# Patient Record
Sex: FEMALE | Race: WHITE | NOT HISPANIC OR LATINO | Employment: OTHER
[De-identification: names, ages, dates, MRNs, and addresses within clinical notes are randomized per-mention and may not be internally consistent; named-entity substitution may affect disease eponyms.]

---

## 2017-06-24 ENCOUNTER — HEALTH MAINTENANCE LETTER (OUTPATIENT)
Age: 42
End: 2017-06-24

## 2017-10-11 ENCOUNTER — COMMUNICATION - HEALTHEAST (OUTPATIENT)
Dept: FAMILY MEDICINE | Facility: CLINIC | Age: 42
End: 2017-10-11

## 2017-10-11 DIAGNOSIS — F33.9 MAJOR DEPRESSION, RECURRENT (H): ICD-10-CM

## 2017-11-22 ENCOUNTER — OFFICE VISIT - HEALTHEAST (OUTPATIENT)
Dept: FAMILY MEDICINE | Facility: CLINIC | Age: 42
End: 2017-11-22

## 2017-11-22 DIAGNOSIS — R11.10 VOMITING: ICD-10-CM

## 2017-11-22 DIAGNOSIS — F33.9 MAJOR DEPRESSION, RECURRENT (H): ICD-10-CM

## 2017-11-22 DIAGNOSIS — K92.1: ICD-10-CM

## 2017-11-23 ENCOUNTER — COMMUNICATION - HEALTHEAST (OUTPATIENT)
Dept: FAMILY MEDICINE | Facility: CLINIC | Age: 42
End: 2017-11-23

## 2017-11-24 LAB
GLIADIN IGA SER-ACNC: 0.2 U/ML
GLIADIN IGG SER-ACNC: <0.4 U/ML
IGA SERPL-MCNC: 71 MG/DL (ref 65–400)
TTG IGA SER-ACNC: 0.1 U/ML
TTG IGG SER-ACNC: <0.6 U/ML

## 2017-11-27 ENCOUNTER — AMBULATORY - HEALTHEAST (OUTPATIENT)
Dept: FAMILY MEDICINE | Facility: CLINIC | Age: 42
End: 2017-11-27

## 2017-11-27 ENCOUNTER — COMMUNICATION - HEALTHEAST (OUTPATIENT)
Dept: FAMILY MEDICINE | Facility: CLINIC | Age: 42
End: 2017-11-27

## 2017-11-27 DIAGNOSIS — K92.1 BLOOD IN STOOL: ICD-10-CM

## 2017-12-06 ENCOUNTER — OFFICE VISIT - HEALTHEAST (OUTPATIENT)
Dept: SURGERY | Facility: CLINIC | Age: 42
End: 2017-12-06

## 2017-12-06 DIAGNOSIS — R13.10 DYSPHAGIA, UNSPECIFIED TYPE: ICD-10-CM

## 2017-12-06 DIAGNOSIS — F17.200 SMOKING: ICD-10-CM

## 2017-12-06 DIAGNOSIS — R11.11 INTRACTABLE VOMITING WITHOUT NAUSEA, UNSPECIFIED VOMITING TYPE: ICD-10-CM

## 2017-12-06 ASSESSMENT — MIFFLIN-ST. JEOR: SCORE: 1651.05

## 2017-12-27 ASSESSMENT — MIFFLIN-ST. JEOR: SCORE: 1651.05

## 2017-12-28 ENCOUNTER — SURGERY - HEALTHEAST (OUTPATIENT)
Dept: SURGERY | Facility: AMBULATORY SURGERY CENTER | Age: 42
End: 2017-12-28

## 2017-12-28 ENCOUNTER — ANESTHESIA - HEALTHEAST (OUTPATIENT)
Dept: SURGERY | Facility: AMBULATORY SURGERY CENTER | Age: 42
End: 2017-12-28

## 2017-12-28 ENCOUNTER — TRANSFERRED RECORDS (OUTPATIENT)
Dept: HEALTH INFORMATION MANAGEMENT | Facility: CLINIC | Age: 42
End: 2017-12-28

## 2018-01-04 ENCOUNTER — COMMUNICATION - HEALTHEAST (OUTPATIENT)
Dept: FAMILY MEDICINE | Facility: CLINIC | Age: 43
End: 2018-01-04

## 2018-01-09 ENCOUNTER — COMMUNICATION - HEALTHEAST (OUTPATIENT)
Dept: SURGERY | Facility: CLINIC | Age: 43
End: 2018-01-09

## 2018-01-24 ENCOUNTER — COMMUNICATION - HEALTHEAST (OUTPATIENT)
Dept: FAMILY MEDICINE | Facility: CLINIC | Age: 43
End: 2018-01-24

## 2018-01-24 DIAGNOSIS — F33.9 MAJOR DEPRESSION, RECURRENT (H): ICD-10-CM

## 2018-01-31 ENCOUNTER — TRANSFERRED RECORDS (OUTPATIENT)
Dept: HEALTH INFORMATION MANAGEMENT | Facility: CLINIC | Age: 43
End: 2018-01-31

## 2018-01-31 ENCOUNTER — OFFICE VISIT - HEALTHEAST (OUTPATIENT)
Dept: FAMILY MEDICINE | Facility: CLINIC | Age: 43
End: 2018-01-31

## 2018-01-31 DIAGNOSIS — R10.11 RUQ PAIN: ICD-10-CM

## 2018-01-31 LAB
ALBUMIN SERPL-MCNC: 3.5 G/DL (ref 3.5–5)
ALP SERPL-CCNC: 84 U/L (ref 45–120)
ALT SERPL W P-5'-P-CCNC: 18 U/L (ref 0–45)
ANION GAP SERPL CALCULATED.3IONS-SCNC: 11 MMOL/L (ref 5–18)
AST SERPL W P-5'-P-CCNC: 21 U/L (ref 0–40)
BASOPHILS # BLD AUTO: 0 THOU/UL (ref 0–0.2)
BASOPHILS NFR BLD AUTO: 1 % (ref 0–2)
BILIRUB SERPL-MCNC: 0.7 MG/DL (ref 0–1)
BUN SERPL-MCNC: 8 MG/DL (ref 8–22)
CALCIUM SERPL-MCNC: 8.9 MG/DL (ref 8.5–10.5)
CHLORIDE BLD-SCNC: 104 MMOL/L (ref 98–107)
CO2 SERPL-SCNC: 24 MMOL/L (ref 22–31)
CREAT SERPL-MCNC: 0.74 MG/DL (ref 0.6–1.1)
EOSINOPHIL # BLD AUTO: 0.1 THOU/UL (ref 0–0.4)
EOSINOPHIL NFR BLD AUTO: 3 % (ref 0–6)
ERYTHROCYTE [DISTWIDTH] IN BLOOD BY AUTOMATED COUNT: 11 % (ref 11–14.5)
GFR SERPL CREATININE-BSD FRML MDRD: >60 ML/MIN/1.73M2
GLUCOSE BLD-MCNC: 92 MG/DL (ref 70–125)
HCT VFR BLD AUTO: 44.8 % (ref 35–47)
HGB BLD-MCNC: 15.4 G/DL (ref 12–16)
LYMPHOCYTES # BLD AUTO: 1.6 THOU/UL (ref 0.8–4.4)
LYMPHOCYTES NFR BLD AUTO: 30 % (ref 20–40)
MCH RBC QN AUTO: 32.2 PG (ref 27–34)
MCHC RBC AUTO-ENTMCNC: 34.4 G/DL (ref 32–36)
MCV RBC AUTO: 94 FL (ref 80–100)
MONOCYTES # BLD AUTO: 0.5 THOU/UL (ref 0–0.9)
MONOCYTES NFR BLD AUTO: 9 % (ref 2–10)
NEUTROPHILS # BLD AUTO: 3.2 THOU/UL (ref 2–7.7)
NEUTROPHILS NFR BLD AUTO: 58 % (ref 50–70)
PLATELET # BLD AUTO: 249 THOU/UL (ref 140–440)
PMV BLD AUTO: 7.8 FL (ref 7–10)
POTASSIUM BLD-SCNC: 3.9 MMOL/L (ref 3.5–5)
PROT SERPL-MCNC: 6.3 G/DL (ref 6–8)
RBC # BLD AUTO: 4.78 MILL/UL (ref 3.8–5.4)
SODIUM SERPL-SCNC: 139 MMOL/L (ref 136–145)
WBC: 5.5 THOU/UL (ref 4–11)

## 2018-01-31 ASSESSMENT — MIFFLIN-ST. JEOR: SCORE: 1655.59

## 2018-02-01 ENCOUNTER — AMBULATORY - HEALTHEAST (OUTPATIENT)
Dept: FAMILY MEDICINE | Facility: CLINIC | Age: 43
End: 2018-02-01

## 2018-02-01 ENCOUNTER — TRANSFERRED RECORDS (OUTPATIENT)
Dept: HEALTH INFORMATION MANAGEMENT | Facility: CLINIC | Age: 43
End: 2018-02-01

## 2018-02-01 DIAGNOSIS — R10.11 RUQ PAIN: ICD-10-CM

## 2018-02-02 ENCOUNTER — AMBULATORY - HEALTHEAST (OUTPATIENT)
Dept: FAMILY MEDICINE | Facility: CLINIC | Age: 43
End: 2018-02-02

## 2018-02-02 DIAGNOSIS — R10.11 RUQ PAIN: ICD-10-CM

## 2018-02-07 ENCOUNTER — TRANSFERRED RECORDS (OUTPATIENT)
Dept: HEALTH INFORMATION MANAGEMENT | Facility: CLINIC | Age: 43
End: 2018-02-07

## 2018-02-08 ENCOUNTER — COMMUNICATION - HEALTHEAST (OUTPATIENT)
Dept: FAMILY MEDICINE | Facility: CLINIC | Age: 43
End: 2018-02-08

## 2018-02-12 ENCOUNTER — MEDICAL CORRESPONDENCE (OUTPATIENT)
Dept: HEALTH INFORMATION MANAGEMENT | Facility: CLINIC | Age: 43
End: 2018-02-12

## 2018-02-21 ENCOUNTER — COMMUNICATION - HEALTHEAST (OUTPATIENT)
Dept: FAMILY MEDICINE | Facility: CLINIC | Age: 43
End: 2018-02-21

## 2018-02-28 ENCOUNTER — CARE COORDINATION (OUTPATIENT)
Dept: GASTROENTEROLOGY | Facility: CLINIC | Age: 43
End: 2018-02-28

## 2018-02-28 ENCOUNTER — COMMUNICATION - HEALTHEAST (OUTPATIENT)
Dept: FAMILY MEDICINE | Facility: CLINIC | Age: 43
End: 2018-02-28

## 2018-02-28 NOTE — PROGRESS NOTES
Received a  Transferred call from triage line from Dr. Hardy [facility]. Provider is requesting an urgent appt for pt to be seen due increased pain.  Pt scheduled to see Dr. Dubois. Pt has dilatated biliary duct. No records have been received.  Recommended that provider call the provider to provider to discuss pt.  Dr. Hardy talked to Dr. Piña and Dr. Piña has contacted his care coordinator.  Did call the clinic back to request records as have not received and also to have images pushed.  Arleen Piña's care coordinator notified.

## 2018-02-28 NOTE — PROGRESS NOTES
2/28/2018: New Referral from Dr. Hardy - [facility]    Per Dr. Piña- MD to MD communication:  Called by primary MD in [facility] (Dr Hardy) re RUQ pain, MRCP showing diffusely dilated CBD (11-14 mm). Normal LFTs.     Would suggest EUS here w , upload MRCP,  clinic w . 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 Seems unlikely.    2/28/2018: Per Dr. Song: Happy to see pt in clinic and do EUS in future    - Referring office called to get images of MRCP and records sent to us for review. Per Dr. Hardy the images and records will be sent. Film called to grab images.     Arleen CHACON RN Coordinator  Dr. Piña, Dr. Valencia & Dr. Song   [facility]  [phone]

## 2018-03-01 ENCOUNTER — COMMUNICATION - HEALTHEAST (OUTPATIENT)
Dept: FAMILY MEDICINE | Facility: CLINIC | Age: 43
End: 2018-03-01

## 2018-03-02 ENCOUNTER — CARE COORDINATION (OUTPATIENT)
Dept: GASTROENTEROLOGY | Facility: CLINIC | Age: 43
End: 2018-03-02

## 2018-03-02 NOTE — PROGRESS NOTES
MRI in PACS and Hard copy chart placed in clinic file.     Arleen CHACON RN Coordinator  Dr. Piña, Dr. Valencia & Dr. Song   [facility]  [phone]

## 2018-03-02 NOTE — PROGRESS NOTES
Message received from call center:     Pt stated her referring provider (Dr Hardy, [facility]) called and spoke to Dr Piña. Pt stated that Dr Piña recommended pt have an EUS prior to her GI appt here on Monday, 3/19/18 at 9:20am with Dr. Dubois. Pt was following up since she has not heard anything yet.     Pt can be reached at [phone]         Called back and explained she will need to see Dr. Song in clinic prior to EUS.   Scheduling will call her next week with date and time.   Verbalized understanding.     Arleen CHACON RN Coordinator  Dr. Piña, Dr. Valencia & Dr. Song   [facility]  [phone]

## 2018-03-06 ENCOUNTER — TELEPHONE (OUTPATIENT)
Dept: GASTROENTEROLOGY | Facility: CLINIC | Age: 43
End: 2018-03-06

## 2018-03-06 NOTE — TELEPHONE ENCOUNTER
Pt informed that she is scheduled on 04/05/2018 at 830 AM with Dr. Adhikari for a clinic consult.    Location of appt confirmed with pt.    SR 03/06/2018 @ 401

## 2018-03-12 ENCOUNTER — COMMUNICATION - HEALTHEAST (OUTPATIENT)
Dept: SCHEDULING | Facility: CLINIC | Age: 43
End: 2018-03-12

## 2018-03-12 ENCOUNTER — OFFICE VISIT - HEALTHEAST (OUTPATIENT)
Dept: FAMILY MEDICINE | Facility: CLINIC | Age: 43
End: 2018-03-12

## 2018-03-12 DIAGNOSIS — R60.0 MILD PERIPHERAL EDEMA: ICD-10-CM

## 2018-03-13 ENCOUNTER — OFFICE VISIT - HEALTHEAST (OUTPATIENT)
Dept: FAMILY MEDICINE | Facility: CLINIC | Age: 43
End: 2018-03-13

## 2018-03-13 DIAGNOSIS — M25.40 JOINT SWELLING: ICD-10-CM

## 2018-03-13 DIAGNOSIS — M13.0 POLYARTHRITIS: ICD-10-CM

## 2018-03-13 DIAGNOSIS — R53.83 FATIGUE, UNSPECIFIED TYPE: ICD-10-CM

## 2018-03-13 LAB
ALBUMIN SERPL-MCNC: 3.5 G/DL (ref 3.5–5)
ALP SERPL-CCNC: 92 U/L (ref 45–120)
ALT SERPL W P-5'-P-CCNC: 46 U/L (ref 0–45)
ANION GAP SERPL CALCULATED.3IONS-SCNC: 11 MMOL/L (ref 5–18)
AST SERPL W P-5'-P-CCNC: 45 U/L (ref 0–40)
BASOPHILS # BLD AUTO: 0.1 THOU/UL (ref 0–0.2)
BASOPHILS NFR BLD AUTO: 1 % (ref 0–2)
BILIRUB SERPL-MCNC: 0.8 MG/DL (ref 0–1)
BUN SERPL-MCNC: 9 MG/DL (ref 8–22)
C REACTIVE PROTEIN LHE: 0.7 MG/DL (ref 0–0.8)
CALCIUM SERPL-MCNC: 9 MG/DL (ref 8.5–10.5)
CCP AB SER IA-ACNC: 0.5 U/ML
CHLORIDE BLD-SCNC: 107 MMOL/L (ref 98–107)
CO2 SERPL-SCNC: 21 MMOL/L (ref 22–31)
CREAT SERPL-MCNC: 0.69 MG/DL (ref 0.6–1.1)
EOSINOPHIL COUNT (ABSOLUTE): 0.3 THOU/UL (ref 0–0.4)
EOSINOPHIL NFR BLD AUTO: 4 % (ref 0–6)
ERYTHROCYTE [DISTWIDTH] IN BLOOD BY AUTOMATED COUNT: 12.5 % (ref 11–14.5)
ERYTHROCYTE [SEDIMENTATION RATE] IN BLOOD BY WESTERGREN METHOD: 8 MM/HR (ref 0–20)
GFR SERPL CREATININE-BSD FRML MDRD: >60 ML/MIN/1.73M2
GLUCOSE BLD-MCNC: 94 MG/DL (ref 70–125)
HCT VFR BLD AUTO: 41.8 % (ref 35–47)
HGB BLD-MCNC: 14.2 G/DL (ref 12–16)
LYMPHOCYTES # BLD AUTO: 1.4 THOU/UL (ref 0.8–4.4)
LYMPHOCYTES NFR BLD AUTO: 19 % (ref 20–40)
MCH RBC QN AUTO: 31 PG (ref 27–34)
MCHC RBC AUTO-ENTMCNC: 34 G/DL (ref 32–36)
MCV RBC AUTO: 91 FL (ref 80–100)
MONOCYTES # BLD AUTO: 0.2 THOU/UL (ref 0–0.9)
MONOCYTES NFR BLD AUTO: 3 % (ref 2–10)
MYELOCYTES (ABSOLUTE): 0 THOU/UL
MYELOCYTES NFR BLD MANUAL: 1 %
OVALOCYTES: ABNORMAL
PLAT MORPH BLD: NORMAL
PLATELET # BLD AUTO: 335 THOU/UL (ref 140–440)
PMV BLD AUTO: 10.7 FL (ref 8.5–12.5)
POTASSIUM BLD-SCNC: 4.2 MMOL/L (ref 3.5–5)
PROT SERPL-MCNC: 6.1 G/DL (ref 6–8)
RBC # BLD AUTO: 4.58 MILL/UL (ref 3.8–5.4)
RHEUMATOID FACT SERPL-ACNC: <15 IU/ML (ref 0–30)
SODIUM SERPL-SCNC: 139 MMOL/L (ref 136–145)
TOTAL NEUTROPHILS-ABS(DIFF): 5.5 THOU/UL (ref 2–7.7)
TOTAL NEUTROPHILS-REL(DIFF): 73 % (ref 50–70)
TSH SERPL DL<=0.005 MIU/L-ACNC: 2.84 UIU/ML (ref 0.3–5)
URATE SERPL-MCNC: 3.8 MG/DL (ref 2–7.5)
WBC: 7.6 THOU/UL (ref 4–11)

## 2018-03-15 ENCOUNTER — COMMUNICATION - HEALTHEAST (OUTPATIENT)
Dept: FAMILY MEDICINE | Facility: CLINIC | Age: 43
End: 2018-03-15

## 2018-03-15 ENCOUNTER — TELEPHONE (OUTPATIENT)
Dept: SURGERY | Facility: CLINIC | Age: 43
End: 2018-03-15

## 2018-03-15 LAB — ANA SER QL: 0.2 U

## 2018-03-19 ENCOUNTER — RECORDS - HEALTHEAST (OUTPATIENT)
Dept: ADMINISTRATIVE | Facility: OTHER | Age: 43
End: 2018-03-19

## 2018-03-19 ENCOUNTER — APPOINTMENT (OUTPATIENT)
Dept: LAB | Facility: CLINIC | Age: 43
End: 2018-03-19
Payer: MEDICARE

## 2018-03-19 ENCOUNTER — OFFICE VISIT (OUTPATIENT)
Dept: GASTROENTEROLOGY | Facility: CLINIC | Age: 43
End: 2018-03-19
Payer: MEDICARE

## 2018-03-19 VITALS
TEMPERATURE: 97.8 F | HEART RATE: 78 BPM | SYSTOLIC BLOOD PRESSURE: 112 MMHG | HEIGHT: 68 IN | OXYGEN SATURATION: 95 % | WEIGHT: 220 LBS | BODY MASS INDEX: 33.34 KG/M2 | DIASTOLIC BLOOD PRESSURE: 72 MMHG

## 2018-03-19 DIAGNOSIS — K59.00 CONSTIPATION, UNSPECIFIED CONSTIPATION TYPE: ICD-10-CM

## 2018-03-19 DIAGNOSIS — G43.A0 CYCLICAL VOMITING WITH NAUSEA, INTRACTABILITY OF VOMITING NOT SPECIFIED: Primary | ICD-10-CM

## 2018-03-19 DIAGNOSIS — R10.11 ABDOMINAL PAIN, RIGHT UPPER QUADRANT: ICD-10-CM

## 2018-03-19 LAB
ALBUMIN SERPL-MCNC: 3.5 G/DL (ref 3.4–5)
ALP SERPL-CCNC: 89 U/L (ref 40–150)
ALT SERPL W P-5'-P-CCNC: 35 U/L (ref 0–50)
AST SERPL W P-5'-P-CCNC: 28 U/L (ref 0–45)
BILIRUB DIRECT SERPL-MCNC: 0.1 MG/DL (ref 0–0.2)
BILIRUB SERPL-MCNC: 0.5 MG/DL (ref 0.2–1.3)
IGA SERPL-MCNC: 88 MG/DL (ref 70–380)
PROT SERPL-MCNC: 6.6 G/DL (ref 6.8–8.8)

## 2018-03-19 PROCEDURE — 83516 IMMUNOASSAY NONANTIBODY: CPT | Performed by: INTERNAL MEDICINE

## 2018-03-19 PROCEDURE — 82784 ASSAY IGA/IGD/IGG/IGM EACH: CPT | Performed by: INTERNAL MEDICINE

## 2018-03-19 ASSESSMENT — ENCOUNTER SYMPTOMS
JOINT SWELLING: 1
NECK PAIN: 0
HALLUCINATIONS: 0
ALTERED TEMPERATURE REGULATION: 1
NECK MASS: 0
POLYDIPSIA: 0
EXERCISE INTOLERANCE: 1
SMELL DISTURBANCE: 0
HOARSE VOICE: 0
JAUNDICE: 0
RECTAL PAIN: 0
POSTURAL DYSPNEA: 0
SLEEP DISTURBANCES DUE TO BREATHING: 0
SHORTNESS OF BREATH: 0
HEARTBURN: 1
TASTE DISTURBANCE: 0
FATIGUE: 1
MUSCLE CRAMPS: 1
TROUBLE SWALLOWING: 1
CONSTIPATION: 0
FEVER: 1
ARTHRALGIAS: 1
CHILLS: 1
BACK PAIN: 1
SORE THROAT: 1
BLOOD IN STOOL: 1
ABDOMINAL PAIN: 1
LEG PAIN: 0
HYPERTENSION: 0
LIGHT-HEADEDNESS: 1
MYALGIAS: 1
WHEEZING: 0
PALPITATIONS: 0
COUGH: 1
HYPOTENSION: 0
MUSCLE WEAKNESS: 1
STIFFNESS: 1
INCREASED ENERGY: 0
HEMOPTYSIS: 0
BOWEL INCONTINENCE: 0
VOMITING: 1
POLYPHAGIA: 0
SNORES LOUDLY: 1
SINUS PAIN: 1
BLOATING: 1
SPUTUM PRODUCTION: 1
DYSPNEA ON EXERTION: 0
DECREASED APPETITE: 1
SWOLLEN GLANDS: 1
WEIGHT GAIN: 1
NIGHT SWEATS: 1
ORTHOPNEA: 0
COUGH DISTURBING SLEEP: 1
DIARRHEA: 0
NAUSEA: 1
BRUISES/BLEEDS EASILY: 0
SYNCOPE: 0
WEIGHT LOSS: 0
SINUS CONGESTION: 1

## 2018-03-19 ASSESSMENT — PAIN SCALES - GENERAL: PAINLEVEL: NO PAIN (0)

## 2018-03-19 NOTE — PROGRESS NOTES
Note dictated. Job code [id number].    Tam Dubois MD    [facility]  Division of Gastroenterology, Hepatology and Nutrition    Answers for HPI/ROS submitted by the patient on 3/19/2018   General Symptoms: Yes  Skin Symptoms: Yes  HENT Symptoms: Yes  EYE SYMPTOMS: No  HEART SYMPTOMS: Yes  LUNG SYMPTOMS: Yes  INTESTINAL SYMPTOMS: Yes  URINARY SYMPTOMS: No  GYNECOLOGIC SYMPTOMS: No  BREAST SYMPTOMS: No  SKELETAL SYMPTOMS: Yes  BLOOD SYMPTOMS: Yes  NERVOUS SYSTEM SYMPTOMS: No  MENTAL HEALTH SYMPTOMS: No  Fever: Yes  Loss of appetite: Yes  Weight loss: No  Weight gain: Yes  Fatigue: Yes  Night sweats: Yes  Chills: Yes  Increased stress: No  Excessive hunger: No  Excessive thirst: No  Feeling hot or cold when others believe the temperature is normal: Yes  Loss of height: No  Post-operative complications: No  Surgical site pain: No  Hallucinations: No  Change in or Loss of Energy: No  Hyperactivity: No  Confusion: No  Ear pain: No  Ear discharge: No  Tinnitus: No  Nosebleeds: No  Congestion: Yes  Sinus pain: Yes  Trouble swallowing: Yes   Voice hoarseness: No  Mouth sores: No  Sore throat: Yes  Tooth pain: No  Gum tenderness: No  Bleeding gums: No  Change in taste: No  Change in sense of smell: No  Dry mouth: No  Hearing aid used: No  Neck lump: No  Cough: Yes  Sputum or phlegm: Yes  Coughing up blood: No  Difficulty breating or shortness of breath: No  Snoring: Yes  Wheezing: No  Difficulty breathing on exertion: No  Nighttime Cough: Yes  Difficulty breathing when lying flat: No  Chest pain or pressure: No  Fast or irregular heartbeat: No  Pain in legs with walking: No  Trouble breathing while lying down: No  Fingers or toes appear blue: No  High blood pressure: No  Low blood pressure: No  Fainting: No  Murmurs: No  Pacemaker: No  Varicose veins: No  Edema or swelling: Yes  Wake up at night with shortness of breath: No  Light-headedness: Yes  Exercise intolerance: Yes  Heart burn or  indigestion: Yes  Nausea: Yes  Vomiting: Yes  Abdominal pain: Yes  Bloating: Yes  Constipation: No  Diarrhea: No  Blood in stool: Yes  Black stools: No  Rectal or Anal pain: No  Fecal incontinence: No  Yellowing of skin or eyes: No  Vomit with blood: Yes  Back pain: Yes  Muscle aches: Yes  Neck pain: No  Swollen joints: Yes  Joint pain: Yes  Bone pain: No  Muscle cramps: Yes  Muscle weakness: Yes  Joint stiffness: Yes  Bone fracture: No  Anemia: No  Swollen glands: Yes  Easy bleeding or bruising: No

## 2018-03-19 NOTE — NURSING NOTE
Printed  after visit summary given to pt along with verbal instructions on gerd precautions and other recommendations. Follow up appt and mre scheduled.

## 2018-03-19 NOTE — PATIENT INSTRUCTIONS
Nice to meet you today!    Check some liver tests today  Lab tests today at the 1st floor -- you will be notified of results by letter or my chart message in 7-10 days.  You will receive a phone call if more urgent follow up is needed.      Start miralax 1 capful per day. Increase or decrease this to have 1-2 soft BMs per day. You can take as much as 3 capfuls per day.    Schedule MR enterography to look at your small bowel  You are scheduled on March 20   Check in time 10   Nothing to eat or drink for 6 hours   [address]  Room [address]327       I will discuss if Dr. Adhikari still wants to see you in clinic first or if we can get the EUS (endoscopic ultrasound) done sooner    Follow anti-reflux precautions. But remember alcohol, smoking (tobacoc and marijuana), caffeine, soda pop can make this worse. Recommend avoiding all of these.    Follow up in 3-4 months or sooner if needed      Call with questions    For questions regarding your care Monday through Friday, contact the RN GI care coordinator,  Call   [phone] option 3 . Your call will be  returned same day, or if consultation is needed with the provider, it may be following business day - or you may send a My Chart message.    For medication refills (prescribed by the GI clinic), contact your pharmacy.    For appointment rescheduling/cancellation, contact [phone]     After hours, or if you have an immediate GI concern and cannot wait for a return call, contact the GI Fellow at [phone] and select option #4.     Thanks Carmen Jacobson RN Care Coordinator for Dr. Dubois   Phone   [phone]       GERD (Adult)    The esophagus is a tube that carries food from the mouth to the stomach. A valve at the lower end of the esophagus prevents stomach acid from flowing upward. When this valve doesn't work properly, stomach contents may repeatedly flow back up (reflux) into the esophagus. This is called gastroesophageal reflux disease  "(GERD). GERD can irritate the esophagus. It can cause problems with swallowing or breathing. In severe cases, GERD can cause recurrent pneumonia or other serious problems.  Symptoms of reflux include burning, pressure or sharp pain in the upper abdomen or mid to lower chest. The pain can spread to the neck, back, or shoulder. There may be belching, an acid taste in the back of the throat, chronic cough, or sore throat or hoarseness. GERD symptoms often occur during the day after a big meal. They can also occur at night when lying down.   Home care  Lifestyle changes can help reduce symptoms. If needed, medicines may be prescribed. Symptoms often improve with treatment, but if treatment is stopped, the symptoms often return after a few months. So most persons with GERD will need to continue treatment.  Lifestyle changes    Limit or avoid fatty, fried, and spicy foods, as well as coffee, chocolate, mint, and foods with high acid content such as tomatoes and citrus fruit and juices (orange, grapefruit, lemon).    Don t eat large meals, especially at night. Frequent, smaller meals are best. Do not lie down right after eating. And don t eat anything 3 hours before going to bed.    Avoid drinking alcohol and smoking. As much as possible, stay away from second hand smoke.    If you are overweight, losing weight will reduce symptoms.     Avoid wearing tight clothing around your stomach area.    If your symptoms occur during sleep, use a foam wedge to elevate your upper body (not just your head.) Or, place 4\" blocks under the head of your bed.  Medicines  If needed, medicines can help relieve the symptoms of GERD and prevent damage to the esophagus. Discuss a medicine plan with your healthcare provider. This may include one or more of the following medicines:    Antacids to help neutralize the normal acids in your stomach.    Acid blockers (H2 blockers) to decrease acid production.    Acid inhibitors (PPIs) to decrease acid " production in a different way than the blockers. They may work better, but can take a little longer to take effect.  Take an antacid 30-60 minutes after eating and at bedtime, but not at the same time as an acid blocker.  Try not to take medicines such as ibuprofen and aspirin. If you are taking aspirin for your heart or other medical reasons, talk to your healthcare provider about stopping it.  Follow-up care  Follow up with your healthcare provider or as advised by our staff.  When to seek medical advice  Call your healthcare provider if any of the following occur:    Stomach pain gets worse or moves to the lower right abdomen (appendix area)    Chest pain appears or gets worse, or spreads to the back, neck, shoulder, or arm    Frequent vomiting (can t keep down liquids)    Blood in the stool or vomit (red or black in color)    Feeling weak or dizzy    Fever of 100.4 F (38 C) or higher, or as directed by your healthcare provider  Date Last Reviewed: 6/23/2015 2000-2017 [employer]. [address] [place] [address], [place] [address]. All rights reserved. This information is not intended as a substitute for professional medical care. Always follow your healthcare professional's instructions.

## 2018-03-19 NOTE — PROGRESS NOTES
Service Date: 03/19/2018      REFERRING/PRIMARY CARE PROVIDER:   Veronica Hardy MD, from [facility].      REASON FOR REFERRAL:  Right upper quadrant abdominal discomfort and dilated common bile duct.      CHIEF COMPLAINT:  Right upper quadrant abdominal discomfort in the setting of multiple chronic GI symptoms.      HISTORY OF PRESENT ILLNESS:  Vianca is a very pleasant 42-year-old female with a many-year history of chronic GI symptoms who is here today to discuss a couple months of worsening right upper quadrant discomfort.      The patient notes that around [place] time she developed right upper quadrant discomfort.  She states the pain is right underneath her rib cage.  She feels that the area is swollen and there is a pressure discomfort.  Since Miquel this has become more frequent and more prominent.  She reports that her symptoms are almost daily now and the symptoms can last anywhere between 15-20 minutes to several hours.  She also notes that this pain can wake her up at night and it can be difficult to have discomfort.  She does not believe that this pain is worse with eating.  She also does not believe that it is related to her bowel habits in any way.  Her primary care provider ordered an abdominal ultrasound which showed no gallstones.  There was segmental thickening of the common bile duct measuring between 10 and 14 mm.  There was no intrahepatic biliary dilatation and no choledocholithiasis.  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  The patient did have liver tests done last week and her transaminases were very mildly elevated with an AST of 45, ALT of 46.  These have been otherwise normal previously.  Bilirubin and alkaline phosphatase were all normal as well as albumin.      The patient reports that she has had years of  other GI issues.  She has a long history of nausea and vomiting.  She has a long history of reflux.  She did have an EGD in December which showed some reflux esophagitis.  She also has a long history of constipation and on average only goes to the bathroom about 2 times per week.  She reports the Hay Stool score of 5.  She occasionally has small amounts of red blood in her stool.  She denies having any straining.  She has had multiple colonoscopies.  Most recent colonoscopy in December showed two polyps in your left colon, one was hyperplastic and one was a tubular adenoma.  She did have a sessile serrated adenoma polyp on a colonoscopy a couple years ago.  Her ileum has been normal on colonoscopies before.      The patient does drink a large like 20 ounce coffee a day with creamer.  She also drinks 1-2 diet sodas a day.  She also drinks 3-4 beers 2-3 times per week.  She occasionally will have hard alcohol on top of that.  She also smokes a half pack per day and smokes marijuana a couple of times per day.      The patient is taking omeprazole 40 mg a day for years.      The patient denies any rashes, any mouth sores or eye pain or eye redness.  She has had some joint pains in her wrists and in her hands as well as her knees and her legs.  She has had some swelling in her extremities but is not sure if it is related to her joints themselves.  She does have a mother who had a history of Crohn's disease with fistula disease.      REVIEW OF SYSTEMS:  A complete review of systems performed.  Pertinent positives and negatives are as stated above in the HPI.  The remainder of a complete review of systems is unremarkable.      PAST MEDICAL HISTORY:   1.  Tobacco abuse.   2.  Chronic cannabis abuse.   3.  History of depression.   4.  History of anxiety.   5.  History of headaches.   6.  History of insomnia.   7.  Chronic constipation.      MEDICATIONS:   1.  Magnesium oxide.   2.  Omeprazole 40 mg per day.   3.  Lexapro  20 mg per day.      ALLERGIES:  Avocado, Chantix.      FAMILY HISTORY:  Mother with history of Crohn's disease, multiple extended family members with coronary artery disease and heart attacks but no first-degree relatives.  Depression in her birth mother and high blood pressure in her birth mother.  No history of colon cancer or colon polyps in the family.  No history of liver or pancreas problems in the family.      PHYSICAL EXAMINATION:   VITAL SIGNS:  Weight is 220 pounds.  Height is 5 feet 8 inches, satting 95% on room air, pulse 78, temperature is 98.8, blood pressure 112/72.   GENERAL:  She is pleasant, in no acute distress.   HEENT:  Head is atraumatic, normocephalic.  Sclerae are anicteric without injection.  Oropharynx is clear with moist mucous membranes.   NECK:  Supple.  There is no lymphadenopathy, no thyromegaly.   HEART:  Normal rate.   RESPIRATIONS:  She is breathing comfortably.   ABDOMEN:  Soft, nondistended.  Minimal tenderness in the right upper quadrant.  Negative Mueller's.  She also does have a mildly positive worsening pain when she tightens her abdominal muscles.  No rebound or guarding.   EXTREMITIES:  No clubbing, cyanosis or edema.   SKIN:  No evidence of rash.   JOINTS:  No evidence of synovitis.   NEUROLOGIC:  Awake, alert and oriented x3 with no focal deficits.      LABORATORY DATA:  Reviewed in Care Everywhere, her AST and ALT are mildly elevated as stated above.  The rest of LFTs are normal.  Lipase is normal.  CBC is normal.        A colonoscopy and endoscopy are reviewed as above.        MRCP and ultrasound are reviewed as stated above.      ASSESSMENT AND PLAN:  Jasmine is a very pleasant 42-year-old female with a longstanding history of GI symptoms of constipation, nausea and vomiting who is seen today for a couple months of right upper quadrant discomfort associated with a dilated common bile duct.     1.  Dilated common bile duct.  I think it would be reasonable to pursue an  endoscopic ultrasound.  The patient is scheduled to see Dr. Guru Song next month to discuss this.  I will speak with him to see if we can move forward with the endoscopic ultrasound at a sooner date.  Her transaminases were mildly elevated last week and we will recheck these today.  She does report occasional pale stools and some itching. Further recommendations will be made after I speak with Dr. Song.  If nothing significant is found on endoscopic ultrasound, certainly her symptoms could be related to a functional GI disorder.     2.  Chronic constipation.  The patient has a long history of constipation.  She was seen 5 years ago in our GI Clinic and recommended MiraLax but she does not think she ever tried that.  I recommend she start MiraLax and take this 1-3 times a day to have 1-2 soft bowel movements per day.  If this does not work, we can consider something like Amitiza or Linzess as well as referral to [facility].     3.  Chronic nausea, vomiting and reflux.  I think that her symptoms could be improved greatly if she modified her lifestyle.  She should avoid tobacco.  She should avoid all alcohol.  She should avoid all caffeine and she should follow closely antireflux precautions.  Certainly weight loss can be helpful as well.  Keeping the head of her bed elevated and avoiding eating before going to bed would be helpful too.  Also, smoking cessation and marijuana cessation will be helpful.  If all of these things are done for a period of 2-3 months and there is no improvement in her symptoms, we can consider further evaluation with a gastric emptying study.      Thank you very much for the opportunity to take part in the care of this patient.  Please do not hesitate to call with questions.      cc:  Guru Nohemi MD.      MD Veronica Dubose MD, from [facility]         MIGEL MCBRIDE MD             D: 03/19/2018   T: 03/19/2018   MT: MICAELA       Name:     GERARDO BLANCO   MRN:      [id number]        Account:      [id number]   :      1975           Service Date: 2018      Document: [id number]

## 2018-03-19 NOTE — LETTER
3/19/2018       RE: Vianca Kumar  [address]  [facility] [address]     Dear Colleague,    Thank you for referring your patient, Vianca Kumar, to the [facility] GASTROENTEROLOGY AND IBD CLINIC at [facility]. Please see a copy of my visit note below.    Service Date: 03/19/2018      REFERRING/PRIMARY CARE PROVIDER:   Veronica Hardy MD, from [facility].      REASON FOR REFERRAL:  Right upper quadrant abdominal discomfort and dilated common bile duct.      CHIEF COMPLAINT:  Right upper quadrant abdominal discomfort in the setting of multiple chronic GI symptoms.      HISTORY OF PRESENT ILLNESS:  Vianca is a very pleasant 42-year-old female with a many-year history of chronic GI symptoms who is here today to discuss a couple months of worsening right upper quadrant discomfort.      The patient notes that around [place] time she developed right upper quadrant discomfort.  She states the pain is right underneath her rib cage.  She feels that the area is swollen and there is a pressure discomfort.  Since [place] this has become more frequent and more prominent.  She reports that her symptoms are almost daily now and the symptoms can last anywhere between 15-20 minutes to several hours.  She also notes that this pain can wake her up at night and it can be difficult to have discomfort.  She does not believe that this pain is worse with eating.  She also does not believe that it is related to her bowel habits in any way.  Her primary care provider ordered an abdominal ultrasound which showed no gallstones.  There was segmental thickening of the common bile duct measuring between 10 and 14 mm.  There was no intrahepatic biliary dilatation and no choledocholithiasis.  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  The patient did have liver tests done last week and her transaminases were very mildly elevated with an AST of 45, ALT of 46.  These have been otherwise normal previously.  Bilirubin and alkaline phosphatase were all normal as well as albumin.      The patient reports that she has had years of other GI issues.  She has a long history of nausea and vomiting.  She has a long history of reflux.  She did have an EGD in December which showed some reflux esophagitis.  She also has a long history of constipation and on average only goes to the bathroom about 2 times per week.  She reports the [place] Stool score of 5.  She occasionally has small amounts of red blood in her stool.  She denies having any straining.  She has had multiple colonoscopies.  Most recent colonoscopy in December showed two polyps in your left colon, one was hyperplastic and one was a tubular adenoma.  She did have a sessile serrated adenoma polyp on a colonoscopy a couple years ago.  Her ileum has been normal on colonoscopies before.      The patient does drink a large like 20 ounce coffee a day with creamer.  She also drinks 1-2 diet sodas a day.  She also drinks 3-4 beers 2-3 times per week.  She occasionally will have hard alcohol on top of that.  She also smokes a half pack per day and smokes marijuana a couple of times per day.      The patient is taking omeprazole 40 mg a day for years.      The patient denies any rashes, any mouth sores or eye pain or eye redness.  She has had some joint pains in her wrists and in her hands as well as her knees and her legs.  She has had some swelling in her extremities but is not sure if it is related to her joints themselves.  She does have a mother who had a history of Crohn's disease with fistula disease.      REVIEW OF SYSTEMS:  A complete review of systems performed.  Pertinent positives and negatives are as stated above in the HPI.  The remainder of a complete review of systems is  unremarkable.      PAST MEDICAL HISTORY:   1.  Tobacco abuse.   2.  Chronic cannabis abuse.   3.  History of depression.   4.  History of anxiety.   5.  History of headaches.   6.  History of insomnia.   7.  Chronic constipation.      MEDICATIONS:   1.  Magnesium oxide.   2.  Omeprazole 40 mg per day.   3.  Lexapro 20 mg per day.      ALLERGIES:  Avocado, Chantix.      FAMILY HISTORY:  Mother with history of Crohn's disease, multiple extended family members with coronary artery disease and heart attacks but no first-degree relatives.  Depression in her birth mother and high blood pressure in her birth mother.  No history of colon cancer or colon polyps in the family.  No history of liver or pancreas problems in the family.      PHYSICAL EXAMINATION:   VITAL SIGNS:  Weight is 220 pounds.  Height is 5 feet 8 inches, satting 95% on room air, pulse 78, temperature is 98.8, blood pressure 112/72.   GENERAL:  She is pleasant, in no acute distress.   HEENT:  Head is atraumatic, normocephalic.  Sclerae are anicteric without injection.  Oropharynx is clear with moist mucous membranes.   NECK:  Supple.  There is no lymphadenopathy, no thyromegaly.   HEART:  Normal rate.   RESPIRATIONS:  She is breathing comfortably.   ABDOMEN:  Soft, nondistended.  Minimal tenderness in the right upper quadrant.  Negative Mueller's.  She also does have a mildly positive worsening pain when she tightens her abdominal muscles.  No rebound or guarding.   EXTREMITIES:  No clubbing, cyanosis or edema.   SKIN:  No evidence of rash.   JOINTS:  No evidence of synovitis.   NEUROLOGIC:  Awake, alert and oriented x3 with no focal deficits.      LABORATORY DATA:  Reviewed in Care Everywhere, her AST and ALT are mildly elevated as stated above.  The rest of LFTs are normal.  Lipase is normal.  CBC is normal.        A colonoscopy and endoscopy are reviewed as above.        MRCP and ultrasound are reviewed as stated above.      ASSESSMENT AND PLAN:  Jasmine  is a very pleasant 42-year-old female with a longstanding history of GI symptoms of constipation, nausea and vomiting who is seen today for a couple months of right upper quadrant discomfort associated with a dilated common bile duct.     1.  Dilated common bile duct.  I think it would be reasonable to pursue an endoscopic ultrasound.  The patient is scheduled to see Dr. Guru Song next month to discuss this.  I will speak with him to see if we can move forward with the endoscopic ultrasound at a sooner date.  Her transaminases were mildly elevated last week and we will recheck these today.  She does report occasional pale stools and some itching. Further recommendations will be made after I speak with Dr. Song.  If nothing significant is found on endoscopic ultrasound, certainly her symptoms could be related to a functional GI disorder.     2.  Chronic constipation.  The patient has a long history of constipation.  She was seen 5 years ago in our GI Clinic and recommended MiraLax but she does not think she ever tried that.  I recommend she start MiraLax and take this 1-3 times a day to have 1-2 soft bowel movements per day.  If this does not work, we can consider something like Amitiza or Linzess as well as referral to [facility].     3.  Chronic nausea, vomiting and reflux.  I think that her symptoms could be improved greatly if she modified her lifestyle.  She should avoid tobacco.  She should avoid all alcohol.  She should avoid all caffeine and she should follow closely antireflux precautions.  Certainly weight loss can be helpful as well.  Keeping the head of her bed elevated and avoiding eating before going to bed would be helpful too.  Also, smoking cessation and marijuana cessation will be helpful.  If all of these things are done for a period of 2-3 months and there is no improvement in her symptoms, we can consider further evaluation with a gastric emptying study.      Thank you  very much for the opportunity to take part in the care of this patient.  Please do not hesitate to call with questions.      cc:  Guru Nohemi MD.      MD Veronica Dubose MD, from [facility]     D: 2018   T: 2018   MT: MICAELA      Name:     GERARDO BLANCO   MRN:      [id number]        Account:      [id number]   :      1975           Service Date: 2018      Document: [id number]        Again, thank you for allowing me to participate in the care of your patient.      Sincerely,    Tam Dubois MD

## 2018-03-19 NOTE — MR AVS SNAPSHOT
After Visit Summary   3/19/2018    Vianca Kumar    MRN: [id number]           Patient Information     Date Of Birth          1975        Visit Information        Provider Department      3/19/2018 9:20 AM Tam Dubois MD [facility] Gastroenterology [facility] IBD [facility]        Today's Diagnoses     Cyclical vomiting with nausea, intractability of vomiting not specified    -  1    Abdominal pain, right upper quadrant        Constipation, unspecified constipation type          Care Instructions    Nice to meet you today!    Check some liver tests today  Lab tests today at the 1st floor -- you will be notified of results by letter or my chart message in 7-10 days.  You will receive a phone call if more urgent follow up is needed.      Start miralax 1 capful per day. Increase or decrease this to have 1-2 soft BMs per day. You can take as much as 3 capfuls per day.    Schedule MR enterography to look at your small bowel  You are scheduled on March 20   Check in time 10   Nothing to eat or drink for 6 hours   [address]  [address]       I will discuss if Dr. Adhikari still wants to see you in clinic first or if we can get the EUS (endoscopic ultrasound) done sooner    Follow anti-reflux precautions. But remember alcohol, smoking (tobacoc and marijuana), caffeine, soda pop can make this worse. Recommend avoiding all of these.    Follow up in 3-4 months or sooner if needed      Call with questions    For questions regarding your care Monday through Friday, contact the RN GI care coordinator,  Call   [phone] option 3 . Your call will be  returned same day, or if consultation is needed with the provider, it may be following business day - or you may send a My Chart message.    For medication refills (prescribed by the GI clinic), contact your pharmacy.    For appointment rescheduling/cancellation, contact [phone]     After hours, or if you have an immediate GI  concern and cannot wait for a return call, contact the GI Fellow at [phone] and select option #4.     Thanks Carmen Jacobson RN Care Coordinator for Dr. Dubois   Phone   [phone]       GERD (Adult)    The esophagus is a tube that carries food from the mouth to the stomach. A valve at the lower end of the esophagus prevents stomach acid from flowing upward. When this valve doesn't work properly, stomach contents may repeatedly flow back up (reflux) into the esophagus. This is called gastroesophageal reflux disease (GERD). GERD can irritate the esophagus. It can cause problems with swallowing or breathing. In severe cases, GERD can cause recurrent pneumonia or other serious problems.  Symptoms of reflux include burning, pressure or sharp pain in the upper abdomen or mid to lower chest. The pain can spread to the neck, back, or shoulder. There may be belching, an acid taste in the back of the throat, chronic cough, or sore throat or hoarseness. GERD symptoms often occur during the day after a big meal. They can also occur at night when lying down.   Home care  Lifestyle changes can help reduce symptoms. If needed, medicines may be prescribed. Symptoms often improve with treatment, but if treatment is stopped, the symptoms often return after a few months. So most persons with GERD will need to continue treatment.  Lifestyle changes    Limit or avoid fatty, fried, and spicy foods, as well as coffee, chocolate, mint, and foods with high acid content such as tomatoes and citrus fruit and juices (orange, grapefruit, lemon).    Don t eat large meals, especially at night. Frequent, smaller meals are best. Do not lie down right after eating. And don t eat anything 3 hours before going to bed.    Avoid drinking alcohol and smoking. As much as possible, stay away from second hand smoke.    If you are overweight, losing weight will reduce symptoms.     Avoid wearing tight clothing around your stomach area.    If your  "symptoms occur during sleep, use a foam wedge to elevate your upper body (not just your head.) Or, place 4\" blocks under the head of your bed.  Medicines  If needed, medicines can help relieve the symptoms of GERD and prevent damage to the esophagus. Discuss a medicine plan with your healthcare provider. This may include one or more of the following medicines:    Antacids to help neutralize the normal acids in your stomach.    Acid blockers (H2 blockers) to decrease acid production.    Acid inhibitors (PPIs) to decrease acid production in a different way than the blockers. They may work better, but can take a little longer to take effect.  Take an antacid 30-60 minutes after eating and at bedtime, but not at the same time as an acid blocker.  Try not to take medicines such as ibuprofen and aspirin. If you are taking aspirin for your heart or other medical reasons, talk to your healthcare provider about stopping it.  Follow-up care  Follow up with your healthcare provider or as advised by our staff.  When to seek medical advice  Call your healthcare provider if any of the following occur:    Stomach pain gets worse or moves to the lower right abdomen (appendix area)    Chest pain appears or gets worse, or spreads to the back, neck, shoulder, or arm    Frequent vomiting (can t keep down liquids)    Blood in the stool or vomit (red or black in color)    Feeling weak or dizzy    Fever of 100.4 F (38 C) or higher, or as directed by your healthcare provider  Date Last Reviewed: 6/23/2015 2000-2017 [employer]. [address] [place] [address]. All rights reserved. This information is not intended as a substitute for professional medical care. Always follow your healthcare professional's instructions.                Follow-ups after your visit        Your next 10 appointments already scheduled     Mar 20, 2018 11:00 AM CDT   (Arrive by 10:45 AM)   MR ENTEROGRAPHY with UUMR1   [facility] " ([facility] [place])    [address] [id number] [phone]           Take your medicines as usual, unless your doctor tells you not to. Bring a list of your current medicines to your exam (including vitamins, minerals and over-the-counter drugs).  You may or may not receive intravenous (IV) contrast for this exam pending the discretion of the Radiologist.  You will be asked to drink oral contrast for this exam. You will be given the oral contrast in MRI prior to your exam. Please arrive 60-90 minutes before your exam time to drink the oral contrast. To prepare:   Do not eat or drink for 6 hours before the exam. If you need to take medicine, you may take it with small sips of water. (We may ask you to take liquid medicine as well.)  The MRI machine uses a strong magnet. Please wear clothes without metal (snaps, zippers). A sweatsuit works well, or we may give you a hospital gown.  Please remove any body piercings and hair extensions before you arrive. You will also remove watches, jewelry, hairpins, wallets, dentures, partial dental plates and hearing aids. You may wear contact lenses, and you may be able to wear your rings. We have a safe place to keep your personal items, but it is safer to leave them at home.  If you have any questions, please contact your Imaging Department exam site.            Apr 05, 2018  8:30 AM CDT   (Arrive by 8:15 AM)   New Patient Visit with Guru Hallie Song MD   [facility] ([facility])    [address] [place] [id number] [phone]            Jun 18, 2018 11:40 AM CDT   (Arrive by 11:25 AM)   Return Visit with Tee Rodgers PA-C   [facility] IBD [facility] ([facility])    [address] [place] [id number] [phone]              Future tests that were  "ordered for you today     Open Future Orders        Priority Expected Expires Ordered    MR Enterography Routine  3/19/2019 3/19/2018            Who to contact     Please call your clinic at [phone] to:    Ask questions about your health    Make or cancel appointments    Discuss your medicines    Learn about your test results    Speak to your doctor            Additional Information About Your Visit        [employer] Information     [employer] gives you secure access to your electronic health record. If you see a primary care provider, you can also send messages to your care team and make appointments. If you have questions, please call your primary care clinic.  If you do not have a primary care provider, please call [phone] and they will assist you.      [employer] is an electronic gateway that provides easy, online access to your medical records. With [employer], you can request a clinic appointment, read your test results, renew a prescription or communicate with your care team.     To access your existing account, please contact your [facility] or call [phone] for assistance.        Care EveryWhere ID     This is your Care EveryWhere ID. This could be used by other organizations to access your [place] medical records  [id number]        Your Vitals Were     Pulse Temperature Height Pulse Oximetry BMI (Body Mass Index)       78 97.8  F (36.6  C) 1.727 m (5' 8\") 95% 33.45 kg/m2        Blood Pressure from Last 3 Encounters:   03/19/18 112/72   06/02/14 114/71   03/01/13 104/71    Weight from Last 3 Encounters:   03/19/18 99.8 kg (220 lb)   06/02/14 86.2 kg (190 lb)   03/01/13 84.4 kg (186 lb)              We Performed the Following     Hepatic panel     IgA     Tissue transglutaminase xavier IgA and IgG          Today's Medication Changes          These changes are accurate as of 3/19/18  9:59 AM.  If you have any questions, ask your nurse or doctor.               Stop taking these " medicines if you haven't already. Please contact your care team if you have questions.     atovaquone 750 MG/5ML suspension   Commonly known as:  MEPRON   Stopped by:  Tam Dubois MD           B-12 1000 MCG Caps   Stopped by:  Tam Dubois MD           CEFUROXIME AXETIL PO   Stopped by:  Tam Dubois MD           clonazePAM 1 MG tablet   Commonly known as:  klonoPIN   Stopped by:  Tam Dubois MD           DOXYCYCLINE HYCLATE PO   Stopped by:  Tam Dubois MD           FISH OIL   Stopped by:  Tam Dubois MD           LECITHIN PO   Stopped by:  Tam Dubois MD           LEVOFLOXACIN PO   Stopped by:  Tam Dubois MD           LUNESTA 3 MG tablet   Generic drug:  eszopiclone   Stopped by:  Tam Dubois MD           MICROGESTIN 1-20 MG-MCG per tablet   Generic drug:  norethindrone-ethinyl estradiol   Stopped by:  Tam Dubois MD           polyethylene glycol Packet   Commonly known as:  MIRALAX/GLYCOLAX   Stopped by:  Tam Dubois MD           SULFAMETHOXAZOLE-TRIMETHOPRIM PO   Stopped by:  Tam Dubois MD           vitamin D 62888 UNIT capsule   Commonly known as:  ERGOCALCIFEROL   Stopped by:  Tam Dubois MD                    Primary Care Provider Office Phone # Fax #    Veronica ARROYO MD Hayley [phone]       [place] [address]        Equal Access to Services     [facility] AH: Hadii aad ku hadasho Soomaali, waaxda luqadaha, qaybta kaalmada adeegyada, waxay idiin hayaan adeeg kharash la'aan . So [facility] [phone].    ATENCIÓN: Si rigola suzie, tiene a ballard disposición servicios gratuitos de asistencia lingüística. Llame al [phone].    We comply with applicable federal civil rights laws and [place] laws. We do not discriminate on the basis of race, color, national origin, age,  disability, sex, sexual orientation, or gender identity.            Thank you!     Thank you for choosing [facility] GASTROENTER[facility]INIC  for your care. Our goal is always to provide you with excellent care. Hearing back from our patients is one way we can continue to improve our services. Please take a few minutes to complete the written survey that you may receive in the mail after your visit with us. Thank you!             Your Updated Medication List - Protect others around you: Learn how to safely use, store and throw away your medicines at www.disposemymeds.org.          This list is accurate as of 3/19/18  9:59 AM.  Always use your most recent med list.                   Brand Name Dispense Instructions for use Diagnosis    escitalopram 20 MG tablet    LEXAPRO     Take 20 mg by mouth daily.        MAGNESIUM OXIDE PO           omeprazole 40 MG capsule    priLOSEC     Take  by mouth daily.

## 2018-03-20 ENCOUNTER — HOSPITAL ENCOUNTER (OUTPATIENT)
Dept: MRI IMAGING | Facility: CLINIC | Age: 43
Discharge: HOME | End: 2018-03-20
Attending: INTERNAL MEDICINE | Admitting: INTERNAL MEDICINE
Payer: MEDICARE

## 2018-03-20 DIAGNOSIS — K59.00 CONSTIPATION, UNSPECIFIED CONSTIPATION TYPE: ICD-10-CM

## 2018-03-20 DIAGNOSIS — G43.A0 CYCLICAL VOMITING WITH NAUSEA, INTRACTABILITY OF VOMITING NOT SPECIFIED: ICD-10-CM

## 2018-03-20 DIAGNOSIS — R10.11 ABDOMINAL PAIN, RIGHT UPPER QUADRANT: ICD-10-CM

## 2018-03-20 LAB
TTG IGA SER-ACNC: <1 U/ML
TTG IGG SER-ACNC: 1 U/ML

## 2018-03-20 PROCEDURE — A9585 GADOBUTROL INJECTION: HCPCS | Performed by: INTERNAL MEDICINE

## 2018-03-20 PROCEDURE — 25000128 H RX IP 250 OP 636: Performed by: INTERNAL MEDICINE

## 2018-03-20 PROCEDURE — 72197 MRI PELVIS W/O & W/DYE: CPT

## 2018-03-20 RX ORDER — GADOBUTROL 604.72 MG/ML
10 INJECTION INTRAVENOUS ONCE
Status: COMPLETED | OUTPATIENT
Start: 2018-03-20 | End: 2018-03-20

## 2018-03-20 RX ADMIN — GADOBUTROL 10 ML: 604.72 INJECTION INTRAVENOUS at 11:44

## 2018-03-20 RX ADMIN — GLUCAGON HYDROCHLORIDE 0.5 MG: 1 INJECTION, POWDER, FOR SOLUTION INTRAMUSCULAR; INTRAVENOUS; SUBCUTANEOUS at 11:32

## 2018-03-23 ENCOUNTER — CARE COORDINATION (OUTPATIENT)
Dept: GASTROENTEROLOGY | Facility: CLINIC | Age: 43
End: 2018-03-23

## 2018-03-23 DIAGNOSIS — K63.9 THICKENED SMALL BOWEL: Primary | ICD-10-CM

## 2018-03-26 ENCOUNTER — COMMUNICATION - HEALTHEAST (OUTPATIENT)
Dept: FAMILY MEDICINE | Facility: CLINIC | Age: 43
End: 2018-03-26

## 2018-03-26 ENCOUNTER — CARE COORDINATION (OUTPATIENT)
Dept: GASTROENTEROLOGY | Facility: CLINIC | Age: 43
End: 2018-03-26

## 2018-03-26 ENCOUNTER — TELEPHONE (OUTPATIENT)
Dept: GASTROENTEROLOGY | Facility: CLINIC | Age: 43
End: 2018-03-26

## 2018-03-26 DIAGNOSIS — R93.5 ABNORMAL MRI OF ABDOMEN: Primary | ICD-10-CM

## 2018-03-26 NOTE — PROGRESS NOTES
Vianca called back, she is aawre of plan for EUS and will get pre-op physical done through her primary.   She is not on any blood thinners, no ASA and is not diabetic.     She will expect a call with date and time for procedure with Dr. Song.     Arleen CHACON RN Coordinator  Dr. Piña, Dr. Valencia & Dr. Song   Advanced Endoscopy  [phone]

## 2018-03-26 NOTE — PROGRESS NOTES
Message received from Dr. Sterling:   Tam   I can do EUS on 4/3 in [address] under [facility]. I am forwarding this to our coordinator.    Order placed for EUS and sent to endo scheduling.   -Message left for her to call back to review plan.       Arleen CHACON RN Coordinator  Dr. Piña, Dr. Valencia & Dr. Song   Advanced Endoscopy  [phone]

## 2018-03-27 ENCOUNTER — TELEPHONE (OUTPATIENT)
Dept: GASTROENTEROLOGY | Facility: CLINIC | Age: 43
End: 2018-03-27

## 2018-03-29 ENCOUNTER — OFFICE VISIT - HEALTHEAST (OUTPATIENT)
Dept: FAMILY MEDICINE | Facility: CLINIC | Age: 43
End: 2018-03-29

## 2018-03-29 ENCOUNTER — TELEPHONE (OUTPATIENT)
Dept: GASTROENTEROLOGY | Facility: CLINIC | Age: 43
End: 2018-03-29

## 2018-03-29 DIAGNOSIS — Z01.818 ENCOUNTER FOR PREOPERATIVE EXAMINATION FOR GENERAL SURGICAL PROCEDURE: ICD-10-CM

## 2018-03-29 DIAGNOSIS — K21.00 REFLUX ESOPHAGITIS: ICD-10-CM

## 2018-03-29 LAB
ALBUMIN UR-MCNC: NEGATIVE MG/DL
APPEARANCE UR: CLEAR
BILIRUB UR QL STRIP: NEGATIVE
COLOR UR AUTO: YELLOW
GLUCOSE UR STRIP-MCNC: NEGATIVE MG/DL
HCG UR QL: NEGATIVE
HGB UR QL STRIP: NEGATIVE
KETONES UR STRIP-MCNC: NEGATIVE MG/DL
LEUKOCYTE ESTERASE UR QL STRIP: NEGATIVE
NITRATE UR QL: NEGATIVE
PH UR STRIP: 5.5 [PH] (ref 5–8)
SP GR UR STRIP: 1.01 (ref 1–1.03)
SP GR UR STRIP: 1.01 (ref 1–1.03)
UROBILINOGEN UR STRIP-ACNC: NORMAL

## 2018-03-29 NOTE — TELEPHONE ENCOUNTER
Patient scheduled for EUS and video capsule     Indication for procedure. Thickened small bowel     Referring Provider. Tam Dubois MD    ? No     Arrival time verified? Patient instructed to arrive at 1000.     Facility location verified? [address]     Instructions given regarding prep and procedure. Patient denied prep review. Transportation policy reviewed; patient is working on a ride home.     Prep Type Miralax prep for video capsule.     Are you taking any anticoagulants or blood thinners? Denies     Instructions given? Yes.     Electronic implanted devices? Denies     Pre procedure teaching completed? Yes    Transportation from procedure? Working on a ride.     H&P / Pre op physical completed? Scheduled for today.     Haseeb Posada RN

## 2018-04-03 ENCOUNTER — HOSPITAL ENCOUNTER (OUTPATIENT)
Facility: CLINIC | Age: 43
Discharge: HOME | End: 2018-04-03
Attending: INTERNAL MEDICINE | Admitting: INTERNAL MEDICINE
Payer: MEDICARE

## 2018-04-03 ENCOUNTER — ANESTHESIA EVENT (OUTPATIENT)
Dept: GASTROENTEROLOGY | Facility: CLINIC | Age: 43
End: 2018-04-03
Payer: MEDICARE

## 2018-04-03 ENCOUNTER — CARE COORDINATION (OUTPATIENT)
Dept: SURGERY | Facility: CLINIC | Age: 43
End: 2018-04-03

## 2018-04-03 ENCOUNTER — ANESTHESIA (OUTPATIENT)
Dept: GASTROENTEROLOGY | Facility: CLINIC | Age: 43
End: 2018-04-03
Payer: MEDICARE

## 2018-04-03 VITALS
TEMPERATURE: 97.5 F | WEIGHT: 220 LBS | SYSTOLIC BLOOD PRESSURE: 100 MMHG | BODY MASS INDEX: 33.34 KG/M2 | HEIGHT: 68 IN | RESPIRATION RATE: 9 BRPM | HEART RATE: 96 BPM | DIASTOLIC BLOOD PRESSURE: 82 MMHG | OXYGEN SATURATION: 97 %

## 2018-04-03 PROCEDURE — 43237 ENDOSCOPIC US EXAM ESOPH: CPT | Performed by: INTERNAL MEDICINE

## 2018-04-03 PROCEDURE — 43259 EGD US EXAM DUODENUM/JEJUNUM: CPT | Performed by: INTERNAL MEDICINE

## 2018-04-03 PROCEDURE — 25000128 H RX IP 250 OP 636: Performed by: NURSE ANESTHETIST, CERTIFIED REGISTERED

## 2018-04-03 PROCEDURE — 25000125 ZZHC RX 250: Performed by: NURSE ANESTHETIST, CERTIFIED REGISTERED

## 2018-04-03 PROCEDURE — 91110 GI TRC IMG INTRAL ESOPH-ILE: CPT | Performed by: INTERNAL MEDICINE

## 2018-04-03 PROCEDURE — 37000008 ZZH ANESTHESIA TECHNICAL FEE, 1ST 30 MIN: Performed by: INTERNAL MEDICINE

## 2018-04-03 PROCEDURE — 37000009 ZZH ANESTHESIA TECHNICAL FEE, EACH ADDTL 15 MIN: Performed by: INTERNAL MEDICINE

## 2018-04-03 PROCEDURE — 25000132 ZZH RX MED GY IP 250 OP 250 PS 637: Mod: GY | Performed by: INTERNAL MEDICINE

## 2018-04-03 RX ORDER — PROPOFOL 10 MG/ML
INJECTION, EMULSION INTRAVENOUS PRN
Status: DISCONTINUED | OUTPATIENT
Start: 2018-04-03 | End: 2018-04-03

## 2018-04-03 RX ORDER — FENTANYL CITRATE 50 UG/ML
INJECTION, SOLUTION INTRAMUSCULAR; INTRAVENOUS PRN
Status: DISCONTINUED | OUTPATIENT
Start: 2018-04-03 | End: 2018-04-03

## 2018-04-03 RX ORDER — SIMETHICONE
LIQUID (ML) MISCELLANEOUS PRN
Status: DISCONTINUED | OUTPATIENT
Start: 2018-04-03 | End: 2018-04-03 | Stop reason: HOSPADM

## 2018-04-03 RX ORDER — MEPERIDINE HYDROCHLORIDE 25 MG/ML
12.5 INJECTION INTRAMUSCULAR; INTRAVENOUS; SUBCUTANEOUS
Status: CANCELLED | OUTPATIENT
Start: 2018-04-03

## 2018-04-03 RX ORDER — ONDANSETRON 2 MG/ML
4 INJECTION INTRAMUSCULAR; INTRAVENOUS EVERY 30 MIN PRN
Status: CANCELLED | OUTPATIENT
Start: 2018-04-03

## 2018-04-03 RX ORDER — SODIUM CHLORIDE, SODIUM LACTATE, POTASSIUM CHLORIDE, CALCIUM CHLORIDE 600; 310; 30; 20 MG/100ML; MG/100ML; MG/100ML; MG/100ML
INJECTION, SOLUTION INTRAVENOUS CONTINUOUS PRN
Status: DISCONTINUED | OUTPATIENT
Start: 2018-04-03 | End: 2018-04-03

## 2018-04-03 RX ORDER — LIDOCAINE HYDROCHLORIDE 20 MG/ML
INJECTION, SOLUTION INFILTRATION; PERINEURAL PRN
Status: DISCONTINUED | OUTPATIENT
Start: 2018-04-03 | End: 2018-04-03

## 2018-04-03 RX ORDER — FENTANYL CITRATE 50 UG/ML
25-50 INJECTION, SOLUTION INTRAMUSCULAR; INTRAVENOUS
Status: CANCELLED | OUTPATIENT
Start: 2018-04-03

## 2018-04-03 RX ORDER — PROPOFOL 10 MG/ML
INJECTION, EMULSION INTRAVENOUS CONTINUOUS PRN
Status: DISCONTINUED | OUTPATIENT
Start: 2018-04-03 | End: 2018-04-03

## 2018-04-03 RX ORDER — ONDANSETRON 4 MG/1
4 TABLET, ORALLY DISINTEGRATING ORAL EVERY 30 MIN PRN
Status: CANCELLED | OUTPATIENT
Start: 2018-04-03

## 2018-04-03 RX ORDER — NALOXONE HYDROCHLORIDE 0.4 MG/ML
.1-.4 INJECTION, SOLUTION INTRAMUSCULAR; INTRAVENOUS; SUBCUTANEOUS
Status: CANCELLED | OUTPATIENT
Start: 2018-04-03 | End: 2018-04-04

## 2018-04-03 RX ORDER — GLYCOPYRROLATE 0.2 MG/ML
INJECTION, SOLUTION INTRAMUSCULAR; INTRAVENOUS PRN
Status: DISCONTINUED | OUTPATIENT
Start: 2018-04-03 | End: 2018-04-03

## 2018-04-03 RX ORDER — SODIUM CHLORIDE, SODIUM LACTATE, POTASSIUM CHLORIDE, CALCIUM CHLORIDE 600; 310; 30; 20 MG/100ML; MG/100ML; MG/100ML; MG/100ML
INJECTION, SOLUTION INTRAVENOUS CONTINUOUS
Status: CANCELLED | OUTPATIENT
Start: 2018-04-03

## 2018-04-03 RX ADMIN — GLYCOPYRROLATE 0.1 MG: 0.2 INJECTION, SOLUTION INTRAMUSCULAR; INTRAVENOUS at 10:39

## 2018-04-03 RX ADMIN — PROPOFOL 100 MCG/KG/MIN: 10 INJECTION, EMULSION INTRAVENOUS at 10:45

## 2018-04-03 RX ADMIN — FENTANYL CITRATE 50 MCG: 50 INJECTION, SOLUTION INTRAMUSCULAR; INTRAVENOUS at 10:44

## 2018-04-03 RX ADMIN — BENZOCAINE 1 EACH: 220 SPRAY, METERED PERIODONTAL at 10:44

## 2018-04-03 RX ADMIN — MIDAZOLAM 2 MG: 1 INJECTION INTRAMUSCULAR; INTRAVENOUS at 10:39

## 2018-04-03 RX ADMIN — PROPOFOL 20 MG: 10 INJECTION, EMULSION INTRAVENOUS at 10:54

## 2018-04-03 RX ADMIN — LIDOCAINE HYDROCHLORIDE 40 MG: 20 INJECTION, SOLUTION INFILTRATION; PERINEURAL at 10:44

## 2018-04-03 RX ADMIN — SODIUM CHLORIDE, POTASSIUM CHLORIDE, SODIUM LACTATE AND CALCIUM CHLORIDE: 600; 310; 30; 20 INJECTION, SOLUTION INTRAVENOUS at 09:40

## 2018-04-03 RX ADMIN — PROPOFOL 20 MG: 10 INJECTION, EMULSION INTRAVENOUS at 10:51

## 2018-04-03 RX ADMIN — BENZOCAINE 1 EACH: 220 SPRAY, METERED PERIODONTAL at 10:39

## 2018-04-03 RX ADMIN — PROPOFOL 20 MG: 10 INJECTION, EMULSION INTRAVENOUS at 11:03

## 2018-04-03 NOTE — PROGRESS NOTES
Care Coordination New Patient Referral  Surgical Oncology and GI    NP referral date: 04/03/18  Referred to MD:  JESUS Noble    Referring MD: LISA Song:    This is a 42 year old female who saw Dr Dubois for constipation, nausea and right upper quadrant pain. She underwent a MRE which was concerning for a mild jejunal thickening suggestive of Crohn's disease. 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 No mass or wall thickening in the bile duct.I will definitely recommend a cholecystectomy. I will leave it to your expertise as to what needs to be done for her choledochocele     04/03/18 Referral will be reviewed by Dr. Noble and we will arrange for clinic consult.    04/04/18 Referral has been reviewed and will ask scheduling to arrange for appointment with Dr. Aide Fung  BSN, HNBC  RN Care Coordinator  Surgical Oncology  Ph: [phone]  Email: [email or web address]

## 2018-04-03 NOTE — IP AVS SNAPSHOT
[facility], [place], Endoscopy    [address], [place] [address]    Phone:  [phone]                                       After Visit Summary   4/3/2018    Vianca Kumar    MRN: [id number]           After Visit Summary Signature Page     I have received my discharge instructions, and my questions have been answered. I have discussed any challenges I see with this plan with the nurse or doctor.    ..........................................................................................................................................  Patient/Patient Representative Signature      ..........................................................................................................................................  Patient Representative Print Name and Relationship to Patient    ..................................................               ................................................  Date                                            Time    ..........................................................................................................................................  Reviewed by Signature/Title    ...................................................              ..............................................  Date                                                            Time

## 2018-04-03 NOTE — OR NURSING
EGD done w/ placement of pillcam.  EUS done w/o interventions.  Both done under MAC, VS and meds per CRNA

## 2018-04-03 NOTE — ANESTHESIA POSTPROCEDURE EVALUATION
Patient: Vianca Kumar    Procedure(s):  EUS/Capsule  - Wound Class: II-Clean Contaminated      Diagnosis:video capsule, needs golytley prep  abdominal pain, nausea  Diagnosis Additional Information: No value filed.    Anesthesia Type:  MAC    Note:  Anesthesia Post Evaluation    Patient location during evaluation: Phase 2  Patient participation: Able to fully participate in evaluation  Level of consciousness: awake and alert  Pain management: adequate  Airway patency: patent  Cardiovascular status: acceptable  Respiratory status: acceptable  Hydration status: acceptable  PONV: none     Anesthetic complications: None          Last vitals:  Vitals:    04/03/18 1002 04/03/18 1142   BP: 112/76 108/87   Pulse:  96   Resp: 18 26   Temp:  36.7  C (98  F)   SpO2: 93% 98%         Electronically Signed By: Emely Harrison MD  April 3, 2018  12:08 PM

## 2018-04-03 NOTE — DISCHARGE INSTRUCTIONS
[facility] Upper Endoscopy (EGD) with Monitored Anesthesia Care  For 24 hours after your procedure  Sedation:  1. Get plenty of rest. A responsible adult must stay with you for at least 24 hours after you leave the hospital.   2. Do not drive or use heavy equipment. If you have weakness or tingling, don't drive or use heavy equipment until this feeling goes away.  3. Do not drink alcohol.  4. Avoid strenuous or risky activities. Ask for help when climbing stairs.   5. You may feel lightheaded. IF so, sit for a few minutes before standing. Have someone help you get up.   6. If you have nausea (feel sick to your stomach): Drink only clear liquids such as apple juice, ginger ale, broth or 7-Up. Rest may also help. Be sure to drink enough fluids. Move to a regular diet as you feel able.  7. You may have a slight fever. Call the doctor if your fever is over 100 F (37.7 C) (taken under the tongue) or lasts longer than 24 hours.  8. You may have a dry mouth, a sore throat, muscle aches or trouble sleeping. These should go away after 24 hours.  9. Do not make important or legal decisions.   Procedural:  1. Wait one hour before eating or drinking. Start with sips of water. When your gag reflex has returned, you may return to your normal diet, medicines, and light exercise.  2. Some bloating is normal. You may have large burps or pass air.  3. You may have a sore throat for 2 to 3 days. If so, it may help to:    Avoid hot liquids for 24 hours.    Use sore throat lozenges.    Gargle as need with salt water up to 4 times a day. Mix 1 cup of warm water with 1 teaspoon of salt. Do not swallow.  4. You may take Tylenol (acetaminophen) for pain unless your doctor has told you not to.   Do not take aspirin or ibuprofen (Advil, Motrin, or other anti-inflammatory  drugs) for __3___ days.  Call right away if you have:  1. Unusual pain in belly or chest pain not relieved with passing air.  2. Severe throat pain or trouble  swallowing.  3. Black stools (tar-like looking bowel movement).  4. It has been over 8 to 10 hours since surgery and you are still not able to urinate (pass water).  5. Headache for over 24 hours.  6.   Follow-up:  ____.  __x__If you have severe pain, bleeding, vomit blood, or shortness of breath, go to an emergency room.  If you have questions, call:  Endoscopy: Monday to Friday, 7 a.m. to 4:30 p.m. [phone] (We may have to call you back)  After hours: Hospital  [phone] (Ask for the GI fellow on call)    [facility] Endoscopic Ultrasound with Monitored Anesthesia Care  For 24 hours after your procedure  Sedation:  10. Get plenty of rest. A responsible adult must stay with you for at least 24 hours after you leave the hospital.   11. Do not drive or use heavy equipment. If you have weakness or tingling, don't drive or use heavy equipment until this feeling goes away.  12. Do not drink alcohol.  13. Avoid strenuous or risky activities. Ask for help when climbing stairs.   14. You may feel lightheaded. IF so, sit for a few minutes before standing. Have someone help you get up.   15. If you have nausea (feel sick to your stomach): Drink only clear liquids such as apple juice, ginger ale, broth or 7-Up. Rest may also help. Be sure to drink enough fluids. Move to a regular diet as you feel able.  16. You may have a slight fever. Call the doctor if your fever is over 100 F (37.7 C) (taken under the tongue) or lasts longer than 24 hours.  17. You may have a dry mouth, a sore throat, muscle aches or trouble sleeping. These should go away after 24 hours.  18. Do not make important or legal decisions.   Procedural:  5. Wait one hour before eating or drinking. Start with sips of water. When your gag reflex has returned, you may return to your normal diet, medicines, and light exercise.  6. Some bloating is normal. You may have large burps or pass air.  7. You may have a sore throat for 2 to 3 days. If so, it may help  to:    Avoid hot liquids for 24 hours.    Use sore throat lozenges.    Gargle as need with salt water up to 4 times a day. Mix 1 cup of warm water with 1 teaspoon of salt. Do not swallow.  8. You may take Tylenol (acetaminophen) for pain unless your doctor has told you not to.   Do not take aspirin or ibuprofen (Advil, Motrin, or other anti-inflammatory  drugs) for __3___ days.  Call right away if you have:  7. Unusual pain in belly or chest pain not relieved with passing air.  8. Severe throat pain or trouble swallowing.  9. Black stools (tar-like looking bowel movement).  10. It has been over 8 to 10 hours since surgery and you are still not able to urinate (pass water).  11. Headache for over 24 hours.  19.   Follow-up:  ___If you have severe paina), bleeding, vomit blood, or shortness of breath, go to an emergency room.  If you have questions, call:  Endoscopy: Monday to Friday, 7 a.m. to 4:30 p.m. [phone] (We may have to call you back)  After hours: Hospital  [phone] (Ask for the GI fellow on call)

## 2018-04-03 NOTE — ANESTHESIA PREPROCEDURE EVALUATION
Anesthesia Evaluation     . Pt has had prior anesthetic. Type: General and MAC           ROS/MED HX    ENT/Pulmonary:  - neg pulmonary ROS     Neurologic:     (+)other neuro   Ataxia, PTSD    Cardiovascular:         METS/Exercise Tolerance:     Hematologic:         Musculoskeletal:   (+) , , other musculoskeletal- TOTAL KNEE ARTHROPLASTY      GI/Hepatic:     (+) GERD       Renal/Genitourinary:  - ROS Renal section negative       Endo:  - neg endo ROS       Psychiatric:     (+) psychiatric history anxiety and depression      Infectious Disease:         Malignancy:         Other:    (+) No chance of pregnancy C-spine cleared: N/A, no H/O Chronic Pain,no other significant disability                    Physical Exam  Normal systems: cardiovascular, pulmonary and dental    Airway   Mallampati: II  TM distance: >3 FB  Neck ROM: full    Dental     Cardiovascular   Rhythm and rate: regular and normal      Pulmonary    breath sounds clear to auscultation                    Anesthesia Plan      History & Physical Review  History and physical reviewed and following examination; no interval change.    ASA Status:  2 .    NPO Status:  > 8 hours    Plan for MAC with Intravenous induction. Maintenance will be TIVA.  Reason for MAC:  Deep or markedly invasive procedure (G8)  PONV prophylaxis:  Ondansetron (or other 5HT-3)       Postoperative Care  Postoperative pain management:  IV analgesics.      Consents  Anesthetic plan, risks, benefits and alternatives discussed with:  Patient.  Use of blood products discussed: No .   .

## 2018-04-03 NOTE — ANESTHESIA CARE TRANSFER NOTE
Patient: Vianca Kumar    Procedure(s):  EUS/Capsule  - Wound Class: II-Clean Contaminated      Diagnosis: video capsule, needs golytley prep  abdominal pain, nausea  Diagnosis Additional Information: No value filed.    Anesthesia Type:   MAC     Note:  Airway :Nasal Cannula  Patient transferred to:Phase II  Comments: Patient transferred to PACU spontaneously breathing.  VSS.  Report to RN. Handoff Report: Identifed the Patient, Identified the Reponsible Provider, Reviewed the pertinent medical history, Discussed the surgical course, Reviewed Intra-OP anesthesia mangement and issues during anesthesia, Set expectations for post-procedure period and Allowed opportunity for questions and acknowledgement of understanding      Vitals: (Last set prior to Anesthesia Care Transfer)    CRNA VITALS  4/3/2018 1105 - 4/3/2018 1138      4/3/2018             Pulse: 99    Ht Rate: 99    SpO2: 93 %                Electronically Signed By: SALLY Carranza CRNA  April 3, 2018  11:38 AM

## 2018-04-03 NOTE — IP AVS SNAPSHOT
MRN:[id number]                      After Visit Summary   4/3/2018    Vianca Kumar    MRN: [id number]           Thank you!     Thank you for choosing [facility] for your care. Our goal is always to provide you with excellent care. Hearing back from our patients is one way we can continue to improve our services. Please take a few minutes to complete the written survey that you may receive in the mail after you visit with us. Thank you!        Patient Information     Date Of Birth          1975        About your hospital stay     You were admitted on:  April 3, 2018 You last received care in the:  [facility], Endoscopy    You were discharged on:  April 3, 2018       Who to Call     For medical emergencies, please call 911.  For non-urgent questions about your medical care, please call your primary care provider or clinic, [phone]  For questions related to your surgery, please call your surgery clinic        Attending Provider     Provider Guru Hallie Jaeger MD Gastroenterology       Primary Care Provider Office Phone # Fax #    Veronica Hardy [phone]      Your next 10 appointments already scheduled     Jun 18, 2018 11:40 AM CDT   (Arrive by 11:25 AM)   Return Visit with Tee Rodgers PA-C   [facility]enter[facility] ([facility])    [address] [place] [id number] [phone]              Further instructions from your care team       [facility] Upper Endoscopy (EGD) with Monitored Anesthesia Care  For 24 hours after your procedure  Sedation:  1. Get plenty of rest. A responsible adult must stay with you for at least 24 hours after you leave the hospital.   2. Do not drive or use heavy equipment. If you have weakness or tingling, don't drive or use heavy equipment until this feeling goes away.  3. Do not drink alcohol.  4. Avoid strenuous or risky  activities. Ask for help when climbing stairs.   5. You may feel lightheaded. IF so, sit for a few minutes before standing. Have someone help you get up.   6. If you have nausea (feel sick to your stomach): Drink only clear liquids such as apple juice, ginger ale, broth or 7-Up. Rest may also help. Be sure to drink enough fluids. Move to a regular diet as you feel able.  7. You may have a slight fever. Call the doctor if your fever is over 100 F (37.7 C) (taken under the tongue) or lasts longer than 24 hours.  8. You may have a dry mouth, a sore throat, muscle aches or trouble sleeping. These should go away after 24 hours.  9. Do not make important or legal decisions.   Procedural:  1. Wait one hour before eating or drinking. Start with sips of water. When your gag reflex has returned, you may return to your normal diet, medicines, and light exercise.  2. Some bloating is normal. You may have large burps or pass air.  3. You may have a sore throat for 2 to 3 days. If so, it may help to:    Avoid hot liquids for 24 hours.    Use sore throat lozenges.    Gargle as need with salt water up to 4 times a day. Mix 1 cup of warm water with 1 teaspoon of salt. Do not swallow.  4. You may take Tylenol (acetaminophen) for pain unless your doctor has told you not to.   Do not take aspirin or ibuprofen (Advil, Motrin, or other anti-inflammatory  drugs) for __3___ days.  Call right away if you have:  1. Unusual pain in belly or chest pain not relieved with passing air.  2. Severe throat pain or trouble swallowing.  3. Black stools (tar-like looking bowel movement).  4. It has been over 8 to 10 hours since surgery and you are still not able to urinate (pass water).  5. Headache for over 24 hours.  6.   Follow-up:  ____.  __x__If you have severe pain, bleeding, vomit blood, or shortness of breath, go to an emergency room.  If you have questions, call:  Endoscopy: Monday to Friday, 7 a.m. to 4:30 p.m. [phone] (We may have to  call you back)  After hours: Hospital  [phone] (Ask for the GI fellow on call)    [facility] Endoscopic Ultrasound with Monitored Anesthesia Care  For 24 hours after your procedure  Sedation:  10. Get plenty of rest. A responsible adult must stay with you for at least 24 hours after you leave the hospital.   11. Do not drive or use heavy equipment. If you have weakness or tingling, don't drive or use heavy equipment until this feeling goes away.  12. Do not drink alcohol.  13. Avoid strenuous or risky activities. Ask for help when climbing stairs.   14. You may feel lightheaded. IF so, sit for a few minutes before standing. Have someone help you get up.   15. If you have nausea (feel sick to your stomach): Drink only clear liquids such as apple juice, ginger ale, broth or 7-Up. Rest may also help. Be sure to drink enough fluids. Move to a regular diet as you feel able.  16. You may have a slight fever. Call the doctor if your fever is over 100 F (37.7 C) (taken under the tongue) or lasts longer than 24 hours.  17. You may have a dry mouth, a sore throat, muscle aches or trouble sleeping. These should go away after 24 hours.  18. Do not make important or legal decisions.   Procedural:  5. Wait one hour before eating or drinking. Start with sips of water. When your gag reflex has returned, you may return to your normal diet, medicines, and light exercise.  6. Some bloating is normal. You may have large burps or pass air.  7. You may have a sore throat for 2 to 3 days. If so, it may help to:    Avoid hot liquids for 24 hours.    Use sore throat lozenges.    Gargle as need with salt water up to 4 times a day. Mix 1 cup of warm water with 1 teaspoon of salt. Do not swallow.  8. You may take Tylenol (acetaminophen) for pain unless your doctor has told you not to.   Do not take aspirin or ibuprofen (Advil, Motrin, or other anti-inflammatory  drugs) for __3___ days.  Call right away if you have:  7. Unusual pain in  "belly or chest pain not relieved with passing air.  8. Severe throat pain or trouble swallowing.  9. Black stools (tar-like looking bowel movement).  10. It has been over 8 to 10 hours since surgery and you are still not able to urinate (pass water).  11. Headache for over 24 hours.  19.   Follow-up:  ___If you have severe paina), bleeding, vomit blood, or shortness of breath, go to an emergency room.  If you have questions, call:  Endoscopy: Monday to Friday, 7 a.m. to 4:30 p.m. [phone] (We may have to call you back)  After hours: Hospital  [phone] (Ask for the GI fellow on call)                Pending Results     No orders found from 4/1/2018 to 4/4/2018.            Admission Information     Date & Time Provider Department Dept. Phone    4/3/2018 Guru Hallie Song MD [facility], [place], Endoscopy [phone]      Your Vitals Were     Blood Pressure Pulse Temperature Respirations Height Weight    108/87 96 98  F (36.7  C) (Oral) 26 1.727 m (5' 8\") 99.8 kg (220 lb)    Pulse Oximetry BMI (Body Mass Index)                98% 33.45 kg/m2          MyChart Information     [facility] gives you secure access to your electronic health record. If you see a primary care provider, you can also send messages to your care team and make appointments. If you have questions, please call your primary care clinic.  If you do not have a primary care provider, please call [phone] and they will assist you.        Care EveryWhere ID     This is your Care EveryWhere ID. This could be used by other organizations to access your [place] medical records  [id number]        Equal Access to Services     JOVANI HO AH: Carine Bearden, mariama mccall, emma chatman [facility] [phone].    ATENCIÓN: Si habla español, tiene a ballard disposición servicios gratuitos de asistencia lingüística. Llame al [phone].    We comply with applicable " federal civil rights laws and [place] laws. We do not discriminate on the basis of race, color, national origin, age, disability, sex, sexual orientation, or gender identity.               Review of your medicines      UNREVIEWED medicines. Ask your doctor about these medicines        Dose / Directions    escitalopram 20 MG tablet   Commonly known as:  LEXAPRO        Dose:  20 mg   Take 20 mg by mouth daily.   Refills:  0       MAGNESIUM OXIDE PO        Refills:  0       omeprazole 40 MG capsule   Commonly known as:  priLOSEC        Take  by mouth daily.   Refills:  0                Protect others around you: Learn how to safely use, store and throw away your medicines at www.disposemymeds.org.             Medication List: This is a list of all your medications and when to take them. Check marks below indicate your daily home schedule. Keep this list as a reference.      Medications           Morning Afternoon Evening Bedtime As Needed    escitalopram 20 MG tablet   Commonly known as:  LEXAPRO   Take 20 mg by mouth daily.                                MAGNESIUM OXIDE PO                                omeprazole 40 MG capsule   Commonly known as:  priLOSEC   Take  by mouth daily.

## 2018-04-06 ENCOUNTER — CARE COORDINATION (OUTPATIENT)
Dept: GASTROENTEROLOGY | Facility: CLINIC | Age: 43
End: 2018-04-06

## 2018-04-06 NOTE — PROGRESS NOTES
Post EUS (4/3/2018) with Dr. Song: Follow-up    Post procedure recommendations: Referral to Dr. Noble for cholecystectomy     Patient states: Voicemail was left for patient to contact us with any questions or concerns.    Orders placed: None- referral already placed and patient already scheduled.      July MENDEZ RN Coordinator  Dr. Song  Advanced Endoscopy  [phone]

## 2018-04-09 LAB
UPPER EUS: NORMAL
VIDEO CAPSULE ENDOSCOPY: NORMAL

## 2018-04-16 ENCOUNTER — OFFICE VISIT (OUTPATIENT)
Dept: SURGERY | Facility: CLINIC | Age: 43
End: 2018-04-16
Attending: SURGERY
Payer: MEDICARE

## 2018-04-16 ENCOUNTER — RECORDS - HEALTHEAST (OUTPATIENT)
Dept: ADMINISTRATIVE | Facility: OTHER | Age: 43
End: 2018-04-16

## 2018-04-16 VITALS
TEMPERATURE: 96.8 F | HEIGHT: 68 IN | SYSTOLIC BLOOD PRESSURE: 108 MMHG | WEIGHT: 217 LBS | HEART RATE: 72 BPM | RESPIRATION RATE: 16 BRPM | DIASTOLIC BLOOD PRESSURE: 72 MMHG | BODY MASS INDEX: 32.89 KG/M2 | OXYGEN SATURATION: 98 %

## 2018-04-16 DIAGNOSIS — K80.50 BILIARY COLIC: Primary | ICD-10-CM

## 2018-04-16 PROCEDURE — G0463 HOSPITAL OUTPT CLINIC VISIT: HCPCS | Mod: ZF

## 2018-04-16 PROCEDURE — 99205 OFFICE O/P NEW HI 60 MIN: CPT | Mod: ZP | Performed by: SURGERY

## 2018-04-16 ASSESSMENT — ENCOUNTER SYMPTOMS
SPEECH CHANGE: 0
WEIGHT GAIN: 1
HALLUCINATIONS: 0
DIZZINESS: 0
DECREASED APPETITE: 0
LIGHT-HEADEDNESS: 1
WHEEZING: 0
MUSCLE CRAMPS: 1
HYPERTENSION: 0
SEIZURES: 0
ORTHOPNEA: 0
MYALGIAS: 1
MEMORY LOSS: 0
NAIL CHANGES: 0
HEMOPTYSIS: 0
SLEEP DISTURBANCES DUE TO BREATHING: 0
POLYPHAGIA: 0
ARTHRALGIAS: 1
COUGH: 1
CONSTIPATION: 0
HYPOTENSION: 0
WEIGHT LOSS: 0
WEAKNESS: 1
FEVER: 0
FATIGUE: 1
BLOOD IN STOOL: 1
POOR WOUND HEALING: 0
EXERCISE INTOLERANCE: 0
PARALYSIS: 0
DISTURBANCES IN COORDINATION: 0
BACK PAIN: 0
DYSPNEA ON EXERTION: 0
SNORES LOUDLY: 0
HEADACHES: 1
JOINT SWELLING: 1
ALTERED TEMPERATURE REGULATION: 1
SKIN CHANGES: 0
NUMBNESS: 1
COUGH DISTURBING SLEEP: 1
NAUSEA: 1
INCREASED ENERGY: 0
DIARRHEA: 0
PALPITATIONS: 0
STIFFNESS: 1
SHORTNESS OF BREATH: 0
NIGHT SWEATS: 1
ABDOMINAL PAIN: 1
POLYDIPSIA: 0
TINGLING: 1
BOWEL INCONTINENCE: 0
TREMORS: 0
BLOATING: 1
MUSCLE WEAKNESS: 1
LOSS OF CONSCIOUSNESS: 0
CHILLS: 0
LEG PAIN: 0
POSTURAL DYSPNEA: 0
RECTAL PAIN: 0
SPUTUM PRODUCTION: 1
VOMITING: 1
SYNCOPE: 0
HEARTBURN: 1
NECK PAIN: 0

## 2018-04-16 ASSESSMENT — PAIN SCALES - GENERAL: PAINLEVEL: MILD PAIN (2)

## 2018-04-16 NOTE — MR AVS SNAPSHOT
After Visit Summary   4/16/2018    Vianca Kumar    MRN: [id number]           Patient Information     Date Of Birth          1975        Visit Information        Provider Department      4/16/2018 1:00 PM Alberto Noble MD [facility]        Today's Diagnoses     Biliary colic    -  1      Care Instructions    Care Coordination Summary    This is a brief summary of your discussion and care plan from today's visit below.  Please review this information with your primary care provider.  ______________________________________________________________________    As discussed today by Dr. Noble, we will plan the following:     We will plan for surgery and our  will contact you with the information to confirm date and time.     You will need a preoperative history and physical done prior to the procedure within 30 days of procedure.      _______________________________________________________________________    It was a pleasure seeing you in clinic today - please be in touch if there are any further questions about upcoming appointments that arise following today's visit.  During business hours, you may reach the Clinic Coordinator at [phone].  For urgent/emergent questions after business hours, you may reach the on-call Surgical oncology fellow on call by contacting the [facility]  at [phone].    Any benign/non-urgent test results are usually communicated via letter or 23andMehart message within 1-2 weeks after completion.  Urgent results (those that require a change in the previously-discussed care plan) are usually communicated via a phone call once available from our clinic staff to discuss the results and the next steps in your evaluation.    I recommend signing up for Easy [employer] access if you have not already done so and are comfortable with using a computer.  This allows for online access to your lab results and also helps you communicate  efficiently with the clinic should any questions arise in your care.    My role as your RN care coordinator is to assist you with any additional questions or concerns that you may have regarding your diagnosis and plan of care.  Feel free to contact me should you need any further assistance.  I am happy to be part of your care team here at the [facility].      Sincerely,      Kristen Fung  BSN, HNBC, STAR-T  RN Care Coordinator  Ph: [phone]  FAX: [phone]    SURGERY INFORMATION            Your surgery is scheduled:    Date: to be determined  ________________________________    Time: pending  ________________________________    Please arrive at:  Two hours prior to procedure  ________________________________    Surgeon's Name: Dr. Alberto Noble  ________________________________    Care Coordinator: Kristen Fung RN      Pre-Op Physical Fax Numbers:             [facility] Pre-Admissions      [address]      Fax: [phone]      Phone: [phone]        Your surgery is located at:      [address] [address]      [phone]      [email or web address]       Before Your Surgery  For Patients and Visitors at [place]    Welcome  As you get ready for surgery, you may have questions.  This information will help you know what to expect before and after surgery.  You and your family are the most important members of your health care team.  You will need to take an active role in your care.    Be sure to ask questions and learn all that you can about your surgery.  If you have any safety concerns, we urge you to tell a nurse as soon as possible.   This is for information only.  It does not replace the advice of your doctor.  Always follow your doctor's advice.    Please let us know if you need a .You have been scheduled for surgery and we would like to give you some  information that will assist in helping get the best possible outcome.      We will also discuss going home plans when we see you in the clinic to plan surgery.  Please let your care team know who will be providing care for you and if you think you may not be able to go home after surgery so we can begin discussion about where you can go for care.      If for any reason you decide not to have the surgery, please contact our office at [phone].  We can easily cancel or reschedule the procedure. After hours: [facility] [phone], [place] [phone]).      Any pain related to the surgery that occurs before the surgery needs to be reported and managed by your primary care or referring doctor.      GETTING READY FOR SURGERY  Always follow your surgeon's instructions.  If you don't, your surgery could be canceled.  Please use the following checklist.    Within 30 Days of Surgery:    Have a pre-surgery physical exam with your family doctor or partner or our Preoperative Assessement Center (PAC). This assessment provides valuable information for anesthesia and your surgeon about your readiness for surgery.    If you use a [facility], your information will be accessable in the Epic computer system.    Ask the doctor to send all of your results to the hospital before the surgery.  The doctor also may ask you to bring the results with you on the day of surgery or can be faxed to [phone].   Tell the doctor if:    You are allergic to latex or rubber  (Latex and rubber gloves are often used in medical care).    You are taking any medicines (including aspirin), vitamins (Vitamin E, Fish Oil, Omegas) or herbal products.  You will need to stop taking some medicines before surgery.  Also, if you are taking blood thinners, such as aspirin, Coumadin or Plavix, these will need to be stopped prior to surgery and you will need to check with your doctor about the timing of this.    You have any medical problems  (allergies, diabetes or heart disease, for example).    You have a pacemaker or an AICD (automatic implanted cardiac defibrillator).  If you do, please bring the ID card with you on the day of surgery.    You are a smoker.  People who smoke have a higher risk of infection after surgery.  Ask your doctor how you can quit smoking.  Within 7 days of Surgery:    Pre-register with the hospital.  Please use the hospital's phone number listed.  Or, to register online, go to [email or web address].      Prior to your surgical procedure, a nurse will be contacting you to obtain a health history ([phone]).  Additionally, someone from the Admissions Department will also contact you for preregistration ([phone]).      Call your insurance company.  Ask if you need pre-approval for your surgery.  If you do not have insurance, please let us know.      Arrange for someone to drive you home after surgery.  If you will have same-day surgery, you may not drive or take public transportation home by yourself.    Arrange for someone to stay with you for 24 hours after you go home.  This person must be a responsible adult (ie- Family member or friend).  The Day Before Surgery:     Call your surgeon if there are any changes in your health.  This includes signs of a cold or flu (such as a sore throat, runny nose, cough, rash or fever).    Do not smoke, drink alcohol or take over-the-counter medicine (unless your surgeon tells you to) for 24 hours before and after surgery.    If you take prescribed drugs:  You may need to stop them until after the surgery.  Follow your doctor's orders.  You may resume Aspirin and/or blood thinners after your surgery as directed by your physician/surgeon.    NO FOOD OR DRINK AFTER MIDNIGHT.  Follow your surgeon's orders for eating and drinking.  You need to have an empty stomach before surgery.  If you do eat or drink your surgery will be canceled.    Take your first Pre surgery shower.  See the  directions below.  A nurse will call you within a few days of surgery to go over these and other instructions.  You may contact them at [phone] if you miss that call.   The Day of Surgery:    Take a shower the morning of surgery. *SEE DIRECTIONS FOR SHOWER BELOW.  Use antiseptic soap or the soap your surgeon gave you.  Gently clean the skin.  Do not shave or scrub your surgery site. You may wash your hair and face with your regular soap.    Please remove deodorant, cologne, scented lotion, makeup, nail polish and jewelry (including rings and body piercings).  If you wear artificial nails, please remove at least one nail before coming to the hospital.    Wear clean, loose clothing to the hospital.    Bring these items to the hospital:  1. Your insurance card.  2. Money for parking and co-pays (for medicines or the surgery), if needed.   3. A list of all the medicines you take.  Include vitamins, minerals, herbs and over-the-counter drugs.  Note any drug allergies.  4. A copy of your advance health care directive, if you have one.  This tells us what treatment you would want -- and who would make health care decisions -- if you could no longer speak for yourself.  You may request this form in advance or download it from [email or web address].  5. A case for any glasses, contact lenses, hearing aids or dentures.  6. Your inhaler or CPAP machine, if you use these at home.  Leave extra cash, jewelry and other valuables at home.    When You Arrive:  When you get to the hospital, you will:    Check in.  If you are under age 18, you must be with a parent or legal guardian.    Sign consent forms, if you haven't already.  These forms state that you know the risks and benefits of surgery.  When you sign the forms, you give us permission to do the surgery.  Do not sign them unless you understand what will happen during and after your surgery.  If you have any questions about your surgery, ask to speak with your doctor  before you sign the forms.  If you don't understand the answers, ask again.    Receive a copy of the Patients Bill of Rights.  If you do not receive a copy, please ask for one.    Change into hospital clothes.  Your belongings will be placed in a bag.  We will return them to you after surgery.    Meet with the anesthesia provider.  He or she will tell you what kind of anesthesia (medicine) will be used to keep you comfortable during surgery.  Remember: It's okay to remind doctors and nurses to wash their hands before touching you.   In most cases, your surgeon will use a marker to write his or her initials on the surgery site.  This ensures that the exact site is operated on.  For safety reasons, we will ask you the same questions many times.  For example, we may ask your name and birth date over and over again.  Friends and family can stay with you until time for surgery.  While you're in surgery, they will be in the waiting area.  Please note that cell phones are not allowed in some patient care areas.  If you have questions about what will happen in the operating room, talk to your care team.  After Surgery:    We will move you to a recovery room where we will watch you closely.  If you have any pain or discomfort, tell your nurse. .      If you are staying overnight we will move you to your hospital room after you are awake.  Your family or friends can meet you there.    You will meet your care team when you move to your hospital room.  You may be in a room with another patient.  If you have questions or concerns, please inform your team.  That team includes nurses, health aides, residents, medical students, your physician and others.    If you are going home we will move you to another room.  Friends and family may be able to join you.  The length of time you spend in recovery depends on the type of medicine you received, your medical condition, and the type of surgery you had.  Dealing with pain:  A nurse will  "check your comfort level often during your stay.    Remember:    All pain is real.  There are many ways to control pain.  We will work with you decide what works best for you.  Supporting your incision when you cough and deep breathe will decrease pain.  We may order an abdominal binder to provide extra support for your abdominal wall.  Ice packs can also be used.    Ask for pain medicine when you need it.  Don't try to \"tough it out\" -- this can make you feel worse.  Always take your medicine as ordered.  If you notice any side effects that do not feel right, please inform your care team.    Medicine doesn't work the same for everyone.  If your medicine isn't working tell your nurse.  There may be other medicines or treatments that can be tried.  Also feel free to discuss   Going Home:  We will let you know when you're ready to leave the hospital.  Before you leave, we will discuss ways to care for yourself.  Feel free to ask questions for clarification and further discussion.   A detailed copy of discharge instructions will be provided including post operative appointments and contact numbers should you have further questions or concerns after going home.  You must have an adult with you for the first 24 hours after you leave the hospital. You will need  time to recover -- you may be more tired than you realize at first.    Exercise is important.  Walk short distances 4-6 times per day, resting when tired.    Do not submerge the wound in water.  You may not use a bathtub or hot tub until the wound is closed.  The wait time frame is generally 2-3 weeks but any open area can be a source of incoming bacteria, so it is better to be on the safe side and avoid the tub until your wound is fully healed.      You may take a shower 24 hours after surgery.  Check with your surgeon to confirmed that it is ok for water to run over a wound. You may gently wash the wound using the antiseptic soap provided for your pre-surgery " showering (do not use a washcloth).  Any mild soap will work as well.      Many surgical wounds will have small white strips of tape on them called Steri Strips.  Do not remove these.  The edges will curl and fall off within 7-10 days with normal showering.      If you are going home with sutures (stitches) or staples, you must return to the clinic to have them taken out, usually within 1-2 weeks.      Also, remember to eat a healthy nutritious diet.  This includes fruits, vegetables and good sources of proteins.  Protein provides the healing blocks for tissues, so include that in each meal, even in small amounts.  Stay hydrated by drinking water and liquids such as Gatoraide or Poweraide, tea, juices.        When to call the doctor:  1. Fever, temperature over 101.5 ' F  2. Redness at the incision  3. Swelling at the incision  4. Increasing pain  5. Drainage from your wound.  Green or yellow drainage which may or may not have a foul odor.                        Pre-Operative Surgery Showers    Purpose:   The skin harbors a large variety of bacteria, both infectious and noninfectious.  Showering with an antiseptic soap prior to an invasive procedure will decrease the number of transient and resident (good and bad) bacteria that is normally found on the skin.    Procedure:      Shower or bathe with 1/2 of the bottle of antiseptic soap (enclosed) the evening before and 1/2 of the bottle the morning of surgery (bathing the day of the procedure is most important). If you do not receive your soap at the clinic you may purchase Hibiclens at your local drug store.      Apply the soap at full strength (use the entire bottle).  Gently clean the skin, rinse, and dry with a clean towel that is freshly laundered (out of the dryer) and then put on clean clothing that is freshly laundered.        We have given you information regarding pre-op showering.  We recommend that patients wash with an antiseptic soap prior to surgery.   This cleanser will be given to you at the clinic.  It is advised that you liberally wash the specific area surgery area the night before, and again in the morning before the surgery.  Do not apply lotion afterward.  We would like to keep the skin as clean as possible.      If you are deaf or hard of hearing, please let us know.  We provide many free services including sign language interpreters, oval interpreters, TTYs, telephone amplifiers, note takers, and written materials.                  Follow-ups after your visit        Your next 10 appointments already scheduled     Jun 18, 2018 11:40 AM CDT   (Arrive by 11:25 AM)   Return Visit with Tee Rodgers PA-C   [facility] ([facility])    [address]  [address] [facility] [address] [phone]              Who to contact     If you have questions or need follow up information about today's clinic visit or your schedule please contact [facility] directly at [phone].  Normal or non-critical lab and imaging results will be communicated to you by c8appshart, letter or phone within 4 business days after the clinic has received the results. If you do not hear from us within 7 days, please contact the clinic through c8appshart or phone. If you have a critical or abnormal lab result, we will notify you by phone as soon as possible.  Submit refill requests through [employer] or call your pharmacy and they will forward the refill request to us. Please allow 3 business days for your refill to be completed.          Additional Information About Your Visit        [employer] Information     [employer] gives you secure access to your electronic health record. If you see a primary care provider, you can also send messages to your care team and make appointments. If you have questions, please call your primary care clinic.  If you do not have a primary care provider, please call [phone] and  "they will assist you.        Care EveryWhere ID     This is your Care EveryWhere ID. This could be used by other organizations to access your [facility] medical records  [id number]        Your Vitals Were     Pulse Temperature Respirations Height Last Period Pulse Oximetry    72 96.8  F (36  C) (Oral) 16 1.727 m (5' 8\") 04/06/2018 (Exact Date) 98%    Breastfeeding? BMI (Body Mass Index)                No 32.99 kg/m2           Blood Pressure from Last 3 Encounters:   04/16/18 108/72   04/03/18 100/82   03/19/18 112/72    Weight from Last 3 Encounters:   04/16/18 98.4 kg (217 lb)   04/03/18 99.8 kg (220 lb)   03/19/18 99.8 kg (220 lb)              We Performed the Following     [facility]-Operative Worksheet        Primary Care Provider Office Phone # Fax #    Veroniac Hardy [phone]       [place] [address]        Equal Access to Services     : Hadii eduardo ku hadasho Soomaali, waaxda luqadaha, qaybta kaalmada adeegyachayo, emma monzon . So [facility] [phone].    ATENCIÓN: Si habla español, tiene a ballard disposición servicios gratuitos de asistencia lingüística. [facility] [phone].    We comply with applicable federal civil rights laws and [place] laws. We do not discriminate on the basis of race, color, national origin, age, disability, sex, sexual orientation, or gender identity.            Thank you!     Thank you for choosing [facility]  for your care. Our goal is always to provide you with excellent care. Hearing back from our patients is one way we can continue to improve our services. Please take a few minutes to complete the written survey that you may receive in the mail after your visit with us. Thank you!             Your Updated Medication List - Protect others around you: Learn how to safely use, store and throw away your medicines at www.disposemymeds.org.          This list is accurate " as of 4/16/18  1:46 PM.  Always use your most recent med list.                   Brand Name Dispense Instructions for use Diagnosis    escitalopram 20 MG tablet    LEXAPRO     Take 20 mg by mouth daily.        MAGNESIUM OXIDE PO           omeprazole 40 MG capsule    priLOSEC     Take  by mouth daily.

## 2018-04-16 NOTE — NURSING NOTE
"Oncology Rooming Note    April 16, 2018 12:58 PM   Vianca Kumar is a 42 year old female who presents for:    Chief Complaint   Patient presents with     Oncology Clinic Visit     New Pt. Cholelithiasis     Initial Vitals: /72  Pulse 72  Temp 96.8  F (36  C) (Oral)  Resp 16  Ht 1.727 m (5' 8\")  Wt 98.4 kg (217 lb)  LMP 04/06/2018 (Exact Date)  SpO2 98%  Breastfeeding? No  BMI 32.99 kg/m2 Estimated body mass index is 32.99 kg/(m^2) as calculated from the following:    Height as of this encounter: 1.727 m (5' 8\").    Weight as of this encounter: 98.4 kg (217 lb). Body surface area is 2.17 meters squared.  Mild Pain (2) Comment: Data Unavailable   Patient's last menstrual period was 04/06/2018 (exact date).  Allergies reviewed: Yes  Medications reviewed: Yes    Medications: Medication refills not needed today.  Pharmacy name entered into EPIC: Data Unavailable    Clinical concerns: new patient today Dr. Noble was NOT notified.    10 minutes for nursing intake (face to face time)     Israel Taylor CMA              "

## 2018-04-16 NOTE — PATIENT INSTRUCTIONS
Care Coordination Summary    This is a brief summary of your discussion and care plan from today's visit below.  Please review this information with your primary care provider.  ______________________________________________________________________    As discussed today by Dr. Noble, we will plan the following:     We will plan for surgery and our  will contact you with the information to confirm date and time.     You will need a preoperative history and physical done prior to the procedure within 30 days of procedure.      _______________________________________________________________________    It was a pleasure seeing you in clinic today - please be in touch if there are any further questions about upcoming appointments that arise following today's visit.  During business hours, you may reach the Clinic Coordinator at [phone].  For urgent/emergent questions after business hours, you may reach the on-call Surgical oncology fellow on call by contacting the [facility]  at [phone].    Any benign/non-urgent test results are usually communicated via letter or [employer] message within 1-2 weeks after completion.  Urgent results (those that require a change in the previously-discussed care plan) are usually communicated via a phone call once available from our clinic staff to discuss the results and the next steps in your evaluation.    I recommend signing up for [employer] access if you have not already done so and are comfortable with using a computer.  This allows for online access to your lab results and also helps you communicate efficiently with the clinic should any questions arise in your care.    My role as your RN care coordinator is to assist you with any additional questions or concerns that you may have regarding your diagnosis and plan of care.  Feel free to contact me should you need any further assistance.  I am happy to be part of your care team here at the [facility].      Sincerely,      Kristen Fung  BSN, HNBC, STAR-T  RN Care Coordinator  Ph: [phone]  FAX: [phone]    SURGERY INFORMATION            Your surgery is scheduled:    Date: to be determined  ________________________________    Time: pending  ________________________________    Please arrive at:  Two hours prior to procedure  ________________________________    Surgeon's Name: Dr. Alberto Noble  ________________________________    Care Coordinator: Kristen Fung RN      Pre-Op Physical Fax Numbers:             [facility]-Ad[facility]      [address]      Fax: [phone]      Phone: [phone]        Your surgery is located at:      [address] [address]      [phone]      [email or web address]       Before Your Surgery  For Patients and Visitors at [place]    Welcome  As you get ready for surgery, you may have questions.  This information will help you know what to expect before and after surgery.  You and your family are the most important members of your health care team.  You will need to take an active role in your care.    Be sure to ask questions and learn all that you can about your surgery.  If you have any safety concerns, we urge you to tell a nurse as soon as possible.   This is for information only.  It does not replace the advice of your doctor.  Always follow your doctor's advice.    Please let us know if you need a .You have been scheduled for surgery and we would like to give you some information that will assist in helping get the best possible outcome.      We will also discuss going home plans when we see you in the clinic to plan surgery.  Please let your care team know who will be providing care for you and if you think you may not be able to go home after surgery so we can begin discussion about where you can go for care.      If for any  reason you decide not to have the surgery, please contact our office at [phone].  We can easily cancel or reschedule the procedure. After hours: [facility] [phone], [place] [phone]).      Any pain related to the surgery that occurs before the surgery needs to be reported and managed by your primary care or referring doctor.      GETTING READY FOR SURGERY  Always follow your surgeon's instructions.  If you don't, your surgery could be canceled.  Please use the following checklist.    Within 30 Days of Surgery:    Have a pre-surgery physical exam with your family doctor or partner or our Preoperative Ass[facility]ment Center (PAC). This assessment provides valuable information for anesthesia and your surgeon about your readiness for surgery.    If you use a [facility], your information will be accessable in the Epic computer system.    Ask the doctor to send all of your results to the hospital before the surgery.  The doctor also may ask you to bring the results with you on the day of surgery or can be faxed to [phone].   Tell the doctor if:    You are allergic to latex or rubber  (Latex and rubber gloves are often used in medical care).    You are taking any medicines (including aspirin), vitamins (Vitamin E, Fish Oil, Omegas) or herbal products.  You will need to stop taking some medicines before surgery.  Also, if you are taking blood thinners, such as aspirin, Coumadin or Plavix, these will need to be stopped prior to surgery and you will need to check with your doctor about the timing of this.    You have any medical problems (allergies, diabetes or heart disease, for example).    You have a pacemaker or an AICD (automatic implanted cardiac defibrillator).  If you do, please bring the ID card with you on the day of surgery.    You are a smoker.  People who smoke have a higher risk of infection after surgery.  Ask your doctor how you can quit smoking.  Within 7 days of  Surgery:    Pre-register with the hospital.  Please use the hospital's phone number listed.  Or, to register online, go to [email or web address].      Prior to your surgical procedure, a nurse will be contacting you to obtain a health history ([phone]).  Additionally, someone from the Admissions Department will also contact you for preregistration ([phone]).      Call your insurance company.  Ask if you need pre-approval for your surgery.  If you do not have insurance, please let us know.      Arrange for someone to drive you home after surgery.  If you will have same-day surgery, you may not drive or take public transportation home by yourself.    Arrange for someone to stay with you for 24 hours after you go home.  This person must be a responsible adult (ie- Family member or friend).  The Day Before Surgery:     Call your surgeon if there are any changes in your health.  This includes signs of a cold or flu (such as a sore throat, runny nose, cough, rash or fever).    Do not smoke, drink alcohol or take over-the-counter medicine (unless your surgeon tells you to) for 24 hours before and after surgery.    If you take prescribed drugs:  You may need to stop them until after the surgery.  Follow your doctor's orders.  You may resume Aspirin and/or blood thinners after your surgery as directed by your physician/surgeon.    NO FOOD OR DRINK AFTER MIDNIGHT.  Follow your surgeon's orders for eating and drinking.  You need to have an empty stomach before surgery.  If you do eat or drink your surgery will be canceled.    Take your first Pre surgery shower.  See the directions below.  A nurse will call you within a few days of surgery to go over these and other instructions.  You may contact them at [phone] if you miss that call.   The Day of Surgery:    Take a shower the morning of surgery. *SEE DIRECTIONS FOR SHOWER BELOW.  Use antiseptic soap or the soap your surgeon gave you.  Gently clean the skin.  Do  not shave or scrub your surgery site. You may wash your hair and face with your regular soap.    Please remove deodorant, cologne, scented lotion, makeup, nail polish and jewelry (including rings and body piercings).  If you wear artificial nails, please remove at least one nail before coming to the hospital.    Wear clean, loose clothing to the hospital.    Bring these items to the hospital:  1. Your insurance card.  2. Money for parking and co-pays (for medicines or the surgery), if needed.   3. A list of all the medicines you take.  Include vitamins, minerals, herbs and over-the-counter drugs.  Note any drug allergies.  4. A copy of your advance health care directive, if you have one.  This tells us what treatment you would want -- and who would make health care decisions -- if you could no longer speak for yourself.  You may request this form in advance or download it from [email or web address].  5. A case for any glasses, contact lenses, hearing aids or dentures.  6. Your inhaler or CPAP machine, if you use these at home.  Leave extra cash, jewelry and other valuables at home.    When You Arrive:  When you get to the hospital, you will:    Check in.  If you are under age 18, you must be with a parent or legal guardian.    Sign consent forms, if you haven't already.  These forms state that you know the risks and benefits of surgery.  When you sign the forms, you give us permission to do the surgery.  Do not sign them unless you understand what will happen during and after your surgery.  If you have any questions about your surgery, ask to speak with your doctor before you sign the forms.  If you don't understand the answers, ask again.    Receive a copy of the Patients Bill of Rights.  If you do not receive a copy, please ask for one.    Change into hospital clothes.  Your belongings will be placed in a bag.  We will return them to you after surgery.    Meet with the anesthesia provider.  He or she will tell  you what kind of anesthesia (medicine) will be used to keep you comfortable during surgery.  Remember: It's okay to remind doctors and nurses to wash their hands before touching you.   In most cases, your surgeon will use a marker to write his or her initials on the surgery site.  This ensures that the exact site is operated on.  For safety reasons, we will ask you the same questions many times.  For example, we may ask your name and birth date over and over again.  Friends and family can stay with you until time for surgery.  While you're in surgery, they will be in the waiting area.  Please note that cell phones are not allowed in some patient care areas.  If you have questions about what will happen in the operating room, talk to your care team.  After Surgery:    We will move you to a recovery room where we will watch you closely.  If you have any pain or discomfort, tell your nurse. .      If you are staying overnight we will move you to your hospital room after you are awake.  Your family or friends can meet you there.    You will meet your care team when you move to your hospital room.  You may be in a room with another patient.  If you have questions or concerns, please inform your team.  That team includes nurses, health aides, residents, medical students, your physician and others.    If you are going home we will move you to another room.  Friends and family may be able to join you.  The length of time you spend in recovery depends on the type of medicine you received, your medical condition, and the type of surgery you had.  Dealing with pain:  A nurse will check your comfort level often during your stay.    Remember:    All pain is real.  There are many ways to control pain.  We will work with you decide what works best for you.  Supporting your incision when you cough and deep breathe will decrease pain.  We may order an abdominal binder to provide extra support for your abdominal wall.  Ice packs can  "also be used.    Ask for pain medicine when you need it.  Don't try to \"tough it out\" -- this can make you feel worse.  Always take your medicine as ordered.  If you notice any side effects that do not feel right, please inform your care team.    Medicine doesn't work the same for everyone.  If your medicine isn't working tell your nurse.  There may be other medicines or treatments that can be tried.  Also feel free to discuss   Going Home:  We will let you know when you're ready to leave the hospital.  Before you leave, we will discuss ways to care for yourself.  Feel free to ask questions for clarification and further discussion.   A detailed copy of discharge instructions will be provided including post operative appointments and contact numbers should you have further questions or concerns after going home.  You must have an adult with you for the first 24 hours after you leave the hospital. You will need  time to recover -- you may be more tired than you realize at first.    Exercise is important.  Walk short distances 4-6 times per day, resting when tired.    Do not submerge the wound in water.  You may not use a bathtub or hot tub until the wound is closed.  The wait time frame is generally 2-3 weeks but any open area can be a source of incoming bacteria, so it is better to be on the safe side and avoid the tub until your wound is fully healed.      You may take a shower 24 hours after surgery.  Check with your surgeon to confirmed that it is ok for water to run over a wound. You may gently wash the wound using the antiseptic soap provided for your pre-surgery showering (do not use a washcloth).  Any mild soap will work as well.      Many surgical wounds will have small white strips of tape on them called Steri Strips.  Do not remove these.  The edges will curl and fall off within 7-10 days with normal showering.      If you are going home with sutures (stitches) or staples, you must return to the clinic to " have them taken out, usually within 1-2 weeks.      Also, remember to eat a healthy nutritious diet.  This includes fruits, vegetables and good sources of proteins.  Protein provides the healing blocks for tissues, so include that in each meal, even in small amounts.  Stay hydrated by drinking water and liquids such as Gatoraide or Poweraide, tea, juices.        When to call the doctor:  1. Fever, temperature over 101.5 ' F  2. Redness at the incision  3. Swelling at the incision  4. Increasing pain  5. Drainage from your wound.  Green or yellow drainage which may or may not have a foul odor.                        Pre-Operative Surgery Showers    Purpose:   The skin harbors a large variety of bacteria, both infectious and noninfectious.  Showering with an antiseptic soap prior to an invasive procedure will decrease the number of transient and resident (good and bad) bacteria that is normally found on the skin.    Procedure:      Shower or bathe with 1/2 of the bottle of antiseptic soap (enclosed) the evening before and 1/2 of the bottle the morning of surgery (bathing the day of the procedure is most important). If you do not receive your soap at the clinic you may purchase Hibiclens at your local drug store.      Apply the soap at full strength (use the entire bottle).  Gently clean the skin, rinse, and dry with a clean towel that is freshly laundered (out of the dryer) and then put on clean clothing that is freshly laundered.        We have given you information regarding pre-op showering.  We recommend that patients wash with an antiseptic soap prior to surgery.  This cleanser will be given to you at the clinic.  It is advised that you liberally wash the specific area surgery area the night before, and again in the morning before the surgery.  Do not apply lotion afterward.  We would like to keep the skin as clean as possible.      If you are deaf or hard of hearing, please let us know.  We provide many free  services including sign language interpreters, oval interpreters, TTYs, telephone amplifiers, note takers, and written materials.
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
## 2018-04-24 ENCOUNTER — ANESTHESIA EVENT (OUTPATIENT)
Dept: SURGERY | Facility: CLINIC | Age: 43
End: 2018-04-24
Payer: MEDICARE

## 2018-04-25 ENCOUNTER — HOSPITAL ENCOUNTER (OUTPATIENT)
Facility: CLINIC | Age: 43
Discharge: HOME | End: 2018-04-25
Attending: SURGERY | Admitting: SURGERY
Payer: MEDICARE

## 2018-04-25 ENCOUNTER — SURGERY (OUTPATIENT)
Age: 43
End: 2018-04-25
Payer: MEDICARE

## 2018-04-25 ENCOUNTER — ANESTHESIA (OUTPATIENT)
Dept: SURGERY | Facility: CLINIC | Age: 43
End: 2018-04-25
Payer: MEDICARE

## 2018-04-25 VITALS
WEIGHT: 215.39 LBS | HEIGHT: 68 IN | BODY MASS INDEX: 32.64 KG/M2 | DIASTOLIC BLOOD PRESSURE: 79 MMHG | TEMPERATURE: 98.2 F | OXYGEN SATURATION: 96 % | RESPIRATION RATE: 18 BRPM | SYSTOLIC BLOOD PRESSURE: 117 MMHG

## 2018-04-25 DIAGNOSIS — Z90.49 S/P LAPAROSCOPIC CHOLECYSTECTOMY: Primary | ICD-10-CM

## 2018-04-25 LAB
ABO + RH BLD: NORMAL
ABO + RH BLD: NORMAL
BLD GP AB SCN SERPL QL: NORMAL
BLOOD BANK CMNT PATIENT-IMP: NORMAL
GLUCOSE BLDC GLUCOMTR-MCNC: 103 MG/DL (ref 70–99)
HCG SERPL QL: NEGATIVE
SPECIMEN EXP DATE BLD: NORMAL

## 2018-04-25 PROCEDURE — 36000057 ZZH SURGERY LEVEL 3 1ST 30 MIN - UMMC: Performed by: SURGERY

## 2018-04-25 PROCEDURE — 27210794 ZZH OR GENERAL SUPPLY STERILE: Performed by: SURGERY

## 2018-04-25 PROCEDURE — 71000027 ZZH RECOVERY PHASE 2 EACH 15 MINS: Performed by: SURGERY

## 2018-04-25 PROCEDURE — 88304 TISSUE EXAM BY PATHOLOGIST: CPT | Performed by: SURGERY

## 2018-04-25 PROCEDURE — 86850 RBC ANTIBODY SCREEN: CPT | Performed by: STUDENT IN AN ORGANIZED HEALTH CARE EDUCATION/TRAINING PROGRAM

## 2018-04-25 PROCEDURE — 25000128 H RX IP 250 OP 636: Performed by: STUDENT IN AN ORGANIZED HEALTH CARE EDUCATION/TRAINING PROGRAM

## 2018-04-25 PROCEDURE — 47562 LAPAROSCOPIC CHOLECYSTECTOMY: CPT | Mod: GC | Performed by: SURGERY

## 2018-04-25 PROCEDURE — 25000128 H RX IP 250 OP 636: Performed by: RESIDENTIAL TREATMENT FACILITY, PHYSICAL DISABILITIES

## 2018-04-25 PROCEDURE — 25000128 H RX IP 250 OP 636: Performed by: NURSE ANESTHETIST, CERTIFIED REGISTERED

## 2018-04-25 PROCEDURE — 37000009 ZZH ANESTHESIA TECHNICAL FEE, EACH ADDTL 15 MIN: Performed by: SURGERY

## 2018-04-25 PROCEDURE — 86900 BLOOD TYPING SEROLOGIC ABO: CPT | Performed by: STUDENT IN AN ORGANIZED HEALTH CARE EDUCATION/TRAINING PROGRAM

## 2018-04-25 PROCEDURE — 37000008 ZZH ANESTHESIA TECHNICAL FEE, 1ST 30 MIN: Performed by: SURGERY

## 2018-04-25 PROCEDURE — 82962 GLUCOSE BLOOD TEST: CPT

## 2018-04-25 PROCEDURE — C9290 INJ, BUPIVACAINE LIPOSOME: HCPCS | Performed by: ANESTHESIOLOGY

## 2018-04-25 PROCEDURE — 25000565 ZZH ISOFLURANE, EA 15 MIN: Performed by: SURGERY

## 2018-04-25 PROCEDURE — 25000132 ZZH RX MED GY IP 250 OP 250 PS 637: Mod: GY | Performed by: RESIDENTIAL TREATMENT FACILITY, PHYSICAL DISABILITIES

## 2018-04-25 PROCEDURE — 40000171 ZZH STATISTIC PRE-PROCEDURE ASSESSMENT III: Performed by: SURGERY

## 2018-04-25 PROCEDURE — 84703 CHORIONIC GONADOTROPIN ASSAY: CPT | Performed by: STUDENT IN AN ORGANIZED HEALTH CARE EDUCATION/TRAINING PROGRAM

## 2018-04-25 PROCEDURE — A9270 NON-COVERED ITEM OR SERVICE: HCPCS | Mod: GY | Performed by: RESIDENTIAL TREATMENT FACILITY, PHYSICAL DISABILITIES

## 2018-04-25 PROCEDURE — 36000059 ZZH SURGERY LEVEL 3 EA 15 ADDTL MIN UMMC: Performed by: SURGERY

## 2018-04-25 PROCEDURE — 25000125 ZZHC RX 250: Performed by: NURSE ANESTHETIST, CERTIFIED REGISTERED

## 2018-04-25 PROCEDURE — 25000128 H RX IP 250 OP 636: Performed by: ANESTHESIOLOGY

## 2018-04-25 PROCEDURE — 25000566 ZZH SEVOFLURANE, EA 15 MIN: Performed by: SURGERY

## 2018-04-25 PROCEDURE — 86901 BLOOD TYPING SEROLOGIC RH(D): CPT | Performed by: STUDENT IN AN ORGANIZED HEALTH CARE EDUCATION/TRAINING PROGRAM

## 2018-04-25 PROCEDURE — 25000125 ZZHC RX 250: Performed by: ANESTHESIOLOGY

## 2018-04-25 PROCEDURE — 71000014 ZZH RECOVERY PHASE 1 LEVEL 2 FIRST HR: Performed by: SURGERY

## 2018-04-25 RX ORDER — CEFAZOLIN SODIUM 2 G/100ML
2 INJECTION, SOLUTION INTRAVENOUS
Status: COMPLETED | OUTPATIENT
Start: 2018-04-25 | End: 2018-04-25

## 2018-04-25 RX ORDER — SODIUM CHLORIDE, SODIUM LACTATE, POTASSIUM CHLORIDE, CALCIUM CHLORIDE 600; 310; 30; 20 MG/100ML; MG/100ML; MG/100ML; MG/100ML
INJECTION, SOLUTION INTRAVENOUS CONTINUOUS
Status: DISCONTINUED | OUTPATIENT
Start: 2018-04-25 | End: 2018-04-25 | Stop reason: HOSPADM

## 2018-04-25 RX ORDER — ACETAMINOPHEN 500 MG
1000 TABLET ORAL EVERY 8 HOURS PRN
Qty: 30 TABLET | Refills: 0 | Status: SHIPPED | OUTPATIENT
Start: 2018-04-25 | End: 2018-06-12

## 2018-04-25 RX ORDER — FENTANYL CITRATE 50 UG/ML
25-50 INJECTION, SOLUTION INTRAMUSCULAR; INTRAVENOUS
Status: DISCONTINUED | OUTPATIENT
Start: 2018-04-25 | End: 2018-04-25 | Stop reason: HOSPADM

## 2018-04-25 RX ORDER — DIMENHYDRINATE 50 MG/ML
INJECTION, SOLUTION INTRAMUSCULAR; INTRAVENOUS PRN
Status: DISCONTINUED | OUTPATIENT
Start: 2018-04-25 | End: 2018-04-25

## 2018-04-25 RX ORDER — NALOXONE HYDROCHLORIDE 0.4 MG/ML
.1-.4 INJECTION, SOLUTION INTRAMUSCULAR; INTRAVENOUS; SUBCUTANEOUS
Status: DISCONTINUED | OUTPATIENT
Start: 2018-04-25 | End: 2018-04-25 | Stop reason: HOSPADM

## 2018-04-25 RX ORDER — HYDROMORPHONE HYDROCHLORIDE 1 MG/ML
.3-.5 INJECTION, SOLUTION INTRAMUSCULAR; INTRAVENOUS; SUBCUTANEOUS EVERY 5 MIN PRN
Status: DISCONTINUED | OUTPATIENT
Start: 2018-04-25 | End: 2018-04-25 | Stop reason: HOSPADM

## 2018-04-25 RX ORDER — DEXAMETHASONE SODIUM PHOSPHATE 4 MG/ML
INJECTION, SOLUTION INTRA-ARTICULAR; INTRALESIONAL; INTRAMUSCULAR; INTRAVENOUS; SOFT TISSUE PRN
Status: DISCONTINUED | OUTPATIENT
Start: 2018-04-25 | End: 2018-04-25

## 2018-04-25 RX ORDER — CEFAZOLIN SODIUM 1 G/3ML
1 INJECTION, POWDER, FOR SOLUTION INTRAMUSCULAR; INTRAVENOUS SEE ADMIN INSTRUCTIONS
Status: DISCONTINUED | OUTPATIENT
Start: 2018-04-25 | End: 2018-04-25 | Stop reason: HOSPADM

## 2018-04-25 RX ORDER — ONDANSETRON 4 MG/1
4 TABLET, ORALLY DISINTEGRATING ORAL EVERY 30 MIN PRN
Status: DISCONTINUED | OUTPATIENT
Start: 2018-04-25 | End: 2018-04-25 | Stop reason: HOSPADM

## 2018-04-25 RX ORDER — BUPIVACAINE HYDROCHLORIDE AND EPINEPHRINE 2.5; 5 MG/ML; UG/ML
INJECTION, SOLUTION INFILTRATION; PERINEURAL PRN
Status: DISCONTINUED | OUTPATIENT
Start: 2018-04-25 | End: 2018-04-25

## 2018-04-25 RX ORDER — MEPERIDINE HYDROCHLORIDE 25 MG/ML
12.5 INJECTION INTRAMUSCULAR; INTRAVENOUS; SUBCUTANEOUS
Status: DISCONTINUED | OUTPATIENT
Start: 2018-04-25 | End: 2018-04-25 | Stop reason: HOSPADM

## 2018-04-25 RX ORDER — ONDANSETRON 2 MG/ML
INJECTION INTRAMUSCULAR; INTRAVENOUS PRN
Status: DISCONTINUED | OUTPATIENT
Start: 2018-04-25 | End: 2018-04-25

## 2018-04-25 RX ORDER — LIDOCAINE HYDROCHLORIDE 20 MG/ML
INJECTION, SOLUTION INFILTRATION; PERINEURAL PRN
Status: DISCONTINUED | OUTPATIENT
Start: 2018-04-25 | End: 2018-04-25

## 2018-04-25 RX ORDER — SODIUM CHLORIDE, SODIUM LACTATE, POTASSIUM CHLORIDE, CALCIUM CHLORIDE 600; 310; 30; 20 MG/100ML; MG/100ML; MG/100ML; MG/100ML
INJECTION, SOLUTION INTRAVENOUS CONTINUOUS PRN
Status: DISCONTINUED | OUTPATIENT
Start: 2018-04-25 | End: 2018-04-25

## 2018-04-25 RX ORDER — FLUMAZENIL 0.1 MG/ML
0.2 INJECTION, SOLUTION INTRAVENOUS
Status: DISCONTINUED | OUTPATIENT
Start: 2018-04-25 | End: 2018-04-25 | Stop reason: HOSPADM

## 2018-04-25 RX ORDER — POLYETHYLENE GLYCOL 3350 17 G/17G
1 POWDER, FOR SOLUTION ORAL DAILY
Qty: 510 G | Refills: 1 | Status: SHIPPED | OUTPATIENT
Start: 2018-04-25 | End: 2018-06-12

## 2018-04-25 RX ORDER — PROPOFOL 10 MG/ML
INJECTION, EMULSION INTRAVENOUS PRN
Status: DISCONTINUED | OUTPATIENT
Start: 2018-04-25 | End: 2018-04-25

## 2018-04-25 RX ORDER — OXYCODONE HYDROCHLORIDE 5 MG/1
5 TABLET ORAL EVERY 6 HOURS PRN
Qty: 15 TABLET | Refills: 0 | Status: SHIPPED | OUTPATIENT
Start: 2018-04-25 | End: 2018-06-12

## 2018-04-25 RX ORDER — OXYCODONE HYDROCHLORIDE 5 MG/1
5 TABLET ORAL EVERY 6 HOURS PRN
Qty: 12 TABLET | Refills: 0 | Status: SHIPPED | OUTPATIENT
Start: 2018-04-25 | End: 2018-04-25

## 2018-04-25 RX ORDER — ONDANSETRON 2 MG/ML
4 INJECTION INTRAMUSCULAR; INTRAVENOUS EVERY 30 MIN PRN
Status: DISCONTINUED | OUTPATIENT
Start: 2018-04-25 | End: 2018-04-25 | Stop reason: HOSPADM

## 2018-04-25 RX ORDER — OXYCODONE HYDROCHLORIDE 5 MG/1
5 TABLET ORAL ONCE
Status: COMPLETED | OUTPATIENT
Start: 2018-04-25 | End: 2018-04-25

## 2018-04-25 RX ADMIN — FENTANYL CITRATE 50 MCG: 50 INJECTION INTRAMUSCULAR; INTRAVENOUS at 13:15

## 2018-04-25 RX ADMIN — OXYCODONE HYDROCHLORIDE 5 MG: 5 TABLET ORAL at 14:50

## 2018-04-25 RX ADMIN — MIDAZOLAM 2 MG: 1 INJECTION INTRAMUSCULAR; INTRAVENOUS at 11:32

## 2018-04-25 RX ADMIN — FENTANYL CITRATE 50 MCG: 50 INJECTION INTRAMUSCULAR; INTRAVENOUS at 10:53

## 2018-04-25 RX ADMIN — ONDANSETRON 4 MG: 2 INJECTION INTRAMUSCULAR; INTRAVENOUS at 12:49

## 2018-04-25 RX ADMIN — ROCURONIUM BROMIDE 10 MG: 10 INJECTION INTRAVENOUS at 12:01

## 2018-04-25 RX ADMIN — FENTANYL CITRATE 100 MCG: 50 INJECTION INTRAMUSCULAR; INTRAVENOUS at 12:35

## 2018-04-25 RX ADMIN — FENTANYL CITRATE 100 MCG: 50 INJECTION INTRAMUSCULAR; INTRAVENOUS at 11:42

## 2018-04-25 RX ADMIN — ROCURONIUM BROMIDE 40 MG: 10 INJECTION INTRAVENOUS at 11:45

## 2018-04-25 RX ADMIN — BUPIVACAINE HYDROCHLORIDE AND EPINEPHRINE BITARTRATE 20 ML: 2.5; .005 INJECTION, SOLUTION INFILTRATION; PERINEURAL at 11:05

## 2018-04-25 RX ADMIN — BUPIVACAINE 20 ML: 13.3 INJECTION, SUSPENSION, LIPOSOMAL INFILTRATION at 11:05

## 2018-04-25 RX ADMIN — CEFAZOLIN SODIUM 2 G: 2 INJECTION, SOLUTION INTRAVENOUS at 11:51

## 2018-04-25 RX ADMIN — PROPOFOL 30 MG: 10 INJECTION, EMULSION INTRAVENOUS at 12:35

## 2018-04-25 RX ADMIN — DIMENHYDRINATE 25 MG: 50 INJECTION, SOLUTION INTRAMUSCULAR; INTRAVENOUS at 11:51

## 2018-04-25 RX ADMIN — LIDOCAINE HYDROCHLORIDE 80 MG: 20 INJECTION, SOLUTION INFILTRATION; PERINEURAL at 11:43

## 2018-04-25 RX ADMIN — MIDAZOLAM 1 MG: 1 INJECTION INTRAMUSCULAR; INTRAVENOUS at 10:52

## 2018-04-25 RX ADMIN — SODIUM CHLORIDE, POTASSIUM CHLORIDE, SODIUM LACTATE AND CALCIUM CHLORIDE: 600; 310; 30; 20 INJECTION, SOLUTION INTRAVENOUS at 11:24

## 2018-04-25 RX ADMIN — HYDROMORPHONE HYDROCHLORIDE 0.3 MG: 1 INJECTION, SOLUTION INTRAMUSCULAR; INTRAVENOUS; SUBCUTANEOUS at 13:38

## 2018-04-25 RX ADMIN — DEXAMETHASONE SODIUM PHOSPHATE 6 MG: 4 INJECTION, SOLUTION INTRA-ARTICULAR; INTRALESIONAL; INTRAMUSCULAR; INTRAVENOUS; SOFT TISSUE at 11:45

## 2018-04-25 RX ADMIN — PROPOFOL 170 MG: 10 INJECTION, EMULSION INTRAVENOUS at 11:43

## 2018-04-25 ASSESSMENT — LIFESTYLE VARIABLES: TOBACCO_USE: 1

## 2018-04-25 NOTE — DISCHARGE INSTRUCTIONS
[facility]  Same-Day Surgery   Adult Discharge Orders & Instructions     For 24 hours after surgery    1. Get plenty of rest.  A responsible adult must stay with you for at least 24 hours after you leave the hospital.   2. Do not drive or use heavy equipment.  If you have weakness or tingling, don't drive or use heavy equipment until this feeling goes away.  3. Do not drink alcohol.  4. Avoid strenuous or risky activities.  Ask for help when climbing stairs.   5. You may feel lightheaded.  IF so, sit for a few minutes before standing.  Have someone help you get up.   6. If you have nausea (feel sick to your stomach): Drink only clear liquids such as apple juice, ginger ale, broth or 7-Up.  Rest may also help.  Be sure to drink enough fluids.  Move to a regular diet as you feel able.  7. You may have a slight fever. Call the doctor if your fever is over 100 F (37.7 C) (taken under the tongue) or lasts longer than 24 hours.  8. You may have a dry mouth, a sore throat, muscle aches or trouble sleeping.  These should go away after 24 hours.  9. Do not make important or legal decisions.   Call your doctor for any of the followin.  Signs of infection (fever, growing tenderness at the surgery site, a large amount of drainage or bleeding, severe pain, foul-smelling drainage, redness, swelling).    2. It has been over 8 to 10 hours since surgery and you are still not able to urinate (pass water).    3.  Headache for over 24 hours.    4.  Numbness, tingling or weakness the day after surgery (if you had spinal anesthesia).  To contact a doctor, call Dr. Alberto Noble @ [phone] (clinic)  or:        [phone] and ask for the resident on call for Surgery/Oncology (answered 24 hours a day)      Emergency Department:    [facility]: [phone]       (TTY for hearing impaired: [phone])    [facility]: [phone]       (TTY for hearing impaired:  [phone])

## 2018-04-25 NOTE — IP AVS SNAPSHOT
MRN:[id number]                      After Visit Summary   4/25/2018    Vianca Kumar    MRN: [id number]           Thank you!     Thank you for choosing [facility] for your care. Our goal is always to provide you with excellent care. Hearing back from our patients is one way we can continue to improve our services. Please take a few minutes to complete the written survey that you may receive in the mail after you visit with us. Thank you!        Patient Information     Date Of Birth          1975        About your hospital stay     You were admitted on:  April 25, 2018 You last received care in the:  Post Anesthesia Care Unit [facility]    You were discharged on:  April 25, 2018        Reason for your hospital stay       No diagnosis found.                  Who to Call     For medical emergencies, please call 911.  For non-urgent questions about your medical care, please call your primary care provider or clinic, [phone]  For questions related to your surgery, please call your surgery clinic        Attending Provider     Provider Specialty    Alberto Noble MD Surgery Surgical Oncology       Primary Care Provider Office Phone # Fax #    Veronica Hardy [phone]      After Care Instructions     Activity       Your activity upon discharge: activity as tolerated.   If you've recently had laparoscopic surgery, do not lift anything > 10 lbs for 4 weeks.            Diet       Regular            Discharge Instructions       If your travel plans upon discharge include prolonged periods of sitting (a lengthy car or plane ride), it is highly beneficial to get up and walk at least once per hour to help prevent swelling and blood clots.     You may shower after operation, let warm soapy water run over incision and pat dry. Do not submerge, soak, or scrub incision.    Take incentive spirometer home for continued frequent use    You will be discharged with narcotic  "pain medications. Please take them only as needed and do not operate a car or heavy machinery for 24 hours after taking them.  Narcotic pain medications like oxycodone are constipating. Please ensure that you are drinking adequate amounts of fluids and taking stool softeners while you are taking narcotics. Take Miralax as needed for constipation.     Do not drive until you can with stand pressing the brake pedal quickly and fully without pain and you are not distracted by pain.     Call for fever greater than 101.5, chills, red skin around incision, drainage from incision, increased swelling from the incision, bleeding from the incision that is not controlled with light pressure, inability to tolerate diet,new nausea/vomiting or other new/worsening symptoms.   You may also call the surgical oncology nurse care coordinator from 8:00am-4:30pm [employer] at [phone]. For after hours questions or concerns you can contact the on-call surgical oncology resident (nights and weekends [phone] and ask \"I would like to page the Surgical Oncology Resident on call.\"). In emergencies, call [phone]    Diet:  -Advance diet as tolerated to regular diet    Follow Up:  Follow up in clinic with Dr. Noble in 2 week. You should be called to make an appointment within 3 business days. If you are not contacted, call [phone] to make an appointment.                  Follow-up Appointments     Adult [facility] Follow-up and recommended labs and tests       Follow up with Dr. Noble , at [facility] Surgery clinic in 2 weeks for post op check    Appointments on [place] and/or [facility] (with [facility] or [facility] provider or service). Call [phone] if you haven't heard regarding these appointments within 7 days of discharge.                  Your next 10 appointments already scheduled     Jun 18, 2018 11:40 AM CDT   (Arrive by 11:25 AM)   Return Visit with Tee Rodgers PA-C   [facility] ([facility] " [facility])    [address]  [address] [facility] [address] [phone]              Further instructions from your care team       [facility] [place]  Same-Day Surgery   Adult Discharge Orders & Instructions     For 24 hours after surgery    1. Get plenty of rest.  A responsible adult must stay with you for at least 24 hours after you leave the hospital.   2. Do not drive or use heavy equipment.  If you have weakness or tingling, don't drive or use heavy equipment until this feeling goes away.  3. Do not drink alcohol.  4. Avoid strenuous or risky activities.  Ask for help when climbing stairs.   5. You may feel lightheaded.  IF so, sit for a few minutes before standing.  Have someone help you get up.   6. If you have nausea (feel sick to your stomach): Drink only clear liquids such as apple juice, ginger ale, broth or 7-Up.  Rest may also help.  Be sure to drink enough fluids.  Move to a regular diet as you feel able.  7. You may have a slight fever. Call the doctor if your fever is over 100 F (37.7 C) (taken under the tongue) or lasts longer than 24 hours.  8. You may have a dry mouth, a sore throat, muscle aches or trouble sleeping.  These should go away after 24 hours.  9. Do not make important or legal decisions.   Call your doctor for any of the followin.  Signs of infection (fever, growing tenderness at the surgery site, a large amount of drainage or bleeding, severe pain, foul-smelling drainage, redness, swelling).    2. It has been over 8 to 10 hours since surgery and you are still not able to urinate (pass water).    3.  Headache for over 24 hours.    4.  Numbness, tingling or weakness the day after surgery (if you had spinal anesthesia).  To contact a doctor, call Dr. Alberto Noble @ [phone] (clinic)  or:        [phone] and ask for the resident on call for Surgery/Oncology (answered 24 hours a day)      Emergency  "Department:    [facility]: [phone]       (TTY for hearing impaired: [phone])    [facility] [place]: [phone]       (TTY for hearing impaired: [phone])        Pending Results     Date and Time Order Name Status Description    4/25/2018 1236 Surgical pathology exam In process             Admission Information     Date & Time Provider Department Dept. Phone    4/25/2018 Alberto Noble MD Post Anesthesia Care Unit [facility] [phone]      Your Vitals Were     Blood Pressure Temperature Respirations Height Weight Last Period    113/67 98.2  F (36.8  C) (Oral) 11 1.727 m (5' 8\") 97.7 kg (215 lb 6.2 oz) 04/06/2018 (Exact Date)    Pulse Oximetry BMI (Body Mass Index)                94% 32.75 kg/m2          MyChart Information     [employer] gives you secure access to your electronic health record. If you see a primary care provider, you can also send messages to your care team and make appointments. If you have questions, please call your primary care clinic.  If you do not have a primary care provider, please call [phone] and they will assist you.        Care EveryWhere ID     This is your Care EveryWhere ID. This could be used by other organizations to access your [place] medical records  [id number]        Equal Access to Services     JOVANI HO AH: Carine apple Sobarbara, waaxda luqadaha, qaybta kaalmada adeegvíctor, emma bullard. So [facility] [phone].    ATENCIÓN: Si habla español, tiene a ballard disposición servicios gratuitos de asistencia lingüística. Llame al [phone].    We comply with applicable federal civil rights laws and [place] laws. We do not discriminate on the basis of race, color, national origin, age, disability, sex, sexual orientation, or gender identity.               Review of your medicines      START taking        Dose / Directions    acetaminophen 500 MG tablet   Commonly known as:  TYLENOL   Used for:  S/P laparoscopic " "cholecystectomy        Dose:  1000 mg   Take 2 tablets (1,000 mg) by mouth every 8 hours as needed for mild pain   Quantity:  30 tablet   Refills:  0       oxyCODONE IR 5 MG tablet   Commonly known as:  ROXICODONE   Used for:  S/P laparoscopic cholecystectomy        Dose:  5 mg   Take 1 tablet (5 mg) by mouth every 6 hours as needed for severe pain maximum 6 tablet(s) per day   Quantity:  15 tablet   Refills:  0       polyethylene glycol powder   Commonly known as:  MIRALAX   Used for:  S/P laparoscopic cholecystectomy        Dose:  1 capful   Take 17 g (1 capful) by mouth daily   Quantity:  510 g   Refills:  1         CONTINUE these medicines which have NOT CHANGED        Dose / Directions    escitalopram 20 MG tablet   Commonly known as:  LEXAPRO        Dose:  20 mg   Take 20 mg by mouth daily.   Refills:  0       MAGNESIUM OXIDE PO        Take by mouth as needed   Refills:  0       omeprazole 40 MG capsule   Commonly known as:  priLOSEC        Take  by mouth daily.   Refills:  0            Where to get your medicines      Some of these will need a paper prescription and others can be bought over the counter. Ask your nurse if you have questions.     Bring a paper prescription for each of these medications     acetaminophen 500 MG tablet    oxyCODONE IR 5 MG tablet    polyethylene glycol powder                Protect others around you: Learn how to safely use, store and throw away your medicines at www.disposemymeds.org.        Information about your nerve block     Today you received a block to numb the nerves near your surgery site.    This is a block using local anesthetic or \"numbing\" medication injected around the nerves to anesthetize or \"numb\" the area supplied by those nerves. This block is injected into the muscle layer near your surgical site. The type of anesthesia (Exparel) your anesthesia team used to numb your abdomen may give you relief for up to 72 hours.     Diet: There are no diet restrictions, " but you should drink plenty of fluids, unless you are on a fluid-restricted diet.     Activity: If your surgical site is an arm or leg you should be careful with your affected limb, since it is possible to injure your limb without being aware of it due to the numbing. Until full feeling returns, you should guard against bumping or hitting your limb, and avoid extreme hot or cold temperatures on the skin.    Pain Medication: As the block wears off, the feeling will return as a tingling or prickly sensation near your surgical site. You will experience more discomfort from your incisions as the feeling returns. You may want to take a pain pill (a narcotic or Tylenol if this was prescribed by your surgeon) when you start to experience mild pain, before the pain becomes more severe. If your pain medications do not control your pain, you should notify your surgeon. If you are taking narcotics for pain management, do not drink alcohol, drive a car, or perform hazardous activities.  If you have questions or concerns you may call your surgeon at the number provided with your discharge instructions.     Call your surgeon if you experience blurry vision, ringing in the ears or metallic taste in your mouth.         Information about OPIOIDS     PRESCRIPTION OPIOIDS: WHAT YOU NEED TO KNOW   You have a prescription for an opioid (narcotic) pain medicine. Opioids can cause addiction. If you have a history of chemical dependency of any type, you are at a higher risk of becoming addicted to opioids. Only take this medicine after all other options have been tried. Take it for as short a time and as few doses as possible.     Do not:    Drive. If you drive while taking these medicines, you could be arrested for driving under the influence (DUI).    Operate heavy machinery    Do any other dangerous activities while taking these medicines.     Drink any alcohol while taking these medicines.      Take with any other medicines that  contain acetaminophen. Read all labels carefully. Look for the word  acetaminophen  or  Tylenol.  Ask your pharmacist if you have questions or are unsure.    Store your pills in a secure place, locked if possible. We will not replace any lost or stolen medicine. If you don t finish your medicine, please throw away (dispose) as directed by your pharmacist. The [place] Pollution Control Agency has more information about safe disposal: https://www.pca.[facility].[place].us/living-green/managing-unwanted-medications    All opioids tend to cause constipation. Drink plenty of water and eat foods that have a lot of fiber, such as fruits, vegetables, prune juice, apple juice and high-fiber cereal. Take a laxative (Miralax, milk of magnesia, Colace, Senna) if you don t move your bowels at least every other day.              Medication List: This is a list of all your medications and when to take them. Check marks below indicate your daily home schedule. Keep this list as a reference.      Medications           Morning Afternoon Evening Bedtime As Needed    acetaminophen 500 MG tablet   Commonly known as:  TYLENOL   Take 2 tablets (1,000 mg) by mouth every 8 hours as needed for mild pain                                escitalopram 20 MG tablet   Commonly known as:  LEXAPRO   Take 20 mg by mouth daily.                                MAGNESIUM OXIDE PO   Take by mouth as needed                                omeprazole 40 MG capsule   Commonly known as:  priLOSEC   Take  by mouth daily.                                oxyCODONE IR 5 MG tablet   Commonly known as:  ROXICODONE   Take 1 tablet (5 mg) by mouth every 6 hours as needed for severe pain maximum 6 tablet(s) per day                                polyethylene glycol powder   Commonly known as:  MIRALAX   Take 17 g (1 capful) by mouth daily

## 2018-04-25 NOTE — BRIEF OP NOTE
[facility], [place]    Brief Operative Note    Pre-operative diagnosis: Biliary Colic   Post-operative diagnosis Chronic cholecystitis   Procedure: Procedure(s):  Laparoscopic Cholecystectomy  - Wound Class: II-Clean Contaminated  Surgeon: Surgeon(s) and Role:     * Alberto Noble MD - Primary     * Danial Martínez MD - Resident - Assisting  Anesthesia: Combined General with Block   Estimated blood loss: Less than 10 ml  Drains: None  Specimens:   ID Type Source Tests Collected by Time Destination   A : GALLBLADDER Tissue Gallbladder and Contents SURGICAL PATHOLOGY EXAM Alberto Noble MD 4/25/2018 12:35 PM      Findings:   Critical view of safety established prior to ligation of cystic duct and artery.  Complications: None.  Implants: None.
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
## 2018-04-25 NOTE — OR NURSING
Dr Noble to room.  OK to give oral pain medication prior to discharge.  Patient able to eat and drink.  Pain is minimal but would like one for plans of actiivity with trip home.

## 2018-04-25 NOTE — ANESTHESIA POSTPROCEDURE EVALUATION
Patient: Vianca Kumar    Procedure(s):  Laparoscopic Cholecystectomy  - Wound Class: II-Clean Contaminated    Diagnosis:Biliary Colic   Diagnosis Additional Information: No value filed.    Anesthesia Type:  General    Note:  Anesthesia Post Evaluation    Patient location during evaluation: PACU  Patient participation: Able to fully participate in evaluation  Level of consciousness: awake and alert  Pain management: adequate  Airway patency: patent  Cardiovascular status: hemodynamically stable  Respiratory status: acceptable  Hydration status: acceptable  PONV: none             Last vitals:  Vitals:    04/25/18 1345 04/25/18 1400 04/25/18 1410   BP: 112/76 113/67 113/74   Resp: 10 11 16   Temp: 36.3  C (97.4  F) 36.8  C (98.2  F) 37.1  C (98.7  F)   SpO2: 95% 94% 93%         Electronically Signed By: Corazon Hernandez MD  April 25, 2018  2:11 PM

## 2018-04-25 NOTE — OP NOTE
DATE OF SURGERY: April 25, 2018     SURGEON: Alberto Noble MD   ASSISTANT: Danial Martínez MD    PREOPERATIVE DIAGNOSIS: Biliary Colic  POSTOPERATIVE DIAGNOSIS: Biliary Colic   general  PROCEDURE: Laparoscopic cholecystectomy    ANESTHESIA: General    INDICATIONS:  Ms. Kumar is a 42-year-old lady who over the last 4-5 months is began experiencing right upper quadrant abdominal pain.  She has undergone extensive workup including CT scan, MR enterography, and an EUS.  She was referred for the EUS procedure because she had some mild dilation of her common bile duct and concern over possible choledochal cyst.  On the EUS she was also noted to have gallbladder sludge in the common bile duct was dilated to about 13 mm.  We had an extensive discussion about how her clinical picture was not the prototypical presentation and appearance of the choledochal cyst.  We also discussed how her symptoms which may be consistent with biliary colic could be explained by her gallbladder still sludge and some ongoing cholecystitis.  I offered her a laparoscopic cholecystectomy attempt to improve her symptoms.  After discussing the risks and benefits of the procedure, she agreed to proceed with surgery.    FINDINGS:  There appearance of the gallbladder was consistent with some chronic inflammation.  There do not appear to be any acute inflammation.  We did not visualize anything other pathology within the upper abdomen that might explain her abdominal pain.    PROCEDURE IN DETAIL:  She was taken to the operating room placed on the table in supine position and general anesthesia was administered.  She received preoperative antibiotics.  She had SCDs in place for VTE prophylaxis.  She was prepped and draped in sterile fashion timeout was performed.    We began the procedure by making a 10 mm millimeter supraumbilical incision.  We entered the peritoneal cavity with Aleman technique and placed a 10 mm port.  Pneumoperitoneum was achieved.   We inspected the peritoneal cavity and then placed 3 additional 5 mm ports within the right upper quadrant in the subcostal location.  Placed the patient in reverse Trendelenburg and left side down.  We grasped the dome and infundibulum of the gallbladder used a combination of blunt and electrocautery dissection to identify the cystic artery coursing onto the gallbladder as well as the cystic duct.  We achieved the critical view as were the only 2 structures going into the gallbladder and the liver was visualized posteriorly.  The cystic artery and cystic duct were divided between clips.  Next, we freed the gallbladder from the gallbladder fossa and left the cystic plate on the liver.  There was no significant bleeding during the procedure and there was complete hemostasis at the completion.    We closed the 10 mm supraumbilical fascial defect with 0 Vicryl in figure-of-eight fashion ×2.  The skin was reapproximated at all incision sites with 4-0 Monocryl in subcuticular fashion and Dermabond was applied to the skin.     The patient tolerated the procedure well.  I was present and scrubbed for the entire procedure.    EBL: 5 mL    SPECIMEN(S):  1.  Gallbladder    COUNTS:  Recorded as correct x 2.    POSTOPERATIVE PLANS:  The patient will be transferred to the PACU with plans to be discharged home.    Alberto Noble MD    Division of Surgical Oncology  [facility]

## 2018-04-25 NOTE — OR NURSING
Page sent to Dr. Martínez, mother of pt has not received update on how surgery went, asked him to stop by marvelunganthony or call [phone] to speak with her

## 2018-04-25 NOTE — ANESTHESIA CARE TRANSFER NOTE
Patient: Vianca Kumar    Procedure(s):  Laparoscopic Cholecystectomy  - Wound Class: II-Clean Contaminated    Diagnosis: Biliary Colic   Diagnosis Additional Information: No value filed.    Anesthesia Type:   General     Note:  Airway :Face Mask  Patient transferred to:PACU  Comments: VSS, patent airway, report to RN.Handoff Report: Identifed the Patient, Identified the Reponsible Provider, Reviewed the pertinent medical history, Discussed the surgical course, Reviewed Intra-OP anesthesia mangement and issues during anesthesia, Set expectations for post-procedure period and Allowed opportunity for questions and acknowledgement of understanding      Vitals: (Last set prior to Anesthesia Care Transfer)    CRNA VITALS  4/25/2018 1239 - 4/25/2018 1315      4/25/2018             Resp Rate (observed): (!)  1                Electronically Signed By: SALLY Byrd CRNA  April 25, 2018  1:15 PM

## 2018-04-25 NOTE — IP AVS SNAPSHOT
Post Anesthesia Care Unit [address] [address]    Phone:  [phone]                                       After Visit Summary   4/25/2018    Vianca Kumar    MRN: [id number]           After Visit Summary Signature Page     I have received my discharge instructions, and my questions have been answered. I have discussed any challenges I see with this plan with the nurse or doctor.    ..........................................................................................................................................  Patient/Patient Representative Signature      ..........................................................................................................................................  Patient Representative Print Name and Relationship to Patient    ..................................................               ................................................  Date                                            Time    ..........................................................................................................................................  Reviewed by Signature/Title    ...................................................              ..............................................  Date                                                            Time

## 2018-04-25 NOTE — ANESTHESIA PROCEDURE NOTES
Peripheral Nerve Block Procedure Note    Staff:     Anesthesiologist:  EWA MITCHELL    Resident/CRNA:  PENNIE EVANS    Block performed by resident/CRNA in the presence of a teaching physician    Location: Pre-op  Procedure Start/Stop TImes:      4/25/2018 10:55 AM     4/25/2018 11:05 AM    patient identified, IV checked, site marked, risks and benefits discussed, informed consent, monitors and equipment checked, pre-op evaluation, at physician/surgeon's request and post-op pain management      Correct Patient: Yes      Correct Position: Yes      Correct Site: Yes      Correct Procedure: Yes      Correct Laterality:  Yes    Site Marked:  Yes  Procedure details:     Procedure:  TAP    Laterality:  Bilateral    Position:  Supine    Sterile Prep: chloraprep, mask and sterile gloves      Local skin infiltration:  None    Needle:  Short bevel and insulated    Needle gauge:  21    Needle length (mm):  110    Ultrasound: Yes      Ultrasound used to identify targeted nerve, plexus, or vascular structure and placed a needle adjacent to it      Permanent Image entered into patiient's record      Abnormal pain on injection: No      Blood Aspirated: No      Paresthesias:  No    Bleeding at site: No      Bolus via:  Needle    Infusion Method:  Single Shot    Complications:  None  Assessment/Narrative:     Injection made incrementally with aspirations every (mL):  5

## 2018-04-26 ENCOUNTER — CARE COORDINATION (OUTPATIENT)
Dept: SURGERY | Facility: CLINIC | Age: 43
End: 2018-04-26

## 2018-04-26 NOTE — PROGRESS NOTES
"RN Care Coordination Post Operative Note  Surgical Oncology    Called patient in f/u to recent laparoscopic cholecystectomy done by Dr. Noble    Patient returned my call and left the following information    Health Status:    Fevers or Chills:  no    Nausea no  Vomiting no  Diet: eating \"some regular food\"    Pain: Related good incisional pain control    Bowels: no information    Activity/Restrictions:    Post op visit has been scheduled and patient is aware.  Patient has our contact information and I have asked that they call with any further questions or concerns.    Plan: patient related that no need for me to call back as doing well.    Kristen FRANCISCON, HNBC, STAR-T  RN Care Coordinator  Surgical Oncology  Ph: [phone]  FAX: [phone]          "

## 2018-04-27 LAB — COPATH REPORT: NORMAL

## 2018-05-06 ENCOUNTER — COMMUNICATION - HEALTHEAST (OUTPATIENT)
Dept: FAMILY MEDICINE | Facility: CLINIC | Age: 43
End: 2018-05-06

## 2018-05-06 DIAGNOSIS — F33.9 MAJOR DEPRESSION, RECURRENT (H): ICD-10-CM

## 2018-05-14 ENCOUNTER — RECORDS - HEALTHEAST (OUTPATIENT)
Dept: ADMINISTRATIVE | Facility: OTHER | Age: 43
End: 2018-05-14

## 2018-05-14 ENCOUNTER — OFFICE VISIT (OUTPATIENT)
Dept: SURGERY | Facility: CLINIC | Age: 43
End: 2018-05-14
Attending: SURGERY
Payer: MEDICARE

## 2018-05-14 VITALS — TEMPERATURE: 98.4 F | OXYGEN SATURATION: 98 % | HEART RATE: 97 BPM | WEIGHT: 215.4 LBS | BODY MASS INDEX: 32.75 KG/M2

## 2018-05-14 DIAGNOSIS — K80.50 BILIARY COLIC: Primary | ICD-10-CM

## 2018-05-14 PROCEDURE — G0463 HOSPITAL OUTPT CLINIC VISIT: HCPCS

## 2018-05-14 PROCEDURE — 99024 POSTOP FOLLOW-UP VISIT: CPT | Mod: ZP | Performed by: SURGERY

## 2018-05-14 ASSESSMENT — PAIN SCALES - GENERAL: PAINLEVEL: NO PAIN (0)

## 2018-05-14 NOTE — NURSING NOTE
"Oncology Rooming Note    May 14, 2018 3:07 PM   Vianca Kumar is a 42 year old female who presents for:    Chief Complaint   Patient presents with     Oncology Clinic Visit     Pt is here for a Post OP rtn      Initial Vitals: Pulse 97  Temp 98.4  F (36.9  C) (Oral)  Wt 97.7 kg (215 lb 6.4 oz)  SpO2 98%  BMI 32.75 kg/m2 Estimated body mass index is 32.75 kg/(m^2) as calculated from the following:    Height as of 4/25/18: 1.727 m (5' 8\").    Weight as of this encounter: 97.7 kg (215 lb 6.4 oz). Body surface area is 2.17 meters squared.  No Pain (0) Comment: Data Unavailable   No LMP recorded.  Allergies reviewed: Yes  Medications reviewed: Yes    Medications: Medication refills not needed today.  Pharmacy name entered into EPIC: Data Unavailable    Clinical concerns: none     6 minutes for nursing intake (face to face time)     Ene Jefferson MA              "

## 2018-05-14 NOTE — MR AVS SNAPSHOT
After Visit Summary   5/14/2018    Vianca Kumar    MRN: [id number]           Patient Information     Date Of Birth          1975        Visit Information        Provider Department      5/14/2018 3:00 PM Alberto Noble MD [facility]        Today's Diagnoses     Biliary colic    -  1       Follow-ups after your visit        Your next 10 appointments already scheduled     Jun 18, 2018 11:40 AM CDT   (Arrive by 11:25 AM)   Return Visit with Tee Rodgers PA-C   [facility] ([facility])    [address] [place] [address] [phone]              Who to contact     If you have questions or need follow up information about today's clinic visit or your schedule please contact [facility] directly at [phone].  Normal or non-critical lab and imaging results will be communicated to you by MyChart, letter or phone within 4 business days after the clinic has received the results. If you do not hear from us within 7 days, please contact the clinic through Veristorm[employer] or phone. If you have a critical or abnormal lab result, we will notify you by phone as soon as possible.  Submit refill requests through Fan[employer] or call your pharmacy and they will forward the refill request to us. Please allow 3 business days for your refill to be completed.          Additional Information About Your Visit        MyChart Information     Fan[employer] gives you secure access to your electronic health record. If you see a primary care provider, you can also send messages to your care team and make appointments. If you have questions, please call your primary care clinic.  If you do not have a primary care provider, please call [phone] and they will assist you.        Care EveryWhere ID     This is your Care EveryWhere ID. This could be used by other organizations to access your [facility]  records  [id number]        Your Vitals Were     Pulse Temperature Pulse Oximetry BMI (Body Mass Index)          97 98.4  F (36.9  C) (Oral) 98% 32.75 kg/m2         Blood Pressure from Last 3 Encounters:   04/25/18 117/79   04/16/18 108/72   04/03/18 100/82    Weight from Last 3 Encounters:   05/14/18 97.7 kg (215 lb 6.4 oz)   04/25/18 97.7 kg (215 lb 6.2 oz)   04/16/18 98.4 kg (217 lb)              Today, you had the following     No orders found for display       Primary Care Provider Office Phone # Fax #    Veronica Hardy [phone]       [address] [address]        Equal Access to Services     JOVANI HO : Carine Bearden, waaxchayo luqadaha, qaybta kaalmachayo segovia, emma monozn . So [facility] [phone].    ATENCIÓN: Si habla español, tiene a ballard disposición servicios gratuitos de asistencia lingüística. Angelina al [phone].    We comply with applicable federal civil rights laws and [place] laws. We do not discriminate on the basis of race, color, national origin, age, disability, sex, sexual orientation, or gender identity.            Thank you!     Thank you for choosing [facility]  for your care. Our goal is always to provide you with excellent care. Hearing back from our patients is one way we can continue to improve our services. Please take a few minutes to complete the written survey that you may receive in the mail after your visit with us. Thank you!             Your Updated Medication List - Protect others around you: Learn how to safely use, store and throw away your medicines at www.disposemymeds.org.          This list is accurate as of 5/14/18  3:55 PM.  Always use your most recent med list.                   Brand Name Dispense Instructions for use Diagnosis    acetaminophen 500 MG tablet    TYLENOL    30 tablet    Take 2 tablets (1,000 mg) by mouth every 8 hours as  needed for mild pain    S/P laparoscopic cholecystectomy       escitalopram 20 MG tablet    LEXAPRO     Take 20 mg by mouth daily.        MAGNESIUM OXIDE PO      Take by mouth as needed        omeprazole 40 MG capsule    priLOSEC     Take  by mouth daily.        oxyCODONE IR 5 MG tablet    ROXICODONE    15 tablet    Take 1 tablet (5 mg) by mouth every 6 hours as needed for severe pain maximum 6 tablet(s) per day    S/P laparoscopic cholecystectomy       polyethylene glycol powder    MIRALAX    510 g    Take 17 g (1 capful) by mouth daily    S/P laparoscopic cholecystectomy

## 2018-05-14 NOTE — LETTER
5/14/2018       RE: Vianca Kumar  [address]  [facility] [address]     Dear Colleague,    Thank you for referring your patient, Vianca Kumar, to the [facility]. Please see a copy of my visit note below.    FOLLOW-UP  May 14, 2018    Vianca Kumar is a 42 year old female who returns for her first post-operative follow-up visit.    HPI:  She underwent a diagnostic laparoscopy and laparoscopic cholecystectomy on April 25, 2018.  She is currently 2.5 week(s) post-op.  Final surgical pathology showed evidence of chronic cholecystitis.    Since the procedure, she reports that she has not had any more right upper quadrant abdominal pain. She reports that she has nausea and vomiting which she has had for many years, but reports that it may be slightly worse since the procedure. She did have some incisional pain shortly thereafter operation but that is improved. She is having normal bowel movements.    Pulse 97  Temp 98.4  F (36.9  C) (Oral)  Wt 97.7 kg (215 lb 6.4 oz)  SpO2 98%  BMI 32.75 kg/m2   Physical Exam  General: No acute distress.  Head and Neck: Anicteric sclera.  Chest: Bilateral chest rise, regular rate.  Abdomen: Incisions are healing well, soft, nontender, nondistended.  Extremities: No lower extremity edema.    INVESTIGATIONS:  Labs: none  Surgical Pathology (April 25, 2018): see above    ASSESSMENT/PLAN:  Vianca Kumar is a 42 year old female with symptoms of biliary colic/right upper quadrant pain that seemed to have resolved after laparoscopic cholecystectomy. She also did have evidence of focal dilation of her common bile duct.    I am pleased that her right upper quadrant abdominal pain seems to have resolved. She still has multiple long-standing gastrointestinal issues. She also will need continued surveillance for this focal dilation of the common bile duct with concerns over possible choledochocyst. I will discuss this patient with Dr. Adhikari  Carolyn, as he is a GI provider who will continue to follow her going forward. She does not need a follow-up appointment with me, but I am happy to see her at any time.    All of the above was discussed with the patient and all questions were answered.     Alberto Noble MD    Division of Surgical Oncology  [facility]

## 2018-05-14 NOTE — PROGRESS NOTES
FOLLOW-UP  May 14, 2018    Vianca Kumar is a 42 year old female who returns for her first post-operative follow-up visit.    HPI:  She underwent a diagnostic laparoscopy and laparoscopic cholecystectomy on April 25, 2018.  She is currently 2.5 week(s) post-op.  Final surgical pathology showed evidence of chronic cholecystitis.    Since the procedure, she reports that she has not had any more right upper quadrant abdominal pain. She reports that she has nausea and vomiting which she has had for many years, but reports that it may be slightly worse since the procedure. She did have some incisional pain shortly thereafter operation but that is improved. She is having normal bowel movements.    Pulse 97  Temp 98.4  F (36.9  C) (Oral)  Wt 97.7 kg (215 lb 6.4 oz)  SpO2 98%  BMI 32.75 kg/m2   Physical Exam  General: No acute distress.  Head and Neck: Anicteric sclera.  Chest: Bilateral chest rise, regular rate.  Abdomen: Incisions are healing well, soft, nontender, nondistended.  Extremities: No lower extremity edema.    INVESTIGATIONS:  Labs: none  Surgical Pathology (April 25, 2018): see above    ASSESSMENT/PLAN:  Vianca Kumar is a 42 year old female with symptoms of biliary colic/right upper quadrant pain that seemed to have resolved after laparoscopic cholecystectomy. She also did have evidence of focal dilation of her common bile duct.    I am pleased that her right upper quadrant abdominal pain seems to have resolved. She still has multiple long-standing gastrointestinal issues. She also will need continued surveillance for this focal dilation of the common bile duct with concerns over possible choledochocyst. I will discuss this patient with Dr. Guru Leon, as he is a GI provider who will continue to follow her going forward. She does not need a follow-up appointment with me, but I am happy to see her at any time.    All of the above was discussed with the patient and all questions were answered.      Alberto Noble MD    Division of Surgical Oncology  [facility]

## 2018-06-12 ENCOUNTER — OFFICE VISIT (OUTPATIENT)
Dept: URGENT CARE | Facility: URGENT CARE | Age: 43
End: 2018-06-12
Payer: MEDICARE

## 2018-06-12 VITALS
TEMPERATURE: 97.8 F | BODY MASS INDEX: 31.85 KG/M2 | OXYGEN SATURATION: 93 % | RESPIRATION RATE: 18 BRPM | WEIGHT: 209.5 LBS | DIASTOLIC BLOOD PRESSURE: 72 MMHG | SYSTOLIC BLOOD PRESSURE: 105 MMHG | HEART RATE: 77 BPM

## 2018-06-12 DIAGNOSIS — H66.013 ACUTE SUPPURATIVE OTITIS MEDIA OF BOTH EARS WITH SPONTANEOUS RUPTURE OF TYMPANIC MEMBRANES, RECURRENCE NOT SPECIFIED: ICD-10-CM

## 2018-06-12 DIAGNOSIS — R51.9 NONINTRACTABLE HEADACHE, UNSPECIFIED CHRONICITY PATTERN, UNSPECIFIED HEADACHE TYPE: ICD-10-CM

## 2018-06-12 DIAGNOSIS — H60.393 INFECTIVE OTITIS EXTERNA, BILATERAL: Primary | ICD-10-CM

## 2018-06-12 PROCEDURE — 99214 OFFICE O/P EST MOD 30 MIN: CPT | Performed by: PHYSICIAN ASSISTANT

## 2018-06-12 RX ORDER — HYDROCODONE BITARTRATE AND ACETAMINOPHEN 5; 325 MG/1; MG/1
1-2 TABLET ORAL EVERY 6 HOURS PRN
Qty: 6 TABLET | Refills: 0 | Status: SHIPPED | OUTPATIENT
Start: 2018-06-12 | End: 2018-06-18

## 2018-06-12 RX ORDER — OFLOXACIN 3 MG/ML
10 SOLUTION AURICULAR (OTIC) 2 TIMES DAILY
Qty: 10 ML | Refills: 0 | Status: SHIPPED | OUTPATIENT
Start: 2018-06-12 | End: 2018-06-19

## 2018-06-12 NOTE — MR AVS SNAPSHOT
After Visit Summary   6/12/2018    Vianca Kumar    MRN: [id number]           Patient Information     Date Of Birth          1975        Visit Information        Provider Department      6/12/2018 9:45 AM Alberto Thomas PA-C [facility]        Today's Diagnoses     Infective otitis externa, bilateral    -  1    Acute suppurative otitis media of both ears with spontaneous rupture of tympanic membranes, recurrence not specified        Nonintractable headache, unspecified chronicity pattern, unspecified headache type           Follow-ups after your visit        Your next 10 appointments already scheduled     Jun 18, 2018 11:40 AM CDT   (Arrive by 11:25 AM)   Return Visit with Tee Rodgers PA-C   [facility] ([facility])    [address] [place] [id number] [phone]              Who to contact     If you have questions or need follow up information about today's clinic visit or your schedule please contact [facility] directly at [phone].  Normal or non-critical lab and imaging results will be communicated to you by [employer], letter or phone within 4 business days after the clinic has received the results. If you do not hear from us within 7 days, please contact the clinic through [employer] or phone. If you have a critical or abnormal lab result, we will notify you by phone as soon as possible.  Submit refill requests through [employer] or call your pharmacy and they will forward the refill request to us. Please allow 3 business days for your refill to be completed.          Additional Information About Your Visit        MyChart Information     [employer] gives you secure access to your electronic health record. If you see a primary care provider, you can also send messages to your care team and make appointments. If you have questions, please call your  primary care clinic.  If you do not have a primary care provider, please call [phone] and they will assist you.        Care EveryWhere ID     This is your Care EveryWhere ID. This could be used by other organizations to access your [facility] medical records  [id number]        Your Vitals Were     Pulse Temperature Respirations Pulse Oximetry BMI (Body Mass Index)       77 97.8  F (36.6  C) (Oral) 18 93% 31.85 kg/m2        Blood Pressure from Last 3 Encounters:   06/12/18 105/72   04/25/18 117/79   04/16/18 108/72    Weight from Last 3 Encounters:   06/12/18 209 lb 8 oz (95 kg)   05/14/18 215 lb 6.4 oz (97.7 kg)   04/25/18 215 lb 6.2 oz (97.7 kg)              Today, you had the following     No orders found for display         Today's Medication Changes          These changes are accurate as of 6/12/18 10:13 AM.  If you have any questions, ask your nurse or doctor.               Start taking these medicines.        Dose/Directions    amoxicillin-clavulanate 875-125 MG per tablet   Commonly known as:  AUGMENTIN   Used for:  Acute suppurative otitis media of both ears with spontaneous rupture of tympanic membranes, recurrence not specified        Dose:  1 tablet   Take 1 tablet by mouth 2 times daily   Quantity:  20 tablet   Refills:  0       HYDROcodone-acetaminophen 5-325 MG per tablet   Commonly known as:  NORCO   Used for:  Nonintractable headache, unspecified chronicity pattern, unspecified headache type        Dose:  1-2 tablet   Take 1-2 tablets by mouth every 6 hours as needed for pain   Quantity:  6 tablet   Refills:  0       ofloxacin 0.3 % otic solution   Commonly known as:  FLOXIN   Used for:  Infective otitis externa, bilateral        Dose:  10 drop   Place 10 drops into both ears 2 times daily for 7 days   Quantity:  10 mL   Refills:  0         Stop taking these medicines if you haven't already. Please contact your care team if you have questions.     acetaminophen 500 MG tablet   Commonly known as:   TYLENOL           MAGNESIUM OXIDE PO           oxyCODONE IR 5 MG tablet   Commonly known as:  ROXICODONE           polyethylene glycol powder   Commonly known as:  MIRALAX                Where to get your medicines      Some of these will need a paper prescription and others can be bought over the counter.  Ask your nurse if you have questions.     Bring a paper prescription for each of these medications     amoxicillin-clavulanate 875-125 MG per tablet    HYDROcodone-acetaminophen 5-325 MG per tablet    ofloxacin 0.3 % otic solution               Information about OPIOIDS     PRESCRIPTION OPIOIDS: WHAT YOU NEED TO KNOW   You have a prescription for an opioid (narcotic) pain medicine. Opioids can cause addiction. If you have a history of chemical dependency of any type, you are at a higher risk of becoming addicted to opioids. Only take this medicine after all other options have been tried. Take it for as short a time and as few doses as possible.     Do not:    Drive. If you drive while taking these medicines, you could be arrested for driving under the influence (DUI).    Operate heavy machinery    Do any other dangerous activities while taking these medicines.     Drink any alcohol while taking these medicines.      Take with any other medicines that contain acetaminophen. Read all labels carefully. Look for the word  acetaminophen  or  Tylenol.  Ask your pharmacist if you have questions or are unsure.    Store your pills in a secure place, locked if possible. We will not replace any lost or stolen medicine. If you don t finish your medicine, please throw away (dispose) as directed by your pharmacist. The [place] Pollution Control Agency has more information about safe disposal: https://www.pca.[facility].[place].us/living-green/managing-unwanted-medications    All opioids tend to cause constipation. Drink plenty of water and eat foods that have a lot of fiber, such as fruits, vegetables, prune juice, apple juice and  high-fiber cereal. Take a laxative (Miralax, milk of magnesia, Colace, Senna) if you don t move your bowels at least every other day.          Primary Care Provider Office Phone # Fax #    Veronica Hardy [phone]       [place] [address]        Equal Access to Services     [facility] Hospital[facility]OR[facility] : Hadii aad ku hadasho Soomaali, waaxda luqadaha, qaybta kaalmada adeegyada, waxay idiin hayaan adeeg kharash la'aan ah. So [facility] [phone].    ATENCIÓN: Si habla español, tiene a ballard disposición servicios gratuitos de asistencia lingüística. Samuelame al [phone].    We comply with applicable federal civil rights laws and [place] laws. We do not discriminate on the basis of race, color, national origin, age, disability, sex, sexual orientation, or gender identity.            Thank you!     Thank you for choosing [facility]  for your care. Our goal is always to provide you with excellent care. Hearing back from our patients is one way we can continue to improve our services. Please take a few minutes to complete the written survey that you may receive in the mail after your visit with us. Thank you!             Your Updated Medication List - Protect others around you: Learn how to safely use, store and throw away your medicines at www.disposemymeds.org.          This list is accurate as of 6/12/18 10:13 AM.  Always use your most recent med list.                   Brand Name Dispense Instructions for use Diagnosis    ADVIL PO      Take 600 mg by mouth every 6 hours as needed for moderate pain        amoxicillin-clavulanate 875-125 MG per tablet    AUGMENTIN    20 tablet    Take 1 tablet by mouth 2 times daily    Acute suppurative otitis media of both ears with spontaneous rupture of tympanic membranes, recurrence not specified       escitalopram 20 MG tablet    LEXAPRO     Take 20 mg by mouth daily.        HYDROcodone-acetaminophen 5-325 MG  per tablet    NORCO    6 tablet    Take 1-2 tablets by mouth every 6 hours as needed for pain    Nonintractable headache, unspecified chronicity pattern, unspecified headache type       ofloxacin 0.3 % otic solution    FLOXIN    10 mL    Place 10 drops into both ears 2 times daily for 7 days    Infective otitis externa, bilateral       omeprazole 40 MG capsule    priLOSEC     Take  by mouth daily.        SUDAFED PO      Take by mouth as needed for congestion
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
## 2018-06-18 ENCOUNTER — RECORDS - HEALTHEAST (OUTPATIENT)
Dept: ADMINISTRATIVE | Facility: OTHER | Age: 43
End: 2018-06-18

## 2018-06-18 ENCOUNTER — OFFICE VISIT (OUTPATIENT)
Dept: GASTROENTEROLOGY | Facility: CLINIC | Age: 43
End: 2018-06-18
Payer: MEDICARE

## 2018-06-18 VITALS
HEIGHT: 68 IN | TEMPERATURE: 98.6 F | BODY MASS INDEX: 31.54 KG/M2 | HEART RATE: 85 BPM | RESPIRATION RATE: 18 BRPM | DIASTOLIC BLOOD PRESSURE: 74 MMHG | WEIGHT: 208.1 LBS | OXYGEN SATURATION: 98 % | SYSTOLIC BLOOD PRESSURE: 103 MMHG

## 2018-06-18 DIAGNOSIS — K29.60 GASTRITIS, BILE ACID REFLUX: Primary | ICD-10-CM

## 2018-06-18 RX ORDER — SUCRALFATE ORAL 1 G/10ML
1 SUSPENSION ORAL 4 TIMES DAILY
Qty: 420 ML | Refills: 1 | Status: SHIPPED | OUTPATIENT
Start: 2018-06-18 | End: 2021-05-05

## 2018-06-18 ASSESSMENT — ENCOUNTER SYMPTOMS
DIARRHEA: 1
CONSTIPATION: 0
BRUISES/BLEEDS EASILY: 0
SKIN CHANGES: 0
BLOATING: 1
NAUSEA: 1
SINUS PAIN: 1
JAUNDICE: 0
SORE THROAT: 1
SINUS CONGESTION: 1
HOARSE VOICE: 1
SMELL DISTURBANCE: 1
BLOOD IN STOOL: 1
DYSPNEA ON EXERTION: 0
TROUBLE SWALLOWING: 1
COUGH DISTURBING SLEEP: 1
COUGH: 1
SNORES LOUDLY: 1
NAIL CHANGES: 0
ABDOMINAL PAIN: 1
WHEEZING: 1
SPUTUM PRODUCTION: 1
HEARTBURN: 1
TASTE DISTURBANCE: 1
HEMOPTYSIS: 1
SHORTNESS OF BREATH: 0
POSTURAL DYSPNEA: 1
POOR WOUND HEALING: 0
VOMITING: 1
SWOLLEN GLANDS: 1
BOWEL INCONTINENCE: 0
RECTAL PAIN: 0
NECK MASS: 0

## 2018-06-18 ASSESSMENT — PAIN SCALES - GENERAL: PAINLEVEL: NO PAIN (0)

## 2018-06-18 NOTE — NURSING NOTE
"Chief Complaint   Patient presents with     Surgical Followup     Pt is here for a 3 month follow up       Vitals:    06/18/18 1131   BP: 103/74   BP Location: Left arm   Patient Position: Sitting   Cuff Size: Adult Large   Pulse: 85   Resp: 18   Temp: 98.6  F (37  C)   TempSrc: Oral   SpO2: 98%   Weight: 208 lb 1.6 oz   Height: 5' 8\"       Body mass index is 31.64 kg/(m^2).      MERCEDES Cardona, EMT                    "
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
## 2018-06-18 NOTE — PATIENT INSTRUCTIONS
It was a pleasure taking care of you today.  I've included a brief summary of our discussion and care plan from today's visit below.  Please review this information with your primary care provider.  ______________________________________________________________________    My recommendations are summarized as follows:    -- do a trial without marajuana to see if this helps with nausea   -- may use carafe as prescribed to help with bile acid vomiting  -- continue life-style modifications for GERD symptoms   1) Prop actual bed up, not just with pillows.  Propping up with just pillows can actually make things worse if it is causing you to bend at the waist while sleeping  2) Avoid alcohol, as this causes relaxation of the sphincter and makes it easier for food to travel up esophagus.  If you do drink alcohol, do not drink before bed or before meals.  3)  Eat small meals  4) Avoid triggers such as fatty foods, processed foods, and high fructose corn syrup (or food that contain these)  -- continue taking omeprazole 40 mg daily on an empty stomach   -- try taking smaller for frequent meals     Return to GI Clinic in 3-6 months to review your progress.    ______________________________________________________________________    Who do I call with any questions after my visit?  Please be in touch if there are any further questions that arise following today's visit.  There are multiple ways to contact your gastroenterology care team.        During business hours, you may reach a Gastroenterology nurse at [phone], option 3.       To schedule or reschedule an appointment, please call [phone].       You can always send a secure message through [employer].  [employer] messages are answered by your nurse or doctor typically within 24 hours.  Please allow extra time on weekends and holidays.        For urgent/emergent questions after business hours, you may reach the on-call GI Fellow by contacting the [facility]   at [phone].     How will I get the results of any tests ordered?    You will receive all of your results.  If you have signed up for [employer], any tests ordered at your visit will be available to you after your physician reviews them.  Typically this takes 1-2 weeks.  If there are urgent results that require a change in your care plan, your physician or nurse will call you to discuss the next steps.      What is [employer]?  [employer] is a secure way for you to access all of your healthcare records from the [facility].  It is a web based computer program, so you can sign on to it from any location.  It also allows you to send secure messages to your care team.  I recommend signing up for [employer] access if you have not already done so and are comfortable with using a computer.      How to I schedule a follow-up visit?  If you did not schedule a follow-up visit today, please call [phone] to schedule a follow-up office visit.        Sincerely,    Tee Rodgers PA-C  [facility]  Division of Gastroenterology

## 2018-06-18 NOTE — MR AVS SNAPSHOT
After Visit Summary   6/18/2018    Vianca Kumar    MRN: [id number]           Patient Information     Date Of Birth          1975        Visit Information        Provider Department      6/18/2018 11:40 AM Tee Rodgers PA-C [facility]        Today's Diagnoses     Gastritis, bile acid reflux    -  1      Care Instructions    It was a pleasure taking care of you today.  I've included a brief summary of our discussion and care plan from today's visit below.  Please review this information with your primary care provider.  ______________________________________________________________________    My recommendations are summarized as follows:    -- do a trial without marajuana to see if this helps with nausea   -- may use carafe as prescribed to help with bile acid vomiting  -- continue life-style modifications for GERD symptoms   1) Prop actual bed up, not just with pillows.  Propping up with just pillows can actually make things worse if it is causing you to bend at the waist while sleeping  2) Avoid alcohol, as this causes relaxation of the sphincter and makes it easier for food to travel up esophagus.  If you do drink alcohol, do not drink before bed or before meals.  3)  Eat small meals  4) Avoid triggers such as fatty foods, processed foods, and high fructose corn syrup (or food that contain these)  -- continue taking omeprazole 40 mg daily on an empty stomach   -- try taking smaller for frequent meals     Return to GI Clinic in 3-6 months to review your progress.    ______________________________________________________________________    Who do I call with any questions after my visit?  Please be in touch if there are any further questions that arise following today's visit.  There are multiple ways to contact your gastroenterology care team.        During business hours, you may reach a Gastroenterology nurse at [phone], option 3.       To schedule or  reschedule an appointment, please call [phone].       You can always send a secure message through [employer].  [employer] messages are answered by your nurse or doctor typically within 24 hours.  Please allow extra time on weekends and holidays.        For urgent/emergent questions after business hours, you may reach the on-call GI Fellow by contacting the [facility]  at [phone].     How will I get the results of any tests ordered?    You will receive all of your results.  If you have signed up for [employer], any tests ordered at your visit will be available to you after your physician reviews them.  Typically this takes 1-2 weeks.  If there are urgent results that require a change in your care plan, your physician or nurse will call you to discuss the next steps.      What is [employer]?  [employer] is a secure way for you to access all of your healthcare records from the [facility].  It is a web based computer program, so you can sign on to it from any location.  It also allows you to send secure messages to your care team.  I recommend signing up for [employer] access if you have not already done so and are comfortable with using a computer.      How to I schedule a follow-up visit?  If you did not schedule a follow-up visit today, please call [phone] to schedule a follow-up office visit.        Sincerely,    Tee Rodgers PA-C  [facility]  Division of Gastroenterology                Follow-ups after your visit        Who to contact     Please call your clinic at [phone] to:    Ask questions about your health    Make or cancel appointments    Discuss your medicines    Learn about your test results    Speak to your doctor            Additional Information About Your Visit        MyChart Information     [employer] gives you secure access to your electronic health record. If you see a primary care provider, you can also send messages to your care team and make  "appointments. If you have questions, please call your primary care clinic.  If you do not have a primary care provider, please call [phone] and they will assist you.      [employer] is an electronic gateway that provides easy, online access to your medical records. With [employer], you can request a clinic appointment, read your test results, renew a prescription or communicate with your care team.     To access your existing account, please contact your [facility] or call [phone] for assistance.        Care EveryWhere ID     This is your Care EveryWhere ID. This could be used by other organizations to access your [place] medical records  [id number]        Your Vitals Were     Pulse Temperature Respirations Height Pulse Oximetry BMI (Body Mass Index)    85 98.6  F (37  C) (Oral) 18 1.727 m (5' 8\") 98% 31.64 kg/m2       Blood Pressure from Last 3 Encounters:   06/18/18 103/74   06/12/18 105/72   04/25/18 117/79    Weight from Last 3 Encounters:   06/18/18 94.4 kg (208 lb 1.6 oz)   06/12/18 95 kg (209 lb 8 oz)   05/14/18 97.7 kg (215 lb 6.4 oz)              Today, you had the following     No orders found for display         Today's Medication Changes          These changes are accurate as of 6/18/18  1:05 PM.  If you have any questions, ask your nurse or doctor.               Start taking these medicines.        Dose/Directions    sucralfate 1 GM/10ML suspension   Commonly known as:  CARAFATE   Used for:  Gastritis, bile acid reflux   Started by:  Tee Rodgers PA-C        Dose:  1 g   Take 10 mLs (1 g) by mouth 4 times daily   Quantity:  420 mL   Refills:  1            Where to get your medicines      Some of these will need a paper prescription and others can be bought over the counter.  Ask your nurse if you have questions.     Bring a paper prescription for each of these medications     sucralfate 1 GM/10ML suspension                Primary Care Provider Office Phone # " Fax #    Veronica DINA Hardy [phone]       [place] [address]        Equal Access to Services     JOVANI GEORGIA : Carine eduardo cordova zechariah Bearden, waaxda luqadaha, qaybta kaalmada luca, emma valdez laadeolaallen aleah. So [facility] [phone].    ATENCIÓN: Si habla español, tiene a ballard disposición servicios gratuitos de asistencia lingüística. Llame al [phone].    We comply with applicable federal civil rights laws and [place] laws. We do not discriminate on the basis of race, color, national origin, age, disability, sex, sexual orientation, or gender identity.            Thank you!     Thank you for choosing [facility]  for your care. Our goal is always to provide you with excellent care. Hearing back from our patients is one way we can continue to improve our services. Please take a few minutes to complete the written survey that you may receive in the mail after your visit with us. Thank you!             Your Updated Medication List - Protect others around you: Learn how to safely use, store and throw away your medicines at www.disposemymeds.org.          This list is accurate as of 6/18/18  1:05 PM.  Always use your most recent med list.                   Brand Name Dispense Instructions for use Diagnosis    amoxicillin-clavulanate 875-125 MG per tablet    AUGMENTIN    20 tablet    Take 1 tablet by mouth 2 times daily    Acute suppurative otitis media of both ears with spontaneous rupture of tympanic membranes, recurrence not specified       escitalopram 20 MG tablet    LEXAPRO     Take 20 mg by mouth daily.        ofloxacin 0.3 % otic solution    FLOXIN    10 mL    Place 10 drops into both ears 2 times daily for 7 days    Infective otitis externa, bilateral       omeprazole 40 MG capsule    priLOSEC     Take  by mouth daily.        sucralfate 1 GM/10ML suspension    CARAFATE    420 mL    Take 10 mLs (1 g)  by mouth 4 times daily    Gastritis, bile acid reflux
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
## 2019-05-06 ENCOUNTER — COMMUNICATION - HEALTHEAST (OUTPATIENT)
Dept: FAMILY MEDICINE | Facility: CLINIC | Age: 44
End: 2019-05-06

## 2019-05-06 DIAGNOSIS — F33.9 MAJOR DEPRESSION, RECURRENT (H): ICD-10-CM

## 2019-05-20 ENCOUNTER — OFFICE VISIT (OUTPATIENT)
Dept: URGENT CARE | Facility: URGENT CARE | Age: 44
End: 2019-05-20
Payer: MEDICARE

## 2019-05-20 ENCOUNTER — RECORDS - HEALTHEAST (OUTPATIENT)
Dept: ADMINISTRATIVE | Facility: OTHER | Age: 44
End: 2019-05-20

## 2019-05-20 VITALS
SYSTOLIC BLOOD PRESSURE: 120 MMHG | WEIGHT: 206.2 LBS | BODY MASS INDEX: 31.25 KG/M2 | HEART RATE: 78 BPM | OXYGEN SATURATION: 96 % | TEMPERATURE: 97.7 F | HEIGHT: 68 IN | DIASTOLIC BLOOD PRESSURE: 83 MMHG

## 2019-05-20 DIAGNOSIS — Z72.0 TOBACCO ABUSE: ICD-10-CM

## 2019-05-20 DIAGNOSIS — J01.90 ACUTE SINUSITIS WITH SYMPTOMS > 10 DAYS: Primary | ICD-10-CM

## 2019-05-20 DIAGNOSIS — H00.011 HORDEOLUM EXTERNUM OF RIGHT UPPER EYELID: ICD-10-CM

## 2019-05-20 DIAGNOSIS — J20.9 ACUTE BRONCHITIS, UNSPECIFIED ORGANISM: ICD-10-CM

## 2019-05-20 PROCEDURE — 99214 OFFICE O/P EST MOD 30 MIN: CPT | Performed by: FAMILY MEDICINE

## 2019-05-20 RX ORDER — TOBRAMYCIN 3 MG/ML
2 SOLUTION/ DROPS OPHTHALMIC 4 TIMES DAILY
Qty: 3 ML | Refills: 0 | Status: SHIPPED | OUTPATIENT
Start: 2019-05-20 | End: 2019-05-27

## 2019-05-20 RX ORDER — DOXYCYCLINE 100 MG/1
100 TABLET ORAL 2 TIMES DAILY
Qty: 20 TABLET | Refills: 0 | Status: SHIPPED | OUTPATIENT
Start: 2019-05-20 | End: 2019-05-30

## 2019-05-20 ASSESSMENT — MIFFLIN-ST. JEOR: SCORE: 1643.82
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
## 2019-06-10 ENCOUNTER — OFFICE VISIT - HEALTHEAST (OUTPATIENT)
Dept: FAMILY MEDICINE | Facility: CLINIC | Age: 44
End: 2019-06-10

## 2019-06-10 DIAGNOSIS — L50.9 HIVES: ICD-10-CM

## 2019-06-10 DIAGNOSIS — F33.9 MAJOR DEPRESSION, RECURRENT (H): ICD-10-CM

## 2019-06-10 DIAGNOSIS — M62.838 SPASM OF MUSCLE: ICD-10-CM

## 2019-06-10 DIAGNOSIS — R11.2 INTRACTABLE VOMITING WITH NAUSEA, UNSPECIFIED VOMITING TYPE: ICD-10-CM

## 2019-06-10 ASSESSMENT — MIFFLIN-ST. JEOR: SCORE: 1637.45

## 2019-07-29 ENCOUNTER — COMMUNICATION - HEALTHEAST (OUTPATIENT)
Dept: FAMILY MEDICINE | Facility: CLINIC | Age: 44
End: 2019-07-29

## 2019-10-02 ENCOUNTER — HEALTH MAINTENANCE LETTER (OUTPATIENT)
Age: 44
End: 2019-10-02

## 2019-10-11 ENCOUNTER — COMMUNICATION - HEALTHEAST (OUTPATIENT)
Dept: FAMILY MEDICINE | Facility: CLINIC | Age: 44
End: 2019-10-11

## 2019-11-25 ENCOUNTER — OFFICE VISIT (OUTPATIENT)
Dept: URGENT CARE | Facility: URGENT CARE | Age: 44
End: 2019-11-25
Payer: MEDICARE

## 2019-11-25 VITALS
TEMPERATURE: 97.1 F | BODY MASS INDEX: 31.78 KG/M2 | DIASTOLIC BLOOD PRESSURE: 78 MMHG | OXYGEN SATURATION: 98 % | RESPIRATION RATE: 18 BRPM | HEART RATE: 68 BPM | SYSTOLIC BLOOD PRESSURE: 108 MMHG | WEIGHT: 209 LBS

## 2019-11-25 DIAGNOSIS — J01.90 ACUTE SINUSITIS WITH SYMPTOMS > 10 DAYS: Primary | ICD-10-CM

## 2019-11-25 DIAGNOSIS — T78.40XA ALLERGIC REACTION, INITIAL ENCOUNTER: ICD-10-CM

## 2019-11-25 DIAGNOSIS — R68.89 FLU-LIKE SYMPTOMS: ICD-10-CM

## 2019-11-25 LAB
FLUAV+FLUBV AG SPEC QL: NEGATIVE
FLUAV+FLUBV AG SPEC QL: NEGATIVE
SPECIMEN SOURCE: NORMAL

## 2019-11-25 PROCEDURE — 99214 OFFICE O/P EST MOD 30 MIN: CPT | Performed by: FAMILY MEDICINE

## 2019-11-25 PROCEDURE — 87804 INFLUENZA ASSAY W/OPTIC: CPT | Performed by: PHYSICIAN ASSISTANT

## 2019-11-25 RX ORDER — METHYLPREDNISOLONE 4 MG
TABLET, DOSE PACK ORAL
Qty: 21 TABLET | Refills: 0 | Status: SHIPPED | OUTPATIENT
Start: 2019-11-25 | End: 2019-12-01

## 2019-11-25 RX ORDER — DOXYCYCLINE 100 MG/1
100 TABLET ORAL 2 TIMES DAILY
Qty: 20 TABLET | Refills: 0 | Status: SHIPPED | OUTPATIENT
Start: 2019-11-25 | End: 2019-12-05
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
## 2019-12-09 ENCOUNTER — OFFICE VISIT - HEALTHEAST (OUTPATIENT)
Dept: FAMILY MEDICINE | Facility: CLINIC | Age: 44
End: 2019-12-09

## 2019-12-09 DIAGNOSIS — H66.001 ACUTE SUPPURATIVE OTITIS MEDIA OF RIGHT EAR WITHOUT SPONTANEOUS RUPTURE OF TYMPANIC MEMBRANE, RECURRENCE NOT SPECIFIED: ICD-10-CM

## 2019-12-09 DIAGNOSIS — J06.9 UPPER RESPIRATORY TRACT INFECTION, UNSPECIFIED TYPE: ICD-10-CM

## 2019-12-09 ASSESSMENT — PATIENT HEALTH QUESTIONNAIRE - PHQ9: SUM OF ALL RESPONSES TO PHQ QUESTIONS 1-9: 13

## 2019-12-09 ASSESSMENT — ANXIETY QUESTIONNAIRES
1. FEELING NERVOUS, ANXIOUS, OR ON EDGE: SEVERAL DAYS
5. BEING SO RESTLESS THAT IT IS HARD TO SIT STILL: NOT AT ALL
6. BECOMING EASILY ANNOYED OR IRRITABLE: NEARLY EVERY DAY
3. WORRYING TOO MUCH ABOUT DIFFERENT THINGS: SEVERAL DAYS
4. TROUBLE RELAXING: SEVERAL DAYS
IF YOU CHECKED OFF ANY PROBLEMS ON THIS QUESTIONNAIRE, HOW DIFFICULT HAVE THESE PROBLEMS MADE IT FOR YOU TO DO YOUR WORK, TAKE CARE OF THINGS AT HOME, OR GET ALONG WITH OTHER PEOPLE: SOMEWHAT DIFFICULT
2. NOT BEING ABLE TO STOP OR CONTROL WORRYING: SEVERAL DAYS
GAD7 TOTAL SCORE: 7
7. FEELING AFRAID AS IF SOMETHING AWFUL MIGHT HAPPEN: NOT AT ALL

## 2019-12-16 ENCOUNTER — HEALTH MAINTENANCE LETTER (OUTPATIENT)
Age: 44
End: 2019-12-16

## 2020-01-18 ENCOUNTER — OFFICE VISIT (OUTPATIENT)
Dept: URGENT CARE | Facility: URGENT CARE | Age: 45
End: 2020-01-18
Payer: MEDICARE

## 2020-01-18 ENCOUNTER — ANCILLARY PROCEDURE (OUTPATIENT)
Dept: GENERAL RADIOLOGY | Facility: CLINIC | Age: 45
End: 2020-01-18
Attending: PHYSICIAN ASSISTANT
Payer: MEDICARE

## 2020-01-18 ENCOUNTER — NURSE TRIAGE (OUTPATIENT)
Dept: NURSING | Facility: CLINIC | Age: 45
End: 2020-01-18

## 2020-01-18 VITALS
HEIGHT: 68 IN | DIASTOLIC BLOOD PRESSURE: 84 MMHG | SYSTOLIC BLOOD PRESSURE: 120 MMHG | WEIGHT: 206 LBS | TEMPERATURE: 99.5 F | RESPIRATION RATE: 20 BRPM | BODY MASS INDEX: 31.22 KG/M2 | HEART RATE: 98 BPM | OXYGEN SATURATION: 97 %

## 2020-01-18 DIAGNOSIS — R11.2 NAUSEA AND VOMITING, INTRACTABILITY OF VOMITING NOT SPECIFIED, UNSPECIFIED VOMITING TYPE: ICD-10-CM

## 2020-01-18 DIAGNOSIS — R07.89 CHEST TIGHTNESS: ICD-10-CM

## 2020-01-18 DIAGNOSIS — R06.02 SOB (SHORTNESS OF BREATH): ICD-10-CM

## 2020-01-18 DIAGNOSIS — R19.7 DIARRHEA, UNSPECIFIED TYPE: ICD-10-CM

## 2020-01-18 DIAGNOSIS — H60.391 INFECTIVE OTITIS EXTERNA, RIGHT: ICD-10-CM

## 2020-01-18 DIAGNOSIS — R07.89 CHEST TIGHTNESS: Primary | ICD-10-CM

## 2020-01-18 DIAGNOSIS — R09.89 CHEST CONGESTION: ICD-10-CM

## 2020-01-18 DIAGNOSIS — R07.89 ATYPICAL CHEST PAIN: ICD-10-CM

## 2020-01-18 LAB
ALBUMIN SERPL-MCNC: 3.5 G/DL (ref 3.4–5)
ALP SERPL-CCNC: 104 U/L (ref 40–150)
ALT SERPL W P-5'-P-CCNC: 19 U/L (ref 0–50)
ANION GAP SERPL CALCULATED.3IONS-SCNC: 3 MMOL/L (ref 3–14)
AST SERPL W P-5'-P-CCNC: 12 U/L (ref 0–45)
BASOPHILS # BLD AUTO: 0 10E9/L (ref 0–0.2)
BASOPHILS NFR BLD AUTO: 0.2 %
BILIRUB SERPL-MCNC: 1.1 MG/DL (ref 0.2–1.3)
BUN SERPL-MCNC: 7 MG/DL (ref 7–30)
C DIFF TOX B STL QL: NEGATIVE
CALCIUM SERPL-MCNC: 8.6 MG/DL (ref 8.5–10.1)
CHLORIDE SERPL-SCNC: 105 MMOL/L (ref 94–109)
CO2 SERPL-SCNC: 28 MMOL/L (ref 20–32)
CREAT SERPL-MCNC: 0.71 MG/DL (ref 0.52–1.04)
D DIMER PPP FEU-MCNC: 0.4 UG/ML FEU (ref 0–0.5)
DIFFERENTIAL METHOD BLD: ABNORMAL
EOSINOPHIL # BLD AUTO: 0.1 10E9/L (ref 0–0.7)
EOSINOPHIL NFR BLD AUTO: 0.5 %
ERYTHROCYTE [DISTWIDTH] IN BLOOD BY AUTOMATED COUNT: 12.1 % (ref 10–15)
GFR SERPL CREATININE-BSD FRML MDRD: >90 ML/MIN/{1.73_M2}
GLUCOSE SERPL-MCNC: 132 MG/DL (ref 70–99)
HCT VFR BLD AUTO: 43.1 % (ref 35–47)
HGB BLD-MCNC: 14.8 G/DL (ref 11.7–15.7)
LYMPHOCYTES # BLD AUTO: 1.5 10E9/L (ref 0.8–5.3)
LYMPHOCYTES NFR BLD AUTO: 8.6 %
MCH RBC QN AUTO: 31.9 PG (ref 26.5–33)
MCHC RBC AUTO-ENTMCNC: 34.3 G/DL (ref 31.5–36.5)
MCV RBC AUTO: 93 FL (ref 78–100)
MONOCYTES # BLD AUTO: 0.6 10E9/L (ref 0–1.3)
MONOCYTES NFR BLD AUTO: 3.4 %
NEUTROPHILS # BLD AUTO: 15.5 10E9/L (ref 1.6–8.3)
NEUTROPHILS NFR BLD AUTO: 87.3 %
PLATELET # BLD AUTO: 324 10E9/L (ref 150–450)
POTASSIUM SERPL-SCNC: 3.6 MMOL/L (ref 3.4–5.3)
PROT SERPL-MCNC: 6.9 G/DL (ref 6.8–8.8)
RBC # BLD AUTO: 4.64 10E12/L (ref 3.8–5.2)
SODIUM SERPL-SCNC: 136 MMOL/L (ref 133–144)
SPECIMEN SOURCE: NORMAL
TROPONIN I SERPL-MCNC: <0.015 UG/L (ref 0–0.04)
WBC # BLD AUTO: 17.7 10E9/L (ref 4–11)

## 2020-01-18 PROCEDURE — 80053 COMPREHEN METABOLIC PANEL: CPT | Performed by: PHYSICIAN ASSISTANT

## 2020-01-18 PROCEDURE — 71046 X-RAY EXAM CHEST 2 VIEWS: CPT

## 2020-01-18 PROCEDURE — 99215 OFFICE O/P EST HI 40 MIN: CPT | Mod: 25 | Performed by: PHYSICIAN ASSISTANT

## 2020-01-18 PROCEDURE — 84484 ASSAY OF TROPONIN QUANT: CPT | Performed by: PHYSICIAN ASSISTANT

## 2020-01-18 PROCEDURE — 87177 OVA AND PARASITES SMEARS: CPT | Performed by: PHYSICIAN ASSISTANT

## 2020-01-18 PROCEDURE — 87209 SMEAR COMPLEX STAIN: CPT | Performed by: PHYSICIAN ASSISTANT

## 2020-01-18 PROCEDURE — 94640 AIRWAY INHALATION TREATMENT: CPT | Performed by: PHYSICIAN ASSISTANT

## 2020-01-18 PROCEDURE — 87506 IADNA-DNA/RNA PROBE TQ 6-11: CPT | Performed by: PHYSICIAN ASSISTANT

## 2020-01-18 PROCEDURE — 36415 COLL VENOUS BLD VENIPUNCTURE: CPT | Performed by: PHYSICIAN ASSISTANT

## 2020-01-18 PROCEDURE — 87493 C DIFF AMPLIFIED PROBE: CPT | Mod: XU | Performed by: PHYSICIAN ASSISTANT

## 2020-01-18 PROCEDURE — 85025 COMPLETE CBC W/AUTO DIFF WBC: CPT | Performed by: PHYSICIAN ASSISTANT

## 2020-01-18 PROCEDURE — 85379 FIBRIN DEGRADATION QUANT: CPT | Performed by: PHYSICIAN ASSISTANT

## 2020-01-18 RX ORDER — ALBUTEROL SULFATE 90 UG/1
2 AEROSOL, METERED RESPIRATORY (INHALATION) EVERY 6 HOURS
Qty: 1 INHALER | Refills: 0 | Status: SHIPPED | OUTPATIENT
Start: 2020-01-18 | End: 2021-05-05

## 2020-01-18 RX ORDER — CIPROFLOXACIN AND DEXAMETHASONE 3; 1 MG/ML; MG/ML
4 SUSPENSION/ DROPS AURICULAR (OTIC) 2 TIMES DAILY
Qty: 2.8 ML | Refills: 0 | Status: SHIPPED | OUTPATIENT
Start: 2020-01-18 | End: 2020-01-25

## 2020-01-18 RX ORDER — AZITHROMYCIN 250 MG/1
TABLET, FILM COATED ORAL
Qty: 6 TABLET | Refills: 0 | Status: SHIPPED | OUTPATIENT
Start: 2020-01-18 | End: 2021-05-05

## 2020-01-18 RX ORDER — LEVALBUTEROL INHALATION SOLUTION 1.25 MG/3ML
1.25 SOLUTION RESPIRATORY (INHALATION) ONCE
Status: COMPLETED | OUTPATIENT
Start: 2020-01-18 | End: 2020-01-18

## 2020-01-18 RX ADMIN — LEVALBUTEROL INHALATION SOLUTION 1.25 MG: 1.25 SOLUTION RESPIRATORY (INHALATION) at 09:50

## 2020-01-18 ASSESSMENT — MIFFLIN-ST. JEOR: SCORE: 1637.91
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
## 2020-01-18 NOTE — TELEPHONE ENCOUNTER
Pharmacist requesting RX change   Call transferred to [facility]   Nohemi Vasquez RN  FNA

## 2020-01-20 LAB
O+P STL MICRO: NORMAL
O+P STL MICRO: NORMAL
SPECIMEN SOURCE: NORMAL

## 2020-01-24 ENCOUNTER — COMMUNICATION - HEALTHEAST (OUTPATIENT)
Dept: FAMILY MEDICINE | Facility: CLINIC | Age: 45
End: 2020-01-24

## 2020-01-24 ENCOUNTER — RECORDS - HEALTHEAST (OUTPATIENT)
Dept: FAMILY MEDICINE | Facility: CLINIC | Age: 45
End: 2020-01-24

## 2020-01-24 ENCOUNTER — HOSPITAL ENCOUNTER (OUTPATIENT)
Dept: CT IMAGING | Facility: CLINIC | Age: 45
Discharge: HOME | End: 2020-01-24
Attending: FAMILY MEDICINE

## 2020-01-24 ENCOUNTER — COMMUNICATION - HEALTHEAST (OUTPATIENT)
Dept: SCHEDULING | Facility: CLINIC | Age: 45
End: 2020-01-24

## 2020-01-24 ENCOUNTER — OFFICE VISIT - HEALTHEAST (OUTPATIENT)
Dept: FAMILY MEDICINE | Facility: CLINIC | Age: 45
End: 2020-01-24

## 2020-01-24 DIAGNOSIS — R51.9 ACUTE INTRACTABLE HEADACHE, UNSPECIFIED HEADACHE TYPE: ICD-10-CM

## 2020-01-24 DIAGNOSIS — D72.825 BANDEMIA: ICD-10-CM

## 2020-01-24 DIAGNOSIS — H70.91 MASTOIDITIS OF RIGHT SIDE: ICD-10-CM

## 2020-01-24 DIAGNOSIS — M54.2 NECK PAIN: ICD-10-CM

## 2020-01-24 DIAGNOSIS — R50.9 FEVER, UNSPECIFIED FEVER CAUSE: ICD-10-CM

## 2020-01-24 LAB
BASOPHILS # BLD AUTO: 0.1 THOU/UL (ref 0–0.2)
BASOPHILS NFR BLD AUTO: 1 % (ref 0–2)
C REACTIVE PROTEIN LHE: 1.3 MG/DL (ref 0–0.8)
EOSINOPHIL # BLD AUTO: 0.3 THOU/UL (ref 0–0.4)
EOSINOPHIL NFR BLD AUTO: 3 % (ref 0–6)
ERYTHROCYTE [DISTWIDTH] IN BLOOD BY AUTOMATED COUNT: 11.1 % (ref 11–14.5)
HCT VFR BLD AUTO: 43.7 % (ref 35–47)
HGB BLD-MCNC: 14.8 G/DL (ref 12–16)
LYMPHOCYTES # BLD AUTO: 2.7 THOU/UL (ref 0.8–4.4)
LYMPHOCYTES NFR BLD AUTO: 26 % (ref 20–40)
MCH RBC QN AUTO: 32.3 PG (ref 27–34)
MCHC RBC AUTO-ENTMCNC: 33.9 G/DL (ref 32–36)
MCV RBC AUTO: 95 FL (ref 80–100)
MONOCYTES # BLD AUTO: 0.7 THOU/UL (ref 0–0.9)
MONOCYTES NFR BLD AUTO: 7 % (ref 2–10)
NEUTROPHILS # BLD AUTO: 6.7 THOU/UL (ref 2–7.7)
NEUTROPHILS NFR BLD AUTO: 64 % (ref 50–70)
PLATELET # BLD AUTO: 418 THOU/UL (ref 140–440)
PMV BLD AUTO: 8.1 FL (ref 7–10)
RBC # BLD AUTO: 4.59 MILL/UL (ref 3.8–5.4)
WBC: 10.6 THOU/UL (ref 4–11)

## 2020-01-24 ASSESSMENT — ANXIETY QUESTIONNAIRES
6. BECOMING EASILY ANNOYED OR IRRITABLE: SEVERAL DAYS
IF YOU CHECKED OFF ANY PROBLEMS ON THIS QUESTIONNAIRE, HOW DIFFICULT HAVE THESE PROBLEMS MADE IT FOR YOU TO DO YOUR WORK, TAKE CARE OF THINGS AT HOME, OR GET ALONG WITH OTHER PEOPLE: SOMEWHAT DIFFICULT
1. FEELING NERVOUS, ANXIOUS, OR ON EDGE: MORE THAN HALF THE DAYS
3. WORRYING TOO MUCH ABOUT DIFFERENT THINGS: MORE THAN HALF THE DAYS
5. BEING SO RESTLESS THAT IT IS HARD TO SIT STILL: SEVERAL DAYS
7. FEELING AFRAID AS IF SOMETHING AWFUL MIGHT HAPPEN: MORE THAN HALF THE DAYS
GAD7 TOTAL SCORE: 13
2. NOT BEING ABLE TO STOP OR CONTROL WORRYING: NEARLY EVERY DAY
4. TROUBLE RELAXING: MORE THAN HALF THE DAYS

## 2020-01-31 ENCOUNTER — RECORDS - HEALTHEAST (OUTPATIENT)
Dept: ADMINISTRATIVE | Facility: OTHER | Age: 45
End: 2020-01-31

## 2020-02-11 ENCOUNTER — AMBULATORY - HEALTHEAST (OUTPATIENT)
Dept: FAMILY MEDICINE | Facility: CLINIC | Age: 45
End: 2020-02-11

## 2020-02-25 ENCOUNTER — OFFICE VISIT - HEALTHEAST (OUTPATIENT)
Dept: FAMILY MEDICINE | Facility: CLINIC | Age: 45
End: 2020-02-25

## 2020-02-25 DIAGNOSIS — Z11.4 SCREENING FOR HIV (HUMAN IMMUNODEFICIENCY VIRUS): ICD-10-CM

## 2020-02-25 DIAGNOSIS — R53.83 FATIGUE, UNSPECIFIED TYPE: ICD-10-CM

## 2020-02-25 DIAGNOSIS — H70.91 MASTOIDITIS, RIGHT: ICD-10-CM

## 2020-02-25 DIAGNOSIS — R11.2 NON-INTRACTABLE VOMITING WITH NAUSEA, UNSPECIFIED VOMITING TYPE: ICD-10-CM

## 2020-02-25 LAB
ALBUMIN SERPL-MCNC: 3.8 G/DL (ref 3.5–5)
ALP SERPL-CCNC: 100 U/L (ref 45–120)
ALT SERPL W P-5'-P-CCNC: 21 U/L (ref 0–45)
ANION GAP SERPL CALCULATED.3IONS-SCNC: 10 MMOL/L (ref 5–18)
AST SERPL W P-5'-P-CCNC: 20 U/L (ref 0–40)
BASOPHILS # BLD AUTO: 0.1 THOU/UL (ref 0–0.2)
BASOPHILS NFR BLD AUTO: 1 % (ref 0–2)
BILIRUB SERPL-MCNC: 1 MG/DL (ref 0–1)
BUN SERPL-MCNC: 6 MG/DL (ref 8–22)
C REACTIVE PROTEIN LHE: 0.4 MG/DL (ref 0–0.8)
CALCIUM SERPL-MCNC: 9.3 MG/DL (ref 8.5–10.5)
CHLORIDE BLD-SCNC: 105 MMOL/L (ref 98–107)
CO2 SERPL-SCNC: 25 MMOL/L (ref 22–31)
CREAT SERPL-MCNC: 0.74 MG/DL (ref 0.6–1.1)
EOSINOPHIL # BLD AUTO: 0.3 THOU/UL (ref 0–0.4)
EOSINOPHIL NFR BLD AUTO: 2 % (ref 0–6)
ERYTHROCYTE [DISTWIDTH] IN BLOOD BY AUTOMATED COUNT: 11.5 % (ref 11–14.5)
GFR SERPL CREATININE-BSD FRML MDRD: >60 ML/MIN/1.73M2
GLUCOSE BLD-MCNC: 86 MG/DL (ref 70–125)
HCT VFR BLD AUTO: 45.6 % (ref 35–47)
HGB BLD-MCNC: 15.2 G/DL (ref 12–16)
HIV 1+2 AB+HIV1 P24 AG SERPL QL IA: NEGATIVE
LYMPHOCYTES # BLD AUTO: 3.4 THOU/UL (ref 0.8–4.4)
LYMPHOCYTES NFR BLD AUTO: 26 % (ref 20–40)
MCH RBC QN AUTO: 32.3 PG (ref 27–34)
MCHC RBC AUTO-ENTMCNC: 33.4 G/DL (ref 32–36)
MCV RBC AUTO: 97 FL (ref 80–100)
MONOCYTES # BLD AUTO: 0.6 THOU/UL (ref 0–0.9)
MONOCYTES NFR BLD AUTO: 5 % (ref 2–10)
NEUTROPHILS # BLD AUTO: 8.8 THOU/UL (ref 2–7.7)
NEUTROPHILS NFR BLD AUTO: 67 % (ref 50–70)
PLATELET # BLD AUTO: 336 THOU/UL (ref 140–440)
PMV BLD AUTO: 7.9 FL (ref 7–10)
POTASSIUM BLD-SCNC: 4 MMOL/L (ref 3.5–5)
PROT SERPL-MCNC: 6.4 G/DL (ref 6–8)
RBC # BLD AUTO: 4.72 MILL/UL (ref 3.8–5.4)
SODIUM SERPL-SCNC: 140 MMOL/L (ref 136–145)
WBC: 13.2 THOU/UL (ref 4–11)

## 2020-03-02 ENCOUNTER — RECORDS - HEALTHEAST (OUTPATIENT)
Dept: ADMINISTRATIVE | Facility: OTHER | Age: 45
End: 2020-03-02

## 2020-06-24 ENCOUNTER — COMMUNICATION - HEALTHEAST (OUTPATIENT)
Dept: FAMILY MEDICINE | Facility: CLINIC | Age: 45
End: 2020-06-24

## 2020-06-24 DIAGNOSIS — F33.9 MAJOR DEPRESSION, RECURRENT (H): ICD-10-CM

## 2021-01-15 ENCOUNTER — HEALTH MAINTENANCE LETTER (OUTPATIENT)
Age: 46
End: 2021-01-15

## 2021-03-12 ENCOUNTER — RECORDS - HEALTHEAST (OUTPATIENT)
Dept: ADMINISTRATIVE | Facility: OTHER | Age: 46
End: 2021-03-12

## 2021-04-09 ENCOUNTER — COMMUNICATION - HEALTHEAST (OUTPATIENT)
Dept: FAMILY MEDICINE | Facility: CLINIC | Age: 46
End: 2021-04-09

## 2021-04-09 DIAGNOSIS — F33.9 MAJOR DEPRESSION, RECURRENT (H): ICD-10-CM

## 2021-05-05 SDOH — HEALTH STABILITY: MENTAL HEALTH: HOW OFTEN DO YOU HAVE 6 OR MORE DRINKS ON ONE OCCASION?: NOT ASKED

## 2021-05-05 SDOH — HEALTH STABILITY: MENTAL HEALTH: HOW OFTEN DO YOU HAVE A DRINK CONTAINING ALCOHOL?: NOT ASKED

## 2021-05-05 SDOH — HEALTH STABILITY: MENTAL HEALTH: HOW MANY STANDARD DRINKS CONTAINING ALCOHOL DO YOU HAVE ON A TYPICAL DAY?: NOT ASKED

## 2021-05-06 ENCOUNTER — OFFICE VISIT (OUTPATIENT)
Dept: INTERNAL MEDICINE | Facility: CLINIC | Age: 46
End: 2021-05-06
Payer: MEDICARE

## 2021-05-06 VITALS
TEMPERATURE: 97.7 F | HEIGHT: 68 IN | RESPIRATION RATE: 18 BRPM | WEIGHT: 203 LBS | SYSTOLIC BLOOD PRESSURE: 112 MMHG | HEART RATE: 94 BPM | BODY MASS INDEX: 30.77 KG/M2 | OXYGEN SATURATION: 98 % | DIASTOLIC BLOOD PRESSURE: 74 MMHG

## 2021-05-06 DIAGNOSIS — Z13.1 SCREENING FOR DIABETES MELLITUS: ICD-10-CM

## 2021-05-06 DIAGNOSIS — Z00.00 MEDICARE ANNUAL WELLNESS VISIT, SUBSEQUENT: Primary | ICD-10-CM

## 2021-05-06 DIAGNOSIS — E66.9 OBESITY (BMI 30-39.9): ICD-10-CM

## 2021-05-06 DIAGNOSIS — Z12.4 SCREENING FOR CERVICAL CANCER: ICD-10-CM

## 2021-05-06 DIAGNOSIS — Z13.220 SCREENING FOR CHOLESTEROL LEVEL: ICD-10-CM

## 2021-05-06 DIAGNOSIS — Z11.59 ENCOUNTER FOR HEPATITIS C SCREENING TEST FOR LOW RISK PATIENT: ICD-10-CM

## 2021-05-06 DIAGNOSIS — Z12.31 ENCOUNTER FOR SCREENING MAMMOGRAM FOR BREAST CANCER: ICD-10-CM

## 2021-05-06 DIAGNOSIS — F33.42 RECURRENT MAJOR DEPRESSIVE DISORDER, IN FULL REMISSION (H): ICD-10-CM

## 2021-05-06 PROCEDURE — G0438 PPPS, INITIAL VISIT: HCPCS | Performed by: INTERNAL MEDICINE

## 2021-05-06 RX ORDER — ESCITALOPRAM OXALATE 20 MG/1
20 TABLET ORAL DAILY
Qty: 90 TABLET | Refills: 3 | Status: SHIPPED | OUTPATIENT
Start: 2021-05-06 | End: 2022-06-09

## 2021-05-06 SDOH — HEALTH STABILITY: MENTAL HEALTH: HOW OFTEN DO YOU HAVE A DRINK CONTAINING ALCOHOL?: 4 OR MORE TIMES A WEEK

## 2021-05-06 SDOH — HEALTH STABILITY: MENTAL HEALTH: HOW MANY STANDARD DRINKS CONTAINING ALCOHOL DO YOU HAVE ON A TYPICAL DAY?: 3 OR 4

## 2021-05-06 ASSESSMENT — PATIENT HEALTH QUESTIONNAIRE - PHQ9: SUM OF ALL RESPONSES TO PHQ QUESTIONS 1-9: 10

## 2021-05-06 ASSESSMENT — MIFFLIN-ST. JEOR: SCORE: 1619.3

## 2021-05-06 NOTE — PROGRESS NOTES
ASSESSMENT/PLAN                                                       (Z00.00) Medicare annual wellness visit, subsequent  (primary encounter diagnosis)  Comment: PMH, PSH, FH, SH, medications, allergies, immunizations, and preventative health measures reviewed and updated as appropriate.  Plan: see below for plans.      (Z12.31) Encounter for screening mammogram for breast cancer  Plan: screening mammogram ordered - patient to schedule.     (Z13.220) Screening for cholesterol level  (Z13.1) Screening for diabetes mellitus  Plan: fasting labs ordered - patient to schedule.     (F33.42) Recurrent major depressive disorder, in full remission (H)  Comment: well-controlled on current regimen.    Plan: continue present management; refills provided.     Whitney Perez MD   [address] [address]  T: [phone], F: [phone]    ZULEMA Kumar is a very pleasant 45 year old female who presents for her AWV:    Current patient providers (other than myself): n/a     PMH, PSH, FH, SH, medications, allergies, immunizations, preventative health, and health risk assessment reviewed.     Past Medical History:   Diagnosis Date     MDD (major depressive disorder)      Obesity (BMI 30-39.9)      Past Surgical History:   Procedure Laterality Date     CAPSULE/PILL CAM ENDOSCOPY N/A 04/03/2018    Procedure: CAPSULE/PILL CAM ENDOSCOPY;;  Surgeon: Guru Hallie Song MD;  [facility]     ENDOSCOPIC ULTRASOUND UPPER GASTROINTESTINAL TRACT (GI) N/A 04/03/2018    Procedure: ENDOSCOPIC ULTRASOUND, ESOPHAGOSCOPY / UPPER GASTROINTESTINAL TRACT (GI);  EUS/Capsule ;  Surgeon: Guru Hallie Song MD;  Location: [facility]     KNEE SURGERY Left     osteotomy     LAPAROSCOPIC CHOLECYSTECTOMY N/A 04/25/2018    Procedure: LAPAROSCOPIC CHOLECYSTECTOMY;  Laparoscopic Cholecystectomy ;   Surgeon: Alberto Noble MD;  Location:  OR     Family History   Problem Relation Age of Onset     Hypertension Mother      Hyperlipidemia Mother      Hypertension Brother      Hyperlipidemia Brother      Myocardial Infarction Maternal Grandfather 47     Myocardial Infarction Maternal Uncle         multiple later in life     Diabetes No family hx of      Breast Cancer No family hx of      Ovarian Cancer No family hx of      Colon Cancer No family hx of      Cerebrovascular Disease No family hx of      Social History     Tobacco Use     Smoking status: Current Every Day Smoker     Packs/day: 1.00     Years: 29.00     Pack years: 29.00     Types: Cigarettes     Smokeless tobacco: Former User     Tobacco comment: smoking since age 16   Substance and Sexual Activity     Alcohol use: Yes     Frequency: 4 or more times a week     Drinks per session: 3 or 4     Drug use: Yes     Types: Marijuana     Comment: 0.5gm/day     Sexual activity: Not Currently   Social History Narrative    Single.    No kids.    Walks 3-4x/week; takes care of niece and nephew regularly.     Allergies   Allergen Reactions     Avocado Hives     Chantix [Varenicline Tartrate] Other (See Comments)     depression     Current Outpatient Medications   Medication Sig     escitalopram (LEXAPRO) 20 MG tablet Take 1 tablet (20 mg) by mouth daily     Immunization History   Administered Date(s) Administered     Flu, Unspecified 01/14/2011, 10/17/2011     Influenza (IIV3) PF 01/14/2011, 10/17/2011     Influenza Vaccine, 6+MO IM (QUADRIVALENT W/PRESERVATIVES) 09/30/2015     TDAP Vaccine (Adacel) 01/14/2011, 01/19/2012     Td (Adult), Adsorbed 12/31/2001     Tdap (Adult) Unspecified Formulation 12/31/2001     PREVENTATIVE HEALTH                                                      BMI: obese  Blood pressure: within normal limits   Breast CA screening: DUE  Colon CA screening: not medically indicated at this time   Lung CA screening: DUE - patient  "declines  Dexa: not medically indicated at this time   Screening HCV: n/a   Screening HIV: completed  Screening cholesterol: DUE  Screening diabetes: DUE  Alcohol misuse screening: alcohol use reviewed - no intervention indicated at this time  Immunizations: reviewed; COVID-19 vaccine DUE - patient declines    HEALTH RISK ASSESSMENT                                                      In general, how would you rate your overall physical health? fair  Outside of work, how many days during the week do you exercise? 4-5 days/week  Outside of work, approximately how many minutes a day do you exercise? 15-30 minutes    If you drink alcohol do you typically have >3 drinks per day or >7 drinks per week? Yes  Do you usually eat at least 4 servings of fruit and vegetables a day, include whole grains & fiber and avoid regularly eating high fat or \"junk\" foods? Yes     Do you have any problems taking medications regularly? No  Do you have any side effects from medications? No    Assistance with daily activities: No    Safety concerns: No    Hearing concerns: No    In the past 6 months, have you been bothered by leaking of urine: No    In general, how would you rate your overall mental or emotional health: fair    Additional concerns today: No    OBJECTIVE                                                      /74 (BP Location: Left arm, Patient Position: Chair, Cuff Size: Adult Regular)   Pulse 94   Temp 97.7  F (36.5  C) (Temporal)   Resp 18   Ht 1.727 m (5' 8\")   Wt 92.1 kg (203 lb)   SpO2 98%   BMI 30.87 kg/m    Constitutional: well-appearing  Head, Ears, and Eyes: normocephalic; normal external auditory canal and pinna; tympanic membranes visualized and normal; normal lids and conjunctivae  Neck: supple, symmetric, no thyromegaly or lymphadenopathy  Respiratory: normal respiratory effort; clear to auscultation bilaterally  Cardiovascular: regular rate and rhythm; no edema  Gastrointestinal: soft, non-tender, " non-distended; no organomegaly or masses   Musculoskeletal: normal gait and station  Psych: normal mood and affect    Cognitive impairment noted: No  ---  (Note was completed, in part, with [employer] voice-recognition software. Documentation was reviewed, but some grammatical, spelling, and word errors may remain.)

## 2021-05-19 ENCOUNTER — ANCILLARY PROCEDURE (OUTPATIENT)
Dept: MAMMOGRAPHY | Facility: CLINIC | Age: 46
End: 2021-05-19
Attending: INTERNAL MEDICINE
Payer: MEDICARE

## 2021-05-19 DIAGNOSIS — Z13.1 SCREENING FOR DIABETES MELLITUS: ICD-10-CM

## 2021-05-19 DIAGNOSIS — Z12.31 VISIT FOR SCREENING MAMMOGRAM: ICD-10-CM

## 2021-05-19 DIAGNOSIS — Z12.31 ENCOUNTER FOR SCREENING MAMMOGRAM FOR BREAST CANCER: ICD-10-CM

## 2021-05-19 DIAGNOSIS — Z13.220 SCREENING FOR CHOLESTEROL LEVEL: ICD-10-CM

## 2021-05-19 LAB
ALBUMIN SERPL-MCNC: 3.9 G/DL (ref 3.4–5)
ALP SERPL-CCNC: 92 U/L (ref 40–150)
ALT SERPL W P-5'-P-CCNC: 27 U/L (ref 0–50)
ANION GAP SERPL CALCULATED.3IONS-SCNC: 7 MMOL/L (ref 3–14)
AST SERPL W P-5'-P-CCNC: 19 U/L (ref 0–45)
BILIRUB SERPL-MCNC: 0.6 MG/DL (ref 0.2–1.3)
BUN SERPL-MCNC: 9 MG/DL (ref 7–30)
CALCIUM SERPL-MCNC: 9 MG/DL (ref 8.5–10.1)
CHLORIDE SERPL-SCNC: 107 MMOL/L (ref 94–109)
CHOLEST SERPL-MCNC: 196 MG/DL
CO2 SERPL-SCNC: 27 MMOL/L (ref 20–32)
CREAT SERPL-MCNC: 0.92 MG/DL (ref 0.52–1.04)
GFR SERPL CREATININE-BSD FRML MDRD: 75 ML/MIN/{1.73_M2}
GLUCOSE SERPL-MCNC: 93 MG/DL (ref 70–99)
HDLC SERPL-MCNC: 65 MG/DL
LDLC SERPL CALC-MCNC: 109 MG/DL
NONHDLC SERPL-MCNC: 131 MG/DL
POTASSIUM SERPL-SCNC: 4.7 MMOL/L (ref 3.4–5.3)
PROT SERPL-MCNC: 6.8 G/DL (ref 6.8–8.8)
SODIUM SERPL-SCNC: 141 MMOL/L (ref 133–144)
TRIGL SERPL-MCNC: 111 MG/DL

## 2021-05-19 PROCEDURE — 77067 SCR MAMMO BI INCL CAD: CPT | Mod: TC | Performed by: RADIOLOGY

## 2021-05-19 PROCEDURE — 36415 COLL VENOUS BLD VENIPUNCTURE: CPT | Performed by: INTERNAL MEDICINE

## 2021-05-19 PROCEDURE — 77063 BREAST TOMOSYNTHESIS BI: CPT | Mod: TC | Performed by: RADIOLOGY

## 2021-05-19 PROCEDURE — 80053 COMPREHEN METABOLIC PANEL: CPT | Performed by: INTERNAL MEDICINE

## 2021-05-19 PROCEDURE — 80061 LIPID PANEL: CPT | Performed by: INTERNAL MEDICINE

## 2021-05-24 ENCOUNTER — HOSPITAL ENCOUNTER (OUTPATIENT)
Dept: MAMMOGRAPHY | Facility: CLINIC | Age: 46
Discharge: HOME | End: 2021-05-24
Attending: INTERNAL MEDICINE | Admitting: INTERNAL MEDICINE
Payer: MEDICARE

## 2021-05-24 DIAGNOSIS — R92.8 ABNORMAL MAMMOGRAM: ICD-10-CM

## 2021-05-24 PROCEDURE — 76642 ULTRASOUND BREAST LIMITED: CPT | Mod: RT

## 2021-05-26 ASSESSMENT — PATIENT HEALTH QUESTIONNAIRE - PHQ9: SUM OF ALL RESPONSES TO PHQ QUESTIONS 1-9: 13

## 2021-05-28 ASSESSMENT — ANXIETY QUESTIONNAIRES
GAD7 TOTAL SCORE: 13
GAD7 TOTAL SCORE: 7

## 2021-05-28 NOTE — TELEPHONE ENCOUNTER
Left message to call back for: regarding refill request   Information to relay to patient:  Please relay message below per PCP and assist pt in scheduling appt. Thank you.

## 2021-05-28 NOTE — TELEPHONE ENCOUNTER
RN cannot approve Refill Request    RN can NOT refill this medication overdue for office visits and/or labs.    Rob Zendejas, [facility] Triage/Med Refill 5/6/2019    Requested Prescriptions   Pending Prescriptions Disp Refills     escitalopram oxalate (LEXAPRO) 20 MG tablet [Pharmacy Med Name: Escitalopram Oxalate Oral Tablet 20 MG] 90 tablet 2     Sig: TAKE ONE TABLET BY MOUTH ONE TIME DAILY       [facility] Refill Protocol  Failed - 5/6/2019  2:48 PM        Failed - PCP or prescribing provider visit in last year     Last office visit with prescriber/PCP: 1/31/2018 Veronica Hardy MD OR same dept: Visit date not found OR same specialty: 3/13/2018 Ayesha Delgadillo CNP  Last physical: 12/28/2016 Last MTM visit: Visit date not found   Next visit within 3 mo: Visit date not found  Next physical within 3 mo: Visit date not found  Prescriber OR PCP: Veronica Hardy MD  Last diagnosis associated with med order: 1. Major depression, recurrent (H)  - escitalopram oxalate (LEXAPRO) 20 MG tablet [Pharmacy Med Name: Escitalopram Oxalate Oral Tablet 20 MG]; TAKE ONE TABLET BY MOUTH ONE TIME DAILY   Dispense: 90 tablet; Refill: 2    If protocol passes may refill for 12 months if within 3 months of last provider visit (or a total of 15 months).

## 2021-05-29 NOTE — PROGRESS NOTES
"SUBJECTIVE: Vianca Kumar is a 43 y.o. female with:  Chief Complaint   Patient presents with     Medication check     Emesis     Rash     x few months.     1) Depression - She has been taking Lexapro 20 mg daily.  Mood doing ok.  No side effects.  Would like to continue on medicine.  Not working at this time.  Has not pursued any further treatment for chronic Lyme's due to finances.  Her stuttering has completely resolved.    2) Vomiting - Has had extensive work-up with GI for this.  Abdominal pain better after choly last year.  She had upper scope exam in 2017 that showed esophagitis.  Also had capsule study that showed normal small bowel.  Did have colonoscopy with adenomatous and hyperplastic polyps in 2017. She saw GI midlevel at [facility] last year but has not been back.   She tried stopping THC for 6 months but no improvement in vomiting.  When she rolls over at night, it triggers nausea and she will go vomit.  Other times can vomit due to a food.  Usually vomits at least once a day.  Has trouble with first meal of the day.  Swallowing food is tough in am.  No nausea in between the vomiting spells.  Bowel movements more frequent since choly - 3x a week.  States her diet is average.    3) Rash - Having a rash that comes up on different parts of body on daily basis. Very itchy.  Rash will last 10-15 minutes.  Not on hand/ face/ feet / chest.  Will get small bumps and very itchy.  Started 1 year ago.  She has tried Benadryl / topical treatments.      4) Muscle spasms - These come and go.  They can be severe and wake her up from sleep.  Was on muscle relaxants and magnesium in past but at present only smoking marijuana to control the spasms.    SH: Single.  Lives with parents.  Watches her nice/nephews a couple times a week.  Not working.  No current relationship.    OBJECTIVE: /74 (Patient Site: Right Arm, Patient Position: Sitting, Cuff Size: Adult Regular)   Pulse 86   Ht 5' 8\" (1.727 m)   Wt 207 lb " (93.9 kg)   LMP 04/29/2019   BMI 31.47 kg/m     No acute distress.  HEENT: Head is atraumatic and/normocephalic.  PERRL.  Conjunctiva clear.  Tympanic membranes grey with normal landmarks and normal light reflexes.  No nasal discharge.  Oropharynx is pink and moist.  Sinuses nontender.  Neck: Supple.  No lymphadenopathy or thyromegaly.  Lungs: Clear to auscultation.  No wheezing, retractions, or tachypnea.  Heart: RRR. S1 and S2 normal.  No murmurs, rubs, or gallops.  Abdomen: Soft. Bowel sounds normal.  Non tender.  No masses.  No hepatosplenomegaly or masses.  Neuro: Awake, alert, oriented x 3.  Normal strength and tone.  Normal gait.    Skin: No rash.  Psych Exam:  Mood: depressed  Affect: flat   Behavior: appropriate  ThoughtContent: logical  Judgement: appropriate  Insight: normal    PHQ9: 12    She shows me photos of what appears to be hive like lesions clustered together.    Vianca was seen today for medication check, emesis and rash.    Diagnoses and all orders for this visit:    Major depression, recurrent (H)- I think PHQ elevated due to other factors.  Continue current dose Lexapro for now.  -     escitalopram oxalate (LEXAPRO) 20 MG tablet; Take 1 tablet (20 mg total) by mouth daily.    Intractable vomiting with nausea, unspecified vomiting type  -     sucralfate (CARAFATE) 1 gram tablet; Take 1 tablet (1 g total) by mouth 4 (four) times a day.  Trial of yvonne.    Muscle Spasm - Will register for medical cannabis.    Hives- Take anti-histamine daily.  If not doing better, will increase dose, add H2 blocker as well.       Patient Instructions   For vomiting:    Consider trying yvonne - 1 capsule of powdered yvonne daily  Carafate 1 tab up to 4x daily to help protect stomach    For muscle cramps / spasms:    Medical Cannabis program - I will register you and you will get e-mail from [facility]    For rash:    Start on a daily anti-histamine like loratadine 10 mg daily    F/U for a physical / pap in 1  elyssa.     Veronica Hardy

## 2021-05-29 NOTE — PATIENT INSTRUCTIONS - HE
For vomiting:    Consider trying yvonne - 1 capsule of powdered yvonne daily  Carafate 1 tab up to 4x daily to help protect stomach    For muscle cramps / spasms:    Medical Cannabis program - I will register you and you will get e-mail from [facility]    For rash:    Start on a daily anti-histamine like loratadine 10 mg daily    F/U for a physical / pap in 1 month.

## 2021-05-31 VITALS — WEIGHT: 209 LBS | BODY MASS INDEX: 31.78 KG/M2

## 2021-05-31 VITALS — BODY MASS INDEX: 31.83 KG/M2 | HEIGHT: 68 IN | WEIGHT: 210 LBS

## 2021-05-31 VITALS — HEIGHT: 68 IN | BODY MASS INDEX: 31.83 KG/M2 | WEIGHT: 210 LBS

## 2021-06-01 VITALS — BODY MASS INDEX: 32.99 KG/M2 | WEIGHT: 217 LBS

## 2021-06-01 VITALS — WEIGHT: 215 LBS | BODY MASS INDEX: 32.69 KG/M2

## 2021-06-01 VITALS — WEIGHT: 211 LBS | HEIGHT: 68 IN | BODY MASS INDEX: 31.98 KG/M2

## 2021-06-01 VITALS — WEIGHT: 221.25 LBS | BODY MASS INDEX: 33.64 KG/M2

## 2021-06-03 VITALS — WEIGHT: 207 LBS | HEIGHT: 68 IN | BODY MASS INDEX: 31.37 KG/M2

## 2021-06-04 VITALS
DIASTOLIC BLOOD PRESSURE: 60 MMHG | TEMPERATURE: 97.7 F | HEART RATE: 71 BPM | OXYGEN SATURATION: 95 % | BODY MASS INDEX: 31.21 KG/M2 | SYSTOLIC BLOOD PRESSURE: 92 MMHG | WEIGHT: 205.25 LBS

## 2021-06-04 VITALS
HEART RATE: 88 BPM | DIASTOLIC BLOOD PRESSURE: 78 MMHG | SYSTOLIC BLOOD PRESSURE: 90 MMHG | TEMPERATURE: 98.5 F | WEIGHT: 207.25 LBS | BODY MASS INDEX: 31.51 KG/M2 | OXYGEN SATURATION: 96 %

## 2021-06-04 VITALS
DIASTOLIC BLOOD PRESSURE: 66 MMHG | SYSTOLIC BLOOD PRESSURE: 92 MMHG | BODY MASS INDEX: 31.78 KG/M2 | WEIGHT: 209 LBS | HEART RATE: 60 BPM

## 2021-06-04 NOTE — PROGRESS NOTES
OFFICE VISIT - FAMILY MEDICINE     ASSESSMENT AND PLAN     1. Acute suppurative otitis media of right ear without spontaneous rupture of tympanic membrane, recurrence not specified  amoxicillin (AMOXIL) 875 MG tablet   2. Upper respiratory tract infection, unspecified type     Continue with good hydration, extra vitamin C, cough drops as needed, take the prescribed antibiotic as directed, possible side effect discussed.  Return if not improving in 1-2 weeks    CHIEF COMPLAINT   Follow-up (URI, went to  11/25/19 did 10 days of antibiotics and prednisone. After finishing, still not any better, congestion, ear aching, chest pain-struggling to breath.)    [facility]   Vianca Kumar is a 44 y.o. female.  No Patient Care Coordination Note on file.    She never fully improved from her URI last November, she was treated with prednisone and doxycycline, symptoms did minimally improve, but for the past 7 to 10 days, she is noticing right more than left ear pain, she is coughing with mild amount of yellowish mucus production, feels feverish, did not check her temperature.  She also feels short of breath with chest discomfort associated with cough.  Mild headache .  Has been pushing fluids, taking Tylenol with no significant improvement of her symptoms.  She does have a history of seizure disorder about a year  Neurologist, no medication was prescribed, no new episode.  She has mild depression with anxiety controlled with Lexapro 20 mg daily.        Review of Systems As per [facility], otherwise negative.    OBJECTIVE   BP 92/60 (Patient Site: Right Arm, Patient Position: Sitting, Cuff Size: Adult Regular)   Pulse 71   Temp 97.7  F (36.5  C) (Oral)   Wt 205 lb 4 oz (93.1 kg)   LMP 11/27/2019   SpO2 95%   BMI 31.21 kg/m    Physical Exam   Constitutional: She is oriented to person, place, and time. She appears well-developed and well-nourished.   HENT:   Head: Normocephalic and atraumatic.   Right Ear: There is tenderness. Tympanic  membrane is injected and erythematous.   Left Ear: There is tenderness.   Neck: Normal range of motion. Neck supple. No JVD present. No tracheal deviation present. No thyromegaly present.   Cardiovascular: Normal rate, regular rhythm, normal heart sounds and intact distal pulses. Exam reveals no gallop and no friction rub.   No murmur heard.  Pulmonary/Chest: Effort normal and breath sounds normal. No respiratory distress. She has no wheezes. She has no rales.   Musculoskeletal:         General: No tenderness or edema.   Lymphadenopathy:     She has no cervical adenopathy.   Neurological: She is alert and oriented to person, place, and time. Coordination normal.   Psychiatric: She has a normal mood and affect. Judgment and thought content normal.       PFSH   No family history on file.  Social History     Socioeconomic History     Marital status:      Spouse name: Not on file     Number of children: Not on file     Years of education: Not on file     Highest education level: Not on file   Occupational History     Not on file   Social Needs     Financial resource strain: Not on file     Food insecurity:     Worry: Not on file     Inability: Not on file     Transportation needs:     Medical: Not on file     Non-medical: Not on file   Tobacco Use     Smoking status: Current Every Day Smoker     Packs/day: 0.50     Years: 48.00     Pack years: 24.00     Types: Cigarettes     Last attempt to quit: 7/22/2016     Years since quitting: 3.3     Smokeless tobacco: Never Used     Tobacco comment: off and on trying to quit, 3packs a week   Substance and Sexual Activity     Alcohol use: Yes     Alcohol/week: 2.0 - 3.0 standard drinks     Types: 2 - 3 Cans of beer per week     Comment: 4 to 5 time       Drug use: Yes     Types: Marijuana     Sexual activity: Yes     Partners: Male     Birth control/protection: None   Lifestyle     Physical activity:     Days per week: Not on file     Minutes per session: Not on file      Stress: Not on file   Relationships     Social connections:     Talks on phone: Not on file     Gets together: Not on file     Attends Religion service: Not on file     Active member of club or organization: Not on file     Attends meetings of clubs or organizations: Not on file     Relationship status: Not on file     Intimate partner violence:     Fear of current or ex partner: Not on file     Emotionally abused: Not on file     Physically abused: Not on file     Forced sexual activity: Not on file   Other Topics Concern     Not on file   Social History Narrative     Not on file     Relevant history was reviewed with the patient today, unless noted in HPI, nothing is pertinent for this visit.  [facility]     Patient Active Problem List    Diagnosis Date Noted     Mild mitral regurgitation 07/22/2016     Seizure-like activity (H) 07/21/2016     Hiatal hernia 09/30/2015     Reflux esophagitis 09/30/2015     Adenomatous polyp of colon 09/21/2015     Overview Note:     Seen on colonoscopy 9/15.  Due for repeat exam 2018.       Muscle Spasm      Overview Note:     Created by Conversion         Insomnia      Overview Note:     Created by Conversion         Chronic Lyme Disease      Overview Note:     Created by Conversion         Allergic Rhinitis      Overview Note:     Created by Conversion    Replacement Utility updated for latest IMO load       Major Depression, Recurrent      Overview Note:     Created by Conversion    Replacement Utility updated for latest IMO load       Past Surgical History:   Procedure Laterality Date     COLONOSCOPY       COLONOSCOPY N/A 12/28/2017    Procedure: COLONOSCOPY;  Surgeon: Yosi Beck MD;  Location: [facility];  Service:      ESOPHAGOGASTRODUODENOSCOPY N/A 12/28/2017    Procedure: ESOPHAGOGASTRODUODENOSCOPY (EGD);  Surgeon: Yosi Beck MD;  Location: [facility];  Service:      IA OSTEOTOMY FEMUR SHAFT/SUPRACONDY      Description: Osteotomy Of The Femoral Shaft;   Recorded: 05/27/2013;     UPPER GASTROINTESTINAL ENDOSCOPY         RESULTS/CONSULTS (Lab/Rad)   No results found for this or any previous visit (from the past 168 hour(s)).  No results found.  MEDICATIONS     Current Outpatient Medications on File Prior to Visit   Medication Sig Dispense Refill     escitalopram oxalate (LEXAPRO) 20 MG tablet Take 1 tablet (20 mg total) by mouth daily. 90 tablet 3     [DISCONTINUED] sucralfate (CARAFATE) 1 gram tablet Take 1 tablet (1 g total) by mouth 4 (four) times a day. 120 tablet 11     No current facility-administered medications on file prior to visit.        HEALTH MAINTENANCE / SCREENING   PHQ-2 Total Score: 4 (12/9/2019 11:00 AM)  , PHQ-9 Total Score: 13 (12/9/2019 11:00 AM)  ,RICCARDO 7 Total Score: 7 (12/9/2019 11:00 AM)    Immunization History   Administered Date(s) Administered     Influenza, inj, historic,unspecified 01/14/2011, 10/17/2011     Influenza,seasonal,quad inj =/> 6months 09/30/2015     Td,adult,historic,unspecified 12/31/2001     Tdap 01/14/2011, 01/19/2012     Health Maintenance   Topic     HIV SCREENING      MEDICARE ANNUAL WELLNESS VISIT      PNEUMOCOCCAL IMMUNIZATION 19-64 MEDIUM RISK (1 of 1 - PPSV23)     PAP SMEAR      HPV TEST      INFLUENZA VACCINE RULE BASED (1)     DEPRESSION FOLLOW UP      ADVANCE CARE PLANNING      TD 18+ HE      TDAP ADULT ONE TIME DOSE        Farshad Beauchamp MD  Family Medicine, [facility]     This note was dictated using a voice recognition software.  Any grammatical or context distortion are unintentional and inherent to the software.

## 2021-06-05 NOTE — PROGRESS NOTES
Assessment and Plan  Uncomfortable - symptoms not improving with recent abx though white count improved.  Discussed with ID on Call [facility] - much appreciated! Who agrees with need to rule out mastoiditis - CT later DID show mastitis and Augmentin and doxycycline prescribed per recommendation of ID and referral to ENT placed    1. Neck pain/2. Fever, unspecified fever cause/3. Bandemia/4. Acute intractable headache, unspecified headache type  - HM1(CBC and Differential)  - HM1 (CBC with Diff)  - JIC RED  - C-Reactive Protein  - CT Head Without Contrast; Future    5. Mastoiditis of right side  - Ambulatory referral to ENT  Following RXs originally sent to the wrong pharmacy, then called in to the correct one    amoxicillin-clavulanate (AUGMENTIN) 875-125 mg per tablet 20 tablet 0 1/24/2020 2/3/2020 No   Sig - Route: Take 1 tablet by mouth 2 (two) times a day for 10 days. - Oral   Sent to pharmacy as: amoxicillin 875 mg-potassium clavulanate 125 mg tablet (Augmentin)   E-Prescribing Status: Receipt confirmed by pharmacy (1/24/2020  6:56 PM CST)      Disp Refills Start End SOLA   doxycycline (MONODOX) 100 MG capsule 20 capsule 0 1/24/2020 2/3/2020 No   Sig - Route: Take 1 capsule (100 mg total) by mouth 2 (two) times a day for 10 days. - Oral   Sent to pharmacy as: doxycycline monohydrate 100 mg capsule (MONODOX)   E-Prescribing Status: Receipt confirmed by pharmacy (1/24/2020  6:56 PM CST)     Return in about 1 week (around 1/31/2020).    Breana Gatica MD     -------------------------------------------    Chief Complaint   Patient presents with     Follow-up     Ear infection Right side lots pain behind ear and down neck, Left ear plugged.     Follow-up     Respitory infection     Vianca notes she has been fighting various stuff since November - ear, sinus and upper respiratory infections. She had recently finished a course of Amoxicillin prescribed by Dr. Beauchamp on 12/9/19 for right otitis media.  She was seen in [place]  [facility] for ear pain, chest tightness, diarrhea, and dyspnea and had a very thorough work up including CXR - negative, d-dimer - normal, CBC - which showed WBC count of 17; and stool tests for infective bacteria, ova and parasites, and toxoplasma - all negative.  Right TM was inflamed and she was treated with Ciprodex ear drops and azythromycin. Her right ear pain was so bad the night before she went in that she was up most of the night crying     With most recent round of treatment - level of pain has gotten a little better , but now things feel like they are going downhill again. She has pain at the base of skull and along hairline.  She has been have low grade fevers and continues to have some nausea and diarrhea, though not consistently        Current Outpatient Medications on File Prior to Visit   Medication Sig Dispense Refill     escitalopram oxalate (LEXAPRO) 20 MG tablet Take 1 tablet (20 mg total) by mouth daily. 90 tablet 3     No current facility-administered medications on file prior to visit.          Health Maintenance Due   Topic Date Due     ASTHMA ACTION PLAN  1976     ASTHMA CONTROL TEST  1976     DEPRESSION ACTION PLAN  1976     HIV SCREENING  10/09/1991     MEDICARE ANNUAL WELLNESS VISIT  10/09/1994     PNEUMOCOCCAL IMMUNIZATION 19-64 MEDIUM RISK (1 of 1 - PPSV23) 10/09/1995     PAP SMEAR  08/05/2018     HPV TEST  08/05/2018     INFLUENZA VACCINE RULE BASED (1) 08/01/2019     Health Maintenance reviewed - .    The history section was last reviewed by Brandy Nina CMA on Jan 24, 2020.    Social History     Tobacco Use   Smoking Status Current Every Day Smoker     Packs/day: 0.50     Years: 48.00     Pack years: 24.00     Types: Cigarettes     Last attempt to quit: 7/22/2016     Years since quitting: 3.5   Smokeless Tobacco Never Used   Tobacco Comment    off and on trying to quit, 3packs a week       Social History     Substance and Sexual Activity   Alcohol Use Yes      Alcohol/week: 2.0 - 3.0 standard drinks     Types: 2 - 3 Cans of beer per week    Comment: 4 to 5 time           Vitals:    01/24/20 0905   BP: 92/66   Pulse: 60     Body mass index is 31.78 kg/m .     EXAM:    General appearance - tired, very uncomfortabel appearing with pain  Eyes - conjucntiva clear  Ears - right ear normal, left TM erythema upper half, clear lower half; pain to palpation  over right mastoic  Mouth - mucous membranes moist, pharynx normal without lesions  Neck - supple, no significant adenopathy, pain to palpation right neck strap muscles and along skull-salpline  Chest - clear to auscultation, no wheezes, rales or rhonchi, symmetric air entry  Heart - normal rate, regular rhythm, normal S1, S2, no murmurs, rubs, clicks or gallops

## 2021-06-05 NOTE — TELEPHONE ENCOUNTER
Question following Office Visit    When did you see your provider: 01/24/2020    What is your question:     Needs scripts sent locally: [facility] [place], [place]  Sent to: [facility] - [place], [place] - [address] in error    Okay to leave a detailed message: Yes    Please resend to [facility].

## 2021-06-05 NOTE — TELEPHONE ENCOUNTER
1-28-20  Hello just connecting back, notes,labs,imaging and order were all faxed to [facility] @ [phone] for pt's appt 1-31-20 with Dr Flores.   Thanks  Karoline

## 2021-06-05 NOTE — TELEPHONE ENCOUNTER
Pt was seen today in clinic, had CT and per note on CT pt to start two antibiotics.   Antibiotics are not at pharmacy, were sent to a pharmacy in [place].   One antibiotic is listed as discontinued.   RN advised on-call provider would be paged to verify prescriptions.     Paged on-call provider today at 6:44pm.     Per Dr. Nixon salgado to send in doxycycline 100mg 1 capsule by mouth 2 times daily for 10 days as previously prescribed and Augmentin 875-125 1 tablet by mouth 2 times daily for 10 days. Both with 20 tablets no refills.    Rn read back and confirmed rx's.     Both prescriptions sent to patient's preferred pharmacy. Pt notified and stated understanding.  Zenobia Ahuja, RN   [facility] RN Triage      Reason for Disposition    [1] Prescription not at pharmacy AND [2] was prescribed today by PCP    Protocols used: MEDICATION QUESTION CALL-A-

## 2021-06-06 NOTE — PROGRESS NOTES
"Assessment and Plan  Vianca has had two round of (first time Augmentin plus doxycyline, second time Augmentin plus prednisone)  antibiotics to address otitis media and mastoiditis. She feels better with antibiotics and worse off of them.  She is feeling poorly in general - though she has had a tendency to nausea and vomiting, this is worse though might be I partly explained bu antibiotics and prednisone.  She had been careful to eat yogurt and has not had diarrhea.  She continues to have pain over the are of the right mastoid    1. Fatigue, unspecified type/2. Mastoiditis, right  - C-Reactive Protein  - HM1(CBC and Differential)  - Comprehensive Metabolic Panel  - HIV Antigen/Antibody Screening [place]  - HM1 (CBC with Diff)    Advised trial of cool pacs rather than warm pacs to right mastoid  I would consider prescription for Tramadol or T3 if pain does not improve.  No history of addiction    3. Non-intractable vomiting with nausea, unspecified vomiting type  She says ondansetron (\"zofran\") doesn't work = trial of  - prochlorperazine (COMPAZINE) 25 MG suppository; Insert 1 suppository (25 mg total) into the rectum every 12 (twelve) hours as needed for nausea.  Dispense: 10 suppository; Refill: 1    5. Screening for HIV (human immunodeficiency virus)  - HIV Antigen/Antibody Screening [place]    She is following up with ENT   Salomon Flores MD in 2 days      Breana Gatica MD     -------------------------------------------    Chief Complaint   Patient presents with     Follow-up     upper respitory concerns.Behind right ear pain and pressure, upper abdonmin feel hot and infected, and it hurts around heart. Has been vomiting 2 to 4 times a day for the last x 4 wks.       I saw Vianca for severe ear pain on 1/24/20 with concern for mastoiditis and ordered at CT scan which showed    IMPRESSION:   1.  Partial fluid opacification of the right mastoid air cells and middle ear cavity which could relate to otitis " "media and mastoiditis given the clinical history. There is no osseous erosive change to suggest coalescent mastoiditis.     2.  Nodular mucosal thickening of the inferior maxillary sinuses and sphenoid sinuses which could relate to chronic sinusitis with a superimposed acute component possible.    After consulting with infectious disease, I treated her with Augmentin and doxycycline    She [facility] MD [facility] on 1/31/20 .  WE do note yet have notes, but Vianca says she started her again on  Augmentin, and added  prednisone, steroid rinse for nose, and two allergy medications. She says that she finished oral prednisone early last week and Augmentin Friday of last week  She feels better on antibiotics - improves - goes off antibiotics  - gets worse. She does not take antibiotics at the same time as probiotics /yogurt and she is also using turmeric. She will be seeing him again in two days. He did do a nasal culture (this is viewable in [facility] ) that was positive for H. Flu.     Vianca says \"I feel I have infection all over the place   - horrible fatigue   - temperature is fluctuating   - vomiting 3x/day . Upper abdomen \"is inflamed and painful.\"  - has trouble with GI tract for years - vomiting is not outside the normal   - sinus pressure comes and goes  - \"back here (she points to right mastoid) still feels like a massive infection\"   - she is sleeping horribly - sleep because of pressure in ear, head -ibuprofen does not help  - chest feels \"hot and firey    Last HIV test 2016    I notice that she seems to be stuttering more. She stuttered for 4 years when she got really sick - stuttering went away after being treated for Lyme - now reappeared th last few weeks.  She says she has not been confused        Current Outpatient Medications on File Prior to Visit   Medication Sig Dispense Refill     escitalopram oxalate (LEXAPRO) 20 MG tablet Take 1 tablet (20 mg total) by mouth " daily. 90 tablet 3     amoxicillin-clavulanate (AUGMENTIN) 875-125 mg per tablet TAKE 1 TABLET BY MOUTH EVERY 12 HOURS FOR 10 DAYS       No current facility-administered medications on file prior to visit.            The history section was last reviewed by Brandy Nina CMA on Jan 24, 2020.    Social History     Tobacco Use   Smoking Status Current Every Day Smoker     Packs/day: 0.50     Years: 48.00     Pack years: 24.00     Types: Cigarettes     Last attempt to quit: 7/22/2016     Years since quitting: 3.6   Smokeless Tobacco Never Used   Tobacco Comment    off and on trying to quit, 3packs a week       Social History     Substance and Sexual Activity   Alcohol Use Yes     Alcohol/week: 2.0 - 3.0 standard drinks     Types: 2 - 3 Cans of beer per week    Comment: 4 to 5 time           Vitals:    02/25/20 1504   BP: 90/78   Pulse: 88   Temp: 98.5  F (36.9  C)   SpO2: 96%     Body mass index is 31.51 kg/m .     EXAM:    General appearance - tired appearing, also appears uncomfortable  Mental status - worried, frustrated; also she has halting and stuttering speech  Eyes - conjunctiva clear  Ears - right TM pale and puffy and dull, but not red  Mouth - mucous membranes moist, pharynx normal without lesions  Neck - supple, no significant adenopathy  Chest - clear to auscultation, no wheezes, rales or rhonchi, symmetric air entry  Heart - S1 and S2 normal, tachycardia, normal rhythm  Abdomen - epigastric pain to palpation, otherwise non-tender and without organomegaly

## 2021-06-09 NOTE — TELEPHONE ENCOUNTER
Refill Approved    Rx renewed per Medication Renewal Policy. Medication was last renewed on 6/10/19.    Addis Marrufo, [facility] Connection Triage/Med Refill 6/25/2020     Requested Prescriptions   Pending Prescriptions Disp Refills     escitalopram oxalate (LEXAPRO) 20 MG tablet [Pharmacy Med Name: Escitalopram Oxalate Oral Tablet 20 MG] 90 tablet 0     Sig: TAKE ONE TABLET BY MOUTH ONE TIME DAILY       SSRI Refill Protocol  Passed - 6/24/2020  4:50 PM        Passed - PCP or prescribing provider visit in last year     Last office visit with prescriber/PCP: 6/10/2019 Veronica Hardy MD OR same dept: 2/25/2020 Breana Gatica MD OR same specialty: 2/25/2020 Breana Gatica MD  Last physical: 12/28/2016 Last MTM visit: Visit date not found   Next visit within 3 mo: Visit date not found  Next physical within 3 mo: Visit date not found  Prescriber OR PCP: Veronica Hardy MD  Last diagnosis associated with med order: 1. Major depression, recurrent (H)  - escitalopram oxalate (LEXAPRO) 20 MG tablet [Pharmacy Med Name: Escitalopram Oxalate Oral Tablet 20 MG]; TAKE ONE TABLET BY MOUTH ONE TIME DAILY   Dispense: 90 tablet; Refill: 0    If protocol passes may refill for 12 months if within 3 months of last provider visit (or a total of 15 months).

## 2021-06-14 NOTE — PROGRESS NOTES
GENERAL SURGICAL CONSULTATION    I was requested by Veronica Hardy MD to consult on this pt to evaluate them for Dysphagia, GERD and Blood in stool.    HPI:  This is a 42 y.o. female here today with frequent GERD symptoms and daily vomiting.  Often the emesis starts at night time.  This issues with frequent emesis has been going on for several years.   She is experiencing dysphagia over the last few months.  Things like bread are giving her the feeling that they are getting stuck.    She has seen blood in her stool about once a week.  She sees a bright red coating over her stool about once a week.  She denies any hemorrhoid issues.      Allergies:Review of patient's allergies indicates no known allergies.    Past Medical History:   Diagnosis Date     Depression      Lyme disease      Muscle spasm        Past Surgical History:   Procedure Laterality Date     COLONOSCOPY       IA OSTEOTOMY FEMUR SHAFT/SUPRACONDY      Description: Osteotomy Of The Femoral Shaft;  Recorded: 05/27/2013;     UPPER GASTROINTESTINAL ENDOSCOPY         CURRENT MEDS:  Current Outpatient Prescriptions   Medication Sig Dispense Refill     escitalopram oxalate (LEXAPRO) 20 MG tablet TAKE 1 TABLET(20 MG) BY MOUTH DAILY 90 tablet 0     magnesium oxide (MAG-OX) 400 mg tablet Take 400 mg by mouth daily.       No current facility-administered medications for this visit.        History reviewed. No pertinent family history.  Family history is not pertinent to this patients Chief Complaint.     reports that she has been smoking Cigarettes.  She has a 24.00 pack-year smoking history. She has never used smokeless tobacco. She reports that she drinks about 1.2 - 1.8 oz of alcohol per week  She reports that she uses illicit drugs, including Marijuana.    Review of Systems -   10 point Review of systems is negative except for; as mentioned above in HPI and PMHx    /78 (Patient Site: Right Arm, Patient Position: Sitting, Cuff Size: Adult  "Large)  Pulse 78  Ht 5' 8\" (1.727 m)  Wt 210 lb (95.3 kg)  LMP 11/08/2017  SpO2 95%  BMI 31.93 kg/m2  Body mass index is 31.93 kg/(m^2).    EXAM:  GENERAL: Well developed female  HEENT: EOMI, Anicteric Sclera, Moist Mucous Membranes,  In Mouth the pt does not have redness or bleeding gums  CARDIOVASCULAR: RRR w/out murmur   CHEST/LUNG: Clear to Auscultation  ABDOMEN:  Non tender to palpation, +BS  MUSCULOSKELETAL:  No deformities with good range of motion in all extremities  NEURO: She is ambulatory with good strength in both legs.  HEME/LYMPH: No Cervical Adenopathy or tenderness.     IMAGES:  I reviewed the prior Colonoscopy and Upper Endoscopy Reports.    Assessment/Plan:  Dysphagia with emesis and Blood in stool.  I believe she would benefit from an EGD and Colonoscopy as an innitial evaluation.  The colonoscopy needs to be done to follow up on a Sessile Polyp noted from her colonoscopy in 2015.  That in combination with the blood in her stool is an appropriate indication for repeat colonoscopy.  The upper endo would be good to evaluate for the GERD and emesis issue.  The hyperemesis with likely require further work-up.       We also discussed how smoking is not helping her health and is potentially complicating issues with gastroesophageal reflux disease and hyperemesis.    EGD and Colonoscopy      Yosi Beck MD  [facility]  [phone]  "

## 2021-06-14 NOTE — PROGRESS NOTES
SUBJECTIVE: Vianca Kumar is a 42 y.o. female with:  Chief Complaint   Patient presents with     Medication Refill     Lexapro- WANTS TO KNOW IF THERE IS A GENERIC BRAND SHE CAN GET     Medication Management     Rectal Bleeding     blood in the stool      Emesis     ongoing issues, at night triggers the vomiting, having pain when swallowing food     1) Depression / anxiety - She tried counseling last year but did not pursue it more than a couple sessions.  She feels her depression is stable but she continues to have medical problems and is not able to work.  She has been paying out of pocket for escitalopram 20 mg daily and it is very expensive.  She does feel the medicine helps her mood.    2) She has been having more vomiting.  She has had many years of reflux symptoms.  She will vomit on a daily basis.  She notices that when she swallows food it gets uncomfortable in her throat.  Her last upper scope was  which showed hiatal hernia.  She will have occasional blood in the vomit- it is mostly what she has eaten.  She will get pressure under right side of rib cage.  No abdominal pain.  If she lies down that will trigger the vomiting.  She is not taking any medicines.  In the past she has tried ranitidine/ pantoprazole / TUMs.  She has not lost weight.    3) Blood in stools -  She will get blood in stools 1x a week.  She has a bowel movement 2-3x a week- stool is in ribbons.  The blood is when she wipes and also coating the stool.  No external hemorrhoids.  No rectal pain.  No abdominal pain.    SH: Living with parents.  On disability.  Diet: bland. Avoids spicy food.  Carb heavy.  Caffeine: 1 cup coffee 5 days a week.  Diet soda 1 / day.  Smokin/2 PPD.  Alcohol: 2-4 drinks 3x a week.  THC - helps with nausea / muscle pain.    Patient Active Problem List   Diagnosis     Muscle Spasm     Insomnia     Chronic Lyme Disease     Allergic Rhinitis     Major Depression, Recurrent     Adenomatous polyp of colon      Hiatal hernia     Reflux esophagitis     Seizure-like activity     Mild mitral regurgitation      Vianca was seen today for medication refill, medication management, rectal bleeding and emesis.    Diagnoses and all orders for this visit:    Vomiting - Will r/o esophageal stricture/ eosinophilic esophagitis/ etc with scope exam. Trial of Iberogast herbal supplement.  -     Comprehensive Metabolic Panel  -     Lipase  -     Celiac(Gluten)Antibody Panel ($$$)  -     Ambulatory Referral for Upper GI Endoscopy    Major depression, recurrent - I gave her resources to purchase escitalopram in the future.      Blood in stool, valencia - Suspect internal hemorrhoids but will r/o other causes.  -     Ambulatory referral for Colonoscopy  -     HM2(CBC w/o Differential)    Follow-up after scope exams.     Veronica Hardy

## 2021-06-15 NOTE — ANESTHESIA PREPROCEDURE EVALUATION
Anesthesia Evaluation      Patient summary reviewed     Airway   Mallampati: II  Neck ROM: full   Pulmonary - negative ROS and normal exam                          Cardiovascular - normal exam  (+) valvular problems/murmurs (mild MR) MR, ,     Rhythm: regular  Rate: normal,      ROS comment: Echo 2016   Normal left ventricular size and wall thickness.   Left ventricular ejection fraction is visually estimated to be 65%.   No regional wall motion abnormalities.   Mild mitral regurgitation.     Neuro/Psych    (+) depression, anxiety/panic attacks,     Endo/Other - negative ROS      GI/Hepatic/Renal - negative ROS      Other findings: Pin in lip.      Dental - normal exam                        Anesthesia Plan  Planned anesthetic: MAC  Versed/ketamine/propofol  ASA 2     Anesthetic plan and risks discussed with: patient    Post-op plan: routine recovery

## 2021-06-15 NOTE — PROGRESS NOTES
"SUBJECTIVE: Vianca Kumar is a 42 y.o. female with:   Chief Complaint   Patient presents with     Follow-up     F/u after endoscopy and colonoscopy procedure     GI Problem     Rt side of stomach underneath ribs cage pain on and off for several weeks, Diarrhea and vomiting after eating for a few days    She is still having a lot of reflux.  She went back on omeprazole and is not doing any better.  She is getting pain under the right side if her rib cage.  Pain comes and goes but it is more frequent.  In the last 3 days she has not held down any food.  She has had vomiting/ diarrhea in the last few days.  She cannot get comfortable with the pain.  Her abdomen feels more distended in that area.  In the last 3 days she has had lower than normal temp and then went higher than normal but not above 100.  Stool is watery for last 3 days.  No new foods.  Her niece and nephew have had respiratory illnesses.  No recent travel or antibiotics.  She has not tried any OTC products for diarrhea.  She has tried zofran in the past and it has not worked for her.    She had recent EGD/ colonoscopy with pathology showing gastritis/ reflux esophagitis but no H pylori.  She had adenomatous polyp of colon.  Celiac / lipase and LFTs have been normal in the last few months.    She has history of chronic lyme disease.  In past she was treated with long term antibiotics but currently she is not getting any active treatment.  She has had unexplained neurologic symptoms including stuttering and muscle spasms.  The stuttering is much better.    OBJECTIVE: /68 (Patient Site: Left Arm, Patient Position: Sitting, Cuff Size: Adult Large)  Pulse 72  Ht 5' 8\" (1.727 m)  Wt 211 lb (95.7 kg)  BMI 32.08 kg/m2 no distress  Lungs: Clear to auscultation.  No retractions or tachypnea.  CV: RRR. S1 and S2 normal.  No murmurs, rubs or gallops.  Abdomen: Soft. Mild RUQ tenderness with no rebound or guarding. ND. No HSM or masses.    Vianca was " seen today for follow-up and gi problem.    Diagnoses and all orders for this visit:    RUQ pain  -     HM1(CBC and Differential)  -     Comprehensive Metabolic Panel  -     US Abdomen Limited; Future  -     HM1 (CBC with Diff)     At this point her symptoms seem more acute and will r/o gallbladder disease.  With the diarrhea, she could have acute gastroenteritis also.  I advised her to push fluids/ try yvonne for nausea/ sip on soup broth to stay hydrated.  Call if symptoms worsen.    Veronica Hardy

## 2021-06-15 NOTE — ANESTHESIA CARE TRANSFER NOTE
Last vitals:   Vitals:    12/28/17 1520   BP: 104/70   Pulse: 82   Resp: 16   Temp: 37.2 C   SpO2: 97%     Patient's level of consciousness is drowsy  Spontaneous respirations: yes  Maintains airway independently: yes  Dentition unchanged: yes  Oropharynx: oropharynx clear of all foreign objects    QCDR Measures:  ASA# 20 - Surgical Safety Checklist: WHO surgical safety checklist completed prior to induction  PQRS# 430 - Adult PONV Prevention: 4558F - Pt received => 2 anti-emetic agents (different classes) preop & intraop  ASA# 8 - Peds PONV Prevention: NA - Not pediatric patient, not GA or 2 or more risk factors NOT present  PQRS# 424 - Gladis-op Temp Management: 4559F - At least one body temp DOCUMENTED => 35.5C or 95.9F within required timeframe  PQRS# 426 - PACU Transfer Protocol: - Transfer of care checklist used  ASA# 14 - Acute Post-op Pain: ASA14B - Patient did NOT experience pain >= 7 out of 10

## 2021-06-15 NOTE — ANESTHESIA POSTPROCEDURE EVALUATION
Patient: Vianca Kumar  ESOPHAGOGASTRODUODENOSCOPY (EGD), COLONOSCOPY  Anesthesia type: MAC    Patient location: Phase II Recovery  Last vitals:   Vitals:    12/28/17 1523   BP: 104/70   Pulse: 82   Resp: 16   Temp: 37.2  C (99  F)   SpO2: 97%     Post vital signs: stable  Level of consciousness: awake and responds to simple questions  Post-anesthesia pain: pain controlled  Post-anesthesia nausea and vomiting: no  Pulmonary: unassisted, return to baseline  Cardiovascular: stable and blood pressure at baseline  Hydration: adequate  Anesthetic events: no    QCDR Measures:  ASA# 11 - Gladis-op Cardiac Arrest: ASA11B - Patient did NOT experience unanticipated cardiac arrest  ASA# 12 - Gladis-op Mortality Rate: ASA12B - Patient did NOT die  ASA# 13 - PACU Re-Intubation Rate: NA - No ETT / LMA used for case  ASA# 10 - Composite Anes Safety: ASA10A - No serious adverse event    Additional Notes:

## 2021-06-16 ENCOUNTER — OFFICE VISIT (OUTPATIENT)
Dept: URGENT CARE | Facility: URGENT CARE | Age: 46
End: 2021-06-16
Payer: MEDICARE

## 2021-06-16 VITALS
DIASTOLIC BLOOD PRESSURE: 80 MMHG | RESPIRATION RATE: 20 BRPM | BODY MASS INDEX: 30.41 KG/M2 | TEMPERATURE: 97.9 F | HEART RATE: 60 BPM | SYSTOLIC BLOOD PRESSURE: 130 MMHG | OXYGEN SATURATION: 98 % | WEIGHT: 200 LBS

## 2021-06-16 DIAGNOSIS — L73.9 FOLLICULITIS: Primary | ICD-10-CM

## 2021-06-16 PROCEDURE — 99213 OFFICE O/P EST LOW 20 MIN: CPT | Performed by: PHYSICIAN ASSISTANT

## 2021-06-16 RX ORDER — SULFAMETHOXAZOLE/TRIMETHOPRIM 800-160 MG
1 TABLET ORAL 2 TIMES DAILY
Qty: 20 TABLET | Refills: 0 | Status: SHIPPED | OUTPATIENT
Start: 2021-06-16 | End: 2021-06-26

## 2021-06-16 NOTE — PROGRESS NOTES
Subjective:   Vianca Kumar is a 42 y.o. female  Roomed by: Hope    Refills needed? No    Do you have any forms that need to be filled out? No N     Chief Complaint   Patient presents with     Edema     HANDS AND FEET SWELLING, STARTED X 2 DAYS, PT STATES ITCHING ALL OVER BODY SINCE NOVEMBER 2017   Says her noticed pain and swelling in both of her wrists and ankles over the last 2-3 days. Called Care Line was was advised by Triage Nurse to come to [facility] for evaluation. Denies having problems with swelling in hands and feet before. Denies any fevers, chills, CP, palpitations, or SOB. Says it's painful to walk with the swelling. Last saw primary in November. Is seeing GI on Monday. Patient is up 4 pounds since January. Denies eating salty foods. For her total body itching. Denies any rashes. Has not history of CHF or HTN and is not on any diuretics.   Called the Triage line and was able to speak with the triage nurse that sent patient to walk-in.  The triage nurse said that she wondered if patient needed to increase diuretics for the edema and if patient had a cellulitis in her feet.  Review of Systems  Cor - Skin - see HPI  Allergies   Allergen Reactions     Avocado Hives       Current Outpatient Prescriptions:      escitalopram oxalate (LEXAPRO) 20 MG tablet, Take 1 tablet (20 mg total) by mouth daily., Disp: 90 tablet, Rfl: 0     magnesium oxide (MAG-OX) 400 mg tablet, Take 400 mg by mouth daily., Disp: , Rfl:      omeprazole (PRILOSEC) 40 MG capsule, Take 1 capsule (40 mg total) by mouth 2 (two) times a day before meals., Disp: 60 capsule, Rfl: 3  Patient Active Problem List   Diagnosis     Muscle Spasm     Insomnia     Chronic Lyme Disease     Allergic Rhinitis     Major Depression, Recurrent     Adenomatous polyp of colon     Hiatal hernia     Reflux esophagitis     Seizure-like activity     Mild mitral regurgitation     Medical History Reviewed  Objective:     Vitals:    03/12/18 2013   BP: 110/62    Patient Site: Right Arm   Patient Position: Sitting   Cuff Size: Adult Regular   Pulse: 72   Resp: 18   Temp: 98.1  F (36.7  C)   TempSrc: Oral   SpO2: 98%   Weight: 215 lb (97.5 kg)   Gen - Pt in NAD  Skin -on both patient's hands and feet there are no redness, and induration or lesions.  Extremities -both patient's hands and feet, ankles are mildly swollen. - trace   Musculoskeletal-patient is able to make 100% fist  Patient did have some did look like she was in some pain when she transferred when she ambulated from the chair to the exam table    Assessment - Plan   Medical Decision Making -42-year-old woman presents with swelling in both her hands and her feet in the last 2-3 days.  She denies any increase in her salt intake.  Patient has 4 pounds up from January.  She had some diffuse swelling of both of her hands and feet but without any erythema or induration.  Was able to get patient into her primary clinic at 8:00 tomorrow.  Will check her comprehensive metabolic panel a sed rate and was CRP.    1. Mild peripheral edema  - Comprehensive Metabolic Panel  - Sedimentation Rate  - C-Reactive Protein(CRP)    At the conclusion of the encounter, assessment and plan were discussed.   All questions were answered.   The patient or guardian acknowledged understanding and was involved in the decision making regarding the overall care plan.    Patient Instructions   1. Follow up with primary care tomorrow at 8 am at [facility]  2.  May take Tylenol every 6 hours as needed for pain  3.  If symptoms are getting worse over time or you have any questions, call the clinic number - the number is answered 24/7

## 2021-06-16 NOTE — PATIENT INSTRUCTIONS
Patient Education     Folliculitis  Folliculitis is an infection of a hair follicle. A hair follicle is the little pocket where a hair grows out of the skin. Bacteria normally live on the skin. But sometimes bacteria can get trapped in a follicle and cause infection. This causes a bumpy rash. The area over the follicles is red and raised. It may itch or be painful. The bumps may have fluid (pus) inside. The pus may leak and then form crusts. Sores can spread to other areas of the body. Once it goes away, folliculitis can come back at any time. Severe cases may cause lasting (permanent) hair loss and scarring.   Folliculitis can happen anywhere on the body where hair grows. It can be caused by rubbing from tight clothing. Ingrown hairs can cause it. Soaking in a hot tub or swimming pool that has bacteria in the water can cause it. It may also occur if a hair follicle is blocked by a bandage.   Sores often go away in a few days with no treatment. In some cases, medicine may be given. A small piece of skin or pus may be taken to find the type of bacteria causing the infection.   Home care  The healthcare provider may prescribe an antibiotic cream or ointment. Antibiotics taken by mouth (oral) may also be prescribed. Or you may be told to use an over-the-counter antibiotic cream. Follow all instructions when using any of these medicines.   General care    Apply warm, moist compresses to the sores for 20 minutes up to 3 times a day. You can make a compress by soaking a cloth in warm water. Squeeze out excess water.    Don t cut, poke, or squeeze the sores. This can be painful and spread infection.    Don t scratch the affected area. Scratching can delay healing.    Don t shave the areas affected by folliculitis.    If the sores leak fluid, cover the area with a nonstick gauze bandage. Use as little tape as possible. Carefully get rid of all soiled bandages.    Dress in loose cotton clothing.    Change sheets and  blankets if they are soiled by pus. Wash all clothes, towels, sheets, and cloth diapers in soap and hot water. Don't share clothes, towels, or sheets with other family members.    Don't soak the sores in bath water. This can spread infection. Instead keep the area clean by gently washing sores with soap and warm water.    Wash your hands or use antibacterial gels often to prevent spreading the bacteria.    Follow-up care  Follow up with your healthcare provider, or as advised.  When to get medical advice  Call your healthcare provider right away if any of these occur:    Fever of 100.4 F (38 C) or higher, or as directed by your provider    Rash spreads    Rash does not get better with treatment    Redness or swelling that gets worse    Rash becomes more painful    Foul-smelling fluid leaking from the skin    Rash improves, but then comes back   Sabina last reviewed this educational content on 8/1/2019 2000-2021 [employer]. All rights reserved. This information is not intended as a substitute for professional medical care. Always follow your healthcare professional's instructions.

## 2021-06-16 NOTE — TELEPHONE ENCOUNTER
Call pt - I have not seen her for almost 2 years.  Please let her know of changes and see what her plans are for primary care.  She needs a visit before any further refills.    The [facility] is permanently closed.  I will be practicing at the [facility].  I am currently doing both face to face and virtual visits ( phone/ video).  If you feel that it is too far to travel there for care and/ or prefer a provider in the [facility], I highly recommend establishing care with one of the following two new providers:  Dr. Kenya Jaffe at [facility] or Dr. Temi Siegel at [facility].

## 2021-06-16 NOTE — PROGRESS NOTES
Order faxed and notes faxed to the [facility] office  PH: [phone] Fax: [phone]  Patient scheduled 03/19/18 at 9:20 AM with Dr. Tam Dubois.

## 2021-06-16 NOTE — PROGRESS NOTES
"Patient presents with:  Derm Problem: Boils or Cysts in Left groin area for 4-5 days, very painful    (L73.9) Folliculitis  (primary encounter diagnosis)  Comment:   Plan: sulfamethoxazole-trimethoprim (BACTRIM DS)         800-160 MG tablet        Warm compress to area.     Avoid shaving in area.      F/U with PCP should symptoms persist or worsen.          SUBJECTIVE:   Vianca Kumar is a 45 year old female who presents today with painful lumps in her groin for the past few days.  Denies any new topical products.  Has had \"boils\" in the past.      Denies any fevers.        Past Medical History:   Diagnosis Date     MDD (major depressive disorder)      Obesity (BMI 30-39.9)          Current Outpatient Medications   Medication Sig Dispense Refill     Multiple Vitamins-Iron (DAILY-ALIS/IRON/BETA-CAROTENE) TABS TAKE 1 TABLET BY MOUTH DAILY. (Patient not taking: Reported on 10/19/2020) 30 tablet 7     Social History     Tobacco Use     Smoking status: Never Smoker     Smokeless tobacco: Never Used   Substance Use Topics     Alcohol use: Not on file     Family History   Problem Relation Age of Onset     Diabetes Mother      Diabetes Father          ROS:    10 point ROS of systems including Constitutional, Eyes, Respiratory, Cardiovascular, Gastroenterology, Genitourinary, Integumentary, Muscularskeletal, Psychiatric ,neurological were all negative except for pertinent positives noted in my HPI       OBJECTIVE:  /80   Pulse 60   Temp 97.9  F (36.6  C)   Resp 20   Wt 90.7 kg (200 lb)   LMP  (LMP Unknown)   SpO2 98%   BMI 30.41 kg/m    Physical Exam:  GENERAL APPEARANCE: healthy, alert and no distress  SKIN: erythematous pustules at hair follicles in pubic area.  Hair has been shaved.        "

## 2021-06-16 NOTE — TELEPHONE ENCOUNTER
RN cannot approve Refill Request    RN can NOT refill this medication PCP messaged that patient is overdue for Office Visit. Last office visit: 6/10/2019 Veronica Hardy MD Last Physical: Visit date not found Last MTM visit: Visit date not found Last visit same specialty: 2/25/2020 Breana Gatica MD.  Next visit within 3 mo: Visit date not found  Next physical within 3 mo: Visit date not found      Apoorva BELIA Harry, [facility] Triage/Med Refill 4/10/2021    Requested Prescriptions   Pending Prescriptions Disp Refills     escitalopram oxalate (LEXAPRO) 20 MG tablet [Pharmacy Med Name: Escitalopram Oxalate Oral Tablet 20 MG] 90 tablet 0     Sig: TAKE ONE TABLET BY MOUTH ONE TIME DAILY       [facility] Refill Protocol  Failed - 4/9/2021 11:22 AM        Failed - PCP or prescribing provider visit in last year     Last office visit with prescriber/PCP: 6/10/2019 Veronica Hardy MD OR same dept: Visit date not found OR same specialty: 2/25/2020 Breana Gatica MD  Last physical: Visit date not found Last MTM visit: Visit date not found   Next visit within 3 mo: Visit date not found  Next physical within 3 mo: Visit date not found  Prescriber OR PCP: Veronica Hardy MD  Last diagnosis associated with med order: 1. Major depression, recurrent (H)  - escitalopram oxalate (LEXAPRO) 20 MG tablet [Pharmacy Med Name: Escitalopram Oxalate Oral Tablet 20 MG]; TAKE ONE TABLET BY MOUTH ONE TIME DAILY   Dispense: 90 tablet; Refill: 0    If protocol passes may refill for 12 months if within 3 months of last provider visit (or a total of 15 months).

## 2021-06-16 NOTE — PROGRESS NOTES
[facility]  OFFICE VISIT - FAMILY MEDICINE     ASSESSMENT AND PLAN     1. Polyarthritis  Duration x 4 days, improving somewhat. Unclear cause. Additional labs today and will refer to rheumatology for further evaluation. Trial NSAIDs, ice, elevation to help with swelling. If persists, may need steroid. No signs/symptoms of infection.    2. Joint swelling        3. Fatigue, unspecified type  Ambulatory referral to Rheumatology        HM1(CBC and Differential)    Thyroid Stimulating Hormone (TSH)    Antinuclear Antibody (SÁNCHEZ) Cascade    Uric Acid    Rheumatoid Factor Quant    CCP Antibodies    HM1 (CBC with Diff)     Thyroid Stimulating Hormone (TSH)       CHIEF COMPLAINT     Edema (swelling feet and hands ) and Pain (joint pains in hands, really difficult to use hands )    HPI     Vianca Kumar is a 42 y.o. female past with medical history as below who presents today for evaluation of joint pain and swelling to hands and feet. Symptoms started over the weekend, started with wrist pain. Repots pain in hand joints, ankle joints, and knees. No back or neck pain. No toe joint pain. Reports stiffness to joints. Not taking any meds for pain and has not tried anything for swelling. Was seen in [facility] yesterday, CMP, Sed rate, CRP ordered, results pending. Feeling well otherwise. No CP or SOB. No fevers, chills, nausea, vomiting or diarrhea. Some fatigue, but this has been chronic and is unchanged. Has not noticed any redness or warmth to her joins. Has seen rheum in the past, but not for many years.       Hx lyme disease.   Undergoing GI workup with Dr. Hardy - seeing GI next week for further evaluation of bile duct.       Review of Systems  12-point review of systems completed and as per HPI, otherwise negative.     PMSH     Past Medical History:   Diagnosis Date     Anxiety      Depression      Lyme disease      Muscle spasm      Seizures      Past Surgical History:   Procedure Laterality Date      COLONOSCOPY       COLONOSCOPY N/A 12/28/2017    Procedure: COLONOSCOPY;  Surgeon: Yosi Beck MD;  Location: [facility];  Service:      ESOPHAGOGASTRODUODENOSCOPY N/A 12/28/2017    Procedure: ESOPHAGOGASTRODUODENOSCOPY (EGD);  Surgeon: Yosi Beck MD;  Location: [facility];  Service:      [place] OSTEOTOMY FEMUR SHAFT/SUPRACONDY      Description: Osteotomy Of The Femoral Shaft;  Recorded: 05/27/2013;     UPPER GASTROINTESTINAL ENDOSCOPY         PFS   No family history on file.  Social History     Social History     Marital status:      Spouse name: N/A     Number of children: N/A     Years of education: N/A     Occupational History     Not on file.     Social History Main Topics     Smoking status: Current Every Day Smoker     Packs/day: 0.50     Years: 48.00     Types: Cigarettes     Last attempt to quit: 7/22/2016     Smokeless tobacco: Never Used      Comment: off and on trying to quit, 3packs a week     Alcohol use 1.2 - 1.8 oz/week     2 - 3 Cans of beer per week      Comment: 4 to 5 time       Drug use: Yes     Special: Marijuana     Sexual activity: Yes     Partners: Male     Birth control/ protection: None     Other Topics Concern     Not on file     Social History Narrative     Relevant history was reviewed with the patient today, unless noted in HPI, is not pertinent for this visit.    MEDICATIONS     Current Outpatient Prescriptions on File Prior to Visit   Medication Sig Dispense Refill     escitalopram oxalate (LEXAPRO) 20 MG tablet Take 1 tablet (20 mg total) by mouth daily. 90 tablet 0     magnesium oxide (MAG-OX) 400 mg tablet Take 400 mg by mouth daily.       omeprazole (PRILOSEC) 40 MG capsule Take 1 capsule (40 mg total) by mouth 2 (two) times a day before meals. 60 capsule 3     No current facility-administered medications on file prior to visit.        OBJECTIVE   /74 (Patient Site: Left Arm, Patient Position: Sitting, Cuff Size: Adult Large)  Pulse 77  Resp  16  Wt 217 lb (98.4 kg)  SpO2 98%  Breastfeeding? No  BMI 32.99 kg/m2    Physical Exam  Physical Examination: General appearance - alert, well appearing, and in no distress  Mental status - alert, oriented to person, place, and time  Eyes - pupils equal and reactive, extraocular eye movements intact  Ears - bilateral TM's and external ear canals normal  Nose - normal and patent, no erythema, discharge or polyps  Mouth - mucous membranes moist, pharynx normal without lesions  Neck - supple, no significant adenopathy  Lymphatics - no palpable lymphadenopathy, no hepatosplenomegaly  Chest - clear to auscultation, no wheezes, rales or rhonchi, symmetric air entry  Heart - normal rate, regular rhythm, normal S1, S2, no murmurs, rubs, clicks or gallops  Neurological - alert, oriented, normal speech, no focal findings or movement disorder noted  Musculoskeletal - no joint tenderness, deformity or swelling  Extremities - peripheral pulses normal, no clubbing or cyanosis, mild symmetric edema to bilateral hands and feet. No redness or warmth. Tenderness on palpation over wrist, MCPs, PIPs bilaterally. No tenderness to DIPs. No tenderness to toe joints. BUE and BLE circulation, sensation, strength, ROM intact. Bilateral knee exam normal; no redness, warmth or swelling.  Skin - normal coloration and turgor, no rashes, no suspicious skin lesions noted      RESULTS/CONSULTS (Lab/Radiology)   No results found for this or any previous visit (from the past 168 hour(s)).    HEALTH MAINTENANCE / SCREENING     PHQ-2 Total Score: 4 (11/22/2017  2:58 PM)  Depression Follow-up Plan: patient follow-up to return when and if necessary (11/22/2017  2:58 PM), PHQ-9 Total Score: 17 (11/22/2017  2:58 PM),RICCARDO-7 Total: 10 (11/22/2017  2:00 PM)    Health Maintenance   Topic Date Due     DEPRESSION FOLLOW UP  1975     INFLUENZA VACCINE RULE BASED (1) 08/01/2017     PAP SMEAR  08/05/2018     ADVANCE DIRECTIVES DISCUSSED WITH PATIENT   08/05/2020     TD 18+ HE  01/19/2022     TDAP ADULT ONE TIME DOSE  Completed     Total time was 25 minutes, greater than 50% counseling and coordinating care regarding the above issues.      Ayesha Delgadillo CNP  Family Medicine, [facility]

## 2021-06-17 NOTE — PROGRESS NOTES
Preoperative Exam    Scheduled Procedure: endoscopy   Surgery Date:  4/3/18  Surgery Location: [facility]     Surgeon:  Dr. Garcia     Assessment/Plan:     1. Encounter for preoperative examination for general surgical procedure  Well 41 y/o female. Low risk patient scheduled to undergo low risk endoscopic procedure. See impression below. No further testing or known intervention would further lower patient's risk prior to planned procedure. Patient advised to hold NSAIDs and supplements 7 days prior to the procedure.   - Pregnancy (Beta-hCG, Qual), Urine  - Urinalysis-UC if Indicated    2. Reflux esophagitis  Ongoing workup with GI.     Surgical Procedure Risk: Low (reported cardiac risk generally < 1%)  Have you had prior anesthesia?: yes   Have you or any family members had a previous anesthesia reaction:  No  Do you or any family members have a history of a clotting or bleeding disorder?: No  Cardiac Risk Assessment: no increased risk for major cardiac complications    Patient approved for surgery with general or local anesthesia.    Functional Status: Independent  Patient plans to recover at home with family.     Impression:  Vianca Kumar is a low risk patient scheduled to undergo a moderate risk surgical procedure. Patient denies any recent CP/SOB. she is able to complete the equivalent of 4 mets activity without CP or SOB. she does not have a known history of ischemic heart disease, CHF, stroke, diabetes requiring insulin, or renal insufficiency. she has undergone surgery in the past with general anesthesia without any complication. Vianca Kumar understands that there are risks associated with any surgical procedure. she reports that all of her questions related to this procedure have been answered to her satisfaction by her surgeon.     Revised Cardiac Risk Index:   High-risk type of surgery (intraperitoneal, intrathoracic, or suprainguinal vascular surgery): No   History of ischemic  heart disease (history of MI or a positive exercise test, current complaint of chest pain considered to be secondary to myocardial ischemia, use of nitrate therapy, or ECG with pathological Q waves; do not count prior coronary revascularization procedure unless one of the other criteria for ischemic heart disease is present): No   History of CHF: No   History of cerebrovascular disease (TIA, stroke): No   Diabetes mellitus requiring treatment with insulin: No   Preoperative serum creatinine >2.0 mg/dL: No   Rate of cardiac death, nonfatal myocardial infarction, and nonfatal cardiac arrest according to the number of predictors:   No risk factors -- 0.4 percent   Functional status: > 4 mets       Subjective:      Vianca Kumar is a 42 y.o. female who presents for a preoperative consultation. She is scheduled to undergo upper endoscopy and endoscopic ultrasound on 4/3/18. She is feeling well today overall. No CP or SOB. No fevers, chills, nausea, vomiting or diarrhea. No recent illness. Walks on treadmill 3-5 days per week for exercise. No dizziness, syncope, palpitations, claudication, BARON, or PND. No personal or family history of clotting disorders or complications from anesthesia.  Can walk up >2 flights of stairs and several blocks without chest pain or SOB.     All other systems reviewed and are negative, other than those listed in the HPI.    Pertinent History  Do you have difficulty breathing or chest pain after walking up a flight of stairs: No  History of obstructive sleep apnea: No  Steroid use in the last 6 months: No  Frequent Aspirin/NSAID use: Yes: Naproxen on and off for inflammation   Prior Blood Transfusion: No  Prior Blood Transfusion Reaction: No  If for some reason prior to, during or after the procedure, if it is medically indicated, would you be willing to have a blood transfusion?:  There is no transfusion refusal.    Current Outpatient Prescriptions   Medication Sig Dispense Refill      escitalopram oxalate (LEXAPRO) 20 MG tablet Take 1 tablet (20 mg total) by mouth daily. 90 tablet 0     magnesium oxide (MAG-OX) 400 mg tablet Take 400 mg by mouth daily.       naproxen (NAPROSYN) 500 MG tablet Take 500 mg by mouth 2 (two) times a day with meals.       omeprazole (PRILOSEC) 40 MG capsule Take 1 capsule (40 mg total) by mouth 2 (two) times a day before meals. 60 capsule 3     No current facility-administered medications for this visit.         Allergies   Allergen Reactions     Avocado Hives       Patient Active Problem List   Diagnosis     Muscle Spasm     Insomnia     Chronic Lyme Disease     Allergic Rhinitis     Major Depression, Recurrent     Adenomatous polyp of colon     Hiatal hernia     Reflux esophagitis     Seizure-like activity     Mild mitral regurgitation       Past Medical History:   Diagnosis Date     Anxiety      Depression      Lyme disease      Muscle spasm      Seizures        Past Surgical History:   Procedure Laterality Date     COLONOSCOPY       COLONOSCOPY N/A 12/28/2017    Procedure: COLONOSCOPY;  Surgeon: Yosi Beck MD;  Location: [facility];  Service:      ESOPHAGOGASTRODUODENOSCOPY N/A 12/28/2017    Procedure: ESOPHAGOGASTRODUODENOSCOPY (EGD);  Surgeon: Yosi Beck MD;  Location: [facility];  Service:      [place] OSTEOTOMY FEMUR SHAFT/SUPRACONDY      Description: Osteotomy Of The Femoral Shaft;  Recorded: 05/27/2013;     UPPER GASTROINTESTINAL ENDOSCOPY         Social History     Social History     Marital status:      Spouse name: N/A     Number of children: N/A     Years of education: N/A     Occupational History     Not on file.     Social History Main Topics     Smoking status: Current Every Day Smoker     Packs/day: 0.50     Years: 48.00     Types: Cigarettes     Last attempt to quit: 7/22/2016     Smokeless tobacco: Never Used      Comment: off and on trying to quit, 3packs a week     Alcohol use 1.2 - 1.8 oz/week     2 - 3 Cans of beer  per week      Comment: 4 to 5 time       Drug use: Yes     Special: Marijuana     Sexual activity: Yes     Partners: Male     Birth control/ protection: None     Other Topics Concern     Not on file     Social History Narrative       Patient Care Team:  Veronica Hardy MD as PCP - General      Objective:     Vitals:    03/29/18 1409   BP: 94/68   Pulse: 73   Resp: 16   SpO2: 96%   Weight: 221 lb 4 oz (100.4 kg)   PainSc:   3       Physical Exam:  Physical Examination: General appearance - alert, well appearing, and in no distress  Mental status - alert, oriented to person, place, and time  Eyes - pupils equal and reactive, extraocular eye movements intact  Ears - bilateral TM's and external ear canals normal  Nose - normal and patent, no erythema, discharge or polyps  Mouth - mucous membranes moist, pharynx normal without lesions  Neck - supple, no significant adenopathy  Lymphatics - no palpable lymphadenopathy, no hepatosplenomegaly  Chest - clear to auscultation, no wheezes, rales or rhonchi, symmetric air entry  Heart - normal rate, regular rhythm, normal S1, S2, no murmurs, rubs, clicks or gallops  Abdomen - soft, nontender, nondistended, no masses or organomegaly  Neurological - alert, oriented, normal speech, no focal findings or movement disorder noted  Musculoskeletal - no joint tenderness, deformity or swelling  Extremities - peripheral pulses normal, no pedal edema, no clubbing or cyanosis  Skin - normal coloration and turgor, no rashes, no suspicious skin lesions noted      Patient Instructions     Hold all supplements, aspirin and NSAIDs for 7 days prior to surgery.  Follow your surgeon's direction on when to stop eating and drinking prior to surgery.  Your surgeon will be managing your pain after your surgery.      Labs:  Recent Results (from the past 24 hour(s))   Pregnancy (Beta-hCG, Qual), Urine    Collection Time: 03/29/18  2:46 PM   Result Value Ref Range    Pregnancy Test, Urine  Negative Negative    Specific Gravity, UA 1.015 1.001 - 1.030   Urinalysis-UC if Indicated    Collection Time: 03/29/18  2:46 PM   Result Value Ref Range    Color, UA Yellow Colorless, Yellow, Straw, Light Yellow    Clarity, UA Clear Clear    Glucose, UA Negative Negative    Bilirubin, UA Negative Negative    Ketones, UA Negative Negative    Specific Gravity, UA 1.015 1.005 - 1.030    Blood, UA Negative Negative    pH, UA 5.5 5.0 - 8.0    Protein, UA Negative Negative mg/dL    Urobilinogen, UA 0.2 E.U./dL 0.2 E.U./dL, 1.0 E.U./dL    Nitrite, UA Negative Negative    Leukocytes, UA Negative Negative       Immunization History   Administered Date(s) Administered     Influenza, inj, historic,unspecified 01/14/2011, 10/17/2011     Influenza, seasonal,quad inj 36+ mos 09/30/2015     Td,adult,historic,unspecified 12/31/2001     Tdap 01/14/2011, 01/19/2012     Total time was 25 minutes, greater than 50% counseling and coordinating care regarding the above issues.    Electronically signed by Ayesha Delgadillo CNP 03/29/18 2:10 PM

## 2021-06-20 NOTE — LETTER
Letter by Breana Gatica MD at      Author: Breana Gatica MD Service: -- Author Type: --    Filed:  Encounter Date: 1/24/2020 Status: (Other)                    My Depression Action Plan  Name: Vianca Kumar   Date of Birth 1975  Date: 1/24/2020    My Doctor: Veronica Hardy MD   My Clinic: [facility]ILY [facility]/OB  [address] [address]  [phone]          GREEN    ZONE   Good Control    What it looks like:     Things are going generally well. You have normal ups and downs. You may even feel depressed from time to time, but bad moods usually last less than a day.   What you need to do:  1. Continue to care for yourself (see self care plan)  2. Check your depression survival kit and update it as needed  3. Follow your physicians recommendations including any medication.  4. Do not stop taking medication unless you consult with your physician first.           YELLOW         ZONE Getting Worse    What it looks like:     Depression is starting to interfere with your life.     It may be hard to get out of bed; you may be starting to isolate yourself from others.    Symptoms of depression are starting to last most all day and this has happened for several days.     You may have suicidal thoughts but they are not constant.   What you need to do:     1. Call your care team. Your response to treatment will improve if you keep your care team informed of your progress. Yellow periods are signs an adjustment may need to be made.     2. Continue your self-care.  Just get dressed and ready for the day.  Don't give yourself time to talk yourself out of it.    3. Talk to someone in your support network.    4. Open up your depression Depression Self-Care Plan / Wellness kit.           RED    ZONE Medical Alert - Get Help    What it looks like:     Depression is seriously interfering with your life.     You may experience these or other symptoms: You cant get out of bed most days,  cant work or engage in other necessary activities, you have trouble taking care of basic hygiene, or basic responsibilities, thoughts of suicide or death that will not go away, self-injurious behavior.     What you need to do:  1. Call your care team and request a same-day appointment. If they are not available (weekends or after hours) call your local crisis line, emergency room or 911.            Self-Care Plan / Wellness Kit    Self-Care for Depression  Heres the deal. Your body and mind are really not as separate as most people think.  What you do and think affects how you feel and how you feel influences what you do and think. This means if you do things that people who feel good do, it will help you feel better.  Sometimes this is all it takes.  There is also a place for medication and therapy depending on how severe your depression is, so be sure to consult with your medical provider and/ or Behavioral Health Consultant if your symptoms are worsening or not improving.     In order to better manage my stress, I will:    Exercise  Get some form of exercise, every day. This will help reduce pain and release endorphins, the feel good chemicals in your brain. This is almost as good as taking antidepressants!  This is not the same as joining a gym and then never going! (they count on that by the way?) It can be as simple as just going for a walk or doing some gardening, anything that will get you moving.      Hygiene   Maintain good hygiene (get out of bed in the morning, make your bed, brush your teeth, take a shower, and get dressed like you were going to work, even if you are unemployed).  If your clothes don't fit try to get ones that do.    Diet  Strive to eat foods that are good for me, drink plenty of water, and avoid excessive sugar, caffeine, alcohol, and other mood-altering substances.  Some foods that are helpful in depression are: complex carbohydrates, B vitamins, flaxseed, fish or fish oil, fresh  fruits and vegetables.    Psychotherapy  Agree to participate in Individual Therapy (if recommended).    Medication  If prescribed medications, I agree to take them.  Missing doses can result in serious side effects.  I understand that drinking alcohol, or other illicit drug use, may cause potential side effects.  I will not stop my medication abruptly without first discussing it with my provider.    Staying Connected With Others  Stay in touch with my friends, family members, and my primary care provider/team.    Use your imagination  Be creative.  We all have a creative side; it doesnt matter if its oil painting, sand castles, or mud pies! This will also kick up the endorphins.    Witness Beauty  (AKA stop and smell the roses) Take a look outside, even in mid-winter. Notice colors, textures. Watch the squirrels and birds.     Service to others  Be of service to others.  There is always someone else in need.  By helping others we can get out of ourselves and remember the really important things.  This also provides opportunities for practicing all the other parts of the program.    Humor  Laugh and be silly!  Adjust your TV habits for less news and crime-drama and more comedy.    Control your stress  Try breathing deep, massage therapy, biofeedback, and meditation. Find time to relax each day.     Crisis Text Line  http://www.crisistextline.org    The Crisis Text Line serves anyone, in any type of crisis, providing access to free, 24/7 support and information via the medium people already use and trust:    Here's how it works:  1.  Text 890-020 from anywhere in the [place], anytime, about any type of crisis.  2.  A live, trained Crisis Counselor receives the text and responds quickly.  3.  The volunteer Crisis Counselor will help you move from a 'hot moment to a cool moment'.  My support system    Clinic Contact:  Phone number:    Contact 1:  Phone number:    Contact 2:  Phone number:    [employer]/:   Phone number:    Therapist:  Phone number:    [facility] crisis center:    Phone number:    Other community support:  Phone number:

## 2021-09-05 ENCOUNTER — HEALTH MAINTENANCE LETTER (OUTPATIENT)
Age: 46
End: 2021-09-05

## 2021-11-20 ENCOUNTER — TRANSFERRED RECORDS (OUTPATIENT)
Dept: HEALTH INFORMATION MANAGEMENT | Facility: CLINIC | Age: 46
End: 2021-11-20
Payer: MEDICARE

## 2022-01-28 ENCOUNTER — TELEPHONE (OUTPATIENT)
Dept: GASTROENTEROLOGY | Facility: CLINIC | Age: 47
End: 2022-01-28
Payer: MEDICARE

## 2022-01-28 NOTE — TELEPHONE ENCOUNTER
[facility] Call Center    Phone Message    May a detailed message be left on voicemail: yes     Reason for Call: Other: Pt called in requesting a call back to see the doctor, said she was last seen in 2017 and believes that what he found then is what's causing her issues now. Please reach out. Thank you.     Action Taken: Message routed to:  [facility] ([facility]): alicia and gordy    Travel Screening: Not Applicable

## 2022-02-02 NOTE — TELEPHONE ENCOUNTER
"Returned patient call. Left message for patient to call back      Saw [facility], per [facility] note from Dr. Song  Will recommend consultation with surgical oncologist  (Dr Noble or Dr Willett) for further management. Patient   will need cholecystectomy for symptomatic cholelithiasis  with endosonographic evidence of sludge. Given her type      1 choledochocele anatomy, will defer it to the surgical  oncologist for further management, which may include  resection of the choledochocele and yoshi-en-y    hepaticojejunostomy reconstruction versus serial  surveillance with MRCP.    - If there is persistent right upper quadrant pain  following cholecystectomy will consider ERCP      - Will defer to Dr Dubois for further management for the   small bowel thickening seen on the MRE and  interpretation of the capsule endoscopy findings     Per patient, had gallbladder removed. 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 Pt has continued to have \"same kind of issues off and on\". Last November, \"horrible stabbing pain in lower right abdomen\", went to ER at [facility], work up unrevealing. A few weeks later, she developed a \"knot on her back, painful, goes up and down in size\", spot on back is directly behind where the pain was that sent her to ER. Went to see PCP, that was delayed to her having Covid. \"feels there's a blockage\", not having BM's, \"I eat something, there's no where for it go to and I throw it back up\", vomiting daily. 1 \"legitimate bm in the last month\" Follows up with PCP today, encouraged her to follow up as scheduled.     CT 11/20/21  IMPRESSION:   1.  No inflammatory change, bowel obstruction or abscess. Normal appendix.   2.  Status post cholecystectomy with mild biliary dilatation.    Message sent to Dr. Song    ML                                                                          "

## 2022-02-04 ENCOUNTER — OFFICE VISIT (OUTPATIENT)
Dept: INTERNAL MEDICINE | Facility: CLINIC | Age: 47
End: 2022-02-04
Payer: MEDICARE

## 2022-02-04 VITALS
TEMPERATURE: 98.2 F | HEART RATE: 89 BPM | DIASTOLIC BLOOD PRESSURE: 74 MMHG | RESPIRATION RATE: 18 BRPM | WEIGHT: 177.7 LBS | OXYGEN SATURATION: 95 % | BODY MASS INDEX: 27.02 KG/M2 | SYSTOLIC BLOOD PRESSURE: 120 MMHG

## 2022-02-04 DIAGNOSIS — M79.89 SOFT TISSUE MASS: ICD-10-CM

## 2022-02-04 DIAGNOSIS — K59.00 CONSTIPATION, UNSPECIFIED CONSTIPATION TYPE: Primary | ICD-10-CM

## 2022-02-04 DIAGNOSIS — R10.13 ABDOMINAL PAIN, EPIGASTRIC: ICD-10-CM

## 2022-02-04 DIAGNOSIS — R10.11 ABDOMINAL PAIN, RIGHT UPPER QUADRANT: ICD-10-CM

## 2022-02-04 PROCEDURE — 99213 OFFICE O/P EST LOW 20 MIN: CPT | Performed by: PHYSICIAN ASSISTANT

## 2022-02-04 RX ORDER — POLYETHYLENE GLYCOL 3350 17 G/17G
1 POWDER, FOR SOLUTION ORAL DAILY
Qty: 507 G | Refills: 3 | Status: SHIPPED | OUTPATIENT
Start: 2022-02-04 | End: 2022-11-25

## 2022-02-04 NOTE — PROGRESS NOTES
"  Assessment & Plan     Constipation, unspecified constipation type    - polyethylene glycol (MIRALAX) 17 GM/Dose powder; Take 17 g (1 capful) by mouth daily    Abdominal pain, epigastric    - Adult Gastro Ref - Consult Only; Future    Abdominal pain, right upper quadrant    - Adult Gastro Ref - Consult Only; Future    Mass soft tissue low lumbar area - firm rubbery - benign feeling  Reassurance that this is not related to her abdominal pain complaints  Likely a prior musculoskeletal injury.   Regarding ongoing GI issues needs to recheck with GI provider                 BMI:   Estimated body mass index is 27.02 kg/m  as calculated from the following:    Height as of 5/6/21: 1.727 m (5' 8\").    Weight as of this encounter: 80.6 kg (177 lb 11.2 oz).       Reviewed workup done by Benita- no significant findings  Patient did call GI clinic with the [facility] - needs new referral done- completed today   For some mild constipation will try Miralax       Return in about 2 weeks (around 2/18/2022) for gi clinic.    MAURY [facility]    Aliza Coley is a 46 year old who presents for the following health issues     HPI     Concern - Mass in back   Onset: Since at least november  Description: Spot in back that hurts  Intensity: mild, moderate  Progression of Symptoms:  worsening  Accompanying Signs & Symptoms:   Previous history of similar problem:   Precipitating factors:        Worsened by:   Alleviating factors:        Improved by:   Therapies tried and outcome:         Review of Systems   Constitutional, HEENT, cardiovascular, pulmonary, gi and gu systems are negative, except as otherwise noted.      Objective    /74   Pulse 89   Temp 98.2  F (36.8  C) (Tympanic)   Resp 18   Wt 80.6 kg (177 lb 11.2 oz)   SpO2 95%   BMI 27.02 kg/m    Body mass index is 27.02 kg/m .  Physical Exam   GENERAL: healthy, alert and no distress  RESP: lungs clear to " auscultation - no rales, rhonchi or wheezes  CV: regular rates and rhythm  MS: spine exam shows no tenderness or mass on the spinous process  To the right of the low lumbar/sacral area ( posterior pelvis) there is a soft mobile nontender mass  Does not appear within the dermis.   SKIN: no suspicious lesions or rashes

## 2022-02-04 NOTE — TELEPHONE ENCOUNTER
Per Dr. Song  She needs follow up with general GI       Reviewed plan for patient, PCP already put in referral order. Pt understands plan.    ML

## 2022-02-08 ENCOUNTER — TELEPHONE (OUTPATIENT)
Dept: GASTROENTEROLOGY | Facility: CLINIC | Age: 47
End: 2022-02-08
Payer: MEDICARE

## 2022-02-08 NOTE — TELEPHONE ENCOUNTER
[facility] Call Center    Phone Message    May a detailed message be left on voicemail: yes     Reason for Call: Other: Patient is being referred for abdominal pain and is experiencing weight loss, 10lbs within 1 month. Please review per scheduling guidelines. Thanks!      Action Taken: Message routed to:  [facility] GI    Travel Screening: Not Applicable

## 2022-02-08 NOTE — TELEPHONE ENCOUNTER
"Per review of chart, weight at ED on 11/20/2021 recorded at 180 lb, weight recorded at 2/4/2022 office visit 177 lb 11.2 oz.      Seen at ED on 11/20/2021 at [facility] for abdominal pain, workup unrevealing with the exception of \"fair amount of stool and gas at her ileocecal junction\".  Evaluated by PCP on 2/4/2022 and Miralax recommended for mild constipation.      No sooner availability at this time, appointment is on wait list.    Dimple Bender RN      "

## 2022-05-28 ENCOUNTER — APPOINTMENT (OUTPATIENT)
Dept: ULTRASOUND IMAGING | Facility: CLINIC | Age: 47
End: 2022-05-28
Attending: NURSE PRACTITIONER
Payer: MEDICARE

## 2022-05-28 ENCOUNTER — APPOINTMENT (OUTPATIENT)
Dept: CT IMAGING | Facility: CLINIC | Age: 47
End: 2022-05-28
Attending: NURSE PRACTITIONER
Payer: MEDICARE

## 2022-05-28 ENCOUNTER — HOSPITAL ENCOUNTER (EMERGENCY)
Facility: CLINIC | Age: 47
Discharge: HOME | End: 2022-05-28
Attending: EMERGENCY MEDICINE | Admitting: EMERGENCY MEDICINE
Payer: MEDICARE

## 2022-05-28 VITALS
WEIGHT: 177.69 LBS | BODY MASS INDEX: 27.02 KG/M2 | RESPIRATION RATE: 17 BRPM | OXYGEN SATURATION: 99 % | TEMPERATURE: 98.6 F | SYSTOLIC BLOOD PRESSURE: 115 MMHG | DIASTOLIC BLOOD PRESSURE: 89 MMHG | HEART RATE: 108 BPM

## 2022-05-28 DIAGNOSIS — R10.11 RUQ ABDOMINAL PAIN: ICD-10-CM

## 2022-05-28 LAB
ALBUMIN SERPL-MCNC: 3.8 G/DL (ref 3.4–5)
ALBUMIN UR-MCNC: NEGATIVE MG/DL
ALP SERPL-CCNC: 80 U/L (ref 40–150)
ALT SERPL W P-5'-P-CCNC: 21 U/L (ref 0–50)
ANION GAP SERPL CALCULATED.3IONS-SCNC: 7 MMOL/L (ref 3–14)
APPEARANCE UR: CLEAR
AST SERPL W P-5'-P-CCNC: 15 U/L (ref 0–45)
BASOPHILS # BLD AUTO: 0.1 10E3/UL (ref 0–0.2)
BASOPHILS NFR BLD AUTO: 1 %
BILIRUB SERPL-MCNC: 0.8 MG/DL (ref 0.2–1.3)
BILIRUB UR QL STRIP: NEGATIVE
BUN SERPL-MCNC: 10 MG/DL (ref 7–30)
CALCIUM SERPL-MCNC: 9.2 MG/DL (ref 8.5–10.1)
CHLORIDE BLD-SCNC: 105 MMOL/L (ref 94–109)
CO2 SERPL-SCNC: 25 MMOL/L (ref 20–32)
COLOR UR AUTO: YELLOW
CREAT SERPL-MCNC: 0.71 MG/DL (ref 0.52–1.04)
EOSINOPHIL # BLD AUTO: 0.4 10E3/UL (ref 0–0.7)
EOSINOPHIL NFR BLD AUTO: 4 %
ERYTHROCYTE [DISTWIDTH] IN BLOOD BY AUTOMATED COUNT: 11.8 % (ref 10–15)
GFR SERPL CREATININE-BSD FRML MDRD: >90 ML/MIN/1.73M2
GLUCOSE BLD-MCNC: 99 MG/DL (ref 70–99)
GLUCOSE UR STRIP-MCNC: NEGATIVE MG/DL
HCG UR QL: NEGATIVE
HCT VFR BLD AUTO: 43.8 % (ref 35–47)
HGB BLD-MCNC: 14.7 G/DL (ref 11.7–15.7)
HGB UR QL STRIP: NEGATIVE
HOLD SPECIMEN: NORMAL
IMM GRANULOCYTES # BLD: 0.1 10E3/UL
IMM GRANULOCYTES NFR BLD: 1 %
KETONES UR STRIP-MCNC: NEGATIVE MG/DL
LEUKOCYTE ESTERASE UR QL STRIP: ABNORMAL
LIPASE SERPL-CCNC: 87 U/L (ref 73–393)
LYMPHOCYTES # BLD AUTO: 3 10E3/UL (ref 0.8–5.3)
LYMPHOCYTES NFR BLD AUTO: 27 %
MCH RBC QN AUTO: 31.5 PG (ref 26.5–33)
MCHC RBC AUTO-ENTMCNC: 33.6 G/DL (ref 31.5–36.5)
MCV RBC AUTO: 94 FL (ref 78–100)
MONOCYTES # BLD AUTO: 0.6 10E3/UL (ref 0–1.3)
MONOCYTES NFR BLD AUTO: 6 %
MUCOUS THREADS #/AREA URNS LPF: PRESENT /LPF
NEUTROPHILS # BLD AUTO: 7 10E3/UL (ref 1.6–8.3)
NEUTROPHILS NFR BLD AUTO: 61 %
NITRATE UR QL: NEGATIVE
NRBC # BLD AUTO: 0 10E3/UL
NRBC BLD AUTO-RTO: 0 /100
PH UR STRIP: 6 [PH] (ref 5–7)
PLATELET # BLD AUTO: 422 10E3/UL (ref 150–450)
POTASSIUM BLD-SCNC: 4.1 MMOL/L (ref 3.4–5.3)
PROT SERPL-MCNC: 7.4 G/DL (ref 6.8–8.8)
RBC # BLD AUTO: 4.67 10E6/UL (ref 3.8–5.2)
RBC URINE: 1 /HPF
SODIUM SERPL-SCNC: 137 MMOL/L (ref 133–144)
SP GR UR STRIP: 1.02 (ref 1–1.03)
SQUAMOUS EPITHELIAL: 3 /HPF
UROBILINOGEN UR STRIP-MCNC: NORMAL MG/DL
WBC # BLD AUTO: 11.2 10E3/UL (ref 4–11)
WBC URINE: 6 /HPF

## 2022-05-28 PROCEDURE — 96374 THER/PROPH/DIAG INJ IV PUSH: CPT | Mod: 59 | Performed by: EMERGENCY MEDICINE

## 2022-05-28 PROCEDURE — 81025 URINE PREGNANCY TEST: CPT | Performed by: NURSE PRACTITIONER

## 2022-05-28 PROCEDURE — 250N000011 HC RX IP 250 OP 636: Performed by: EMERGENCY MEDICINE

## 2022-05-28 PROCEDURE — 81001 URINALYSIS AUTO W/SCOPE: CPT | Performed by: EMERGENCY MEDICINE

## 2022-05-28 PROCEDURE — 76705 ECHO EXAM OF ABDOMEN: CPT | Mod: 26 | Performed by: RADIOLOGY

## 2022-05-28 PROCEDURE — G1004 CDSM NDSC: HCPCS | Mod: GC | Performed by: RADIOLOGY

## 2022-05-28 PROCEDURE — 80053 COMPREHEN METABOLIC PANEL: CPT | Performed by: STUDENT IN AN ORGANIZED HEALTH CARE EDUCATION/TRAINING PROGRAM

## 2022-05-28 PROCEDURE — 85025 COMPLETE CBC W/AUTO DIFF WBC: CPT | Performed by: STUDENT IN AN ORGANIZED HEALTH CARE EDUCATION/TRAINING PROGRAM

## 2022-05-28 PROCEDURE — 87086 URINE CULTURE/COLONY COUNT: CPT | Performed by: EMERGENCY MEDICINE

## 2022-05-28 PROCEDURE — 74177 CT ABD & PELVIS W/CONTRAST: CPT | Mod: 26 | Performed by: RADIOLOGY

## 2022-05-28 PROCEDURE — 96375 TX/PRO/DX INJ NEW DRUG ADDON: CPT | Performed by: EMERGENCY MEDICINE

## 2022-05-28 PROCEDURE — 76705 ECHO EXAM OF ABDOMEN: CPT

## 2022-05-28 PROCEDURE — 83690 ASSAY OF LIPASE: CPT | Performed by: STUDENT IN AN ORGANIZED HEALTH CARE EDUCATION/TRAINING PROGRAM

## 2022-05-28 PROCEDURE — 99285 EMERGENCY DEPT VISIT HI MDM: CPT | Mod: 25 | Performed by: EMERGENCY MEDICINE

## 2022-05-28 PROCEDURE — 99285 EMERGENCY DEPT VISIT HI MDM: CPT | Performed by: EMERGENCY MEDICINE

## 2022-05-28 PROCEDURE — 36415 COLL VENOUS BLD VENIPUNCTURE: CPT | Performed by: EMERGENCY MEDICINE

## 2022-05-28 PROCEDURE — 74177 CT ABD & PELVIS W/CONTRAST: CPT | Mod: MG

## 2022-05-28 PROCEDURE — 250N000011 HC RX IP 250 OP 636: Performed by: STUDENT IN AN ORGANIZED HEALTH CARE EDUCATION/TRAINING PROGRAM

## 2022-05-28 RX ORDER — LANSOPRAZOLE 30 MG/1
30 CAPSULE, DELAYED RELEASE ORAL DAILY
Qty: 30 CAPSULE | Refills: 0 | Status: SHIPPED | OUTPATIENT
Start: 2022-05-28 | End: 2022-08-11

## 2022-05-28 RX ORDER — HYDROCODONE BITARTRATE AND ACETAMINOPHEN 5; 325 MG/1; MG/1
1 TABLET ORAL EVERY 6 HOURS PRN
Qty: 18 TABLET | Refills: 0 | Status: SHIPPED | OUTPATIENT
Start: 2022-05-28 | End: 2022-06-02

## 2022-05-28 RX ORDER — HYDROMORPHONE HYDROCHLORIDE 1 MG/ML
0.5 INJECTION, SOLUTION INTRAMUSCULAR; INTRAVENOUS; SUBCUTANEOUS ONCE
Status: COMPLETED | OUTPATIENT
Start: 2022-05-28 | End: 2022-05-28

## 2022-05-28 RX ORDER — IOPAMIDOL 755 MG/ML
108 INJECTION, SOLUTION INTRAVASCULAR ONCE
Status: COMPLETED | OUTPATIENT
Start: 2022-05-28 | End: 2022-05-28

## 2022-05-28 RX ORDER — ONDANSETRON 4 MG/1
4 TABLET, ORALLY DISINTEGRATING ORAL ONCE
Status: COMPLETED | OUTPATIENT
Start: 2022-05-28 | End: 2022-05-28

## 2022-05-28 RX ORDER — METOCLOPRAMIDE HYDROCHLORIDE 5 MG/ML
5 INJECTION INTRAMUSCULAR; INTRAVENOUS ONCE
Status: COMPLETED | OUTPATIENT
Start: 2022-05-28 | End: 2022-05-28

## 2022-05-28 RX ORDER — METOCLOPRAMIDE 10 MG/1
10 TABLET ORAL
Qty: 60 TABLET | Refills: 0 | Status: SHIPPED | OUTPATIENT
Start: 2022-05-28 | End: 2022-11-30

## 2022-05-28 RX ADMIN — IOPAMIDOL 108 ML: 755 INJECTION, SOLUTION INTRAVENOUS at 18:13

## 2022-05-28 RX ADMIN — ONDANSETRON 4 MG: 4 TABLET, ORALLY DISINTEGRATING ORAL at 17:01

## 2022-05-28 RX ADMIN — METOCLOPRAMIDE HYDROCHLORIDE 5 MG: 5 INJECTION INTRAMUSCULAR; INTRAVENOUS at 19:17

## 2022-05-28 RX ADMIN — HYDROMORPHONE HYDROCHLORIDE 0.5 MG: 1 INJECTION, SOLUTION INTRAMUSCULAR; INTRAVENOUS; SUBCUTANEOUS at 19:17

## 2022-05-28 NOTE — ED TRIAGE NOTES
Pt comes in with RUQ abdominal pain with nausea and vomiting x 1 month.   She states she has been vomiting up bile all day.  She also states she has been constipated. LBM was 5-6 days ago. Enemas and laxatives do not help her she says.   She also states she has an appointment June 29th with a gastro.   Pt states its a 5/10 dull pain that comes and goes.   She denies CP or SOB.    /89   Pulse 108   Temp 98.6  F (37  C) (Oral)   Resp 17   Wt 80.6 kg (177 lb 11.1 oz)   SpO2 99%   BMI 27.02 kg/m         Triage Assessment     Row Name 05/28/22 1530       Triage Assessment (Adult)    Airway WDL WDL       Respiratory WDL    Respiratory WDL WDL       Skin Circulation/Temperature WDL    Skin Circulation/Temperature WDL WDL       Cardiac WDL    Cardiac WDL WDL       Peripheral/Neurovascular WDL    Peripheral Neurovascular WDL WDL       Cognitive/Neuro/Behavioral WDL    Cognitive/Neuro/Behavioral WDL WDL

## 2022-05-28 NOTE — ED PROVIDER NOTES
ED Provider Note  [facility]      History     Chief Complaint   Patient presents with     Abdominal Pain     Nausea & Vomiting     HPI  Vianca Kumar is a 46 year old female with a past history of lap cholecystectomy, GERD, who presents to the ED with worsening RUQ pain and vomiting. She reports she has had a long standing history of GERD, abdominal pain and vomiting, but states the past 6 months the symptoms have become worse. She reports intermittent vomiting, but reports the feeling that her GI system has a blockage that caused the vomiting, not nausea. She also reports more frequent RUQ pain, and constipation. She estimates her last bowel movement was 5-6 days ago.     She had gallbladder removed in 2018, for similar pain and vomiting, with the hopes that this would improve her symptoms.  She reports intermittent problems with vomiting and pain since.  She was also told at that time she has a choledocho cyst on one of the remaining bile ducts, but has not followed up on this.    She also endorses recent worsening fatigue, as well as urinary urgency and fevers.  Denies headache, sore throat, cough or shortness of breath.    Past Medical History  Past Medical History:   Diagnosis Date     Anxiety      Asthma 1/24/2020     Depression      Lyme disease      MDD (major depressive disorder)      Muscle spasm      Obesity (BMI 30-39.9)      Seizures (H)      Past Surgical History:   Procedure Laterality Date     CAPSULE/PILL CAM ENDOSCOPY N/A 04/03/2018    Procedure: CAPSULE/PILL CAM ENDOSCOPY;;  Surgeon: Guru Hallie Song MD;  Location:  GI     COLONOSCOPY       COLONOSCOPY N/A 12/28/2017    Procedure: COLONOSCOPY;  Surgeon: Yosi Beck MD;  Location: [facility];  Service:      ENDOSCOPIC ULTRASOUND UPPER GASTROINTESTINAL TRACT (GI) N/A 04/03/2018    Procedure: ENDOSCOPIC ULTRASOUND, ESOPHAGOSCOPY / UPPER GASTROINTESTINAL TRACT (GI);  EUS/Capsule ;   Surgeon: Guru Hallie Song MD;  Location:  GI     ESOPHAGOSCOPY, GASTROSCOPY, DUODENOSCOPY (EGD), COMBINED N/A 12/28/2017    Procedure: ESOPHAGOGASTRODUODENOSCOPY (EGD);  Surgeon: Yosi Beck MD;  Location: [facility];  Service:      KNEE SURGERY Left     osteotomy     LAPAROSCOPIC CHOLECYSTECTOMY N/A 04/25/2018    Procedure: LAPAROSCOPIC CHOLECYSTECTOMY;  Laparoscopic Cholecystectomy ;  Surgeon: Alberto Noble MD;  Location:  OR     UPPER GASTROINTESTINAL ENDOSCOPY       ZZC OSTEOTOMY FEMUR SHAFT/SUPRACONDY      Description: Osteotomy Of The Femoral Shaft;  Recorded: 05/27/2013;     HYDROcodone-acetaminophen (NORCO) 5-325 MG tablet  LANsoprazole (PREVACID) 30 MG DR capsule  metoclopramide (REGLAN) 10 MG tablet  escitalopram (LEXAPRO) 20 MG tablet  polyethylene glycol (MIRALAX) 17 GM/Dose powder      Allergies   Allergen Reactions     Avocado Hives     Chantix [Varenicline Tartrate] Other (See Comments)     depression     Family History  Family History   Problem Relation Age of Onset     Hypertension Mother      Hyperlipidemia Mother      Hypertension Brother      Hyperlipidemia Brother      Myocardial Infarction Maternal Grandfather 47     Myocardial Infarction Maternal Uncle         multiple later in life     Diabetes No family hx of      Breast Cancer No family hx of      Ovarian Cancer No family hx of      Colon Cancer No family hx of      Cerebrovascular Disease No family hx of      Social History   Social History     Tobacco Use     Smoking status: Current Every Day Smoker     Packs/day: 1.00     Years: 29.00     Pack years: 29.00     Types: Cigarettes     Smokeless tobacco: Former User     Tobacco comment: smoking since age 16   Substance Use Topics     Alcohol use: Yes     Drug use: Yes     Types: Marijuana     Comment: 0.5gm/day      Past medical history, past surgical history, medications, allergies, family history, and social history were reviewed with the patient. No  additional pertinent items.       Review of Systems  A complete review of systems was performed with pertinent positives and negatives noted in the HPI, and all other systems negative.    Physical Exam   BP: 115/89  Pulse: 108  Temp: 98.6  F (37  C)  Resp: 17  Weight: 80.6 kg (177 lb 11.1 oz)  SpO2: 99 %  Physical Exam  Vitals and nursing note reviewed.   Constitutional:       General: She is not in acute distress.     Appearance: She is well-developed.   HENT:      Head: Normocephalic and atraumatic.   Eyes:      Extraocular Movements: Extraocular movements intact.      Conjunctiva/sclera: Conjunctivae normal.      Pupils: Pupils are equal, round, and reactive to light.   Neck:      Thyroid: No thyromegaly.      Trachea: No tracheal deviation.   Cardiovascular:      Rate and Rhythm: Regular rhythm. Tachycardia present.      Heart sounds: Normal heart sounds. No murmur heard.  Pulmonary:      Effort: Pulmonary effort is normal. No respiratory distress.      Breath sounds: Normal breath sounds. No wheezing.   Chest:      Chest wall: No tenderness.   Abdominal:      General: Abdomen is flat. Bowel sounds are decreased. There is no distension.      Palpations: Abdomen is soft.      Tenderness: There is abdominal tenderness in the right upper quadrant. There is no right CVA tenderness, left CVA tenderness or guarding. Negative signs include Mueller's sign.   Musculoskeletal:         General: No tenderness.      Cervical back: Normal range of motion and neck supple.   Skin:     General: Skin is warm.      Findings: No rash.   Neurological:      Mental Status: She is alert and oriented to person, place, and time.      Sensory: No sensory deficit.   Psychiatric:         Behavior: Behavior normal.       ED Course      Procedures                 Results for orders placed or performed during the hospital encounter of 05/28/22   CT Abdomen Pelvis w Contrast     Status: None (Preliminary result)    Impression    RESIDENT  PRELIMINARY INTERPRETATION  IMPRESSION:  No findings to explain patient's nausea, vomiting or abdominal pain.  No acute pathology demonstrated within the abdomen or pelvis.   US Abdomen Limited (RUQ)     Status: None (Preliminary result)    Impression    RESIDENT PRELIMINARY INTERPRETATION  IMPRESSION:   Postoperative changes from cholecystectomy with mild reservoir effect  in the extrahepatic common bile duct. Normal right upper quadrant  ultrasound.   UA with Microscopic reflex to Culture     Status: Abnormal    Specimen: Urine, Midstream   Result Value Ref Range    Color Urine Yellow Colorless, Straw, Light Yellow, Yellow    Appearance Urine Clear Clear    Glucose Urine Negative Negative mg/dL    Bilirubin Urine Negative Negative    Ketones Urine Negative Negative mg/dL    Specific Gravity Urine 1.020 1.003 - 1.035    Blood Urine Negative Negative    pH Urine 6.0 5.0 - 7.0    Protein Albumin Urine Negative Negative mg/dL    Urobilinogen Urine Normal Normal, 2.0 mg/dL    Nitrite Urine Negative Negative    Leukocyte Esterase Urine Moderate (A) Negative    Mucus Urine Present (A) None Seen /LPF    RBC Urine 1 <=2 /HPF    WBC Urine 6 (H) <=5 /HPF    Squamous Epithelials Urine 3 (H) <=1 /HPF    Narrative    Urine Culture ordered based on laboratory criteria   Comprehensive metabolic panel     Status: Normal   Result Value Ref Range    Sodium 137 133 - 144 mmol/L    Potassium 4.1 3.4 - 5.3 mmol/L    Chloride 105 94 - 109 mmol/L    Carbon Dioxide (CO2) 25 20 - 32 mmol/L    Anion Gap 7 3 - 14 mmol/L    Urea Nitrogen 10 7 - 30 mg/dL    Creatinine 0.71 0.52 - 1.04 mg/dL    Calcium 9.2 8.5 - 10.1 mg/dL    Glucose 99 70 - 99 mg/dL    Alkaline Phosphatase 80 40 - 150 U/L    AST 15 0 - 45 U/L    ALT 21 0 - 50 U/L    Protein Total 7.4 6.8 - 8.8 g/dL    Albumin 3.8 3.4 - 5.0 g/dL    Bilirubin Total 0.8 0.2 - 1.3 mg/dL    GFR Estimate >90 >60 mL/min/1.73m2   Lipase     Status: Normal   Result Value Ref Range    Lipase 87 73 - 393  U/L   CBC with platelets and differential     Status: Abnormal   Result Value Ref Range    WBC Count 11.2 (H) 4.0 - 11.0 10e3/uL    RBC Count 4.67 3.80 - 5.20 10e6/uL    Hemoglobin 14.7 11.7 - 15.7 g/dL    Hematocrit 43.8 35.0 - 47.0 %    MCV 94 78 - 100 fL    MCH 31.5 26.5 - 33.0 pg    MCHC 33.6 31.5 - 36.5 g/dL    RDW 11.8 10.0 - 15.0 %    Platelet Count 422 150 - 450 10e3/uL    % Neutrophils 61 %    % Lymphocytes 27 %    % Monocytes 6 %    % Eosinophils 4 %    % Basophils 1 %    % Immature Granulocytes 1 %    NRBCs per 100 WBC 0 <1 /100    Absolute Neutrophils 7.0 1.6 - 8.3 10e3/uL    Absolute Lymphocytes 3.0 0.8 - 5.3 10e3/uL    Absolute Monocytes 0.6 0.0 - 1.3 10e3/uL    Absolute Eosinophils 0.4 0.0 - 0.7 10e3/uL    Absolute Basophils 0.1 0.0 - 0.2 10e3/uL    Absolute Immature Granulocytes 0.1 <=0.4 10e3/uL    Absolute NRBCs 0.0 10e3/uL   HCG qualitative urine (UPT)     Status: Normal   Result Value Ref Range    hCG Urine Qualitative Negative Negative     Medications   ondansetron (ZOFRAN ODT) ODT tab 4 mg (4 mg Oral Given 5/28/22 1701)   iopamidol (ISOVUE-370) solution 108 mL (108 mLs Intravenous Given 5/28/22 1813)   sodium chloride (PF) 0.9% PF flush 77 mL (77 mLs Intravenous Given 5/28/22 1813)   metoclopramide (REGLAN) injection 5 mg (5 mg Intravenous Given 5/28/22 1917)   HYDROmorphone (PF) (DILAUDID) injection 0.5 mg (0.5 mg Intravenous Given 5/28/22 1917)        Assessments & Plan (with Medical Decision Making)   Patient is a 46-year-old female with a history of lap cholecystectomy and GERD who has had ongoing abdominal pain over the past several months.  She had imaging that showed that she likely has a choledochocyst.  She also is reporting constipation.  She is scheduled for GI follow-up.    On exam, patient's blood pressure is 115/89 with a pulse rate of 108 and temperature 98.6.  Respirations are 17 and O2 saturations are 99%.  She had tenderness of the right upper quadrant without rebound or  guarding.    IV was established and bloods were drawn.  Patient was given fluids, Zofran, Reglan, and hydromorphone.    Patient's white count was 11.2 with a hemoglobin of 14.7.  Her comprehensive metabolic profile is normal.  Lipase is 87.  Urinalysis shows 6 white cells, 1 red cell, 3 squamous epithelial cells per high-power field.  Urine pregnancy test was negative.    CT scan of the abdomen showed no acute abnormalities.    Right upper quadrant ultrasound showed postoperative changes from the cholecystectomy with a reservoir effect in the extrahepatic common bile duct.  There is no acute abnormalities.    Patient's pulse rate improved to 84.  Her pain improved with medications.    She will be given Reglan just in case she has slow gastric motility and obstipation causing her pain.    Patient will also be given an acid blocker in case gastritis or esophagitis is contributing to her pain.  She has GI scheduled and was encouraged to follow-up with them.  She is instructed to return should she develop increasing pain, fevers, vomiting.    I have reviewed the nursing notes. I have reviewed the findings, diagnosis, plan and need for follow up with the patient.    Discharge Medication List as of 5/28/2022  8:40 PM      START taking these medications    Details   HYDROcodone-acetaminophen (NORCO) 5-325 MG tablet Take 1 tablet by mouth every 6 hours as needed for pain, Disp-18 tablet, R-0, Local Print      LANsoprazole (PREVACID) 30 MG DR capsule Take 1 capsule (30 mg) by mouth daily, Disp-30 capsule, R-0, Local Print      metoclopramide (REGLAN) 10 MG tablet Take 1 tablet (10 mg) by mouth 4 times daily (before meals and nightly), Disp-60 tablet, R-0, Local Print             Final diagnoses:   RUQ abdominal pain       --  Edis Liao  [facility] EMERGENCY DEPARTMENT  5/28/2022     Edsi Liao MD  05/30/22 1025

## 2022-05-30 LAB — BACTERIA UR CULT: NO GROWTH

## 2022-06-07 DIAGNOSIS — F33.42 RECURRENT MAJOR DEPRESSIVE DISORDER, IN FULL REMISSION (H): ICD-10-CM

## 2022-06-09 RX ORDER — ESCITALOPRAM OXALATE 20 MG/1
TABLET ORAL
Qty: 90 TABLET | Refills: 0 | Status: SHIPPED | OUTPATIENT
Start: 2022-06-09 | End: 2022-10-03

## 2022-06-09 NOTE — TELEPHONE ENCOUNTER
Routing refill request to provider for review/approval because:  PHQ-9 score:    PHQ 5/6/2021   PHQ-9 Total Score 10   Q9: Thoughts of better off dead/self-harm past 2 weeks Not at all                Eddie Rahman RN  [facility] Triage Nurse

## 2022-06-11 ENCOUNTER — HEALTH MAINTENANCE LETTER (OUTPATIENT)
Age: 47
End: 2022-06-11

## 2022-06-29 ENCOUNTER — VIRTUAL VISIT (OUTPATIENT)
Dept: GASTROENTEROLOGY | Facility: CLINIC | Age: 47
End: 2022-06-29
Attending: PHYSICIAN ASSISTANT
Payer: MEDICARE

## 2022-06-29 VITALS — WEIGHT: 170 LBS | BODY MASS INDEX: 25.85 KG/M2

## 2022-06-29 DIAGNOSIS — Q44.4 CHOLEDOCHAL CYST: ICD-10-CM

## 2022-06-29 DIAGNOSIS — R10.11 ABDOMINAL PAIN, RIGHT UPPER QUADRANT: Primary | ICD-10-CM

## 2022-06-29 DIAGNOSIS — R10.13 ABDOMINAL PAIN, EPIGASTRIC: ICD-10-CM

## 2022-06-29 PROCEDURE — 99205 OFFICE O/P NEW HI 60 MIN: CPT | Mod: 95 | Performed by: INTERNAL MEDICINE

## 2022-06-29 ASSESSMENT — PAIN SCALES - GENERAL: PAINLEVEL: NO PAIN (0)

## 2022-06-29 NOTE — PROGRESS NOTES
"       HPI:    Vianca  presents today for a video visit to discuss chronic abdominal pain, reflux, vomiting and constipation.  Symptoms have been going on for years \"ebbs and flows\" - has been bad since November of last year.  Has been vomiting quite frequently.  Also has one bowel movement a week if she is pedro.  Reflux is not controlled - can't sleep lying flat - has to keep herself propped up.  Has lost 40lbs since symptoms began. Also has odynophagia and dysphagia especially to bread and meat.     Uses enemas to help with constipation - sometimes without relief. Has also tried miralax, dietary changes     Has been using lansoprazole which she doesn't find helpful. Does drink 2 sodas a day.  Also drinks 2-3 beers a day.  Also smokes 1/2 a pack to a pack a day.    Occasional NSAID use (less than once a month).    Past Medical History:   Diagnosis Date     Anxiety      Asthma 1/24/2020     Depression      Lyme disease      MDD (major depressive disorder)      Muscle spasm      Obesity (BMI 30-39.9)      Seizures (H)        Past Surgical History:   Procedure Laterality Date     CAPSULE/PILL CAM ENDOSCOPY N/A 04/03/2018    Procedure: CAPSULE/PILL CAM ENDOSCOPY;;  Surgeon: Guru Hallie Song MD;  Location:  GI     COLONOSCOPY       COLONOSCOPY N/A 12/28/2017    Procedure: COLONOSCOPY;  Surgeon: Yosi Beck MD;  Location: [facility];  Service:      ENDOSCOPIC ULTRASOUND UPPER GASTROINTESTINAL TRACT (GI) N/A 04/03/2018    Procedure: ENDOSCOPIC ULTRASOUND, ESOPHAGOSCOPY / UPPER GASTROINTESTINAL TRACT (GI);  EUS/Capsule ;  Surgeon: Guru Hallie Song MD;  Location:  GI     ESOPHAGOSCOPY, GASTROSCOPY, DUODENOSCOPY (EGD), COMBINED N/A 12/28/2017    Procedure: ESOPHAGOGASTRODUODENOSCOPY (EGD);  Surgeon: Yosi Beck MD;  Location: [facility];  Service:      KNEE SURGERY Left     osteotomy     LAPAROSCOPIC CHOLECYSTECTOMY N/A 04/25/2018    Procedure: " LAPAROSCOPIC CHOLECYSTECTOMY;  Laparoscopic Cholecystectomy ;  Surgeon: Alberto Noble MD;  Location: [facility] OR     UPPER GASTROINTESTINAL ENDOSCOPY       ZZC OSTEOTOMY FEMUR SHAFT/SUPRACONDY      Description: Osteotomy Of The Femoral Shaft;  Recorded: 05/27/2013;       Family History   Problem Relation Age of Onset     Hypertension Mother      Hyperlipidemia Mother      Hypertension Brother      Hyperlipidemia Brother      Myocardial Infarction Maternal Grandfather 47     Myocardial Infarction Maternal Uncle         multiple later in life     Diabetes No family hx of      Breast Cancer No family hx of      Ovarian Cancer No family hx of      Colon Cancer No family hx of      Cerebrovascular Disease No family hx of        Social History     Tobacco Use     Smoking status: Current Every Day Smoker     Packs/day: 1.00     Years: 29.00     Pack years: 29.00     Types: Cigarettes     Smokeless tobacco: Former User     Tobacco comment: smoking since age 16   Substance Use Topics     Alcohol use: Yes        O:    Gen: no acute distress  HEENT: NCAT  Neck: normal ROM  Resp: nonlabored breathing  Neuro: no gross deficits  Psych: appropriate mood and affect    Assessment and Plan:    # dysphagia, GERD, weight loss, abdominal pain - likely in part related to diet - alcohol/tobacco could be contributing as well. Unclear etiology of abdominal pain although constipation could certainly be contributing - unfortunately no improvement after cholecystectomy.  No improvement on PPI. Given dysphagia and weight loss, will evaluate further with upper endoscopy    # constipation - will start miralax BID - if no improvement - will switch to linzess/trulance/amitiza    # BRBPR - will plan for colonoscopy at the time of upper endoscopy    # type Ib choledochal cyst - will order follow-up MRCP    RTC 3 months    Lorri Holley, DO     Video-Visit Details     Type of service:  Video Visit     Video Start Time: 8:59 AM  Video End Time (time  video stopped): 9:15 AM    Originating Location (pt. Location): home     Distant Location (provider location):  [facility]      Mode of Communication:  Video Conference [facility]

## 2022-06-29 NOTE — PROGRESS NOTES
Vianca is a 46 year old who is being evaluated via a billable video visit.      How would you like to obtain your AVS? MyChart  If the video visit is dropped, the invitation should be resent by: Send to e-mail at: [email or web address]  Will anyone else be joining your video visit? No

## 2022-06-29 NOTE — PATIENT INSTRUCTIONS
Please start miralax twice a day - this may initially make diarrhea worse but should improve after a few weeks - if your constipation isn't controlled with this let me know.    Please have an MRCP done.     Please call [phone] to schedule an endoscopy and colonoscopy

## 2022-06-30 ENCOUNTER — TELEPHONE (OUTPATIENT)
Dept: GASTROENTEROLOGY | Facility: CLINIC | Age: 47
End: 2022-06-30

## 2022-06-30 NOTE — TELEPHONE ENCOUNTER
Screening Questions    BlueKIND OF PREP RedLOCATION [review exclusion criteria] GreenSEDATION TYPE    1. Are you active on mychart? Y    2. What insurance is in the chart? MEDICARE     3.   Ordering/Referring Provider: ISABELLE    **(Sedation review/consideration needed)**  Do you have a legal guardian or Medical Power of    and/or are you able to give consent for your medical care?     Y    4. BMI   (If greater than 40 review exclusion criteria [PAC APPT IF [MAC] @ [facility])  25.8  [If yes, BMI OVER 40-EXTENDED PREP]    5. Have you had a positive covid test in the last 90 days?   Y - HOME TEST, June 9TH    6.  Are you currently on dialysis?   N [ If yes, G-PREP & HOSPITAL setting ONLY]     7.  Do you have chronic kidney disease?  N [ If yes, G-PREP ]    8.   Do you have a diagnosis of diabetes?   N   [ If yes, G-PREP ]    9.  On a regular basis do you go 3-5 days between bowel movements?   Y   [ If yes, EXTENDED PREP]    10.  Are you taking any prescription pain medications on a routine schedule?    N -  [ If yes, EXTENDED PREP] [If yes, MAC]      11.   Do you have any chemical dependencies such as alcohol, street drugs, or methadone?    N [If yes, MAC]    12.   Do you have any history of post-traumatic stress syndrome, severe anxiety or history of psychosis?    N  [If yes, MAC]    13.  [FEMALES] Are you currently pregnant? N    If yes, how many weeks?       Respiratory/Heart Screening:  [If yes to any of the following HOSPITAL setting only]     14. Do you use daily home oxygen?  N       15.  Do you have mod to severe Obstructive Sleep Apnea?         (AMY @ [facility]  [facility]  [facility]  [facility]  [facility]  [facility] - if pt is not on OXYGEN)  N     16.  Do you have Pulmonary Hypertension [Lungs]?   N      17.  Do you have UNCONTROLLED asthma?  N     18.   Have you had a heart or lung transplant?   N      19.   In the past 6 months have you had a stroke or Transient ischemic attack (TIA - aka  mini stroke ) ?  (If yes, please review  exclusion criteria)  N     20.   Have you had any heart related issues including cardiomyopathy or heart attack within 6 months?   N           If yes, did it require cardiac stenting or other implantable device?         21.   Do you have any implantable devices in your body (pacemaker, defib, LVAD)? (If yes, please review exclusion criteria)  N     22. Do you take nitroglycerin?   N           If yes, how often?   (if yes, HOSPITAL setting ONLY)    23.  Are you currently taking any blood thinners?    [If yes, INFORM patient to follw up w/ ORDERING PROVIDER FOR BRIDGING INSTRUCTIONS]     N    24.   Do you transfer independently?                (If NO, please HOSPITAL setting ONLY)  Y    25.   Preferred LOCAL Pharmacy for Pre Prescription:         [facility] [address]    Scheduling Details  (Please ask for phone number if not scheduled by patient)      Caller : Vianca Kumar    Date of Procedure: 8/11/22  Surgeon: ISABELLE  Location: [facility]        Sedation Type: CS (PER LAST COLONOSCOPY 12/2017 & SCREENING)   Conscious Sedation- Needs  for 6 hours after the procedure  MAC/General-Needs  for 24 hours after procedure    N :[Pre-op Required] at [facility]  [facility]  MG and OR for MAC sedation   (advise patient they will need a pre-op WITH IN 30 DAYS of procedure date)     Type of Procedure Scheduled:   Upper and Lower Endoscopy [EGD and Colonoscopy]    Which Colonoscopy Prep was Sent?:   EXTENDED     [facility] CF PATIENTS & GRO[facility]'S PATIENTS NEEDS EXTENDED PREP       Informed patient they will need an adult  Y  Cannot take any type of public or medical transportation alone    Pre-Procedure Covid test to be completed at [facility] or Externally: NOT NEEDED - COVID+ ON 6/9/22  **INFORMED OF HOME TESTING & LAB OPTION**        Confirmed Nurse will call to complete assessment Y    Additional comments:

## 2022-07-07 ENCOUNTER — TELEPHONE (OUTPATIENT)
Dept: GASTROENTEROLOGY | Facility: CLINIC | Age: 47
End: 2022-07-07

## 2022-07-07 NOTE — TELEPHONE ENCOUNTER
7/7 [facility] to schedule patient for Return in about 6 months (around 12/29/2022). Provided patient with callback [phone].     Pooja Jaramillo  Procedure    Cardiology, Rheumatology, GI, Pulmonology, Nephrology Specialties   [facility]  [phone]

## 2022-07-27 ENCOUNTER — ANCILLARY PROCEDURE (OUTPATIENT)
Dept: MRI IMAGING | Facility: CLINIC | Age: 47
End: 2022-07-27
Attending: INTERNAL MEDICINE
Payer: MEDICARE

## 2022-07-27 DIAGNOSIS — R10.13 ABDOMINAL PAIN, EPIGASTRIC: ICD-10-CM

## 2022-07-27 DIAGNOSIS — R10.11 ABDOMINAL PAIN, RIGHT UPPER QUADRANT: ICD-10-CM

## 2022-07-27 DIAGNOSIS — Q44.4 CHOLEDOCHAL CYST: ICD-10-CM

## 2022-07-27 PROCEDURE — A9585 GADOBUTROL INJECTION: HCPCS | Performed by: RADIOLOGY

## 2022-07-27 PROCEDURE — 74183 MRI ABD W/O CNTR FLWD CNTR: CPT | Mod: MG | Performed by: RADIOLOGY

## 2022-07-27 PROCEDURE — G1010 CDSM STANSON: HCPCS | Mod: GC | Performed by: RADIOLOGY

## 2022-07-27 RX ORDER — GADOBUTROL 604.72 MG/ML
10 INJECTION INTRAVENOUS ONCE
Status: COMPLETED | OUTPATIENT
Start: 2022-07-27 | End: 2022-07-27

## 2022-07-27 RX ADMIN — GADOBUTROL 8 ML: 604.72 INJECTION INTRAVENOUS at 10:08

## 2022-08-03 RX ORDER — BISACODYL 5 MG
2 TABLET, DELAYED RELEASE (ENTERIC COATED) ORAL SEE ADMIN INSTRUCTIONS
Qty: 2 TABLET | Refills: 0 | Status: SHIPPED | OUTPATIENT
Start: 2022-08-03 | End: 2022-11-25

## 2022-08-06 ENCOUNTER — HEALTH MAINTENANCE LETTER (OUTPATIENT)
Age: 47
End: 2022-08-06

## 2022-08-11 ENCOUNTER — HOSPITAL ENCOUNTER (OUTPATIENT)
Facility: AMBULATORY SURGERY CENTER | Age: 47
Discharge: HOME | End: 2022-08-11
Attending: INTERNAL MEDICINE | Admitting: INTERNAL MEDICINE
Payer: MEDICARE

## 2022-08-11 VITALS
OXYGEN SATURATION: 96 % | SYSTOLIC BLOOD PRESSURE: 89 MMHG | RESPIRATION RATE: 16 BRPM | DIASTOLIC BLOOD PRESSURE: 60 MMHG | TEMPERATURE: 97.8 F

## 2022-08-11 DIAGNOSIS — Z12.11 SCREEN FOR COLON CANCER: ICD-10-CM

## 2022-08-11 DIAGNOSIS — K59.09 CHRONIC CONSTIPATION: ICD-10-CM

## 2022-08-11 DIAGNOSIS — K21.00 GASTROESOPHAGEAL REFLUX DISEASE WITH ESOPHAGITIS WITHOUT HEMORRHAGE: Primary | ICD-10-CM

## 2022-08-11 LAB
COLONOSCOPY: NORMAL
UPPER GI ENDOSCOPY: NORMAL

## 2022-08-11 PROCEDURE — 43239 EGD BIOPSY SINGLE/MULTIPLE: CPT

## 2022-08-11 PROCEDURE — 45380 COLONOSCOPY AND BIOPSY: CPT

## 2022-08-11 PROCEDURE — G8907 PT DOC NO EVENTS ON DISCHARG: HCPCS

## 2022-08-11 PROCEDURE — G8918 PT W/O PREOP ORDER IV AB PRO: HCPCS

## 2022-08-11 RX ORDER — ONDANSETRON 4 MG/1
4 TABLET, ORALLY DISINTEGRATING ORAL EVERY 6 HOURS PRN
Status: DISCONTINUED | OUTPATIENT
Start: 2022-08-11 | End: 2022-08-12 | Stop reason: HOSPADM

## 2022-08-11 RX ORDER — PROCHLORPERAZINE MALEATE 10 MG
10 TABLET ORAL EVERY 6 HOURS PRN
Status: DISCONTINUED | OUTPATIENT
Start: 2022-08-11 | End: 2022-08-12 | Stop reason: HOSPADM

## 2022-08-11 RX ORDER — NALOXONE HYDROCHLORIDE 0.4 MG/ML
0.2 INJECTION, SOLUTION INTRAMUSCULAR; INTRAVENOUS; SUBCUTANEOUS
Status: DISCONTINUED | OUTPATIENT
Start: 2022-08-11 | End: 2022-08-12 | Stop reason: HOSPADM

## 2022-08-11 RX ORDER — LANSOPRAZOLE 30 MG/1
30 CAPSULE, DELAYED RELEASE ORAL 2 TIMES DAILY
Qty: 60 CAPSULE | Refills: 3 | Status: ON HOLD | OUTPATIENT
Start: 2022-08-11 | End: 2023-05-23

## 2022-08-11 RX ORDER — ONDANSETRON 2 MG/ML
4 INJECTION INTRAMUSCULAR; INTRAVENOUS EVERY 6 HOURS PRN
Status: DISCONTINUED | OUTPATIENT
Start: 2022-08-11 | End: 2022-08-12 | Stop reason: HOSPADM

## 2022-08-11 RX ORDER — LIDOCAINE 40 MG/G
CREAM TOPICAL
Status: DISCONTINUED | OUTPATIENT
Start: 2022-08-11 | End: 2022-08-12 | Stop reason: HOSPADM

## 2022-08-11 RX ORDER — FENTANYL CITRATE 50 UG/ML
INJECTION, SOLUTION INTRAMUSCULAR; INTRAVENOUS PRN
Status: DISCONTINUED | OUTPATIENT
Start: 2022-08-11 | End: 2022-08-11 | Stop reason: HOSPADM

## 2022-08-11 RX ORDER — FLUMAZENIL 0.1 MG/ML
0.2 INJECTION, SOLUTION INTRAVENOUS
Status: ACTIVE | OUTPATIENT
Start: 2022-08-11 | End: 2022-08-11

## 2022-08-11 RX ORDER — ONDANSETRON 2 MG/ML
4 INJECTION INTRAMUSCULAR; INTRAVENOUS
Status: DISCONTINUED | OUTPATIENT
Start: 2022-08-11 | End: 2022-08-12 | Stop reason: HOSPADM

## 2022-08-11 RX ORDER — NALOXONE HYDROCHLORIDE 0.4 MG/ML
0.4 INJECTION, SOLUTION INTRAMUSCULAR; INTRAVENOUS; SUBCUTANEOUS
Status: DISCONTINUED | OUTPATIENT
Start: 2022-08-11 | End: 2022-08-12 | Stop reason: HOSPADM

## 2022-08-11 NOTE — H&P
[facility] Anesthesia Pre-op History and Physical    Vianca Kumar MRN# [id number]   Age: 46 year old YOB: 1975            Date of Exam 8/11/2022           Primary care provider: Whitney Perez         Chief Complaint and/or Reason for Procedure:     BRBPR, history of colon polyps.  Abdominal pain         Active problem list:     Patient Active Problem List    Diagnosis Date Noted     Obesity (BMI 30-39.9)      Priority: Medium     MDD (major depressive disorder)      Priority: Medium            Medications (include herbals and vitamins):   Any Plavix use in the last 7 days? No     Current Outpatient Medications   Medication Sig     bisacodyl (DULCOLAX) 5 MG EC tablet Take 2 tablets (10 mg) by mouth See Admin Instructions Follow prep instructions     escitalopram (LEXAPRO) 20 MG tablet Take 1 tablet by mouth once daily     polyethylene glycol (GOLYTELY) 236 g suspension Take 6,000 mLs by mouth See Admin Instructions --For instructions refer to you colonoscopy prep instructions.     polyethylene glycol (MIRALAX) 17 GM/Dose powder Take 17 g (1 capful) by mouth daily     LANsoprazole (PREVACID) 30 MG DR capsule Take 1 capsule (30 mg) by mouth daily     metoclopramide (REGLAN) 10 MG tablet Take 1 tablet (10 mg) by mouth 4 times daily (before meals and nightly)     Current Facility-Administered Medications   Medication     flumazenil (ROMAZICON) injection 0.2 mg     lidocaine (LMX4) kit     lidocaine 1 % 0.1-1 mL     naloxone (NARCAN) injection 0.2 mg     naloxone (NARCAN) injection 0.2 mg     naloxone (NARCAN) injection 0.4 mg     naloxone (NARCAN) injection 0.4 mg     ondansetron (ZOFRAN ODT) ODT tab 4 mg    Or     ondansetron (ZOFRAN) injection 4 mg     ondansetron (ZOFRAN) injection 4 mg     prochlorperazine (COMPAZINE) injection 10 mg    Or     prochlorperazine (COMPAZINE) tablet 10 mg     sodium chloride (PF) 0.9% PF flush 3 mL     sodium chloride (PF) 0.9% PF flush 3 mL              Allergies:      Allergies   Allergen Reactions     Avocado Hives     Chantix [Varenicline Tartrate] Other (See Comments)     depression     Allergy to Latex? No  Allergy to tape?   No  Intolerances:             Physical Exam:   All vitals have been reviewed  Patient Vitals for the past 8 hrs:   BP Temp Temp src Resp SpO2   08/11/22 0820 102/66 97.8  F (36.6  C) Temporal 16 95 %     No intake/output data recorded.  Lungs:   No increased work of breathing, good air exchange, clear to auscultation bilaterally, no crackles or wheezing     Cardiovascular:   normal S1 and S2             Lab / Radiology Results:            Anesthetic risk and/or ASA classification:   2    Lorri Holley,

## 2022-08-15 LAB
PATH REPORT.COMMENTS IMP SPEC: NORMAL
PATH REPORT.COMMENTS IMP SPEC: NORMAL
PATH REPORT.FINAL DX SPEC: NORMAL
PATH REPORT.GROSS SPEC: NORMAL
PATH REPORT.MICROSCOPIC SPEC OTHER STN: NORMAL
PATH REPORT.RELEVANT HX SPEC: NORMAL
PHOTO IMAGE: NORMAL

## 2022-08-15 PROCEDURE — 88305 TISSUE EXAM BY PATHOLOGIST: CPT | Mod: GC | Performed by: PATHOLOGY

## 2022-08-16 ENCOUNTER — HOSPITAL ENCOUNTER (OUTPATIENT)
Facility: AMBULATORY SURGERY CENTER | Age: 47
End: 2022-08-16
Attending: INTERNAL MEDICINE
Payer: MEDICARE

## 2022-08-16 ENCOUNTER — TELEPHONE (OUTPATIENT)
Dept: GASTROENTEROLOGY | Facility: CLINIC | Age: 47
End: 2022-08-16

## 2022-08-16 DIAGNOSIS — K59.00 CONSTIPATION, UNSPECIFIED CONSTIPATION TYPE: Primary | ICD-10-CM

## 2022-08-16 RX ORDER — LUBIPROSTONE 24 UG/1
24 CAPSULE ORAL 2 TIMES DAILY WITH MEALS
Qty: 60 CAPSULE | Refills: 3 | Status: SHIPPED | OUTPATIENT
Start: 2022-08-16 | End: 2022-11-30

## 2022-08-16 NOTE — TELEPHONE ENCOUNTER
Screening Questions    BlueKIND OF PREP RedLOCATION [review exclusion criteria] GreenSEDATION TYPE      1. Are you active on mychart? Y    2. What insurance is in the chart? Medicare     3.   Ordering/Referring Provider: Lorri Holley DO    4. BMI   (If greater than 40 review exclusion criteria [PAC APPT IF [MAC] @ [facility])  25.8  [If yes, BMI OVER 40-EXTENDED PREP]      **(Sedation review/consideration needed)**  Do you have a legal guardian or Medical Power of    and/or are you able to give consent for your medical care?     Y    5. Have you had a positive covid test in the last 90 days?   N -     6.  Are you currently on dialysis?   N [ If yes, G-PREP & HOSPITAL setting ONLY]     7.  Do you have chronic kidney disease?  N [ If yes, G-PREP ]    8.   Do you have a diagnosis of diabetes?   N   [ If yes, G-PREP ]    9.  On a regular basis do you go 3-5 days between bowel movements?   Y   [ If yes, EXTENDED PREP]    10.  Are you taking any prescription pain medications on a routine schedule?    N -  [ If yes, EXTENDED PREP] [If yes, MAC]      11.   Do you have any chemical dependencies such as alcohol, street drugs, or methadone?    N [If yes, MAC]    12.   Do you have any history of post-traumatic stress syndrome, severe anxiety or history of psychosis?    N  [If yes, MAC]    13.  [FEMALES] Are you currently pregnant? N    If yes, how many weeks?       Respiratory/Heart Screening:  [If yes to any of the following HOSPITAL setting only]     14. Do you have Pulmonary Hypertension [Lungs]?   N       15. Do you have UNCONTROLLED asthma?   N     16.  Do you use daily home oxygen?  N      17. Do you have mod to severe Obstructive Sleep Apnea?         (OKAY @ [facility]  [facility]  [facility]  [facility]  [facility]  [facility] - if pt is not on OXYGEN)  N      18.   Have you had a heart or lung transplant?   N      19.   Have you had a stroke or Transient ischemic attack (TIA - aka  mini stroke ) within 6 months?  (If yes, please review exclusion  criteria)  N     20.   In the past 6 months, have you had any heart related issues including cardiomyopathy or heart attack?   N           If yes, did it require cardiac stenting or other implantable device?   NA      21.   Do you have any implantable devices in your body (pacemaker, defib, LVAD)? (If yes, please review exclusion criteria)  N     22.  Do you take the medication Phentermine?  NO        23. Do you take nitroglycerin?   N           If yes, how often? NA  (if yes, HOSPITAL setting ONLY)    24.  Are you currently taking any blood thinners?    [If yes, INFORM patient to follw  w/ ORDERING PROVIDER FOR BRIDGING INSTRUCTIONS]     N    25.   Do you transfer independently?                (If NO, please HOSPITAL setting ONLY)  Y    26.   Preferred LOCAL Pharmacy for Pre Prescription:         WRITTEN PRESCRIPTION REQUESTED    [facility] [id number] - [address]    Scheduling Details  (Please ask for phone number if not scheduled by patient)      Caller : Vianca Kumar    Date of Procedure: 10/14  Surgeon:   Location: [facility]        Sedation Type: CS l Per Protocol  Conscious Sedation- Needs  for 6 hours after the procedure  MAC/General-Needs  for 24 hours after procedure    N :[Pre-op Required] at [facility]    [facility] and OR for MAC sedation   (advise patient they will need a pre-op WITH IN 30 DAYS of procedure date)     Type of Procedure Scheduled:   Upper Endoscopy [EGD]    Which Colonoscopy Prep was Sent?:   FELICITAS -     [facility] CF PATIENTS & GRO[facility]'S PATIENTS NEEDS EXTENDED PREP       Informed patient they will need an adult  Y  Cannot take any type of public or medical transportation alone    Pre-Procedure [facility] test [facility] or Externally: Y  **INFORMED OF HOME TESTING & LAB OPTION**        Confirmed Nurse will call to complete assessment Y    Additional comments:

## 2022-09-19 DIAGNOSIS — F33.42 RECURRENT MAJOR DEPRESSIVE DISORDER, IN FULL REMISSION (H): ICD-10-CM

## 2022-09-21 RX ORDER — ESCITALOPRAM OXALATE 20 MG/1
TABLET ORAL
Qty: 90 TABLET | Refills: 0 | OUTPATIENT
Start: 2022-09-21

## 2022-09-21 NOTE — TELEPHONE ENCOUNTER
Routing refill request to provider for review/approval because:  PHQ9 score out of range/date   PHQ-9 score:    PHQ 5/6/2021   PHQ-9 Total Score 10   Q9: Thoughts of better off dead/self-harm past 2 weeks Not at all              Pt due for an appt with PCP

## 2022-09-21 NOTE — TELEPHONE ENCOUNTER
She is overdue for her annual exam and mood f/u. Please ask her to schedule this appointment ASAP. Can refill medication temporarily if she anticipates running out prior to scheduled appointment.     Thank you.     ---    May use any virtual spot for an in-person visit.     Patient may also be seen at noon (arrival time 11:40am) Mondays, Wednesdays, Thursdays, and Fridays OR at 1:00pm (arrival time 12:40pm) on Thursdays (during the huddle).    Please do not schedule in a same day, next day, or hospital follow-up slot.    Okay to double book lunch slot (but patient should still arrive by 11:40am).

## 2022-09-30 DIAGNOSIS — F33.42 RECURRENT MAJOR DEPRESSIVE DISORDER, IN FULL REMISSION (H): ICD-10-CM

## 2022-10-03 RX ORDER — ESCITALOPRAM OXALATE 20 MG/1
20 TABLET ORAL DAILY
Qty: 90 TABLET | Refills: 0 | Status: SHIPPED | OUTPATIENT
Start: 2022-10-03 | End: 2023-01-02

## 2022-10-04 RX ORDER — ESCITALOPRAM OXALATE 20 MG/1
TABLET ORAL
Qty: 90 TABLET | Refills: 0
Start: 2022-10-04

## 2022-10-07 RX ORDER — FLUMAZENIL 0.1 MG/ML
0.2 INJECTION, SOLUTION INTRAVENOUS
Status: CANCELLED | OUTPATIENT
Start: 2022-10-07 | End: 2022-10-08

## 2022-10-07 RX ORDER — NALOXONE HYDROCHLORIDE 0.4 MG/ML
0.2 INJECTION, SOLUTION INTRAMUSCULAR; INTRAVENOUS; SUBCUTANEOUS
Status: CANCELLED | OUTPATIENT
Start: 2022-10-07

## 2022-10-07 RX ORDER — NALOXONE HYDROCHLORIDE 0.4 MG/ML
0.4 INJECTION, SOLUTION INTRAMUSCULAR; INTRAVENOUS; SUBCUTANEOUS
Status: CANCELLED | OUTPATIENT
Start: 2022-10-07

## 2022-10-07 RX ORDER — ONDANSETRON 2 MG/ML
4 INJECTION INTRAMUSCULAR; INTRAVENOUS
Status: CANCELLED | OUTPATIENT
Start: 2022-10-07

## 2022-10-07 RX ORDER — LIDOCAINE 40 MG/G
CREAM TOPICAL
Status: CANCELLED | OUTPATIENT
Start: 2022-10-07

## 2022-10-07 RX ORDER — PROCHLORPERAZINE MALEATE 10 MG
10 TABLET ORAL EVERY 6 HOURS PRN
Status: CANCELLED | OUTPATIENT
Start: 2022-10-07

## 2022-10-07 RX ORDER — ONDANSETRON 4 MG/1
4 TABLET, ORALLY DISINTEGRATING ORAL EVERY 6 HOURS PRN
Status: CANCELLED | OUTPATIENT
Start: 2022-10-07

## 2022-10-07 RX ORDER — ONDANSETRON 2 MG/ML
4 INJECTION INTRAMUSCULAR; INTRAVENOUS EVERY 6 HOURS PRN
Status: CANCELLED | OUTPATIENT
Start: 2022-10-07

## 2022-10-13 ENCOUNTER — TELEPHONE (OUTPATIENT)
Dept: GASTROENTEROLOGY | Facility: CLINIC | Age: 47
End: 2022-10-13

## 2022-10-13 PROBLEM — F33.42 RECURRENT MAJOR DEPRESSIVE DISORDER, IN FULL REMISSION (H): Status: ACTIVE | Noted: 2022-10-13

## 2022-10-13 NOTE — TELEPHONE ENCOUNTER
Caller: Vianca Kumar    Procedure: EGD    Date, Location, and Surgeon of Procedure Cancelled: 10/14, Juan SALDAÑA    Ordering Provider:Lorri Holley, DO    Reason for cancel (please be detailed, any staff messages or encounters to note?): Not feeling well        Rescheduled: Y     If rescheduled:    Date: 11/25   Location:    Prep Resent: Yes (changes to prep?)   Covid Test Rescheduled: no   Note any change or update to original order/sedation:

## 2022-10-22 ENCOUNTER — HEALTH MAINTENANCE LETTER (OUTPATIENT)
Age: 47
End: 2022-10-22

## 2022-11-24 ENCOUNTER — ANESTHESIA EVENT (OUTPATIENT)
Dept: SURGERY | Facility: AMBULATORY SURGERY CENTER | Age: 47
End: 2022-11-24
Payer: MEDICARE

## 2022-11-24 ASSESSMENT — LIFESTYLE VARIABLES: TOBACCO_USE: 1

## 2022-11-25 ENCOUNTER — HOSPITAL ENCOUNTER (OUTPATIENT)
Facility: AMBULATORY SURGERY CENTER | Age: 47
Discharge: HOME | End: 2022-11-25
Attending: INTERNAL MEDICINE
Payer: MEDICARE

## 2022-11-25 ENCOUNTER — ANESTHESIA (OUTPATIENT)
Dept: SURGERY | Facility: AMBULATORY SURGERY CENTER | Age: 47
End: 2022-11-25
Payer: MEDICARE

## 2022-11-25 VITALS
RESPIRATION RATE: 16 BRPM | DIASTOLIC BLOOD PRESSURE: 82 MMHG | OXYGEN SATURATION: 97 % | SYSTOLIC BLOOD PRESSURE: 126 MMHG | HEART RATE: 74 BPM | TEMPERATURE: 97.1 F

## 2022-11-25 VITALS — HEART RATE: 84 BPM

## 2022-11-25 DIAGNOSIS — K21.00 GASTROESOPHAGEAL REFLUX DISEASE WITH ESOPHAGITIS, UNSPECIFIED WHETHER HEMORRHAGE: Primary | ICD-10-CM

## 2022-11-25 DIAGNOSIS — K44.9 HIATAL HERNIA: ICD-10-CM

## 2022-11-25 LAB — UPPER GI ENDOSCOPY: NORMAL

## 2022-11-25 PROCEDURE — G8918 PT W/O PREOP ORDER IV AB PRO: HCPCS

## 2022-11-25 PROCEDURE — G8907 PT DOC NO EVENTS ON DISCHARG: HCPCS

## 2022-11-25 PROCEDURE — 43239 EGD BIOPSY SINGLE/MULTIPLE: CPT

## 2022-11-25 RX ORDER — FLUMAZENIL 0.1 MG/ML
0.2 INJECTION, SOLUTION INTRAVENOUS
Status: ACTIVE | OUTPATIENT
Start: 2022-11-25 | End: 2022-11-25

## 2022-11-25 RX ORDER — SODIUM CHLORIDE, SODIUM LACTATE, POTASSIUM CHLORIDE, CALCIUM CHLORIDE 600; 310; 30; 20 MG/100ML; MG/100ML; MG/100ML; MG/100ML
INJECTION, SOLUTION INTRAVENOUS CONTINUOUS PRN
Status: DISCONTINUED | OUTPATIENT
Start: 2022-11-25 | End: 2022-11-25

## 2022-11-25 RX ORDER — NALOXONE HYDROCHLORIDE 0.4 MG/ML
0.2 INJECTION, SOLUTION INTRAMUSCULAR; INTRAVENOUS; SUBCUTANEOUS
Status: DISCONTINUED | OUTPATIENT
Start: 2022-11-25 | End: 2022-11-26 | Stop reason: HOSPADM

## 2022-11-25 RX ORDER — ONDANSETRON 2 MG/ML
4 INJECTION INTRAMUSCULAR; INTRAVENOUS
Status: DISCONTINUED | OUTPATIENT
Start: 2022-11-25 | End: 2022-11-26 | Stop reason: HOSPADM

## 2022-11-25 RX ORDER — LIDOCAINE HYDROCHLORIDE 20 MG/ML
INJECTION, SOLUTION INFILTRATION; PERINEURAL PRN
Status: DISCONTINUED | OUTPATIENT
Start: 2022-11-25 | End: 2022-11-25

## 2022-11-25 RX ORDER — LIDOCAINE 40 MG/G
CREAM TOPICAL
Status: DISCONTINUED | OUTPATIENT
Start: 2022-11-25 | End: 2022-11-26 | Stop reason: HOSPADM

## 2022-11-25 RX ORDER — NALOXONE HYDROCHLORIDE 0.4 MG/ML
0.4 INJECTION, SOLUTION INTRAMUSCULAR; INTRAVENOUS; SUBCUTANEOUS
Status: DISCONTINUED | OUTPATIENT
Start: 2022-11-25 | End: 2022-11-26 | Stop reason: HOSPADM

## 2022-11-25 RX ORDER — ONDANSETRON 4 MG/1
4 TABLET, ORALLY DISINTEGRATING ORAL EVERY 6 HOURS PRN
Status: DISCONTINUED | OUTPATIENT
Start: 2022-11-25 | End: 2022-11-26 | Stop reason: HOSPADM

## 2022-11-25 RX ORDER — PROPOFOL 10 MG/ML
INJECTION, EMULSION INTRAVENOUS CONTINUOUS PRN
Status: DISCONTINUED | OUTPATIENT
Start: 2022-11-25 | End: 2022-11-25

## 2022-11-25 RX ORDER — ONDANSETRON 2 MG/ML
4 INJECTION INTRAMUSCULAR; INTRAVENOUS EVERY 6 HOURS PRN
Status: DISCONTINUED | OUTPATIENT
Start: 2022-11-25 | End: 2022-11-26 | Stop reason: HOSPADM

## 2022-11-25 RX ORDER — PROCHLORPERAZINE MALEATE 10 MG
10 TABLET ORAL EVERY 6 HOURS PRN
Status: DISCONTINUED | OUTPATIENT
Start: 2022-11-25 | End: 2022-11-26 | Stop reason: HOSPADM

## 2022-11-25 RX ADMIN — LIDOCAINE HYDROCHLORIDE 60 MG: 20 INJECTION, SOLUTION INFILTRATION; PERINEURAL at 09:59

## 2022-11-25 RX ADMIN — PROPOFOL 150 MCG/KG/MIN: 10 INJECTION, EMULSION INTRAVENOUS at 09:59

## 2022-11-25 RX ADMIN — PROPOFOL 20 MG: 10 INJECTION, EMULSION INTRAVENOUS at 10:07

## 2022-11-25 RX ADMIN — SODIUM CHLORIDE, SODIUM LACTATE, POTASSIUM CHLORIDE, CALCIUM CHLORIDE: 600; 310; 30; 20 INJECTION, SOLUTION INTRAVENOUS at 09:57

## 2022-11-25 RX ADMIN — PROPOFOL 80 MG: 10 INJECTION, EMULSION INTRAVENOUS at 10:01

## 2022-11-25 ASSESSMENT — ENCOUNTER SYMPTOMS: DYSRHYTHMIAS: 0

## 2022-11-25 NOTE — ANESTHESIA CARE TRANSFER NOTE
Patient: Vianca Kumar    Procedure: Procedure(s):  ESOPHAGOGASTRODUODENOSCOPY, WITH CO2 INSUFFLATION  ESOPHAGOGASTRODUODENOSCOPY, WITH BIOPSY  ESOPHAGOGASTRODUODENOSCOPY, WITH HEMORRHAGE CONTROL       Diagnosis: Gastroesophageal reflux disease with esophagitis without hemorrhage [K21.00]  Diagnosis Additional Information: No value filed.    Anesthesia Type:   MAC     Note:    Oropharynx: oropharynx clear of all foreign objects and spontaneously breathing  Level of Consciousness: drowsy  Oxygen Supplementation: room air    Independent Airway: airway patency satisfactory and stable  Dentition: dentition unchanged  Vital Signs Stable: post-procedure vital signs reviewed and stable  Report to RN Given: handoff report given  Patient transferred to: Phase II    Handoff Report: Identifed the Patient, Identified the Reponsible Provider, Reviewed the pertinent medical history, Discussed the surgical course, Reviewed Intra-OP anesthesia mangement and issues during anesthesia, Set expectations for post-procedure period and Allowed opportunity for questions and acknowledgement of understanding      Vitals:  Vitals Value Taken Time   BP     Temp     Pulse 84 11/25/22 1012   Resp     SpO2         Electronically Signed By: SALLY Austin CRNA  November 25, 2022  10:15 AM

## 2022-11-25 NOTE — ANESTHESIA POSTPROCEDURE EVALUATION
Patient: Vianca Kumar    Procedure: Procedure(s):  ESOPHAGOGASTRODUODENOSCOPY, WITH CO2 INSUFFLATION  ESOPHAGOGASTRODUODENOSCOPY, WITH BIOPSY  ESOPHAGOGASTRODUODENOSCOPY, WITH HEMORRHAGE CONTROL       Anesthesia Type:  MAC    Note:  Disposition: Outpatient   Postop Pain Control: Uneventful            Sign Out: Well controlled pain   PONV: No   Neuro/Psych: Uneventful            Sign Out: Acceptable/Baseline neuro status   Airway/Respiratory: Uneventful            Sign Out: Acceptable/Baseline resp. status   CV/Hemodynamics: Uneventful            Sign Out: Acceptable CV status; No obvious hypovolemia; No obvious fluid overload   Other NRE: NONE   DID A NON-ROUTINE EVENT OCCUR? No           Last vitals:  Vitals Value Taken Time   /82 11/25/22 1049   Temp 97.1  F (36.2  C) 11/25/22 1020   Pulse     Resp 16 11/25/22 1049   SpO2 97 % 11/25/22 1049       Electronically Signed By: Thai Ashley MD  November 25, 2022  1:02 PM

## 2022-11-25 NOTE — H&P
[facility] Anesthesia Pre-op History and Physical    Vianca Kumar MRN# [id number]   Age: 47 year old YOB: 1975            Date of Exam 11/25/2022         Primary care provider: Whitney Perez         Chief Complaint and/or Reason for Procedure:     Follow-up esophagitis - on BID PPI but stopped ~ 10 days ago         Active problem list:     Patient Active Problem List    Diagnosis Date Noted     Recurrent major depressive disorder, in full remission (H) 10/13/2022     Priority: Medium     Obesity (BMI 30-39.9)      Priority: Medium     MDD (major depressive disorder)      Priority: Medium            Medications (include herbals and vitamins):   Any Plavix use in the last 7 days? No     Current Outpatient Medications   Medication Sig     bisacodyl (DULCOLAX) 5 MG EC tablet Take 2 tablets (10 mg) by mouth See Admin Instructions Follow prep instructions     escitalopram (LEXAPRO) 20 MG tablet Take 1 tablet (20 mg) by mouth daily     LANsoprazole (PREVACID) 30 MG DR capsule Take 1 capsule (30 mg) by mouth 2 times daily     linaclotide (LINZESS) 145 MCG capsule Take 1 capsule (145 mcg) by mouth every morning (before breakfast)     lubiprostone (AMITIZA) 24 MCG capsule Take 1 capsule (24 mcg) by mouth 2 times daily (with meals)     metoclopramide (REGLAN) 10 MG tablet Take 1 tablet (10 mg) by mouth 4 times daily (before meals and nightly)     polyethylene glycol (GOLYTELY) 236 g suspension Take 6,000 mLs by mouth See Admin Instructions --For instructions refer to you colonoscopy prep instructions.     polyethylene glycol (MIRALAX) 17 GM/Dose powder Take 17 g (1 capful) by mouth daily     Current Facility-Administered Medications   Medication     lidocaine (LMX4) kit     lidocaine 1 % 0.1-1 mL     ondansetron (ZOFRAN) injection 4 mg     sodium chloride (PF) 0.9% PF flush 3 mL     sodium chloride (PF) 0.9% PF flush 3 mL             Allergies:      Allergies   Allergen Reactions     Avocado  Hives     Chantix [Varenicline Tartrate] Other (See Comments)     depression     Allergy to Latex? No  Allergy to tape?   No  Intolerances:             Physical Exam:   All vitals have been reviewed  No data found.  No intake/output data recorded.  Lungs:   No increased work of breathing, good air exchange, clear to auscultation bilaterally, no crackles or wheezing     Cardiovascular:   normal S1 and S2             Lab / Radiology Results:            Anesthetic risk and/or ASA classification:     2  Lorri Holley,

## 2022-11-25 NOTE — ANESTHESIA PREPROCEDURE EVALUATION
Anesthesia Pre-Procedure Evaluation    Patient: Vianca Kumar   MRN: [id number] : 1975        Procedure : Procedure(s):  ESOPHAGOGASTRODUODENOSCOPY, WITH CO2 INSUFFLATION          Past Medical History:   Diagnosis Date     MDD (major depressive disorder)      Obesity (BMI 30-39.9)       Past Surgical History:   Procedure Laterality Date     CAPSULE/PILL CAM ENDOSCOPY N/A 2018    Procedure: CAPSULE/PILL CAM ENDOSCOPY;;  Surgeon: Guru Hallie Song MD;  Location: [facility]     COLONOSCOPY N/A 2017    Procedure: COLONOSCOPY;  Surgeon: Yosi Beck MD;  Location: [facility];  Service:      COLONOSCOPY N/A 2022    Procedure: COLONOSCOPY, WITH POLYPECTOMY AND BIOPSY;  Surgeon: Lorri Holley DO;  Location: [facility] OR     COLONOSCOPY WITH CO2 INSUFFLATION N/A 2022    Procedure: COLONOSCOPY, WITH CO2 INSUFFLATION;  Surgeon: Lorri Holley DO;  Location: [facility] OR     COMBINED ESOPHAGOSCOPY, GASTROSCOPY, DUODENOSCOPY (EGD) WITH CO2 INSUFFLATION N/A 2022    Procedure: ESOPHAGOGASTRODUODENOSCOPY, WITH CO2 INSUFFLATION;  Surgeon: Lorri Holley DO;  Location: [facility] OR     ENDOSCOPIC ULTRASOUND UPPER GASTROINTESTINAL TRACT (GI) N/A 2018    Procedure: ENDOSCOPIC ULTRASOUND, ESOPHAGOSCOPY / UPPER GASTROINTESTINAL TRACT (GI);  EUS/Capsule ;  Surgeon: Guru Hallie Song MD;  Location: [facility]     ESOPHAGOSCOPY, GASTROSCOPY, DUODENOSCOPY (EGD), COMBINED N/A 2017    Procedure: ESOPHAGOGASTRODUODENOSCOPY (EGD);  Surgeon: Yosi Beck MD;  Location: [facility];  Service:      ESOPHAGOSCOPY, GASTROSCOPY, DUODENOSCOPY (EGD), COMBINED N/A 2022    Procedure: ESOPHAGOGASTRODUODENOSCOPY, WITH BIOPSY;  Surgeon: Lorri Holley DO;  Location: [facility] OR     KNEE SURGERY Left     osteotomy     LAPAROSCOPIC CHOLECYSTECTOMY N/A 2018    Procedure: LAPAROSCOPIC CHOLECYSTECTOMY;  Laparoscopic Cholecystectomy ;  Surgeon: Alberto Noble  MD;  Location: [facility] OR     UPPER GASTROINTESTINAL ENDOSCOPY        Allergies   Allergen Reactions     Avocado Hives     Chantix [Varenicline Tartrate] Other (See Comments)     depression      Social History     Tobacco Use     Smoking status: Every Day     Packs/day: 1.00     Years: 29.00     Pack years: 29.00     Types: Cigarettes     Smokeless tobacco: Former     Tobacco comments:     smoking since age 16   Substance Use Topics     Alcohol use: Yes     Comment: 12-15 drinks/week      Wt Readings from Last 1 Encounters:   06/29/22 77.1 kg (170 lb)        Anesthesia Evaluation   Pt has had prior anesthetic.     No history of anesthetic complications       ROS/MED HX  ENT/Pulmonary:     (+) tobacco use,     Neurologic:       Cardiovascular:    (-) hypertension, arrhythmias and murmur   METS/Exercise Tolerance: >4 METS    Hematologic:       Musculoskeletal:       GI/Hepatic:     (+) GERD (currently asymptomatic but will have symptoms despite medication and despite being NPO), Asymptomatic on medication, hiatal hernia,  (-) liver disease   Renal/Genitourinary:    (-) renal disease   Endo:     (+) Obesity,     Psychiatric/Substance Use:       Infectious Disease:       Malignancy:       Other:            Physical Exam    Airway        Mallampati: I   TM distance: > 3 FB   Neck ROM: full   Mouth opening: > 3 cm    Respiratory Devices and Support         Dental  no notable dental history         Cardiovascular          Rhythm and rate: regular and normal (-) no murmur    Pulmonary   pulmonary exam normal        breath sounds clear to auscultation           OUTSIDE LABS:  CBC:   Lab Results   Component Value Date    WBC 11.2 (H) 05/28/2022    WBC 13.2 (H) 02/25/2020    HGB 14.7 05/28/2022    HGB 15.2 02/25/2020    HCT 43.8 05/28/2022    HCT 45.6 02/25/2020     05/28/2022     02/25/2020     BMP:   Lab Results   Component Value Date     05/28/2022     05/19/2021    POTASSIUM 4.1 05/28/2022    POTASSIUM  4.7 05/19/2021    CHLORIDE 105 05/28/2022    CHLORIDE 107 05/19/2021    CO2 25 05/28/2022    CO2 27 05/19/2021    BUN 10 05/28/2022    BUN 9 05/19/2021    CR 0.71 05/28/2022    CR 0.92 05/19/2021    GLC 99 05/28/2022    GLC 93 05/19/2021     COAGS:   Lab Results   Component Value Date    PTT 45 (H) 10/03/2008    INR 1.02 03/27/2013     POC:   Lab Results   Component Value Date     (H) 04/25/2018    HCG Negative 05/28/2022    HCGS Negative 04/25/2018     HEPATIC:   Lab Results   Component Value Date    ALBUMIN 3.8 05/28/2022    PROTTOTAL 7.4 05/28/2022    ALT 21 05/28/2022    AST 15 05/28/2022    ALKPHOS 80 05/28/2022    BILITOTAL 0.8 05/28/2022     OTHER:   Lab Results   Component Value Date    LACT 1.8 06/02/2014    MARIAH 9.2 05/28/2022    LIPASE 87 05/28/2022    TSH 2.84 03/13/2018    T4 1.21 03/01/2013    CRP 0.4 02/25/2020    SED 8 03/12/2018       Anesthesia Plan    ASA Status:  2   NPO Status:  NPO Appropriate    Anesthesia Type: MAC.     - Reason for MAC: immobility needed   Induction: Intravenous, Propofol.   Maintenance: TIVA.        Consents    Anesthesia Plan(s) and associated risks, benefits, and realistic alternatives discussed. Questions answered and patient/representative(s) expressed understanding.    - Discussed:     - Discussed with:  Patient      - Extended Intubation/Ventilatory Support Discussed: No.      - Patient is DNR/DNI Status: No    Use of blood products discussed: No .     Postoperative Care    Pain management: IV analgesics.   PONV prophylaxis: Ondansetron (or other 5HT-3), Background Propofol Infusion     Comments:    Other Comments: Discussed plan for IV anesthetic with native airway. Discussed potential need for conversion to general anesthetic with airway management and potential for recall.  Discussed risk of aspiration given GERD and unprotected airway.             Thai Ashley MD

## 2022-11-30 ENCOUNTER — PRE VISIT (OUTPATIENT)
Dept: GASTROENTEROLOGY | Facility: CLINIC | Age: 47
End: 2022-11-30

## 2022-11-30 ENCOUNTER — VIRTUAL VISIT (OUTPATIENT)
Dept: GASTROENTEROLOGY | Facility: CLINIC | Age: 47
End: 2022-11-30
Payer: MEDICARE

## 2022-11-30 VITALS — WEIGHT: 180 LBS | BODY MASS INDEX: 27.37 KG/M2

## 2022-11-30 DIAGNOSIS — R11.2 NAUSEA AND VOMITING, UNSPECIFIED VOMITING TYPE: ICD-10-CM

## 2022-11-30 DIAGNOSIS — R19.7 DIARRHEA OF PRESUMED INFECTIOUS ORIGIN: Primary | ICD-10-CM

## 2022-11-30 DIAGNOSIS — R10.13 EPIGASTRIC PAIN: ICD-10-CM

## 2022-11-30 DIAGNOSIS — K21.01 GASTROESOPHAGEAL REFLUX DISEASE WITH ESOPHAGITIS AND HEMORRHAGE: ICD-10-CM

## 2022-11-30 PROCEDURE — 99214 OFFICE O/P EST MOD 30 MIN: CPT | Mod: 95 | Performed by: INTERNAL MEDICINE

## 2022-11-30 RX ORDER — ONDANSETRON 8 MG/1
8 TABLET, ORALLY DISINTEGRATING ORAL EVERY 8 HOURS PRN
Qty: 30 TABLET | Refills: 3 | Status: SHIPPED | OUTPATIENT
Start: 2022-11-30 | End: 2023-03-09

## 2022-11-30 ASSESSMENT — PATIENT HEALTH QUESTIONNAIRE - PHQ9: SUM OF ALL RESPONSES TO PHQ QUESTIONS 1-9: 13

## 2022-11-30 ASSESSMENT — PAIN SCALES - GENERAL: PAINLEVEL: NO PAIN (0)

## 2022-11-30 NOTE — PROGRESS NOTES
Vianca is a 47 year old who is being evaluated via a billable video visit.      How would you like to obtain your AVS? MyChart  If the video visit is dropped, the invitation should be resent by: Send to e-mail at: [email or web address]  Will anyone else be joining your video visit?

## 2022-11-30 NOTE — PROGRESS NOTES
HPI:    Arleen  presents today for a video visit for follow-up.  Had EGD with me on Friday (pathology still pending) - persistent grade D esophagitis noted. Had stopped PPI about 10 days prior to procedure but had been taking it regularly prior to that - is now back on it.    Over the last few weeks has developed watery diarrhea - anywhere between 5 to 30 times a day.  Reports stool looks like bile - similar to what her vomit looks like when she throws up.  Previously she was struggling with constipation so this is new for her.      Past Medical History:   Diagnosis Date     MDD (major depressive disorder)      Obesity (BMI 30-39.9)        Past Surgical History:   Procedure Laterality Date     CAPSULE/PILL CAM ENDOSCOPY N/A 04/03/2018    Procedure: CAPSULE/PILL CAM ENDOSCOPY;;  Surgeon: Guru Hallie Song MD;  Location:  GI     COLONOSCOPY N/A 12/28/2017    Procedure: COLONOSCOPY;  Surgeon: Yosi Beck MD;  Location: [facility];  Service:      COLONOSCOPY N/A 08/11/2022    Procedure: COLONOSCOPY, WITH POLYPECTOMY AND BIOPSY;  Surgeon: Lorri Holley DO;  Location: [facility]     COLONOSCOPY WITH CO2 INSUFFLATION N/A 08/11/2022    Procedure: COLONOSCOPY, WITH CO2 INSUFFLATION;  Surgeon: Lorri Holley DO;  Location: [facility]     COMBINED ESOPHAGOSCOPY, GASTROSCOPY, DUODENOSCOPY (EGD) WITH CO2 INSUFFLATION N/A 08/11/2022    Procedure: ESOPHAGOGASTRODUODENOSCOPY, WITH CO2 INSUFFLATION;  Surgeon: Lorri Holley DO;  Location: [facility]     COMBINED ESOPHAGOSCOPY, GASTROSCOPY, DUODENOSCOPY (EGD) WITH CO2 INSUFFLATION N/A 11/25/2022    Procedure: ESOPHAGOGASTRODUODENOSCOPY, WITH CO2 INSUFFLATION;  Surgeon: Lorri Hollye DO;  Location: [facility]     ENDOSCOPIC ULTRASOUND UPPER GASTROINTESTINAL TRACT (GI) N/A 04/03/2018    Procedure: ENDOSCOPIC ULTRASOUND, ESOPHAGOSCOPY / UPPER GASTROINTESTINAL TRACT (GI);  EUS/Capsule ;  Surgeon: Guru Hallie Song MD;  Location:  GI      ESOPHAGOSCOPY, GASTROSCOPY, DUODENOSCOPY (EGD), COMBINED N/A 12/28/2017    Procedure: ESOPHAGOGASTRODUODENOSCOPY (EGD);  Surgeon: Yosi Beck MD;  Location: [facility];  Service:      ESOPHAGOSCOPY, GASTROSCOPY, DUODENOSCOPY (EGD), COMBINED N/A 08/11/2022    Procedure: ESOPHAGOGASTRODUODENOSCOPY, WITH BIOPSY;  Surgeon: Lorri Holley DO;  [facility]     ESOPHAGOSCOPY, GASTROSCOPY, DUODENOSCOPY (EGD), COMBINED N/A 11/25/2022    Procedure: ESOPHAGOGASTRODUODENOSCOPY, WITH BIOPSY;  Surgeon: Lorri Holley DO;  [facility]: [facility]     KNEE SURGERY Left     osteotomy     LAPAROSCOPIC CHOLECYSTECTOMY N/A 04/25/2018    Procedure: LAPAROSCOPIC CHOLECYSTECTOMY;  Laparoscopic Cholecystectomy ;  Surgeon: Alberto Noble MD;  Location: [facility]     UPPER GASTROINTESTINAL ENDOSCOPY         Family History   Problem Relation Age of Onset     Hypertension Mother      Hyperlipidemia Mother      Hypertension Brother      Hyperlipidemia Brother      Myocardial Infarction Maternal Grandfather 47     Myocardial Infarction Maternal Uncle         multiple later in life     Diabetes No family hx of      Breast Cancer No family hx of      Ovarian Cancer No family hx of      Colon Cancer No family hx of      Cerebrovascular Disease No family hx of        Social History     Tobacco Use     Smoking status: Every Day     Packs/day: 1.00     Years: 29.00     Pack years: 29.00     Types: Cigarettes     Smokeless tobacco: Former     Tobacco comments:     smoking since age 16   Substance Use Topics     Alcohol use: Yes     Comment: 12-15 drinks/week        O:    Gen: no acute distress  HEENT: NCAT  Neck: normal ROM  Resp: nonlabored breathing  Neuro: no gross deficits  Psych: appropriate mood and affect    Assessment and Plan:    # GERD, hiatal hernia, reflux esophagitis - reflux persistent despite BID PPI (pathology pending) - question if she would be a surgical candidate - referral placed at the time of EGD - encouraged to  make appointment - will likely need manometry prior to surgery    # diarrhea - new onset over the last few weeks - will check stool studies.  For now - can add fiber supplement    # nausea - will add ondansetron    # type Ib choledochal cyst - plan for yearly MRCPs for monitoring      RTC 3 months    Lorri Holley, DO     Video-Visit Details     Type of service:  Video Visit     Video Start Time: 10:01 AM  Video End Time (time video stopped): 10:10 AM    Originating Location (pt. Location): home     Distant Location (provider location): Remote  Mode of Communication:  Video Conference via [employer]

## 2022-12-01 ENCOUNTER — TELEPHONE (OUTPATIENT)
Dept: GASTROENTEROLOGY | Facility: CLINIC | Age: 47
End: 2022-12-01
Payer: MEDICARE

## 2022-12-01 DIAGNOSIS — R19.7 DIARRHEA OF PRESUMED INFECTIOUS ORIGIN: ICD-10-CM

## 2022-12-01 DIAGNOSIS — K21.01 GASTROESOPHAGEAL REFLUX DISEASE WITH ESOPHAGITIS AND HEMORRHAGE: ICD-10-CM

## 2022-12-01 DIAGNOSIS — R10.13 EPIGASTRIC PAIN: ICD-10-CM

## 2022-12-01 DIAGNOSIS — R11.2 NAUSEA AND VOMITING, UNSPECIFIED VOMITING TYPE: ICD-10-CM

## 2022-12-01 LAB
PATH REPORT.COMMENTS IMP SPEC: NORMAL
PATH REPORT.FINAL DX SPEC: NORMAL
PATH REPORT.GROSS SPEC: NORMAL
PATH REPORT.MICROSCOPIC SPEC OTHER STN: NORMAL
PATH REPORT.RELEVANT HX SPEC: NORMAL
PHOTO IMAGE: NORMAL

## 2022-12-01 PROCEDURE — 88305 TISSUE EXAM BY PATHOLOGIST: CPT | Performed by: STUDENT IN AN ORGANIZED HEALTH CARE EDUCATION/TRAINING PROGRAM

## 2022-12-01 NOTE — TELEPHONE ENCOUNTER
LVM with schedulinlg phone number for follow up per Lorri Holley.     Left Voicemail (1st Attempt) for the patient to call back and schedule the following:    Appointment type: 3 month follow up video  Provider: Amanda Cifuentes PA-C  Return date: March 2023  Specialty phone number: n/a  Additional appointment(s) needed: n/a  Additonal Notes: n/a

## 2022-12-02 ENCOUNTER — OFFICE VISIT (OUTPATIENT)
Dept: URGENT CARE | Facility: URGENT CARE | Age: 47
End: 2022-12-02
Payer: MEDICARE

## 2022-12-02 VITALS
HEART RATE: 98 BPM | OXYGEN SATURATION: 96 % | RESPIRATION RATE: 16 BRPM | SYSTOLIC BLOOD PRESSURE: 108 MMHG | TEMPERATURE: 101.6 F | DIASTOLIC BLOOD PRESSURE: 74 MMHG

## 2022-12-02 DIAGNOSIS — J10.1 INFLUENZA A: ICD-10-CM

## 2022-12-02 DIAGNOSIS — J02.0 STREP THROAT: Primary | ICD-10-CM

## 2022-12-02 DIAGNOSIS — J20.8 ACUTE BACTERIAL BRONCHITIS: ICD-10-CM

## 2022-12-02 DIAGNOSIS — B96.89 ACUTE BACTERIAL BRONCHITIS: ICD-10-CM

## 2022-12-02 LAB
DEPRECATED S PYO AG THROAT QL EIA: POSITIVE
FLUAV AG SPEC QL IA: POSITIVE
FLUBV AG SPEC QL IA: NEGATIVE

## 2022-12-02 PROCEDURE — 87880 STREP A ASSAY W/OPTIC: CPT | Performed by: PHYSICIAN ASSISTANT

## 2022-12-02 PROCEDURE — 87804 INFLUENZA ASSAY W/OPTIC: CPT | Performed by: PHYSICIAN ASSISTANT

## 2022-12-02 PROCEDURE — 99214 OFFICE O/P EST MOD 30 MIN: CPT | Performed by: PHYSICIAN ASSISTANT

## 2022-12-02 RX ORDER — OSELTAMIVIR PHOSPHATE 75 MG/1
75 CAPSULE ORAL 2 TIMES DAILY
Qty: 10 CAPSULE | Refills: 0 | Status: SHIPPED | OUTPATIENT
Start: 2022-12-02 | End: 2022-12-07

## 2022-12-02 RX ORDER — DIPHENHYDRAMINE HYDROCHLORIDE AND LIDOCAINE HYDROCHLORIDE AND ALUMINUM HYDROXIDE AND MAGNESIUM HYDRO
5-10 KIT EVERY 6 HOURS PRN
Qty: 237 ML | Refills: 0 | Status: SHIPPED | OUTPATIENT
Start: 2022-12-02 | End: 2023-03-09

## 2022-12-04 PROCEDURE — 83993 ASSAY FOR CALPROTECTIN FECAL: CPT | Performed by: PHYSICIAN ASSISTANT

## 2022-12-04 PROCEDURE — 87506 IADNA-DNA/RNA PROBE TQ 6-11: CPT | Performed by: PHYSICIAN ASSISTANT

## 2022-12-05 ENCOUNTER — APPOINTMENT (OUTPATIENT)
Dept: LAB | Facility: CLINIC | Age: 47
End: 2022-12-05
Payer: MEDICARE

## 2022-12-05 LAB
C COLI+JEJUNI+LARI FUSA STL QL NAA+PROBE: NOT DETECTED
C DIFF TOX B STL QL: NEGATIVE
EC STX1 GENE STL QL NAA+PROBE: NOT DETECTED
EC STX2 GENE STL QL NAA+PROBE: NOT DETECTED
NOROV GI+II ORF1-ORF2 JNC STL QL NAA+PR: NOT DETECTED
RVA NSP5 STL QL NAA+PROBE: NOT DETECTED
SALMONELLA SP RPOD STL QL NAA+PROBE: NOT DETECTED
SHIGELLA SP+EIEC IPAH STL QL NAA+PROBE: NOT DETECTED
V CHOL+PARA RFBL+TRKH+TNAA STL QL NAA+PR: NOT DETECTED
Y ENTERO RECN STL QL NAA+PROBE: NOT DETECTED

## 2022-12-07 LAB — CALPROTECTIN STL-MCNT: 109 MG/KG (ref 0–49.9)

## 2022-12-08 DIAGNOSIS — R19.7 DIARRHEA OF PRESUMED INFECTIOUS ORIGIN: Primary | ICD-10-CM

## 2022-12-21 ENCOUNTER — OFFICE VISIT (OUTPATIENT)
Dept: SURGERY | Facility: CLINIC | Age: 47
End: 2022-12-21
Attending: INTERNAL MEDICINE
Payer: MEDICARE

## 2022-12-21 ENCOUNTER — TELEPHONE (OUTPATIENT)
Dept: GASTROENTEROLOGY | Facility: CLINIC | Age: 47
End: 2022-12-21

## 2022-12-21 VITALS — WEIGHT: 197 LBS | BODY MASS INDEX: 29.95 KG/M2

## 2022-12-21 DIAGNOSIS — K44.9 HIATAL HERNIA: ICD-10-CM

## 2022-12-21 DIAGNOSIS — K21.00 GASTROESOPHAGEAL REFLUX DISEASE WITH ESOPHAGITIS, UNSPECIFIED WHETHER HEMORRHAGE: ICD-10-CM

## 2022-12-21 PROCEDURE — 99204 OFFICE O/P NEW MOD 45 MIN: CPT | Performed by: SURGERY

## 2022-12-21 NOTE — PROGRESS NOTES
Patient seen in consultation for gerd by Lorri Holley    HPI:  Patient is a 47 year old female  with complaints of vomiting/regurgitation, has to sleep propped up like 45 degrees. Used to feel heartburn all the time but seems less now.  Has been on various PPI over the years, currently taking prevacid  The patient noticed the symptoms about years ago.  Worse in last year  Has had multiple scopes  Sometimes has some trouble with bread and meat but not all the time.  nothing makes the episode better.  Patient has not family history of gerd problems at least not to this degree    Review Of Systems    Skin: negative  Ears/Nose/Throat: negative  Respiratory: No shortness of breath, dyspnea on exertion, cough, or hemoptysis  Cardiovascular: negative  Gastrointestinal: as above  Genitourinary: negative  Musculoskeletal: negative  Neurologic: negative  Hematologic/Lymphatic/Immunologic: negative  Endocrine: negative      Past Medical History:   Diagnosis Date     MDD (major depressive disorder)      Obesity (BMI 30-39.9)        Past Surgical History:   Procedure Laterality Date     CAPSULE/PILL CAM ENDOSCOPY N/A 04/03/2018    Procedure: CAPSULE/PILL CAM ENDOSCOPY;;  Surgeon: Guru Hallie Song MD;  Location:  GI     COLONOSCOPY N/A 12/28/2017    Procedure: COLONOSCOPY;  Surgeon: Yosi Beck MD;  Location: [facility];  Service:      COLONOSCOPY N/A 08/11/2022    Procedure: COLONOSCOPY, WITH POLYPECTOMY AND BIOPSY;  Surgeon: Lorri Holley DO;  Location:  OR     COLONOSCOPY WITH CO2 INSUFFLATION N/A 08/11/2022    Procedure: COLONOSCOPY, WITH CO2 INSUFFLATION;  Surgeon: Lorri oHlley DO;  Location:  OR     COMBINED ESOPHAGOSCOPY, GASTROSCOPY, DUODENOSCOPY (EGD) WITH CO2 INSUFFLATION N/A 08/11/2022    Procedure: ESOPHAGOGASTRODUODENOSCOPY, WITH CO2 INSUFFLATION;  Surgeon: Lorri Holley DO;  Location:  OR     COMBINED ESOPHAGOSCOPY, GASTROSCOPY, DUODENOSCOPY (EGD) WITH CO2  INSUFFLATION N/A 11/25/2022    Procedure: ESOPHAGOGASTRODUODENOSCOPY, WITH CO2 INSUFFLATION;  Surgeon: Lorri Holley DO;  [facility]     ENDOSCOPIC ULTRASOUND UPPER GASTROINTESTINAL TRACT (GI) N/A 04/03/2018    Procedure: ENDOSCOPIC ULTRASOUND, ESOPHAGOSCOPY / UPPER GASTROINTESTINAL TRACT (GI);  EUS/Capsule ;  Surgeon: Guru Hallie Song MD;  Location: [facility]     ESOPHAGOSCOPY, GASTROSCOPY, DUODENOSCOPY (EGD), COMBINED N/A 12/28/2017    Procedure: ESOPHAGOGASTRODUODENOSCOPY (EGD);  Surgeon: Yosi Beck MD;  Location: [facility];  Service:      ESOPHAGOSCOPY, GASTROSCOPY, DUODENOSCOPY (EGD), COMBINED N/A 08/11/2022    Procedure: ESOPHAGOGASTRODUODENOSCOPY, WITH BIOPSY;  Surgeon: Lorri Holley DO;  Location:  OR     ESOPHAGOSCOPY, GASTROSCOPY, DUODENOSCOPY (EGD), COMBINED N/A 11/25/2022    Procedure: ESOPHAGOGASTRODUODENOSCOPY, WITH BIOPSY;  Surgeon: Lorri Holley DO;  Location:  OR     KNEE SURGERY Left     osteotomy     LAPAROSCOPIC CHOLECYSTECTOMY N/A 04/25/2018    Procedure: LAPAROSCOPIC CHOLECYSTECTOMY;  Laparoscopic Cholecystectomy ;  Surgeon: Alberto Noble MD;  Location:  OR     UPPER GASTROINTESTINAL ENDOSCOPY         Social History     Socioeconomic History     Marital status:      Spouse name: Not on file     Number of children: Not on file     Years of education: Not on file     Highest education level: Not on file   Occupational History     Not on file   Tobacco Use     Smoking status: Every Day     Packs/day: 1.00     Years: 29.00     Pack years: 29.00     Types: Cigarettes     Smokeless tobacco: Former     Tobacco comments:     smoking since age 16   Vaping Use     Vaping Use: Never used   Substance and Sexual Activity     Alcohol use: Yes     Comment: 12-15 drinks/week     Drug use: Not Currently     Types: Marijuana     Comment: 0.5gm/day     Sexual activity: Not Currently   Other Topics Concern     Parent/sibling w/ CABG, MI or angioplasty  before 65F 55M? Not Asked   Social History Narrative    Single.    No kids.    Walks 3-4x/week; takes care of niece and nephew regularly.     Social Determinants of Health     Financial Resource Strain: Not on file   Food Insecurity: Not on file   Transportation Needs: Not on file   Physical Activity: Not on file   Stress: Not on file   Social Connections: Not on file   Intimate Partner Violence: Not on file   Housing Stability: Not on file       Current Outpatient Medications   Medication Sig Dispense Refill     escitalopram (LEXAPRO) 20 MG tablet Take 1 tablet (20 mg) by mouth daily 90 tablet 0     LANsoprazole (PREVACID) 30 MG DR capsule Take 1 capsule (30 mg) by mouth 2 times daily 60 capsule 3     magic mouthwash suspension, diphenhydrAMINE, lidocaine, aluminum-magnesium & simethicone, (FIRST-MOUTHWASH BLM) compounding kit Swish and swallow 5-10 mLs in mouth every 6 hours as needed for mouth sores 237 mL 0     ondansetron (ZOFRAN ODT) 8 MG ODT tab Take 1 tablet (8 mg) by mouth every 8 hours as needed for nausea 30 tablet 3       Medications and history reviewed    Physical exam:  Vitals: Wt 89.4 kg (197 lb)   BMI 29.95 kg/m    BMI= Body mass index is 29.95 kg/m .    Constitutional: healthy, alert and no distress  Head: Normocephalic. No masses, lesions, tenderness or abnormalities  Cardiovascular: negative, PMI normal. No lifts, heaves, or thrills. RRR. No murmurs, clicks gallops or rub  Respiratory: negative, Percussion normal. Good diaphragmatic excursion. Lungs clear  Gastrointestinal: Abdomen soft, non-tender. BS normal. No masses, organomegaly  : Deferred  Musculoskeletal: extremities normal- no gross deformities noted, gait normal and normal muscle tone  Skin: no suspicious lesions or rashes      Imaging shows: personally reviewed images  EGD 11/25/22  Findings:        LA Grade D (one or more mucosal breaks involving at least 75% of        esophageal circumference) esophagitis was found 29 to 34 cm  from the        incisors. Biopsies were taken with a cold forceps for histology. To        prevent bleeding after the biopsy, one hemostatic clip was successfully        placed. There was no bleeding at the end of the procedure.        A 6 cm hiatal hernia was found. The hiatal narrowing was 40 cm from the        incisors. The Z-line was 34 cm from the incisors.        The entire examined stomach was normal.        The examined duodenum was normal.     Assessment:     ICD-10-CM    1. Hiatal hernia  K44.9 Adult General Surg Referral      2. Gastroesophageal reflux disease with esophagitis, unspecified whether hemorrhage  K21.00 Adult General Surg Referral        Plan: Long history of reflux issues, GERD confirmed with persistent grade D esophagitis despite PPI therapy.  Approximately 6 cm hiatal hernia seen on most recent scope.  Does have indication for hiatal hernia repair and antireflux surgery.  Briefly discussed options of hiatal hernia repair with either fundoplication or the Linx implant.  Discussed some of the differences between and risks of postoperative dysphagia, gas bloat, potential of erosion with the implant at 3 of 1000.  Decision on which will depend on manometry results.  This is already been ordered but she has not yet scheduled.  Once this is completed and final results back we will plan to have her return to clinic to discuss results and come up with final surgical plan.  Questions answered.    Abdiaziz Tolbert MD

## 2022-12-21 NOTE — TELEPHONE ENCOUNTER
A user error has taken place: encounter opened in error, closed for administrative reasons.

## 2022-12-21 NOTE — LETTER
12/21/2022         RE: Vianca Kumar  [address]  [facility] [address]        Dear Colleague,    Thank you for referring your patient, Vianca Kumar, to the [facility]. Please see a copy of my visit note below.    Patient seen in consultation for gerd by Lorri Holley    HPI:  Patient is a 47 year old female  with complaints of vomiting/regurgitation, has to sleep propped up like 45 degrees. Used to feel heartburn all the time but seems less now.  Has been on various PPI over the years, currently taking prevacid  The patient noticed the symptoms about years ago.  Worse in last year  Has had multiple scopes  Sometimes has some trouble with bread and meat but not all the time.  nothing makes the episode better.  Patient has not family history of gerd problems at least not to this degree    Review Of Systems    Skin: negative  Ears/Nose/Throat: negative  Respiratory: No shortness of breath, dyspnea on exertion, cough, or hemoptysis  Cardiovascular: negative  Gastrointestinal: as above  Genitourinary: negative  Musculoskeletal: negative  Neurologic: negative  Hematologic/Lymphatic/Immunologic: negative  Endocrine: negative      Past Medical History:   Diagnosis Date     MDD (major depressive disorder)      Obesity (BMI 30-39.9)        Past Surgical History:   Procedure Laterality Date     CAPSULE/PILL CAM ENDOSCOPY N/A 04/03/2018    Procedure: CAPSULE/PILL CAM ENDOSCOPY;;  Surgeon: Guru Hallie Song MD;  Location:  GI     COLONOSCOPY N/A 12/28/2017    Procedure: COLONOSCOPY;  Surgeon: Yosi Beck MD;  Location: [facility];  Service:      COLONOSCOPY N/A 08/11/2022    Procedure: COLONOSCOPY, WITH POLYPECTOMY AND BIOPSY;  Surgeon: Lorri Holley DO;  [facility]: [facility]     COLONOSCOPY WITH CO2 INSUFFLATION N/A 08/11/2022    Procedure: COLONOSCOPY, WITH CO2 INSUFFLATION;  Surgeon: Lorri Holley DO;  Location: [facility]     COMBINED  ESOPHAGOSCOPY, GASTROSCOPY, DUODENOSCOPY (EGD) WITH CO2 INSUFFLATION N/A 08/11/2022    Procedure: ESOPHAGOGASTRODUODENOSCOPY, WITH CO2 INSUFFLATION;  Surgeon: Lorri Holley DO;  Location:  OR     COMBINED ESOPHAGOSCOPY, GASTROSCOPY, DUODENOSCOPY (EGD) WITH CO2 INSUFFLATION N/A 11/25/2022    Procedure: ESOPHAGOGASTRODUODENOSCOPY, WITH CO2 INSUFFLATION;  Surgeon: Lorri Holley DO;  [facility]: [facility]     ENDOSCOPIC ULTRASOUND UPPER GASTROINTESTINAL TRACT (GI) N/A 04/03/2018    Procedure: ENDOSCOPIC ULTRASOUND, ESOPHAGOSCOPY / UPPER GASTROINTESTINAL TRACT (GI);  EUS/Capsule ;  Surgeon: Guru Hallie Song MD;  Location:  GI     ESOPHAGOSCOPY, GASTROSCOPY, DUODENOSCOPY (EGD), COMBINED N/A 12/28/2017    Procedure: ESOPHAGOGASTRODUODENOSCOPY (EGD);  Surgeon: Yosi Beck MD;  Location: [facility];  Service:      ESOPHAGOSCOPY, GASTROSCOPY, DUODENOSCOPY (EGD), COMBINED N/A 08/11/2022    Procedure: ESOPHAGOGASTRODUODENOSCOPY, WITH BIOPSY;  Surgeon: Lorri Holley DO;  Location:  OR     ESOPHAGOSCOPY, GASTROSCOPY, DUODENOSCOPY (EGD), COMBINED N/A 11/25/2022    Procedure: ESOPHAGOGASTRODUODENOSCOPY, WITH BIOPSY;  Surgeon: Lorri Holley DO;  Location:  OR     KNEE SURGERY Left     osteotomy     LAPAROSCOPIC CHOLECYSTECTOMY N/A 04/25/2018    Procedure: LAPAROSCOPIC CHOLECYSTECTOMY;  Laparoscopic Cholecystectomy ;  Surgeon: Alberto Noble MD;  Location:  OR     UPPER GASTROINTESTINAL ENDOSCOPY         Social History     Socioeconomic History     Marital status:      Spouse name: Not on file     Number of children: Not on file     Years of education: Not on file     Highest education level: Not on file   Occupational History     Not on file   Tobacco Use     Smoking status: Every Day     Packs/day: 1.00     Years: 29.00     Pack years: 29.00     Types: Cigarettes     Smokeless tobacco: Former     Tobacco comments:     smoking since age 16   Vaping Use     Vaping Use:  Never used   Substance and Sexual Activity     Alcohol use: Yes     Comment: 12-15 drinks/week     Drug use: Not Currently     Types: Marijuana     Comment: 0.5gm/day     Sexual activity: Not Currently   Other Topics Concern     Parent/sibling w/ CABG, MI or angioplasty before 65F 55M? Not Asked   Social History Narrative    Single.    No kids.    Walks 3-4x/week; takes care of niece and nephew regularly.     Social Determinants of Health     Financial Resource Strain: Not on file   Food Insecurity: Not on file   Transportation Needs: Not on file   Physical Activity: Not on file   Stress: Not on file   Social Connections: Not on file   Intimate Partner Violence: Not on file   Housing Stability: Not on file       Current Outpatient Medications   Medication Sig Dispense Refill     escitalopram (LEXAPRO) 20 MG tablet Take 1 tablet (20 mg) by mouth daily 90 tablet 0     LANsoprazole (PREVACID) 30 MG DR capsule Take 1 capsule (30 mg) by mouth 2 times daily 60 capsule 3     magic mouthwash suspension, diphenhydrAMINE, lidocaine, aluminum-magnesium & simethicone, (FIRST-MOUTHWASH BLM) compounding kit Swish and swallow 5-10 mLs in mouth every 6 hours as needed for mouth sores 237 mL 0     ondansetron (ZOFRAN ODT) 8 MG ODT tab Take 1 tablet (8 mg) by mouth every 8 hours as needed for nausea 30 tablet 3       Medications and history reviewed    Physical exam:  Vitals: Wt 89.4 kg (197 lb)   BMI 29.95 kg/m    BMI= Body mass index is 29.95 kg/m .    Constitutional: healthy, alert and no distress  Head: Normocephalic. No masses, lesions, tenderness or abnormalities  Cardiovascular: negative, PMI normal. No lifts, heaves, or thrills. RRR. No murmurs, clicks gallops or rub  Respiratory: negative, Percussion normal. Good diaphragmatic excursion. Lungs clear  Gastrointestinal: Abdomen soft, non-tender. BS normal. No masses, organomegaly  : Deferred  Musculoskeletal: extremities normal- no gross deformities noted, gait normal and  normal muscle tone  Skin: no suspicious lesions or rashes      Imaging shows: personally reviewed images  EGD 11/25/22  Findings:        LA Grade D (one or more mucosal breaks involving at least 75% of        esophageal circumference) esophagitis was found 29 to 34 cm from the        incisors. Biopsies were taken with a cold forceps for histology. To        prevent bleeding after the biopsy, one hemostatic clip was successfully        placed. There was no bleeding at the end of the procedure.        A 6 cm hiatal hernia was found. The hiatal narrowing was 40 cm from the        incisors. The Z-line was 34 cm from the incisors.        The entire examined stomach was normal.        The examined duodenum was normal.     Assessment:     ICD-10-CM    1. Hiatal hernia  K44.9 Adult General Surg Referral      2. Gastroesophageal reflux disease with esophagitis, unspecified whether hemorrhage  K21.00 Adult General Surg Referral        Plan: Long history of reflux issues, GERD confirmed with persistent grade D esophagitis despite PPI therapy.  Approximately 6 cm hiatal hernia seen on most recent scope.  Does have indication for hiatal hernia repair and antireflux surgery.  Briefly discussed options of hiatal hernia repair with either fundoplication or the Linx implant.  Discussed some of the differences between and risks of postoperative dysphagia, gas bloat, potential of erosion with the implant at 3 of 1000.  Decision on which will depend on manometry results.  This is already been ordered but she has not yet scheduled.  Once this is completed and final results back we will plan to have her return to clinic to discuss results and come up with final surgical plan.  Questions answered.    Abdiaziz Tolbert MD        Again, thank you for allowing me to participate in the care of your patient.        Sincerely,        Abdiaziz Tolbert MD

## 2023-01-05 ENCOUNTER — PATIENT OUTREACH (OUTPATIENT)
Dept: GASTROENTEROLOGY | Facility: CLINIC | Age: 48
End: 2023-01-05

## 2023-01-05 NOTE — TELEPHONE ENCOUNTER
Left message for pt to call back with any questions about upcoming manometry testing. Sent [employer] message with information as well.

## 2023-01-11 ENCOUNTER — OFFICE VISIT (OUTPATIENT)
Dept: URGENT CARE | Facility: URGENT CARE | Age: 48
End: 2023-01-11
Payer: MEDICARE

## 2023-01-11 ENCOUNTER — TELEPHONE (OUTPATIENT)
Dept: GASTROENTEROLOGY | Facility: CLINIC | Age: 48
End: 2023-01-11

## 2023-01-11 VITALS
TEMPERATURE: 98.1 F | OXYGEN SATURATION: 99 % | SYSTOLIC BLOOD PRESSURE: 124 MMHG | RESPIRATION RATE: 16 BRPM | DIASTOLIC BLOOD PRESSURE: 86 MMHG | HEART RATE: 77 BPM

## 2023-01-11 DIAGNOSIS — J10.1 INFLUENZA B: Primary | ICD-10-CM

## 2023-01-11 DIAGNOSIS — R07.0 THROAT PAIN: ICD-10-CM

## 2023-01-11 LAB
DEPRECATED S PYO AG THROAT QL EIA: NEGATIVE
FLUAV AG SPEC QL IA: NEGATIVE
FLUBV AG SPEC QL IA: POSITIVE
GROUP A STREP BY PCR: NOT DETECTED
WBC # BLD AUTO: 15 10E3/UL (ref 4–11)

## 2023-01-11 PROCEDURE — 85048 AUTOMATED LEUKOCYTE COUNT: CPT | Performed by: PHYSICIAN ASSISTANT

## 2023-01-11 PROCEDURE — 99213 OFFICE O/P EST LOW 20 MIN: CPT | Performed by: PHYSICIAN ASSISTANT

## 2023-01-11 PROCEDURE — 87651 STREP A DNA AMP PROBE: CPT | Performed by: PHYSICIAN ASSISTANT

## 2023-01-11 PROCEDURE — 36415 COLL VENOUS BLD VENIPUNCTURE: CPT | Performed by: PHYSICIAN ASSISTANT

## 2023-01-11 PROCEDURE — 87804 INFLUENZA ASSAY W/OPTIC: CPT | Performed by: PHYSICIAN ASSISTANT

## 2023-01-11 RX ORDER — OSELTAMIVIR PHOSPHATE 75 MG/1
75 CAPSULE ORAL 2 TIMES DAILY
Qty: 10 CAPSULE | Refills: 0 | Status: SHIPPED | OUTPATIENT
Start: 2023-01-11 | End: 2023-01-16

## 2023-01-11 NOTE — PROGRESS NOTES
SUBJECTIVE:   Vianca Kumar is a 47 year old female presenting with a chief complaint of fever, runny nose, stuffy nose, cough - non-productive, sore throat, facial pain/pressure, headache, body aches, fatigue and nausea.  Onset of symptoms was 2 week(s) ago.  Course of illness is same.    Severity moderate  Current and Associated symptoms: strep/flu  Treatment measures tried include Tylenol/Ibuprofen today.  Predisposing factors include flu and strep.    Past Medical History:   Diagnosis Date     MDD (major depressive disorder)      Obesity (BMI 30-39.9)      Current Outpatient Medications   Medication Sig Dispense Refill     escitalopram (LEXAPRO) 20 MG tablet Take 1 tablet (20 mg) by mouth daily 90 tablet 0     LANsoprazole (PREVACID) 30 MG DR capsule Take 1 capsule (30 mg) by mouth 2 times daily 60 capsule 3     magic mouthwash suspension, diphenhydrAMINE, lidocaine, aluminum-magnesium & simethicone, (FIRST-MOUTHWASH BLM) compounding kit Swish and swallow 5-10 mLs in mouth every 6 hours as needed for mouth sores 237 mL 0     ondansetron (ZOFRAN ODT) 8 MG ODT tab Take 1 tablet (8 mg) by mouth every 8 hours as needed for nausea 30 tablet 3     Social History     Tobacco Use     Smoking status: Every Day     Packs/day: 1.00     Years: 29.00     Pack years: 29.00     Types: Cigarettes     Smokeless tobacco: Former     Tobacco comments:     smoking since age 16   Substance Use Topics     Alcohol use: Yes     Comment: 12-15 drinks/week       ROS:  Review of systems negative except as stated above.    OBJECTIVE:  /86   Pulse 77   Temp 98.1  F (36.7  C) (Tympanic)   Resp 16   SpO2 99%   GENERAL APPEARANCE: healthy, alert and no distress  EYES: conjunctiva clear  HENT: ear canals and TM's normal.  Nose and mouth without ulcers, erythema or lesions  NECK: supple, nontender, no lymphadenopathy  RESP: lungs clear to auscultation - no rales, rhonchi or wheezes  CV: regular rates and rhythm, normal S1 S2, no  murmur noted  NEURO: Normal strength and tone, sensory exam grossly normal,  normal speech and mentation  SKIN: no suspicious lesions or rashes      Results for orders placed or performed in visit on 01/11/23   WBC count     Status: Abnormal   Result Value Ref Range    WBC Count 15.0 (H) 4.0 - 11.0 10e3/uL   Influenza A/B antigen     Status: Abnormal    Specimen: Nose; Swab   Result Value Ref Range    Influenza A antigen Negative Negative    Influenza B antigen Positive (A) Negative    Narrative    Test results must be correlated with clinical data. If necessary, results should be confirmed by a molecular assay or viral culture.   Streptococcus A Rapid Screen w/Reflex to PCR     Status: Normal    Specimen: Throat; Swab   Result Value Ref Range    Group A Strep antigen Negative Negative   Group A Streptococcus PCR Throat Swab     Status: Normal    Specimen: Throat; Swab   Result Value Ref Range    Group A strep by PCR Not Detected Not Detected    Narrative    The Xpert Xpress Strep A test, performed on the [employer], is a rapid, qualitative in vitro diagnostic test for the detection of Streptococcus pyogenes (Group A ß-hemolytic Streptococcus, Strep A) in throat swab specimens from patients with signs and symptoms of pharyngitis. The Xpert Xpress Strep A test can be used as an aid in the diagnosis of Group A Streptococcal pharyngitis. The assay is not intended to monitor treatment for Group A Streptococcus infections. The Xpert Xpress Strep A test utilizes an automated real-time polymerase chain reaction (PCR) to detect Streptococcus pyogenes DNA.       ASSESSMENT:  (J10.1) Influenza B  (primary encounter diagnosis)  Plan: oseltamivir (TAMIFLU) 75 MG capsule       (R07.0) Throat pain  Plan: Influenza A/B antigen, Streptococcus A Rapid         Screen w/Reflex to PCR, Group A Streptococcus         PCR Throat Swab, WBC count      Follow up with PCP if symptoms worsen or fail to improve

## 2023-01-11 NOTE — TELEPHONE ENCOUNTER
Non- Invasive Endoscopy  Scheduling Details      Caller : Vianca Kumar    Procedure scheduled: pH Impedence (Manometry / Motility)  Ordering Provider: Lorri Holley DO  Provider/Surgeon: RN  Date of Scheduled Procedure: 2/9/23  Location: [facility]   [UNLESS IT IS VCE @ [facility] ARRIVAL 60MINS BEFORE]    Sedation Type: NO SEDATION  Informed patient they will need an adult  No  NEEDED  Cannot take any type of public or medical transportation alone    Where is COVID appointment Scheduled at?  NA      Additional comments/Staff message:       ----- Message from Mekhi Mendez RN sent at 1/11/2023  3:30 PM CST -----  Regarding: Reschedule Manometry  Hi Endo,    Can you please give this pt a call back to schedule their manometry testing. Pt called in needing to cancel but would now like to reschedule.    Thank you,  Mekhi

## 2023-01-11 NOTE — PATIENT INSTRUCTIONS
1.  Plenty of fluids, rest, warm compresses on face  2.  Mucinex twice daily for at least 4 days  3.  Melanie Pot 1x in the morning 1x at night (SALINE MIST SPRAY IS AN ACCEPTABLE, THOUGH NOT AS EFFECTIVE REPLACEMENT)  4.  Benadryl (diphenhydramine) at bedtime   5.  Either Claritin (Loratadine), Allegra (Fexofenadine), or Zyrtec (Cetirizine) in the day  6.  Flonase (Fluticasone) 2x each nostril twice a day for two weeks, then once each nostril once a day       Please let us know if symptoms persist, or worsen.  Fever and focal pain may be a sign of a bacterial infection which may need antibiotic treatment

## 2023-02-09 ENCOUNTER — OFFICE VISIT (OUTPATIENT)
Dept: GASTROENTEROLOGY | Facility: CLINIC | Age: 48
End: 2023-02-09
Payer: MEDICARE

## 2023-02-09 VITALS
WEIGHT: 190 LBS | OXYGEN SATURATION: 97 % | SYSTOLIC BLOOD PRESSURE: 119 MMHG | HEART RATE: 69 BPM | HEIGHT: 68 IN | DIASTOLIC BLOOD PRESSURE: 80 MMHG | RESPIRATION RATE: 15 BRPM | BODY MASS INDEX: 28.79 KG/M2

## 2023-02-09 DIAGNOSIS — K21.00 GASTROESOPHAGEAL REFLUX DISEASE WITH ESOPHAGITIS, UNSPECIFIED WHETHER HEMORRHAGE: ICD-10-CM

## 2023-02-09 DIAGNOSIS — K44.9 HIATAL HERNIA: ICD-10-CM

## 2023-02-09 PROCEDURE — 91037 ESOPH IMPED FUNCTION TEST: CPT

## 2023-02-09 PROCEDURE — 91010 ESOPHAGUS MOTILITY STUDY: CPT

## 2023-02-09 ASSESSMENT — PAIN SCALES - GENERAL: PAINLEVEL: MILD PAIN (2)

## 2023-02-09 NOTE — PROGRESS NOTES
"Non-Invasive GI Procedure Visit    Vianca Kumar is a 47 year old female with history of    Hiatal hernia  Gastroesophageal reflux disease with esophagitis, unspecified whether hemorrhage.   Patient stated reason for procedure: Regurgitation and GERD    COVID-19 PCR Results    COVID-19 PCR Results   No data to display.         COVID-19 Antibody Results, Testing for Immunity    COVID-19 Antibody Results, Testing for Immunity   No data to display.             Pre-Procedure Assessment  Patient presents to clinic today for Esophageal Manometry Study    Referring Provider: Dr. Holley  Patient has undergone previous endoscopy.    Does patient report taking a PPI (omeprazole, pantoprazole, rabeprazole, lansoprazole, esomeprazole, dexlansoprazole)? Yes. Is patient taking a PPI twice daily? Yes  Does patient report taking a H2 blocker (ranitidine, or famotidine)? No  Does patient report taking opioids? No  Patient reported that last food and/or drink was last consumed 14 hours ago.  Esophageal Questionnaire(s) Completed: Yes - Esophageal Questionnaire(s)    BEDQ Questionnaire  No flowsheet data found.  No flowsheet data found.    Eckardt Questionnaire  No flowsheet data found.    Promis 10 Questionnaire  No flowsheet data found.      Patient Hx  Patient's history, medications and allergies were reviewed.     Height: 5' 8\"   Weight: 190 lbs 0 oz    Patient Active Problem List    Diagnosis Date Noted     Recurrent major depressive disorder, in full remission (H) 10/13/2022     Priority: Medium     Obesity (BMI 30-39.9)      Priority: Medium     MDD (major depressive disorder)      Priority: Medium      Prior to Admission medications    Medication Sig Start Date End Date Taking? Authorizing Provider   escitalopram (LEXAPRO) 20 MG tablet Take 1 tablet (20 mg) by mouth daily 1/2/23   Whitney Perez MD   LANsoprazole (PREVACID) 30 MG DR capsule Take 1 capsule (30 mg) by mouth 2 times daily 8/11/22   Lorri Holley, DO "   magic mouthwash suspension, diphenhydrAMINE, lidocaine, aluminum-magnesium & simethicone, (FIRST-MOUTHWASH BLM) compounding kit Swish and swallow 5-10 mLs in mouth every 6 hours as needed for mouth sores 12/2/22   Alberto Thomas, MAURY   ondansetron (ZOFRAN ODT) 8 MG ODT tab Take 1 tablet (8 mg) by mouth every 8 hours as needed for nausea 11/30/22   Lorri Holley DO     Allergies   Allergen Reactions     Avocado Hives     Chantix [Varenicline Tartrate] Other (See Comments)     depression     Past Medical History:   Diagnosis Date     MDD (major depressive disorder)      Obesity (BMI 30-39.9)      Past Surgical History:   Procedure Laterality Date     CAPSULE/PILL CAM ENDOSCOPY N/A 04/03/2018    Procedure: CAPSULE/PILL CAM ENDOSCOPY;;  Surgeon: Guru Hallie Song MD;  Location:  GI     COLONOSCOPY N/A 12/28/2017    Procedure: COLONOSCOPY;  Surgeon: Yosi Beck MD;  Location: [facility];  Service:      COLONOSCOPY N/A 08/11/2022    Procedure: COLONOSCOPY, WITH POLYPECTOMY AND BIOPSY;  Surgeon: Lorri Holley DO;  Location: [facility]     COLONOSCOPY WITH CO2 INSUFFLATION N/A 08/11/2022    Procedure: COLONOSCOPY, WITH CO2 INSUFFLATION;  Surgeon: Lorri Holley DO;  Location: [facility]     COMBINED ESOPHAGOSCOPY, GASTROSCOPY, DUODENOSCOPY (EGD) WITH CO2 INSUFFLATION N/A 08/11/2022    Procedure: ESOPHAGOGASTRODUODENOSCOPY, WITH CO2 INSUFFLATION;  Surgeon: Lorri Holley DO;  Location: [facility]     COMBINED ESOPHAGOSCOPY, GASTROSCOPY, DUODENOSCOPY (EGD) WITH CO2 INSUFFLATION N/A 11/25/2022    Procedure: ESOPHAGOGASTRODUODENOSCOPY, WITH CO2 INSUFFLATION;  Surgeon: Lorri Holley DO;  Location: [facility]     ENDOSCOPIC ULTRASOUND UPPER GASTROINTESTINAL TRACT (GI) N/A 04/03/2018    Procedure: ENDOSCOPIC ULTRASOUND, ESOPHAGOSCOPY / UPPER GASTROINTESTINAL TRACT (GI);  EUS/Capsule ;  Surgeon: Guru Hallie Song MD;  Location: [facility]     ESOPHAGOSCOPY, GASTROSCOPY, DUODENOSCOPY  (EGD), COMBINED N/A 12/28/2017    Procedure: ESOPHAGOGASTRODUODENOSCOPY (EGD);  Surgeon: Yosi Beck MD;  Location: [facility];  Service:      ESOPHAGOSCOPY, GASTROSCOPY, DUODENOSCOPY (EGD), COMBINED N/A 08/11/2022    Procedure: ESOPHAGOGASTRODUODENOSCOPY, WITH BIOPSY;  Surgeon: Lorri Holley DO;  Location: [facility]     ESOPHAGOSCOPY, GASTROSCOPY, DUODENOSCOPY (EGD), COMBINED N/A 11/25/2022    Procedure: ESOPHAGOGASTRODUODENOSCOPY, WITH BIOPSY;  Surgeon: Lorri Holley DO;  Location: [facility]     KNEE SURGERY Left     osteotomy     LAPAROSCOPIC CHOLECYSTECTOMY N/A 04/25/2018    Procedure: LAPAROSCOPIC CHOLECYSTECTOMY;  Laparoscopic Cholecystectomy ;  Surgeon: Alberto Noble MD;  Location: [facility] OR     UPPER GASTROINTESTINAL ENDOSCOPY       Family History   Problem Relation Age of Onset     Hypertension Mother      Hyperlipidemia Mother      Hypertension Brother      Hyperlipidemia Brother      Myocardial Infarction Maternal Grandfather 47     Myocardial Infarction Maternal Uncle         multiple later in life     Diabetes No family hx of      Breast Cancer No family hx of      Ovarian Cancer No family hx of      Colon Cancer No family hx of      Cerebrovascular Disease No family hx of      Social History     Tobacco Use     Smoking status: Every Day     Packs/day: 1.00     Years: 29.00     Pack years: 29.00     Types: Cigarettes     Smokeless tobacco: Former     Tobacco comments:     smoking since age 16   Substance Use Topics     Alcohol use: Yes     Comment: 12-15 drinks/week        Pre-Procedure Education & Consent  Procedure education was provided to: Patient  Teaching method: Explanation  Barriers to learning: No Barrier    Patient indicated understanding of pre-procedure instruction and appropriate consent was obtained and documented.    ____________________________________________________________________    Post-Procedure Documentation: Esophageal Manometry    Manometry catheter was placed via  left nare to 51 cm and normal saline swallows given per protocol. Manometry catheter was removed at the end of test.    Discharge instructions given to patient.    Notification of pending test results sent to provider for interpretation. Please reference scanned document for final interpretation of results. Patient will follow up with referring provider for test results.    Mekhi Mendez RN on 2/9/2023 at 10:58 AM

## 2023-02-09 NOTE — PATIENT INSTRUCTIONS
Esophogeal Manometry Study  1. Resume regular diet.  2. You may have a bloody nose or sore throat after the procedure.  3. If you have questions call [phone] from 7:00am-5:00pm.  For afterhours questions call GI doctor on call at [phone].

## 2023-03-08 ENCOUNTER — OFFICE VISIT (OUTPATIENT)
Dept: SURGERY | Facility: CLINIC | Age: 48
End: 2023-03-08
Payer: MEDICARE

## 2023-03-08 ENCOUNTER — TELEPHONE (OUTPATIENT)
Dept: SURGERY | Facility: CLINIC | Age: 48
End: 2023-03-08

## 2023-03-08 VITALS
HEART RATE: 80 BPM | DIASTOLIC BLOOD PRESSURE: 69 MMHG | BODY MASS INDEX: 28.89 KG/M2 | SYSTOLIC BLOOD PRESSURE: 110 MMHG | WEIGHT: 190 LBS

## 2023-03-08 DIAGNOSIS — K21.00 GASTROESOPHAGEAL REFLUX DISEASE WITH ESOPHAGITIS, UNSPECIFIED WHETHER HEMORRHAGE: ICD-10-CM

## 2023-03-08 DIAGNOSIS — K44.9 HIATAL HERNIA: Primary | ICD-10-CM

## 2023-03-08 PROBLEM — F33.42 RECURRENT MAJOR DEPRESSIVE DISORDER, IN FULL REMISSION (H): Status: RESOLVED | Noted: 2022-10-13 | Resolved: 2023-03-08

## 2023-03-08 PROCEDURE — 99214 OFFICE O/P EST MOD 30 MIN: CPT | Mod: 57 | Performed by: SURGERY

## 2023-03-08 NOTE — PROGRESS NOTES
General Surgery Follow Up    Pt returns for follow up visit for going over manometry results    HPI:  Patient returning to go over her manometry results and discuss surgical options.  Currently on lansoprazole twice daily.  Has been working on cutting back on smoking.  Otherwise no change compared to previously.    From previous visit:  Patient is a 47 year old female  with complaints of vomiting/regurgitation, has to sleep propped up like 45 degrees. Used to feel heartburn all the time but seems less now.  Has been on various PPI over the years, currently taking prevacid  The patient noticed the symptoms about years ago.  Worse in last year  Has had multiple scopes  Sometimes has some trouble with bread and meat but not all the time.  nothing makes the episode better.  Patient has not family history of gerd problems at least not to this degree    Review Of Systems    Skin: negative  Ears/Nose/Throat: negative  Respiratory: No shortness of breath, dyspnea on exertion, cough, or hemoptysis  Cardiovascular: negative  Gastrointestinal: negative  Genitourinary: negative  Musculoskeletal: negative  Neurologic: negative  Hematologic/Lymphatic/Immunologic: negative  Endocrine: negative      Past Medical History:   Diagnosis Date     MDD (major depressive disorder)      Obesity (BMI 30-39.9)        Past Surgical History:   Procedure Laterality Date     CAPSULE/PILL CAM ENDOSCOPY N/A 04/03/2018    Procedure: CAPSULE/PILL CAM ENDOSCOPY;;  Surgeon: Guru Hallie Song MD;  Location:  GI     COLONOSCOPY N/A 12/28/2017    Procedure: COLONOSCOPY;  Surgeon: Yosi Beck MD;  Location: [facility];  Service:      COLONOSCOPY N/A 08/11/2022    Procedure: COLONOSCOPY, WITH POLYPECTOMY AND BIOPSY;  Surgeon: Lorri Holley DO;  Location: [facility]     COLONOSCOPY WITH CO2 INSUFFLATION N/A 08/11/2022    Procedure: COLONOSCOPY, WITH CO2 INSUFFLATION;  Surgeon: Lorri Holley DO;  Location:  OR      COMBINED ESOPHAGOSCOPY, GASTROSCOPY, DUODENOSCOPY (EGD) WITH CO2 INSUFFLATION N/A 08/11/2022    Procedure: ESOPHAGOGASTRODUODENOSCOPY, WITH CO2 INSUFFLATION;  Surgeon: Lorri Holley DO;  Location: [facility]     COMBINED ESOPHAGOSCOPY, GASTROSCOPY, DUODENOSCOPY (EGD) WITH CO2 INSUFFLATION N/A 11/25/2022    Procedure: ESOPHAGOGASTRODUODENOSCOPY, WITH CO2 INSUFFLATION;  Surgeon: Lorri Holley DO;  Location: [facility]     ENDOSCOPIC ULTRASOUND UPPER GASTROINTESTINAL TRACT (GI) N/A 04/03/2018    Procedure: ENDOSCOPIC ULTRASOUND, ESOPHAGOSCOPY / UPPER GASTROINTESTINAL TRACT (GI);  EUS/Capsule ;  Surgeon: Guru Hallie Song MD;  Location:  GI     ESOPHAGOSCOPY, GASTROSCOPY, DUODENOSCOPY (EGD), COMBINED N/A 12/28/2017    Procedure: ESOPHAGOGASTRODUODENOSCOPY (EGD);  Surgeon: Yosi Beck MD;  Location: [facility];  Service:      ESOPHAGOSCOPY, GASTROSCOPY, DUODENOSCOPY (EGD), COMBINED N/A 08/11/2022    Procedure: ESOPHAGOGASTRODUODENOSCOPY, WITH BIOPSY;  Surgeon: Lorri Holley DO;  Location:  OR     ESOPHAGOSCOPY, GASTROSCOPY, DUODENOSCOPY (EGD), COMBINED N/A 11/25/2022    Procedure: ESOPHAGOGASTRODUODENOSCOPY, WITH BIOPSY;  Surgeon: Lorri Holley DO;  Location:  OR     KNEE SURGERY Left     osteotomy     LAPAROSCOPIC CHOLECYSTECTOMY N/A 04/25/2018    Procedure: LAPAROSCOPIC CHOLECYSTECTOMY;  Laparoscopic Cholecystectomy ;  Surgeon: Alberto Noble MD;  Location:  OR     UPPER GASTROINTESTINAL ENDOSCOPY         Social History     Socioeconomic History     Marital status:      Spouse name: Not on file     Number of children: Not on file     Years of education: Not on file     Highest education level: Not on file   Occupational History     Not on file   Tobacco Use     Smoking status: Every Day     Packs/day: 1.00     Years: 29.00     Pack years: 29.00     Types: Cigarettes     Smokeless tobacco: Former     Tobacco comments:     smoking since age 16   Vaping Use     Vaping  Use: Never used   Substance and Sexual Activity     Alcohol use: Yes     Comment: 12-15 drinks/week     Drug use: Not Currently     Types: Marijuana     Comment: 0.5gm/day     Sexual activity: Not Currently   Other Topics Concern     Parent/sibling w/ CABG, MI or angioplasty before 65F 55M? Not Asked   Social History Narrative    Single.    No kids.    Walks 3-4x/week; takes care of niece and nephew regularly.     Social Determinants of Health     Financial Resource Strain: Not on file   Food Insecurity: Not on file   Transportation Needs: Not on file   Physical Activity: Not on file   Stress: Not on file   Social Connections: Not on file   Intimate Partner Violence: Not on file   Housing Stability: Not on file       Current Outpatient Medications   Medication Sig Dispense Refill     escitalopram (LEXAPRO) 20 MG tablet Take 1 tablet (20 mg) by mouth daily 90 tablet 0     LANsoprazole (PREVACID) 30 MG DR capsule Take 1 capsule (30 mg) by mouth 2 times daily 60 capsule 3     magic mouthwash suspension, diphenhydrAMINE, lidocaine, aluminum-magnesium & simethicone, (FIRST-MOUTHWASH BLM) compounding kit Swish and swallow 5-10 mLs in mouth every 6 hours as needed for mouth sores (Patient not taking: Reported on 3/8/2023) 237 mL 0     ondansetron (ZOFRAN ODT) 8 MG ODT tab Take 1 tablet (8 mg) by mouth every 8 hours as needed for nausea (Patient not taking: Reported on 3/8/2023) 30 tablet 3       Medications and history reviewed    Physical exam:  Vitals: /69   Pulse 80   Wt 86.2 kg (190 lb)   BMI 28.89 kg/m    BMI= Body mass index is 28.89 kg/m .    Constitutional: healthy, alert and no distress      Imaging shows:  HRM   The baseline tone of the lower esophageal sphincter was normotensive. The lower esophageal sphincter was able to relax appropriately as measured by the IRP (10.2) in the supine position. The EGJ morphology was type 3. There was peristalsis visible. The DCI, DL, and contractile pattern were within  normal limits in all 10/10 swallows. There were 1/10 swallows with elevated intrabolus pressure and compartmentalized pressurization. Rapid water swallows were performed with normal augmentation. Rapid Drink Challenge does not indicate an elevated IRP. Supine liquid swallows were performed. 5 upright liquid swallows were performed with a median upright IRP of 13.9. Bolus transit as measured by impedance was complete in 7/10 swallows. This is most consistent with normal esophageal motility and a hiatal hernia.       Wording of procedure description and interpretation was adapted from [employer] [facility] [employer] [facility] Weekly Motility Conference (2018).       Impressions   Based on the most recent [place] Classification v4.0, the findings are most consistent with normal esophageal motility and a hiatal hernia.     EGD  Impression:               - LA Grade D reflux esophagitis. Rule out Gordon's                             esophagus. Biopsied. Clip was placed for bleeding                             after biopsy                             - 6 cm hiatal hernia.                             - Normal stomach.                             - Normal examined duodenum.     Assessment:     ICD-10-CM    1. Hiatal hernia  K44.9 Case Request: ROBOTIC ASSISTED LAPAROSCOPIC HERNIORRHAPHY, HIATAL WITH MESH AND MAGNETIC SPHINCTER AUGMENTATION      2. Gastroesophageal reflux disease with esophagitis, unspecified whether hemorrhage  K21.00 Case Request: ROBOTIC ASSISTED LAPAROSCOPIC HERNIORRHAPHY, HIATAL WITH MESH AND MAGNETIC SPHINCTER AUGMENTATION        Plan: Again went over how her repair with either fundoplication or the Linx implant as we had discussed briefly before.  She has normal manometry so should be able to qualify for either procedure.  Discussed some differences in how each functions as well as differences in postoperative bloating, dysphagia and diet.  Also more general risks such as hiatal hernia recurrence or  return of reflux symptoms, bleeding, infection, wound healing issues, pneumothorax, other intra-abdominal injury, pneumonia, DVT etc.  Patient would like to go forward with the LINX.  We will work on orders to get things scheduled for her.  This would be at [facility] in [place] also need at least overnight stay after surgery.    Total time 30 mins including review of EGD, Manometry, discussion of above    Abdiaziz Tolbert MD

## 2023-03-08 NOTE — TELEPHONE ENCOUNTER
Type of surgery: ROBOTIC ASSISTED LAPAROSCOPIC HERNIORRHAPHY, HIATAL WITH MESH AND MAGNETIC SPHINCTER   Location of surgery: [facility] OR  Date and time of surgery: 5/23  Surgeon: Tomasz   Pre-Op Appt Date: 5/8  Post-Op Appt Date: 6/14   Packet sent out: Yes  Pre-cert/Authorization completed:    Date:

## 2023-03-08 NOTE — LETTER
3/8/2023         RE: Vianca Kumar  [address]  [facility] [address]        Dear Colleague,    Thank you for referring your patient, Vianca Kumar, to the [facility]. Please see a copy of my visit note below.    General Surgery Follow Up    Pt returns for follow up visit for going over manometry results    HPI:  Patient returning to go over her manometry results and discuss surgical options.  Currently on lansoprazole twice daily.  Has been working on cutting back on smoking.  Otherwise no change compared to previously.    From previous visit:  Patient is a 47 year old female  with complaints of vomiting/regurgitation, has to sleep propped up like 45 degrees. Used to feel heartburn all the time but seems less now.  Has been on various PPI over the years, currently taking prevacid  The patient noticed the symptoms about years ago.  Worse in last year  Has had multiple scopes  Sometimes has some trouble with bread and meat but not all the time.  nothing makes the episode better.  Patient has not family history of gerd problems at least not to this degree    Review Of Systems    Skin: negative  Ears/Nose/Throat: negative  Respiratory: No shortness of breath, dyspnea on exertion, cough, or hemoptysis  Cardiovascular: negative  Gastrointestinal: negative  Genitourinary: negative  Musculoskeletal: negative  Neurologic: negative  Hematologic/Lymphatic/Immunologic: negative  Endocrine: negative      Past Medical History:   Diagnosis Date     MDD (major depressive disorder)      Obesity (BMI 30-39.9)        Past Surgical History:   Procedure Laterality Date     CAPSULE/PILL CAM ENDOSCOPY N/A 04/03/2018    Procedure: CAPSULE/PILL CAM ENDOSCOPY;;  Surgeon: Guru Hallie Song MD;  Location:  GI     COLONOSCOPY N/A 12/28/2017    Procedure: COLONOSCOPY;  Surgeon: Yosi Beck MD;  Location: [facility];  Service:      COLONOSCOPY N/A 08/11/2022     Procedure: COLONOSCOPY, WITH POLYPECTOMY AND BIOPSY;  Surgeon: Lorri Holley DO;  Location: [facility]     COLONOSCOPY WITH CO2 INSUFFLATION N/A 08/11/2022    Procedure: COLONOSCOPY, WITH CO2 INSUFFLATION;  Surgeon: Lorri Holley DO;  Location: [facility]     COMBINED ESOPHAGOSCOPY, GASTROSCOPY, DUODENOSCOPY (EGD) WITH CO2 INSUFFLATION N/A 08/11/2022    Procedure: ESOPHAGOGASTRODUODENOSCOPY, WITH CO2 INSUFFLATION;  Surgeon: Lorri Holley DO;  Location: [facility]     COMBINED ESOPHAGOSCOPY, GASTROSCOPY, DUODENOSCOPY (EGD) WITH CO2 INSUFFLATION N/A 11/25/2022    Procedure: ESOPHAGOGASTRODUODENOSCOPY, WITH CO2 INSUFFLATION;  Surgeon: Lorri Holley DO;  Location: [facility]     ENDOSCOPIC ULTRASOUND UPPER GASTROINTESTINAL TRACT (GI) N/A 04/03/2018    Procedure: ENDOSCOPIC ULTRASOUND, ESOPHAGOSCOPY / UPPER GASTROINTESTINAL TRACT (GI);  EUS/Capsule ;  Surgeon: Guru Hallie Song MD;  Location:  GI     ESOPHAGOSCOPY, GASTROSCOPY, DUODENOSCOPY (EGD), COMBINED N/A 12/28/2017    Procedure: ESOPHAGOGASTRODUODENOSCOPY (EGD);  Surgeon: Yosi Beck MD;  Location[facility];  Service:      ESOPHAGOSCOPY, GASTROSCOPY, DUODENOSCOPY (EGD), COMBINED N/A 08/11/2022    Procedure: ESOPHAGOGASTRODUODENOSCOPY, WITH BIOPSY;  Surgeon: Lorri Holley DO;  [facility]: [facility]     ESOPHAGOSCOPY, GASTROSCOPY, DUODENOSCOPY (EGD), COMBINED N/A 11/25/2022    Procedure: ESOPHAGOGASTRODUODENOSCOPY, WITH BIOPSY;  Surgeon: Lorri Holley DO;  Location: [facility]     KNEE SURGERY Left     osteotomy     LAPAROSCOPIC CHOLECYSTECTOMY N/A 04/25/2018    Procedure: LAPAROSCOPIC CHOLECYSTECTOMY;  Laparoscopic Cholecystectomy ;  Surgeon: Alberto Noble MD;  [facility]     UPPER GASTROINTESTINAL ENDOSCOPY         Social History     Socioeconomic History     Marital status:      Spouse name: Not on file     Number of children: Not on file     Years of education: Not on file     Highest education level: Not on file    Occupational History     Not on file   Tobacco Use     Smoking status: Every Day     Packs/day: 1.00     Years: 29.00     Pack years: 29.00     Types: Cigarettes     Smokeless tobacco: Former     Tobacco comments:     smoking since age 16   Vaping Use     Vaping Use: Never used   Substance and Sexual Activity     Alcohol use: Yes     Comment: 12-15 drinks/week     Drug use: Not Currently     Types: Marijuana     Comment: 0.5gm/day     Sexual activity: Not Currently   Other Topics Concern     Parent/sibling w/ CABG, MI or angioplasty before 65F 55M? Not Asked   Social History Narrative    Single.    No kids.    Walks 3-4x/week; takes care of niece and nephew regularly.     Social Determinants of Health     Financial Resource Strain: Not on file   Food Insecurity: Not on file   Transportation Needs: Not on file   Physical Activity: Not on file   Stress: Not on file   Social Connections: Not on file   Intimate Partner Violence: Not on file   Housing Stability: Not on file       Current Outpatient Medications   Medication Sig Dispense Refill     escitalopram (LEXAPRO) 20 MG tablet Take 1 tablet (20 mg) by mouth daily 90 tablet 0     LANsoprazole (PREVACID) 30 MG DR capsule Take 1 capsule (30 mg) by mouth 2 times daily 60 capsule 3     magic mouthwash suspension, diphenhydrAMINE, lidocaine, aluminum-magnesium & simethicone, (FIRST-MOUTHWASH BLM) compounding kit Swish and swallow 5-10 mLs in mouth every 6 hours as needed for mouth sores (Patient not taking: Reported on 3/8/2023) 237 mL 0     ondansetron (ZOFRAN ODT) 8 MG ODT tab Take 1 tablet (8 mg) by mouth every 8 hours as needed for nausea (Patient not taking: Reported on 3/8/2023) 30 tablet 3       Medications and history reviewed    Physical exam:  Vitals: /69   Pulse 80   Wt 86.2 kg (190 lb)   BMI 28.89 kg/m    BMI= Body mass index is 28.89 kg/m .    Constitutional: healthy, alert and no distress      Imaging shows:  HRM   The baseline tone of the lower  esophageal sphincter was normotensive. The lower esophageal sphincter was able to relax appropriately as measured by the IRP (10.2) in the supine position. The EGJ morphology was type 3. There was peristalsis visible. The DCI, DL, and contractile pattern were within normal limits in all 10/10 swallows. There were 1/10 swallows with elevated intrabolus pressure and compartmentalized pressurization. Rapid water swallows were performed with normal augmentation. Rapid Drink Challenge does not indicate an elevated IRP. Supine liquid swallows were performed. 5 upright liquid swallows were performed with a median upright IRP of 13.9. Bolus transit as measured by impedance was complete in 7/10 swallows. This is most consistent with normal esophageal motility and a hiatal hernia.       Wording of procedure description and interpretation was adapted from [employer] Weekly Motility Conference (2018).       Impressions   Based on the most recent Butler Classification v4.0, the findings are most consistent with normal esophageal motility and a hiatal hernia.     EGD  Impression:               - LA Grade D reflux esophagitis. Rule out Gordon's                             esophagus. Biopsied. Clip was placed for bleeding                             after biopsy                             - 6 cm hiatal hernia.                             - Normal stomach.                             - Normal examined duodenum.     Assessment:     ICD-10-CM    1. Hiatal hernia  K44.9 Case Request: ROBOTIC ASSISTED LAPAROSCOPIC HERNIORRHAPHY, HIATAL WITH MESH AND MAGNETIC SPHINCTER AUGMENTATION      2. Gastroesophageal reflux disease with esophagitis, unspecified whether hemorrhage  K21.00 Case Request: ROBOTIC ASSISTED LAPAROSCOPIC HERNIORRHAPHY, HIATAL WITH MESH AND MAGNETIC SPHINCTER AUGMENTATION        Plan: Again went over how her repair with either fundoplication or the Linx implant as we had discussed  briefly before.  She has normal manometry so should be able to qualify for either procedure.  Discussed some differences in how each functions as well as differences in postoperative bloating, dysphagia and diet.  Also more general risks such as hiatal hernia recurrence or return of reflux symptoms, bleeding, infection, wound healing issues, pneumothorax, other intra-abdominal injury, pneumonia, DVT etc.  Patient would like to go forward with the LINX.  We will work on orders to get things scheduled for her.  This would be at [facility] in [place] also need at least overnight stay after surgery.    Total time 30 mins including review of EGD, Manometry, discussion of above    Abdiaziz Tolbert MD        Again, thank you for allowing me to participate in the care of your patient.        Sincerely,        Abdiaziz Tolbert MD

## 2023-03-09 ENCOUNTER — OFFICE VISIT (OUTPATIENT)
Dept: INTERNAL MEDICINE | Facility: CLINIC | Age: 48
End: 2023-03-09
Payer: MEDICARE

## 2023-03-09 VITALS
BODY MASS INDEX: 30.1 KG/M2 | OXYGEN SATURATION: 98 % | HEART RATE: 75 BPM | WEIGHT: 198.6 LBS | DIASTOLIC BLOOD PRESSURE: 84 MMHG | SYSTOLIC BLOOD PRESSURE: 116 MMHG | HEIGHT: 68 IN | TEMPERATURE: 97.8 F

## 2023-03-09 DIAGNOSIS — F33.42 RECURRENT MAJOR DEPRESSIVE DISORDER, IN FULL REMISSION (H): ICD-10-CM

## 2023-03-09 DIAGNOSIS — Z12.31 ENCOUNTER FOR SCREENING MAMMOGRAM FOR BREAST CANCER: ICD-10-CM

## 2023-03-09 DIAGNOSIS — Z00.00 MEDICARE ANNUAL WELLNESS VISIT, SUBSEQUENT: Primary | ICD-10-CM

## 2023-03-09 DIAGNOSIS — Z13.1 SCREENING FOR DIABETES MELLITUS: ICD-10-CM

## 2023-03-09 DIAGNOSIS — Z13.0 SCREENING FOR BLOOD DISEASE: ICD-10-CM

## 2023-03-09 DIAGNOSIS — Z12.4 SCREENING FOR CERVICAL CANCER: ICD-10-CM

## 2023-03-09 DIAGNOSIS — Z13.220 SCREENING FOR CHOLESTEROL LEVEL: ICD-10-CM

## 2023-03-09 LAB
ALBUMIN SERPL BCG-MCNC: 4.3 G/DL (ref 3.5–5.2)
ALP SERPL-CCNC: 72 U/L (ref 35–104)
ALT SERPL W P-5'-P-CCNC: 15 U/L (ref 10–35)
ANION GAP SERPL CALCULATED.3IONS-SCNC: 11 MMOL/L (ref 7–15)
AST SERPL W P-5'-P-CCNC: 21 U/L (ref 10–35)
BILIRUB SERPL-MCNC: 0.2 MG/DL
BUN SERPL-MCNC: 9 MG/DL (ref 6–20)
CALCIUM SERPL-MCNC: 9.9 MG/DL (ref 8.6–10)
CHLORIDE SERPL-SCNC: 103 MMOL/L (ref 98–107)
CHOLEST SERPL-MCNC: 179 MG/DL
CREAT SERPL-MCNC: 0.66 MG/DL (ref 0.51–0.95)
DEPRECATED HCO3 PLAS-SCNC: 26 MMOL/L (ref 22–29)
ERYTHROCYTE [DISTWIDTH] IN BLOOD BY AUTOMATED COUNT: 11.4 % (ref 10–15)
GFR SERPL CREATININE-BSD FRML MDRD: >90 ML/MIN/1.73M2
GLUCOSE SERPL-MCNC: 99 MG/DL (ref 70–99)
HCT VFR BLD AUTO: 41.5 % (ref 35–47)
HDLC SERPL-MCNC: 62 MG/DL
HGB BLD-MCNC: 14.1 G/DL (ref 11.7–15.7)
LDLC SERPL CALC-MCNC: 94 MG/DL
MCH RBC QN AUTO: 31.5 PG (ref 26.5–33)
MCHC RBC AUTO-ENTMCNC: 34 G/DL (ref 31.5–36.5)
MCV RBC AUTO: 93 FL (ref 78–100)
NONHDLC SERPL-MCNC: 117 MG/DL
PLATELET # BLD AUTO: 394 10E3/UL (ref 150–450)
POTASSIUM SERPL-SCNC: 4.5 MMOL/L (ref 3.4–5.3)
PROT SERPL-MCNC: 6.9 G/DL (ref 6.4–8.3)
RBC # BLD AUTO: 4.47 10E6/UL (ref 3.8–5.2)
SODIUM SERPL-SCNC: 140 MMOL/L (ref 136–145)
TRIGL SERPL-MCNC: 114 MG/DL
WBC # BLD AUTO: 10.8 10E3/UL (ref 4–11)

## 2023-03-09 PROCEDURE — G0439 PPPS, SUBSEQ VISIT: HCPCS | Performed by: INTERNAL MEDICINE

## 2023-03-09 PROCEDURE — 85027 COMPLETE CBC AUTOMATED: CPT | Performed by: INTERNAL MEDICINE

## 2023-03-09 PROCEDURE — 36415 COLL VENOUS BLD VENIPUNCTURE: CPT | Performed by: INTERNAL MEDICINE

## 2023-03-09 PROCEDURE — 80061 LIPID PANEL: CPT | Performed by: INTERNAL MEDICINE

## 2023-03-09 PROCEDURE — 80053 COMPREHEN METABOLIC PANEL: CPT | Performed by: INTERNAL MEDICINE

## 2023-03-09 PROCEDURE — 87624 HPV HI-RISK TYP POOLED RSLT: CPT | Performed by: INTERNAL MEDICINE

## 2023-03-09 PROCEDURE — G0145 SCR C/V CYTO,THINLAYER,RESCR: HCPCS | Performed by: INTERNAL MEDICINE

## 2023-03-09 PROCEDURE — 99213 OFFICE O/P EST LOW 20 MIN: CPT | Mod: 25 | Performed by: INTERNAL MEDICINE

## 2023-03-09 RX ORDER — ESCITALOPRAM OXALATE 20 MG/1
20 TABLET ORAL DAILY
Qty: 90 TABLET | Refills: 3 | Status: SHIPPED | OUTPATIENT
Start: 2023-03-09 | End: 2024-06-03

## 2023-03-09 ASSESSMENT — ENCOUNTER SYMPTOMS
NERVOUS/ANXIOUS: 1
HEADACHES: 1
EYE PAIN: 1
BREAST MASS: 0

## 2023-03-09 ASSESSMENT — PATIENT HEALTH QUESTIONNAIRE - PHQ9
10. IF YOU CHECKED OFF ANY PROBLEMS, HOW DIFFICULT HAVE THESE PROBLEMS MADE IT FOR YOU TO DO YOUR WORK, TAKE CARE OF THINGS AT HOME, OR GET ALONG WITH OTHER PEOPLE: VERY DIFFICULT
SUM OF ALL RESPONSES TO PHQ QUESTIONS 1-9: 18
SUM OF ALL RESPONSES TO PHQ QUESTIONS 1-9: 18

## 2023-03-09 ASSESSMENT — ACTIVITIES OF DAILY LIVING (ADL)
CURRENT_FUNCTION: MONEY MANAGEMENT REQUIRES ASSISTANCE
CURRENT_FUNCTION: TRANSPORTATION REQUIRES ASSISTANCE
CURRENT_FUNCTION: HOUSEWORK REQUIRES ASSISTANCE
CURRENT_FUNCTION: SHOPPING REQUIRES ASSISTANCE

## 2023-03-09 ASSESSMENT — PAIN SCALES - GENERAL: PAINLEVEL: NO PAIN (0)

## 2023-03-09 NOTE — PROGRESS NOTES
ASSESSMENT/PLAN                                                       (Z00.00) Medicare annual wellness visit, subsequent  (primary encounter diagnosis)  Comment: PMH, PSH, FH, SH, medications, allergies, immunizations, and preventative health measures reviewed and updated as appropriate.  Plan: see below for plans.      (Z12.31) Encounter for screening mammogram for breast cancer  Plan: screening mammogram ordered - patient to schedule.     (Z12.4) Screening for cervical cancer  Plan: pap obtained today.     (Z13.220) Screening for cholesterol level  (Z13.1) Screening for diabetes mellitus  (Z13.0) Screening for blood disease  Plan: fasting labs today.     (F33.42) Recurrent major depressive disorder, in full remission (H)  Comment: well-controlled on current regimen.    Plan: continue present management; refills provided.     Appropriate preventive services were discussed with this patient, including applicable screening as appropriate for cardiovascular disease, diabetes, osteopenia/osteoporosis, and glaucoma.  As appropriate for age/gender, discussed screening for colorectal cancer, prostate cancer, breast cancer, and cervical cancer. Checklist reviewing preventive services available has been given to the patient.    Reviewed patients plan of care. The Basic Care Plan (routine screening as documented in Health Maintenance) for Vianca Kumar meets the Care Plan requirement. This Care Plan has been established and reviewed with the Patient.    Whitney Perez MD   [address] [address]  T: [phone], F: [phone]    SUBJECTIVE                                                      Vianca Kumar is a very pleasant 47 year old female who presents for her subsequent AWV:    Current providers (other than myself): Tomasz (surgery), Kishan (gastroenterology)    PMH, PSH, FH, SH, medications, allergies, immunizations, preventative health, and health risk  assessment reviewed and updated as appropriate.    Past Medical History:   Diagnosis Date     MDD (major depressive disorder)      Obesity (BMI 30-39.9)      Past Surgical History:   Procedure Laterality Date     CAPSULE/PILL CAM ENDOSCOPY N/A 04/03/2018    Procedure: CAPSULE/PILL CAM ENDOSCOPY;;  Surgeon: Guru Hallie Song MD;  [facility]     COLONOSCOPY N/A 12/28/2017    Procedure: COLONOSCOPY;  Surgeon: Yosi Beck MD;  Location: [facility];  Service:      COLONOSCOPY N/A 08/11/2022    Procedure: COLONOSCOPY, WITH POLYPECTOMY AND BIOPSY;  Surgeon: Lorri Holley DO;  Location: [facility]     COLONOSCOPY WITH CO2 INSUFFLATION N/A 08/11/2022    Procedure: COLONOSCOPY, WITH CO2 INSUFFLATION;  Surgeon: Lorri Holley DO;  Location: [facility]     COMBINED ESOPHAGOSCOPY, GASTROSCOPY, DUODENOSCOPY (EGD) WITH CO2 INSUFFLATION N/A 08/11/2022    Procedure: ESOPHAGOGASTRODUODENOSCOPY, WITH CO2 INSUFFLATION;  Surgeon: Lorri Holley DO;  [facility]     COMBINED ESOPHAGOSCOPY, GASTROSCOPY, DUODENOSCOPY (EGD) WITH CO2 INSUFFLATION N/A 11/25/2022    Procedure: ESOPHAGOGASTRODUODENOSCOPY, WITH CO2 INSUFFLATION;  Surgeon: Lorri Holley DO;  Location: [facility]     ENDOSCOPIC ULTRASOUND UPPER GASTROINTESTINAL TRACT (GI) N/A 04/03/2018    Procedure: ENDOSCOPIC ULTRASOUND, ESOPHAGOSCOPY / UPPER GASTROINTESTINAL TRACT (GI);  EUS/Capsule ;  Surgeon: Guru Hallie Song MD;  [facility]     ESOPHAGOSCOPY, GASTROSCOPY, DUODENOSCOPY (EGD), COMBINED N/A 12/28/2017    Procedure: ESOPHAGOGASTRODUODENOSCOPY (EGD);  Surgeon: Yosi Beck MD;  Location: [facility];  Service:      ESOPHAGOSCOPY, GASTROSCOPY, DUODENOSCOPY (EGD), COMBINED N/A 08/11/2022    Procedure: ESOPHAGOGASTRODUODENOSCOPY, WITH BIOPSY;  Surgeon: Lorri Holley DO;  [facility]     ESOPHAGOSCOPY, GASTROSCOPY, DUODENOSCOPY (EGD), COMBINED N/A 11/25/2022    Procedure: ESOPHAGOGASTRODUODENOSCOPY,  WITH BIOPSY;  Surgeon: Lorri Holley DO;  Location: [facility]     KNEE SURGERY Left     osteotomy     LAPAROSCOPIC CHOLECYSTECTOMY N/A 04/25/2018    Procedure: LAPAROSCOPIC CHOLECYSTECTOMY;  Laparoscopic Cholecystectomy ;  Surgeon: Alberto Noble MD;  [facility]     Family History   Problem Relation Age of Onset     Hypertension Mother      Hyperlipidemia Mother      Hypertension Brother      Hyperlipidemia Brother      Myocardial Infarction Maternal Grandfather 47     Myocardial Infarction Maternal Uncle         multiple later in life     Diabetes No family hx of      Breast Cancer No family hx of      Ovarian Cancer No family hx of      Colon Cancer No family hx of      Cerebrovascular Disease No family hx of      Social History     Tobacco Use     Smoking status: Every Day     Packs/day: 1.00     Years: 29.00     Pack years: 29.00     Types: Cigarettes     Smokeless tobacco: Former     Tobacco comments:     smoking since age 16   Vaping Use     Vaping Use: Never used   Substance and Sexual Activity     Alcohol use: Yes     Comment: 12-15 drinks/week     Drug use: Not Currently     Types: Marijuana     Comment: 0.5gm/day     Sexual activity: Not Currently   Social History Narrative    Single.    No kids.    Walks 3-4x/week; takes care of niece and nephew regularly.     Allergies   Allergen Reactions     Avocado Hives     Chantix [Varenicline Tartrate] Other (See Comments)     depression     Current Outpatient Medications   Medication Sig     escitalopram (LEXAPRO) 20 MG tablet Take 1 tablet (20 mg) by mouth daily     LANsoprazole (PREVACID) 30 MG DR capsule Take 1 capsule (30 mg) by mouth 2 times daily     Immunization History   Administered Date(s) Administered     COVID-19 Vaccine (Allan) 11/22/2021     Flu, Unspecified 01/14/2011, 10/17/2011     Influenza (IIV3) PF 01/14/2011, 10/17/2011     Influenza Vaccine, 6+MO IM (QUADRIVALENT W/PRESERVATIVES) 09/30/2015     TDAP Vaccine (Adacel) 01/14/2011,  "01/19/2012     Td (Adult), Adsorbed 12/31/2001     Tdap (Adult) Unspecified Formulation 12/31/2001     PREVENTATIVE HEALTH                                                      BMI: obese  Blood pressure: within normal limits   Breast CA screening: DUE   Cervical CA screening: DUE  Colon CA screening: up to date   Lung CA screening: not medically indicated at this time   Dexa: not medically indicated at this time   Screening cholesterol: DUE  Screening diabetes: DUE  Immunizations: reviewed; COVID-19 booster, flu shot, and tetanus booster DUE - patient declines    HEALTH RISK ASSESSMENT                                                      In general, how would you rate your overall physical health? fair  Outside of work, how many days during the week do you exercise? 2-3 days/week  Outside of work, approximately how many minutes a day do you exercise? 15-30 minutes    If you drink alcohol do you typically have >3 drinks per day or >7 drinks per week? Yes  Do you usually eat at least 4 servings of fruit and vegetables a day, include whole grains & fiber and avoid regularly eating high fat or \"junk\" foods? Yes     Do you have any problems taking medications regularly? No  Do you have any side effects from medications? No    Assistance with daily activities: YES - transportation requires assistance, shopping requires assistance, housework requires assistance, money management requires assistance    Safety concerns: No    Fall risk assessment: completed today (see ambulatory assessments)    Hearing concerns: YES - difficulty following a conversation in a noisy restaurant or crowded room, need to ask people to speak up or repeat themselves, difficulty understanding soft or whispered speech, difficulty understanding speech on the telephone    In the past 6 months, have you been bothered by leaking of urine: No    In general, how would you rate your overall mental or emotional health: poor    PHQ-2/PHQ-9 assessment: " "completed today (see ambulatory assessments)    Additional concerns today: No    OBJECTIVE                                                      /84   Pulse 75   Temp 97.8  F (36.6  C) (Oral)   Ht 1.727 m (5' 8\")   Wt 90.1 kg (198 lb 9.6 oz)   LMP 01/01/2022 (Exact Date)   SpO2 98%   Breastfeeding No   BMI 30.20 kg/m    Constitutional: well-appearing  Head, Ears, and Eyes: normocephalic; normal external auditory canal and pinna; tympanic membranes visualized and normal; normal lids and conjunctivae  Neck: supple, symmetric, no thyromegaly or lymphadenopathy  Respiratory: normal respiratory effort; clear to auscultation bilaterally  Cardiovascular: regular rate and rhythm; no edema  Gastrointestinal: soft, non-tender, and non-distended; no organomegaly or masses  Genitourinary: external genitalia, urethral meatus, and vagina normal; cervix visualized and normal in appearance  Musculoskeletal: normal gait and station  Psych: normal judgment and insight; normal mood and affect; recent and remote memory intact    Cognitive impairment noted: No  ---  (Note was completed, in part, with [employer] voice-recognition software. Documentation was reviewed, but some grammatical, spelling, and word errors may remain.)    "

## 2023-03-10 NOTE — TELEPHONE ENCOUNTER
Left message for patient to call us back to discuss surgery dates    Marva Carr M.A.  Specialty surgery Scheduler

## 2023-03-13 LAB
BKR LAB AP GYN ADEQUACY: NORMAL
BKR LAB AP GYN INTERPRETATION: NORMAL
BKR LAB AP HPV REFLEX: NORMAL
BKR LAB AP PREVIOUS ABNORMAL: NORMAL
PATH REPORT.COMMENTS IMP SPEC: NORMAL
PATH REPORT.COMMENTS IMP SPEC: NORMAL
PATH REPORT.RELEVANT HX SPEC: NORMAL

## 2023-03-14 NOTE — TELEPHONE ENCOUNTER
Left message for patient to call us back to discuss surgery dates  5/23 is on hold   Marva Carr M.A.  Specialty surgery Scheduler

## 2023-03-15 ENCOUNTER — PATIENT OUTREACH (OUTPATIENT)
Dept: INTERNAL MEDICINE | Facility: CLINIC | Age: 48
End: 2023-03-15

## 2023-03-15 PROBLEM — R87.810 CERVICAL HIGH RISK HPV (HUMAN PAPILLOMAVIRUS) TEST POSITIVE: Status: ACTIVE | Noted: 2023-03-15

## 2023-03-15 LAB
HUMAN PAPILLOMA VIRUS 16 DNA: NEGATIVE
HUMAN PAPILLOMA VIRUS 18 DNA: NEGATIVE
HUMAN PAPILLOMA VIRUS FINAL DIAGNOSIS: ABNORMAL
HUMAN PAPILLOMA VIRUS OTHER HR: POSITIVE

## 2023-04-04 ENCOUNTER — ANCILLARY PROCEDURE (OUTPATIENT)
Dept: MAMMOGRAPHY | Facility: CLINIC | Age: 48
End: 2023-04-04
Attending: INTERNAL MEDICINE
Payer: MEDICARE

## 2023-04-04 DIAGNOSIS — Z12.31 ENCOUNTER FOR SCREENING MAMMOGRAM FOR BREAST CANCER: ICD-10-CM

## 2023-04-04 DIAGNOSIS — Z12.31 VISIT FOR SCREENING MAMMOGRAM: ICD-10-CM

## 2023-04-04 PROCEDURE — 77063 BREAST TOMOSYNTHESIS BI: CPT | Mod: TC | Performed by: STUDENT IN AN ORGANIZED HEALTH CARE EDUCATION/TRAINING PROGRAM

## 2023-04-04 PROCEDURE — 77067 SCR MAMMO BI INCL CAD: CPT | Mod: TC | Performed by: STUDENT IN AN ORGANIZED HEALTH CARE EDUCATION/TRAINING PROGRAM

## 2023-05-08 ENCOUNTER — OFFICE VISIT (OUTPATIENT)
Dept: INTERNAL MEDICINE | Facility: CLINIC | Age: 48
End: 2023-05-08
Payer: MEDICARE

## 2023-05-08 VITALS
WEIGHT: 206.4 LBS | TEMPERATURE: 97.9 F | OXYGEN SATURATION: 96 % | DIASTOLIC BLOOD PRESSURE: 74 MMHG | HEIGHT: 68 IN | RESPIRATION RATE: 16 BRPM | SYSTOLIC BLOOD PRESSURE: 118 MMHG | BODY MASS INDEX: 31.28 KG/M2 | HEART RATE: 85 BPM

## 2023-05-08 DIAGNOSIS — K44.9 HIATAL HERNIA: ICD-10-CM

## 2023-05-08 DIAGNOSIS — F33.42 RECURRENT MAJOR DEPRESSIVE DISORDER, IN FULL REMISSION (H): ICD-10-CM

## 2023-05-08 DIAGNOSIS — Z01.818 PREOPERATIVE EXAMINATION: Primary | ICD-10-CM

## 2023-05-08 DIAGNOSIS — K21.9 GASTROESOPHAGEAL REFLUX DISEASE WITHOUT ESOPHAGITIS: ICD-10-CM

## 2023-05-08 PROCEDURE — 99214 OFFICE O/P EST MOD 30 MIN: CPT | Performed by: INTERNAL MEDICINE

## 2023-05-08 NOTE — PATIENT INSTRUCTIONS
HOLD ibuprofen for at least 1 day before surgery.    Please take Prevacid and Lexapro on the morning of surgery with a sip of water.

## 2023-05-08 NOTE — H&P (VIEW-ONLY)
ASSESSMENT/PLAN:                                                      Vianca Kumar is a pleasant 47 year old female who presents for a preoperative evaluation. She is undergoing an intermediate risk procedure. Her risk of major cardiac event is 0 points: 0.4%.    (Z01.818) Preoperative examination  (primary encounter diagnosis)  (K44.9) Hiatal hernia  (K21.9) Gastroesophageal reflux disease without esophagitis  Plan: No additional testing, cardiac or otherwise, indicated prior to surgery; HOLD ibuprofen for at least 1 day prior to surgery; TAKE Lexapro and Prevacid on the morning of surgery with a sip of water.    (F33.42) Recurrent major depressive disorder, in full remission (H)  Comment: well-controlled on Lexapro 20 mg daily.    RECOMMEND PROCEEDING WITH SURGERY: YES    Whitney Perez MD   [address] [address]  T: [phone], F: [phone]    SUBJECTIVE:                                                      Vianca Kumar is a very pleasant 47 year old female who presents for preoperative evaluation.    Surgeon: Tomasz  Date: 5/23/2023  Location:  OR  Surgery: Robot-assisted laparoscopic hiatal hernia repair with mesh and magnetic sphincter augmentation (intermediate risk)  Indication: hiatal hernia with acid reflux    Past Medical History:   Diagnosis Date     MDD (major depressive disorder)      Obesity (BMI 30-39.9)      Personal or family history of excessive or prolonged bleeding? No  Personal or family history of blood clots? No  History of snoring/Sleep Apnea? YES - snores; has never been tested for RAINER  History of blood transfusions? No    Cardiovascular risk: None (0 points)    Risk of major cardiac event: 0 points: 0.4%    Past Surgical History:   Procedure Laterality Date     CAPSULE/PILL CAM ENDOSCOPY N/A 04/03/2018    Procedure: CAPSULE/PILL CAM ENDOSCOPY;;  Surgeon: Guru Hallie Song MD;  Location: [facility]      COLONOSCOPY N/A 12/28/2017    Procedure: COLONOSCOPY;  Surgeon: Yosi Beck MD;  Location: [facility] OR;  Service:      COLONOSCOPY N/A 08/11/2022    Procedure: COLONOSCOPY, WITH POLYPECTOMY AND BIOPSY;  Surgeon: Lorri Holley DO;  Location: [facility] OR     COLONOSCOPY WITH CO2 INSUFFLATION N/A 08/11/2022    Procedure: COLONOSCOPY, WITH CO2 INSUFFLATION;  Surgeon: Lorri Holley DO;  Location: [facility] OR     COMBINED ESOPHAGOSCOPY, GASTROSCOPY, DUODENOSCOPY (EGD) WITH CO2 INSUFFLATION N/A 08/11/2022    Procedure: ESOPHAGOGASTRODUODENOSCOPY, WITH CO2 INSUFFLATION;  Surgeon: Lorri Holley DO;  Location: [facility] OR     COMBINED ESOPHAGOSCOPY, GASTROSCOPY, DUODENOSCOPY (EGD) WITH CO2 INSUFFLATION N/A 11/25/2022    Procedure: ESOPHAGOGASTRODUODENOSCOPY, WITH CO2 INSUFFLATION;  Surgeon: Lorri Holley DO;  Location: [facility] OR     ENDOSCOPIC ULTRASOUND UPPER GASTROINTESTINAL TRACT (GI) N/A 04/03/2018    Procedure: ENDOSCOPIC ULTRASOUND, ESOPHAGOSCOPY / UPPER GASTROINTESTINAL TRACT (GI);  EUS/Capsule ;  Surgeon: Guru Hlalie Song MD;  Location: [facility] GI     ESOPHAGOSCOPY, GASTROSCOPY, DUODENOSCOPY (EGD), COMBINED N/A 12/28/2017    Procedure: ESOPHAGOGASTRODUODENOSCOPY (EGD);  Surgeon: Yosi Beck MD;  Location: [facility] OR;  Service:      ESOPHAGOSCOPY, GASTROSCOPY, DUODENOSCOPY (EGD), COMBINED N/A 08/11/2022    Procedure: ESOPHAGOGASTRODUODENOSCOPY, WITH BIOPSY;  Surgeon: Lorri Holley DO;  Location: [facility]     ESOPHAGOSCOPY, GASTROSCOPY, DUODENOSCOPY (EGD), COMBINED N/A 11/25/2022    Procedure: ESOPHAGOGASTRODUODENOSCOPY, WITH BIOPSY;  Surgeon: Lorri Holley DO;  Location: [facility] OR     KNEE SURGERY Left     osteotomy     LAPAROSCOPIC CHOLECYSTECTOMY N/A 04/25/2018    Procedure: LAPAROSCOPIC CHOLECYSTECTOMY;  Laparoscopic Cholecystectomy ;  Surgeon: Alberto Noble MD;  Location: [facility] OR     Artificial assistive devices, such as pacemakers, artificial cardiac valves, or artificial joints?  YES - plate and screws left leg    Allergies   Allergen Reactions     Avocado Hives     Chantix [Varenicline Tartrate] Other (See Comments)     depression     Personal or family history of allergy to anesthesia? No  Allergy to local or topical analgesics? No  Allergy to latex? No  Allergy to adhesives/skin preparations (chlorhexadine)? No    Current Outpatient Medications   Medication Sig     escitalopram (LEXAPRO) 20 MG tablet Take 1 tablet (20 mg) by mouth daily     LANsoprazole (PREVACID) 30 MG DR capsule Take 1 capsule (30 mg) by mouth 2 times daily     Over the counter medications? YES - Ibuprofen as needed  Vitamin Supplements? No  Herbal Supplements? No    Family History   Problem Relation Age of Onset     Hypertension Mother      Hyperlipidemia Mother      Hypertension Brother      Hyperlipidemia Brother      Myocardial Infarction Maternal Grandfather 47     Myocardial Infarction Maternal Uncle         multiple later in life     Diabetes No family hx of      Breast Cancer No family hx of      Ovarian Cancer No family hx of      Colon Cancer No family hx of      Cerebrovascular Disease No family hx of      Social History     Occupational History     Occupation: Not working currently   Tobacco Use     Smoking status: Every Day     Packs/day: 1.00     Years: 31.00     Pack years: 31.00     Types: Cigarettes     Smokeless tobacco: Never     Tobacco comments:     31 years (as of 2023); ~1 cig/week (as of 2023)   Vaping Use     Vaping status: Never Used   Substance and Sexual Activity     Alcohol use: Not Currently     Drug use: Yes     Types: Marijuana     Comment: rare use     Sexual activity: Not Currently   Social History Narrative    Single.    No kids.    Walks 3-4x/week; takes care of niece and nephew regularly.     Functional capacity: Can do heavy work around the house like scrubbing floors or moving heavy furniture (7 METs)    OBJECTIVE:                                                      /74    "Pulse 85   Temp 97.9  F (36.6  C) (Tympanic)   Resp 16   Ht 1.727 m (5' 8\")   Wt 93.6 kg (206 lb 6.4 oz)   LMP 01/01/2022 (Exact Date)   SpO2 96%   BMI 31.38 kg/m    Constitutional: well-appearing  Respiratory: normal respiratory effort; clear to auscultation bilaterally  Cardiovascular: regular rate and rhythm; no edema  Gastrointestinal: soft, non-tender, non-distended, and bowel sounds present; no organomegaly or masses  Musculoskeletal: normal gait and station  Psych: normal judgment and insight; normal mood and affect    ---    (Chart documentation was completed, in part, with [employer] voice-recognition software. Even though reviewed, some grammatical, spelling, and word errors may remain.)  "

## 2023-05-08 NOTE — PROGRESS NOTES
ASSESSMENT/PLAN:                                                      Vianca Kumar is a pleasant 47 year old female who presents for a preoperative evaluation. She is undergoing an intermediate risk procedure. Her risk of major cardiac event is 0 points: 0.4%.    (Z01.818) Preoperative examination  (primary encounter diagnosis)  (K44.9) Hiatal hernia  (K21.9) Gastroesophageal reflux disease without esophagitis  Plan: No additional testing, cardiac or otherwise, indicated prior to surgery; HOLD ibuprofen for at least 1 day prior to surgery; TAKE Lexapro and Prevacid on the morning of surgery with a sip of water.    (F33.42) Recurrent major depressive disorder, in full remission (H)  Comment: well-controlled on Lexapro 20 mg daily.    RECOMMEND PROCEEDING WITH SURGERY: YES    Whitney Perez MD   [address] [address]  T: [phone], F: [phone]    SUBJECTIVE:                                                      Vianca Kumar is a very pleasant 47 year old female who presents for preoperative evaluation.    Surgeon: Tomasz  Date: 5/23/2023  Location:  OR  Surgery: Robot-assisted laparoscopic hiatal hernia repair with mesh and magnetic sphincter augmentation (intermediate risk)  Indication: hiatal hernia with acid reflux    Past Medical History:   Diagnosis Date     MDD (major depressive disorder)      Obesity (BMI 30-39.9)      Personal or family history of excessive or prolonged bleeding? No  Personal or family history of blood clots? No  History of snoring/Sleep Apnea? YES - snores; has never been tested for RAINER  History of blood transfusions? No    Cardiovascular risk: None (0 points)    Risk of major cardiac event: 0 points: 0.4%    Past Surgical History:   Procedure Laterality Date     CAPSULE/PILL CAM ENDOSCOPY N/A 04/03/2018    Procedure: CAPSULE/PILL CAM ENDOSCOPY;;  Surgeon: Guru Hallie Song MD;  Location: [facility]      COLONOSCOPY N/A 12/28/2017    Procedure: COLONOSCOPY;  Surgeon: Yosi Beck MD;  Location: [facility] OR;  Service:      COLONOSCOPY N/A 08/11/2022    Procedure: COLONOSCOPY, WITH POLYPECTOMY AND BIOPSY;  Surgeon: Lorri Holley DO;  Location: [facility]     COLONOSCOPY WITH CO2 INSUFFLATION N/A 08/11/2022    Procedure: COLONOSCOPY, WITH CO2 INSUFFLATION;  Surgeon: Lorri Holley DO;  [facility]: [facility]     COMBINED ESOPHAGOSCOPY, GASTROSCOPY, DUODENOSCOPY (EGD) WITH CO2 INSUFFLATION N/A 08/11/2022    Procedure: ESOPHAGOGASTRODUODENOSCOPY, WITH CO2 INSUFFLATION;  Surgeon: Lorri Holley DO;  [facility]: [facility]     COMBINED ESOPHAGOSCOPY, GASTROSCOPY, DUODENOSCOPY (EGD) WITH CO2 INSUFFLATION N/A 11/25/2022    Procedure: ESOPHAGOGASTRODUODENOSCOPY, WITH CO2 INSUFFLATION;  Surgeon: Lorri Holley DO;  [facility]: [facility]     ENDOSCOPIC ULTRASOUND UPPER GASTROINTESTINAL TRACT (GI) N/A 04/03/2018    Procedure: ENDOSCOPIC ULTRASOUND, ESOPHAGOSCOPY / UPPER GASTROINTESTINAL TRACT (GI);  EUS/Capsule ;  Surgeon: Guru Hallie Song MD;  Location: [facility]     ESOPHAGOSCOPY, GASTROSCOPY, DUODENOSCOPY (EGD), COMBINED N/A 12/28/2017    Procedure: ESOPHAGOGASTRODUODENOSCOPY (EGD);  Surgeon: Yosi Beck MD;  Location: [facility] OR;  Service:      ESOPHAGOSCOPY, GASTROSCOPY, DUODENOSCOPY (EGD), COMBINED N/A 08/11/2022    Procedure: ESOPHAGOGASTRODUODENOSCOPY, WITH BIOPSY;  Surgeon: Lorri Holley DO;  [facility]: [facility]     ESOPHAGOSCOPY, GASTROSCOPY, DUODENOSCOPY (EGD), COMBINED N/A 11/25/2022    Procedure: ESOPHAGOGASTRODUODENOSCOPY, WITH BIOPSY;  Surgeon: Lorri Holley DO;  Location: [facility]     KNEE SURGERY Left     osteotomy     LAPAROSCOPIC CHOLECYSTECTOMY N/A 04/25/2018    Procedure: LAPAROSCOPIC CHOLECYSTECTOMY;  Laparoscopic Cholecystectomy ;  Surgeon: Alberto Noble MD;  Location: [facility] OR     Artificial assistive devices, such as pacemakers, artificial cardiac valves, or artificial joints?  YES - plate and screws left leg    Allergies   Allergen Reactions     Avocado Hives     Chantix [Varenicline Tartrate] Other (See Comments)     depression     Personal or family history of allergy to anesthesia? No  Allergy to local or topical analgesics? No  Allergy to latex? No  Allergy to adhesives/skin preparations (chlorhexadine)? No    Current Outpatient Medications   Medication Sig     escitalopram (LEXAPRO) 20 MG tablet Take 1 tablet (20 mg) by mouth daily     LANsoprazole (PREVACID) 30 MG DR capsule Take 1 capsule (30 mg) by mouth 2 times daily     Over the counter medications? YES - Ibuprofen as needed  Vitamin Supplements? No  Herbal Supplements? No    Family History   Problem Relation Age of Onset     Hypertension Mother      Hyperlipidemia Mother      Hypertension Brother      Hyperlipidemia Brother      Myocardial Infarction Maternal Grandfather 47     Myocardial Infarction Maternal Uncle         multiple later in life     Diabetes No family hx of      Breast Cancer No family hx of      Ovarian Cancer No family hx of      Colon Cancer No family hx of      Cerebrovascular Disease No family hx of      Social History     Occupational History     Occupation: Not working currently   Tobacco Use     Smoking status: Every Day     Packs/day: 1.00     Years: 31.00     Pack years: 31.00     Types: Cigarettes     Smokeless tobacco: Never     Tobacco comments:     31 years (as of 2023); ~1 cig/week (as of 2023)   Vaping Use     Vaping status: Never Used   Substance and Sexual Activity     Alcohol use: Not Currently     Drug use: Yes     Types: Marijuana     Comment: rare use     Sexual activity: Not Currently   Social History Narrative    Single.    No kids.    Walks 3-4x/week; takes care of niece and nephew regularly.     Functional capacity: Can do heavy work around the house like scrubbing floors or moving heavy furniture (7 METs)    OBJECTIVE:                                                      /74    "Pulse 85   Temp 97.9  F (36.6  C) (Tympanic)   Resp 16   Ht 1.727 m (5' 8\")   Wt 93.6 kg (206 lb 6.4 oz)   LMP 01/01/2022 (Exact Date)   SpO2 96%   BMI 31.38 kg/m    Constitutional: well-appearing  Respiratory: normal respiratory effort; clear to auscultation bilaterally  Cardiovascular: regular rate and rhythm; no edema  Gastrointestinal: soft, non-tender, non-distended, and bowel sounds present; no organomegaly or masses  Musculoskeletal: normal gait and station  Psych: normal judgment and insight; normal mood and affect    ---    (Chart documentation was completed, in part, with [employer] voice-recognition software. Even though reviewed, some grammatical, spelling, and word errors may remain.)  "

## 2023-05-18 NOTE — H&P
Copy and paste from previous encounter        Office Visit    5/8/2023  [facility]   Whitney Perez MD  Internal Medicine  Preoperative examination +3 more  Dx  Pre-Op Exam  Reason for Visit     Progress Notes  Whitney Perez MD (Physician)     Internal Medicine     ASSESSMENT/PLAN:                                                       Vianca Kumar is a pleasant 47 year old female who presents for a preoperative evaluation. She is undergoing an intermediate risk procedure. Her risk of major cardiac event is 0 points: 0.4%.     (Z01.818) Preoperative examination  (primary encounter diagnosis)  (K44.9) Hiatal hernia  (K21.9) Gastroesophageal reflux disease without esophagitis  Plan: No additional testing, cardiac or otherwise, indicated prior to surgery; HOLD ibuprofen for at least 1 day prior to surgery; TAKE Lexapro and Prevacid on the morning of surgery with a sip of water.     (F33.42) Recurrent major depressive disorder, in full remission (H)  Comment: well-controlled on Lexapro 20 mg daily.     RECOMMEND PROCEEDING WITH SURGERY: YES     Whitney Perez MD   [facility]  [address] [address]  T: [phone], F: [phone]     SUBJECTIVE:                                                       Vianca Kumar is a very pleasant 47 year old female who presents for preoperative evaluation.     Surgeon: Tomasz  Date: 5/23/2023  Location: [facility] OR  Surgery: Robot-assisted laparoscopic hiatal hernia repair with mesh and magnetic sphincter augmentation (intermediate risk)  Indication: hiatal hernia with acid reflux          Past Medical History:   Diagnosis Date     MDD (major depressive disorder)       Obesity (BMI 30-39.9)        Personal or family history of excessive or prolonged bleeding? No  Personal or family history of blood clots? No  History of snoring/Sleep Apnea? YES - snores; has never been tested for RAINER  History of blood  transfusions? No     Cardiovascular risk: None (0 points)     Risk of major cardiac event: 0 points: 0.4%           Past Surgical History:   Procedure Laterality Date     CAPSULE/PILL CAM ENDOSCOPY N/A 04/03/2018     Procedure: CAPSULE/PILL CAM ENDOSCOPY;;  Surgeon: Guru Hallie Song MD;  Location: [facility]     COLONOSCOPY N/A 12/28/2017     Procedure: COLONOSCOPY;  Surgeon: Yosi Beck MD;  Location: [facility];  Service:      COLONOSCOPY N/A 08/11/2022     Procedure: COLONOSCOPY, WITH POLYPECTOMY AND BIOPSY;  Surgeon: Lorri Holley DO;  Location: [facility]     COLONOSCOPY WITH CO2 INSUFFLATION N/A 08/11/2022     Procedure: COLONOSCOPY, WITH CO2 INSUFFLATION;  Surgeon: Lorri Holley DO;  Location: [facility]     COMBINED ESOPHAGOSCOPY, GASTROSCOPY, DUODENOSCOPY (EGD) WITH CO2 INSUFFLATION N/A 08/11/2022     Procedure: ESOPHAGOGASTRODUODENOSCOPY, WITH CO2 INSUFFLATION;  Surgeon: Lorri Holley DO;  [facility]: [facility]     COMBINED ESOPHAGOSCOPY, GASTROSCOPY, DUODENOSCOPY (EGD) WITH CO2 INSUFFLATION N/A 11/25/2022     Procedure: ESOPHAGOGASTRODUODENOSCOPY, WITH CO2 INSUFFLATION;  Surgeon: Lorri Holley DO;  Location: [facility]     ENDOSCOPIC ULTRASOUND UPPER GASTROINTESTINAL TRACT (GI) N/A 04/03/2018     Procedure: ENDOSCOPIC ULTRASOUND, ESOPHAGOSCOPY / UPPER GASTROINTESTINAL TRACT (GI);  EUS/Capsule ;  Surgeon: Guru Hallie Song MD;  [facility]     ESOPHAGOSCOPY, GASTROSCOPY, DUODENOSCOPY (EGD), COMBINED N/A 12/28/2017     Procedure: ESOPHAGOGASTRODUODENOSCOPY (EGD);  Surgeon: Yosi Beck MD;  Location: [facility];  Service:      ESOPHAGOSCOPY, GASTROSCOPY, DUODENOSCOPY (EGD), COMBINED N/A 08/11/2022     Procedure: ESOPHAGOGASTRODUODENOSCOPY, WITH BIOPSY;  Surgeon: Lorri Holley DO;  [facility]: [facility]     ESOPHAGOSCOPY, GASTROSCOPY, DUODENOSCOPY (EGD), COMBINED N/A 11/25/2022     Procedure: ESOPHAGOGASTRODUODENOSCOPY, WITH BIOPSY;  Surgeon: Kishan  DO Lorri;  Location: [facility] OR     KNEE SURGERY Left       osteotomy     LAPAROSCOPIC CHOLECYSTECTOMY N/A 04/25/2018     Procedure: LAPAROSCOPIC CHOLECYSTECTOMY;  Laparoscopic Cholecystectomy ;  Surgeon: Alberto Noble MD;  Location: [facility]      Artificial assistive devices, such as pacemakers, artificial cardiac valves, or artificial joints? YES - plate and screws left leg           Allergies   Allergen Reactions     Avocado Hives     Chantix [Varenicline Tartrate] Other (See Comments)       depression      Personal or family history of allergy to anesthesia? No  Allergy to local or topical analgesics? No  Allergy to latex? No  Allergy to adhesives/skin preparations (chlorhexadine)? No          Current Outpatient Medications   Medication Sig     escitalopram (LEXAPRO) 20 MG tablet Take 1 tablet (20 mg) by mouth daily     LANsoprazole (PREVACID) 30 MG DR capsule Take 1 capsule (30 mg) by mouth 2 times daily      Over the counter medications? YES - Ibuprofen as needed  Vitamin Supplements? No  Herbal Supplements? No           Family History   Problem Relation Age of Onset     Hypertension Mother       Hyperlipidemia Mother       Hypertension Brother       Hyperlipidemia Brother       Myocardial Infarction Maternal Grandfather 47     Myocardial Infarction Maternal Uncle           multiple later in life     Diabetes No family hx of       Breast Cancer No family hx of       Ovarian Cancer No family hx of       Colon Cancer No family hx of       Cerebrovascular Disease No family hx of        Social History            Occupational History     Occupation: Not working currently   Tobacco Use     Smoking status: Every Day       Packs/day: 1.00       Years: 31.00       Pack years: 31.00       Types: Cigarettes     Smokeless tobacco: Never     Tobacco comments:       31 years (as of 2023); ~1 cig/week (as of 2023)   Vaping Use     Vaping status: Never Used   Substance and Sexual Activity     Alcohol use: Not Currently     Drug  "use: Yes       Types: Marijuana       Comment: rare use     Sexual activity: Not Currently   Social History Narrative     Single.     No kids.     Walks 3-4x/week; takes care of niece and nephew regularly.      Functional capacity: Can do heavy work around the house like scrubbing floors or moving heavy furniture (7 METs)     OBJECTIVE:                                                       /74   Pulse 85   Temp 97.9  F (36.6  C) (Tympanic)   Resp 16   Ht 1.727 m (5' 8\")   Wt 93.6 kg (206 lb 6.4 oz)   LMP 01/01/2022 (Exact Date)   SpO2 96%   BMI 31.38 kg/m    Constitutional: well-appearing  Respiratory: normal respiratory effort; clear to auscultation bilaterally  Cardiovascular: regular rate and rhythm; no edema  Gastrointestinal: soft, non-tender, non-distended, and bowel sounds present; no organomegaly or masses  Musculoskeletal: normal gait and station  Psych: normal judgment and insight; normal mood and affect     ---     (Chart documentation was completed, in part, with [employer] voice-recognition software. Even though reviewed, some grammatical, spelling, and word errors may remain.)        Other Notes     All notes  Instructions          HOLD ibuprofen for at least 1 day before surgery.     Please take Prevacid and Lexapro on the morning of surgery with a sip of water.             After Visit Summary (Automatic SnapShot taken 5/8/2023)  Additional Documentation    Vitals:   /74   Pulse 85   Temp 97.9  F (36.6  C) (Tympanic)   Resp 16   Ht 1.727 m (5' 8\")   Wt 93.6 kg (206 lb 6.4 oz)   LMP 01/01/2022 (Exact Date)   SpO2 96%   BMI 31.38 kg/m    BSA 2.12 m    Flowsheets:   Patient-Reported Data,   Vitals Reassessment,   Anthropometrics      Encounter Info:   Billing Info,   History,   Allergies,   Detailed Report        Communications    View All Conversations on this Encounter  Preop    Question 5/8/2023  8:52 AM CDT - Filed by Patient   I understand that completing this form is intended to " provide my doctor and/or care team with helpful information for my upcoming clinic visit. It is not to notify my doctor and/or care team of medical matters requiring urgent attention. If I have an urgent medical matter, I should call 911 or my doctor's office. Acknowledge   YES and UNKNOWN responses will be further discussed at your visit.    1. Have you ever had a heart attack or stroke? No   2. Have you ever had surgery on your heart or blood vessels, such as a stent placement, a coronary artery bypass, or surgery on an artery in your head, neck, heart, or legs? No   3. Do you have chest pain with activity? No   4. Do you have a history of  heart failure? No   5. Do you currently have a cold, bronchitis or symptoms of other infection? No   6. Do you have a cough, shortness of breath, or wheezing? YES -    7. Do you or anyone in your family have previous history of blood clots? No   8. Do you or does anyone in your family have a serious bleeding problem such as prolonged bleeding following surgeries or cuts? No   9. Have you ever had problems with anemia or been told to take iron pills? No   10. Have you had any abnormal blood loss such as black, tarry or bloody stools, or abnormal vaginal bleeding? No   11. Have you ever had a blood transfusion? No   12. Are you willing to have a blood transfusion if it is medically needed before, during, or after your surgery? Yes   13. Have you or any of your relatives ever had problems with anesthesia? No   14. Do you have sleep apnea, excessive snoring or daytime drowsiness? No   15. Do you have any artifical heart valves or other implanted medical devices like a pacemaker, defibrillator, or continuous glucose monitor? No   16. Do you have artificial joints? No   17. Are you allergic to latex? No   18. Is there any chance that you may be pregnant? No     Welcome File Ready To Room Event    Question 5/8/2023  8:52 AM CDT - Filed by Patient   Press 'Submit' to complete your  questionnaires. Submit

## 2023-05-22 ENCOUNTER — ANESTHESIA EVENT (OUTPATIENT)
Dept: SURGERY | Facility: CLINIC | Age: 48
End: 2023-05-22
Payer: MEDICARE

## 2023-05-22 ASSESSMENT — ENCOUNTER SYMPTOMS: DYSRHYTHMIAS: 0

## 2023-05-22 ASSESSMENT — LIFESTYLE VARIABLES: TOBACCO_USE: 1

## 2023-05-23 ENCOUNTER — ANESTHESIA (OUTPATIENT)
Dept: SURGERY | Facility: CLINIC | Age: 48
End: 2023-05-23
Payer: MEDICARE

## 2023-05-23 ENCOUNTER — HOSPITAL ENCOUNTER (OUTPATIENT)
Facility: CLINIC | Age: 48
Discharge: HOME | End: 2023-05-24
Attending: SURGERY | Admitting: SURGERY
Payer: MEDICARE

## 2023-05-23 VITALS — HEART RATE: 86 BPM

## 2023-05-23 DIAGNOSIS — K21.00 GASTROESOPHAGEAL REFLUX DISEASE WITH ESOPHAGITIS WITHOUT HEMORRHAGE: ICD-10-CM

## 2023-05-23 DIAGNOSIS — Z87.19 STATUS POST REPAIR OF PARAESOPHAGEAL DIAPHRAGMATIC HERNIA: Primary | ICD-10-CM

## 2023-05-23 DIAGNOSIS — Z98.890 STATUS POST REPAIR OF PARAESOPHAGEAL DIAPHRAGMATIC HERNIA: Primary | ICD-10-CM

## 2023-05-23 PROCEDURE — 43282 LAP PARAESOPH HER RPR W/MESH: CPT | Performed by: SURGERY

## 2023-05-23 PROCEDURE — 250N000009 HC RX 250: Performed by: NURSE ANESTHETIST, CERTIFIED REGISTERED

## 2023-05-23 PROCEDURE — 250N000013 HC RX MED GY IP 250 OP 250 PS 637: Performed by: SURGERY

## 2023-05-23 PROCEDURE — 999N000141 HC STATISTIC PRE-PROCEDURE NURSING ASSESSMENT: Performed by: SURGERY

## 2023-05-23 PROCEDURE — 250N000011 HC RX IP 250 OP 636: Performed by: NURSE ANESTHETIST, CERTIFIED REGISTERED

## 2023-05-23 PROCEDURE — 258N000003 HC RX IP 258 OP 636: Performed by: NURSE ANESTHETIST, CERTIFIED REGISTERED

## 2023-05-23 PROCEDURE — 272N000001 HC OR GENERAL SUPPLY STERILE: Performed by: SURGERY

## 2023-05-23 PROCEDURE — 258N000003 HC RX IP 258 OP 636: Performed by: SURGERY

## 2023-05-23 PROCEDURE — C1781 MESH (IMPLANTABLE): HCPCS | Performed by: SURGERY

## 2023-05-23 PROCEDURE — 370N000017 HC ANESTHESIA TECHNICAL FEE, PER MIN: Performed by: SURGERY

## 2023-05-23 PROCEDURE — 120N000001 HC R&B MED SURG/OB

## 2023-05-23 PROCEDURE — 250N000011 HC RX IP 250 OP 636: Performed by: SURGERY

## 2023-05-23 PROCEDURE — 710N000010 HC RECOVERY PHASE 1, LEVEL 2, PER MIN: Performed by: SURGERY

## 2023-05-23 PROCEDURE — 278N000051 HC OR IMPLANT GENERAL: Performed by: SURGERY

## 2023-05-23 PROCEDURE — 250N000009 HC RX 250: Performed by: SURGERY

## 2023-05-23 PROCEDURE — 360N000080 HC SURGERY LEVEL 7, PER MIN: Performed by: SURGERY

## 2023-05-23 DEVICE — IMPLANTABLE DEVICE: Type: IMPLANTABLE DEVICE | Site: ABDOMEN | Status: FUNCTIONAL

## 2023-05-23 DEVICE — MESH BIO-A TISSUE REINFORCEMENT 7X10CM HH0710: Type: IMPLANTABLE DEVICE | Site: ABDOMEN | Status: FUNCTIONAL

## 2023-05-23 RX ORDER — LABETALOL HYDROCHLORIDE 5 MG/ML
INJECTION, SOLUTION INTRAVENOUS PRN
Status: DISCONTINUED | OUTPATIENT
Start: 2023-05-23 | End: 2023-05-23

## 2023-05-23 RX ORDER — AMOXICILLIN 250 MG
1 CAPSULE ORAL 2 TIMES DAILY
Status: DISCONTINUED | OUTPATIENT
Start: 2023-05-23 | End: 2023-05-24 | Stop reason: HOSPADM

## 2023-05-23 RX ORDER — ACETAMINOPHEN 325 MG/1
975 TABLET ORAL EVERY 8 HOURS
Status: DISCONTINUED | OUTPATIENT
Start: 2023-05-23 | End: 2023-05-24 | Stop reason: HOSPADM

## 2023-05-23 RX ORDER — LIDOCAINE 40 MG/G
CREAM TOPICAL
Status: DISCONTINUED | OUTPATIENT
Start: 2023-05-23 | End: 2023-05-24 | Stop reason: HOSPADM

## 2023-05-23 RX ORDER — ONDANSETRON 2 MG/ML
4 INJECTION INTRAMUSCULAR; INTRAVENOUS EVERY 30 MIN PRN
Status: DISCONTINUED | OUTPATIENT
Start: 2023-05-23 | End: 2023-05-23 | Stop reason: HOSPADM

## 2023-05-23 RX ORDER — NALOXONE HYDROCHLORIDE 0.4 MG/ML
0.2 INJECTION, SOLUTION INTRAMUSCULAR; INTRAVENOUS; SUBCUTANEOUS
Status: DISCONTINUED | OUTPATIENT
Start: 2023-05-23 | End: 2023-05-24 | Stop reason: HOSPADM

## 2023-05-23 RX ORDER — SODIUM CHLORIDE, SODIUM LACTATE, POTASSIUM CHLORIDE, CALCIUM CHLORIDE 600; 310; 30; 20 MG/100ML; MG/100ML; MG/100ML; MG/100ML
INJECTION, SOLUTION INTRAVENOUS CONTINUOUS PRN
Status: DISCONTINUED | OUTPATIENT
Start: 2023-05-23 | End: 2023-05-23

## 2023-05-23 RX ORDER — FENTANYL CITRATE 50 UG/ML
INJECTION, SOLUTION INTRAMUSCULAR; INTRAVENOUS PRN
Status: DISCONTINUED | OUTPATIENT
Start: 2023-05-23 | End: 2023-05-23

## 2023-05-23 RX ORDER — HYDROMORPHONE HCL IN WATER/PF 6 MG/30 ML
0.4 PATIENT CONTROLLED ANALGESIA SYRINGE INTRAVENOUS
Status: DISCONTINUED | OUTPATIENT
Start: 2023-05-23 | End: 2023-05-24 | Stop reason: HOSPADM

## 2023-05-23 RX ORDER — ONDANSETRON 4 MG/1
4 TABLET, ORALLY DISINTEGRATING ORAL EVERY 30 MIN PRN
Status: DISCONTINUED | OUTPATIENT
Start: 2023-05-23 | End: 2023-05-23 | Stop reason: HOSPADM

## 2023-05-23 RX ORDER — ENOXAPARIN SODIUM 100 MG/ML
40 INJECTION SUBCUTANEOUS DAILY
Status: DISCONTINUED | OUTPATIENT
Start: 2023-05-24 | End: 2023-05-24 | Stop reason: HOSPADM

## 2023-05-23 RX ORDER — ONDANSETRON 4 MG/1
4 TABLET, ORALLY DISINTEGRATING ORAL EVERY 6 HOURS PRN
Status: DISCONTINUED | OUTPATIENT
Start: 2023-05-23 | End: 2023-05-24 | Stop reason: HOSPADM

## 2023-05-23 RX ORDER — LIDOCAINE HYDROCHLORIDE 40 MG/ML
SOLUTION TOPICAL PRN
Status: DISCONTINUED | OUTPATIENT
Start: 2023-05-23 | End: 2023-05-23

## 2023-05-23 RX ORDER — PROPOFOL 10 MG/ML
INJECTION, EMULSION INTRAVENOUS PRN
Status: DISCONTINUED | OUTPATIENT
Start: 2023-05-23 | End: 2023-05-23

## 2023-05-23 RX ORDER — BISACODYL 10 MG
10 SUPPOSITORY, RECTAL RECTAL DAILY PRN
Status: DISCONTINUED | OUTPATIENT
Start: 2023-05-23 | End: 2023-05-24 | Stop reason: HOSPADM

## 2023-05-23 RX ORDER — ALBUTEROL SULFATE 0.83 MG/ML
2.5 SOLUTION RESPIRATORY (INHALATION) EVERY 4 HOURS PRN
Status: DISCONTINUED | OUTPATIENT
Start: 2023-05-23 | End: 2023-05-23 | Stop reason: HOSPADM

## 2023-05-23 RX ORDER — ONDANSETRON 2 MG/ML
INJECTION INTRAMUSCULAR; INTRAVENOUS PRN
Status: DISCONTINUED | OUTPATIENT
Start: 2023-05-23 | End: 2023-05-23

## 2023-05-23 RX ORDER — IBUPROFEN 600 MG/1
600 TABLET, FILM COATED ORAL EVERY 6 HOURS PRN
Status: DISCONTINUED | OUTPATIENT
Start: 2023-05-23 | End: 2023-05-24 | Stop reason: HOSPADM

## 2023-05-23 RX ORDER — DEXAMETHASONE SODIUM PHOSPHATE 4 MG/ML
INJECTION, SOLUTION INTRA-ARTICULAR; INTRALESIONAL; INTRAMUSCULAR; INTRAVENOUS; SOFT TISSUE PRN
Status: DISCONTINUED | OUTPATIENT
Start: 2023-05-23 | End: 2023-05-23

## 2023-05-23 RX ORDER — KETOROLAC TROMETHAMINE 30 MG/ML
INJECTION, SOLUTION INTRAMUSCULAR; INTRAVENOUS PRN
Status: DISCONTINUED | OUTPATIENT
Start: 2023-05-23 | End: 2023-05-23

## 2023-05-23 RX ORDER — ESCITALOPRAM OXALATE 10 MG/1
20 TABLET ORAL DAILY
Status: DISCONTINUED | OUTPATIENT
Start: 2023-05-23 | End: 2023-05-24 | Stop reason: HOSPADM

## 2023-05-23 RX ORDER — SODIUM CHLORIDE, SODIUM LACTATE, POTASSIUM CHLORIDE, CALCIUM CHLORIDE 600; 310; 30; 20 MG/100ML; MG/100ML; MG/100ML; MG/100ML
INJECTION, SOLUTION INTRAVENOUS CONTINUOUS
Status: DISCONTINUED | OUTPATIENT
Start: 2023-05-23 | End: 2023-05-23 | Stop reason: HOSPADM

## 2023-05-23 RX ORDER — HYDRALAZINE HYDROCHLORIDE 20 MG/ML
2.5-5 INJECTION INTRAMUSCULAR; INTRAVENOUS EVERY 10 MIN PRN
Status: DISCONTINUED | OUTPATIENT
Start: 2023-05-23 | End: 2023-05-23 | Stop reason: HOSPADM

## 2023-05-23 RX ORDER — BUPIVACAINE HYDROCHLORIDE AND EPINEPHRINE 5; 5 MG/ML; UG/ML
INJECTION, SOLUTION PERINEURAL PRN
Status: DISCONTINUED | OUTPATIENT
Start: 2023-05-23 | End: 2023-05-23 | Stop reason: HOSPADM

## 2023-05-23 RX ORDER — FENTANYL CITRATE 50 UG/ML
25 INJECTION, SOLUTION INTRAMUSCULAR; INTRAVENOUS EVERY 5 MIN PRN
Status: DISCONTINUED | OUTPATIENT
Start: 2023-05-23 | End: 2023-05-23 | Stop reason: HOSPADM

## 2023-05-23 RX ORDER — LIDOCAINE 40 MG/G
CREAM TOPICAL
Status: DISCONTINUED | OUTPATIENT
Start: 2023-05-23 | End: 2023-05-23 | Stop reason: HOSPADM

## 2023-05-23 RX ORDER — OXYCODONE HYDROCHLORIDE 5 MG/1
10 TABLET ORAL EVERY 4 HOURS PRN
Status: DISCONTINUED | OUTPATIENT
Start: 2023-05-23 | End: 2023-05-24 | Stop reason: HOSPADM

## 2023-05-23 RX ORDER — POLYETHYLENE GLYCOL 3350 17 G/17G
17 POWDER, FOR SOLUTION ORAL DAILY
Status: DISCONTINUED | OUTPATIENT
Start: 2023-05-24 | End: 2023-05-24 | Stop reason: HOSPADM

## 2023-05-23 RX ORDER — CEFAZOLIN SODIUM/WATER 2 G/20 ML
2 SYRINGE (ML) INTRAVENOUS SEE ADMIN INSTRUCTIONS
Status: DISCONTINUED | OUTPATIENT
Start: 2023-05-23 | End: 2023-05-23 | Stop reason: HOSPADM

## 2023-05-23 RX ORDER — FENTANYL CITRATE 50 UG/ML
50 INJECTION, SOLUTION INTRAMUSCULAR; INTRAVENOUS EVERY 5 MIN PRN
Status: DISCONTINUED | OUTPATIENT
Start: 2023-05-23 | End: 2023-05-23 | Stop reason: HOSPADM

## 2023-05-23 RX ORDER — SODIUM CHLORIDE, SODIUM LACTATE, POTASSIUM CHLORIDE, CALCIUM CHLORIDE 600; 310; 30; 20 MG/100ML; MG/100ML; MG/100ML; MG/100ML
INJECTION, SOLUTION INTRAVENOUS CONTINUOUS
Status: DISCONTINUED | OUTPATIENT
Start: 2023-05-23 | End: 2023-05-24 | Stop reason: HOSPADM

## 2023-05-23 RX ORDER — HYDROMORPHONE HCL IN WATER/PF 6 MG/30 ML
0.2 PATIENT CONTROLLED ANALGESIA SYRINGE INTRAVENOUS
Status: DISCONTINUED | OUTPATIENT
Start: 2023-05-23 | End: 2023-05-24 | Stop reason: HOSPADM

## 2023-05-23 RX ORDER — NALOXONE HYDROCHLORIDE 0.4 MG/ML
0.4 INJECTION, SOLUTION INTRAMUSCULAR; INTRAVENOUS; SUBCUTANEOUS
Status: DISCONTINUED | OUTPATIENT
Start: 2023-05-23 | End: 2023-05-24 | Stop reason: HOSPADM

## 2023-05-23 RX ORDER — ENOXAPARIN SODIUM 100 MG/ML
40 INJECTION SUBCUTANEOUS
Status: COMPLETED | OUTPATIENT
Start: 2023-05-23 | End: 2023-05-23

## 2023-05-23 RX ORDER — OXYCODONE HYDROCHLORIDE 5 MG/1
5 TABLET ORAL EVERY 4 HOURS PRN
Status: DISCONTINUED | OUTPATIENT
Start: 2023-05-23 | End: 2023-05-24 | Stop reason: HOSPADM

## 2023-05-23 RX ORDER — ONDANSETRON 2 MG/ML
4 INJECTION INTRAMUSCULAR; INTRAVENOUS EVERY 6 HOURS PRN
Status: DISCONTINUED | OUTPATIENT
Start: 2023-05-23 | End: 2023-05-24 | Stop reason: HOSPADM

## 2023-05-23 RX ORDER — PROCHLORPERAZINE MALEATE 5 MG
10 TABLET ORAL EVERY 6 HOURS PRN
Status: DISCONTINUED | OUTPATIENT
Start: 2023-05-23 | End: 2023-05-24 | Stop reason: HOSPADM

## 2023-05-23 RX ORDER — MEPERIDINE HYDROCHLORIDE 25 MG/ML
12.5 INJECTION INTRAMUSCULAR; INTRAVENOUS; SUBCUTANEOUS EVERY 5 MIN PRN
Status: DISCONTINUED | OUTPATIENT
Start: 2023-05-23 | End: 2023-05-23 | Stop reason: HOSPADM

## 2023-05-23 RX ORDER — HYDROMORPHONE HYDROCHLORIDE 1 MG/ML
0.4 INJECTION, SOLUTION INTRAMUSCULAR; INTRAVENOUS; SUBCUTANEOUS EVERY 5 MIN PRN
Status: DISCONTINUED | OUTPATIENT
Start: 2023-05-23 | End: 2023-05-23 | Stop reason: HOSPADM

## 2023-05-23 RX ORDER — PANTOPRAZOLE SODIUM 40 MG/1
40 TABLET, DELAYED RELEASE ORAL
Status: DISCONTINUED | OUTPATIENT
Start: 2023-05-24 | End: 2023-05-24 | Stop reason: HOSPADM

## 2023-05-23 RX ORDER — OXYCODONE HYDROCHLORIDE 5 MG/1
5-10 TABLET ORAL EVERY 6 HOURS PRN
Qty: 12 TABLET | Refills: 0 | Status: SHIPPED | OUTPATIENT
Start: 2023-05-23 | End: 2023-05-26

## 2023-05-23 RX ORDER — CEFAZOLIN SODIUM/WATER 2 G/20 ML
2 SYRINGE (ML) INTRAVENOUS
Status: COMPLETED | OUTPATIENT
Start: 2023-05-23 | End: 2023-05-23

## 2023-05-23 RX ORDER — ACETAMINOPHEN 325 MG/1
975 TABLET ORAL ONCE
Status: DISCONTINUED | OUTPATIENT
Start: 2023-05-23 | End: 2023-05-23 | Stop reason: HOSPADM

## 2023-05-23 RX ORDER — DIMENHYDRINATE 50 MG/ML
12.5 INJECTION, SOLUTION INTRAMUSCULAR; INTRAVENOUS EVERY 10 MIN PRN
Status: DISCONTINUED | OUTPATIENT
Start: 2023-05-23 | End: 2023-05-23 | Stop reason: HOSPADM

## 2023-05-23 RX ORDER — LANSOPRAZOLE 30 MG/1
30 CAPSULE, DELAYED RELEASE ORAL DAILY
Qty: 14 CAPSULE | Refills: 0
Start: 2023-05-23 | End: 2023-06-06

## 2023-05-23 RX ORDER — LIDOCAINE HYDROCHLORIDE 20 MG/ML
INJECTION, SOLUTION INFILTRATION; PERINEURAL PRN
Status: DISCONTINUED | OUTPATIENT
Start: 2023-05-23 | End: 2023-05-23

## 2023-05-23 RX ORDER — HYDROMORPHONE HYDROCHLORIDE 1 MG/ML
0.2 INJECTION, SOLUTION INTRAMUSCULAR; INTRAVENOUS; SUBCUTANEOUS EVERY 5 MIN PRN
Status: DISCONTINUED | OUTPATIENT
Start: 2023-05-23 | End: 2023-05-23 | Stop reason: HOSPADM

## 2023-05-23 RX ORDER — ACETAMINOPHEN 325 MG/1
650 TABLET ORAL EVERY 4 HOURS PRN
Status: DISCONTINUED | OUTPATIENT
Start: 2023-05-26 | End: 2023-05-24 | Stop reason: HOSPADM

## 2023-05-23 RX ORDER — PROPOFOL 10 MG/ML
INJECTION, EMULSION INTRAVENOUS CONTINUOUS PRN
Status: DISCONTINUED | OUTPATIENT
Start: 2023-05-23 | End: 2023-05-23

## 2023-05-23 RX ADMIN — SODIUM CHLORIDE, POTASSIUM CHLORIDE, SODIUM LACTATE AND CALCIUM CHLORIDE: 600; 310; 30; 20 INJECTION, SOLUTION INTRAVENOUS at 18:13

## 2023-05-23 RX ADMIN — PHENYLEPHRINE HYDROCHLORIDE 100 MCG: 10 INJECTION INTRAVENOUS at 12:49

## 2023-05-23 RX ADMIN — ESCITALOPRAM OXALATE 20 MG: 10 TABLET ORAL at 18:13

## 2023-05-23 RX ADMIN — ACETAMINOPHEN 975 MG: 325 TABLET ORAL at 18:13

## 2023-05-23 RX ADMIN — PHENYLEPHRINE HYDROCHLORIDE 100 MCG: 10 INJECTION INTRAVENOUS at 14:32

## 2023-05-23 RX ADMIN — SUGAMMADEX 200 MG: 100 INJECTION, SOLUTION INTRAVENOUS at 14:56

## 2023-05-23 RX ADMIN — OXYCODONE HYDROCHLORIDE 10 MG: 5 TABLET ORAL at 22:26

## 2023-05-23 RX ADMIN — LIDOCAINE HYDROCHLORIDE 0.5 ML: 10 INJECTION, SOLUTION EPIDURAL; INFILTRATION; INTRACAUDAL; PERINEURAL at 11:17

## 2023-05-23 RX ADMIN — HYDROMORPHONE HYDROCHLORIDE 0.4 MG: 1 INJECTION, SOLUTION INTRAMUSCULAR; INTRAVENOUS; SUBCUTANEOUS at 15:46

## 2023-05-23 RX ADMIN — FENTANYL CITRATE 50 MCG: 50 INJECTION, SOLUTION INTRAMUSCULAR; INTRAVENOUS at 13:04

## 2023-05-23 RX ADMIN — LABETALOL HYDROCHLORIDE 5 MG: 5 INJECTION INTRAVENOUS at 13:59

## 2023-05-23 RX ADMIN — PROPOFOL 200 MG: 10 INJECTION, EMULSION INTRAVENOUS at 12:10

## 2023-05-23 RX ADMIN — SODIUM CHLORIDE, POTASSIUM CHLORIDE, SODIUM LACTATE AND CALCIUM CHLORIDE: 600; 310; 30; 20 INJECTION, SOLUTION INTRAVENOUS at 11:15

## 2023-05-23 RX ADMIN — ROCURONIUM BROMIDE 20 MG: 50 INJECTION, SOLUTION INTRAVENOUS at 14:25

## 2023-05-23 RX ADMIN — ENOXAPARIN SODIUM 40 MG: 40 INJECTION SUBCUTANEOUS at 12:25

## 2023-05-23 RX ADMIN — LIDOCAINE HYDROCHLORIDE 3 ML: 40 SOLUTION TOPICAL at 12:12

## 2023-05-23 RX ADMIN — ONDANSETRON 4 MG: 2 INJECTION INTRAMUSCULAR; INTRAVENOUS at 14:44

## 2023-05-23 RX ADMIN — KETOROLAC TROMETHAMINE 30 MG: 30 INJECTION, SOLUTION INTRAMUSCULAR at 14:51

## 2023-05-23 RX ADMIN — SODIUM CHLORIDE, POTASSIUM CHLORIDE, SODIUM LACTATE AND CALCIUM CHLORIDE: 600; 310; 30; 20 INJECTION, SOLUTION INTRAVENOUS at 13:50

## 2023-05-23 RX ADMIN — SENNOSIDES AND DOCUSATE SODIUM 1 TABLET: 8.6; 5 TABLET ORAL at 20:24

## 2023-05-23 RX ADMIN — MIDAZOLAM 1 MG: 1 INJECTION INTRAMUSCULAR; INTRAVENOUS at 12:00

## 2023-05-23 RX ADMIN — HYDROMORPHONE HYDROCHLORIDE 0.5 MG: 1 INJECTION, SOLUTION INTRAMUSCULAR; INTRAVENOUS; SUBCUTANEOUS at 13:32

## 2023-05-23 RX ADMIN — FENTANYL CITRATE 50 MCG: 50 INJECTION, SOLUTION INTRAMUSCULAR; INTRAVENOUS at 12:00

## 2023-05-23 RX ADMIN — DEXAMETHASONE SODIUM PHOSPHATE 10 MG: 4 INJECTION, SOLUTION INTRA-ARTICULAR; INTRALESIONAL; INTRAMUSCULAR; INTRAVENOUS; SOFT TISSUE at 14:44

## 2023-05-23 RX ADMIN — HYDROMORPHONE HYDROCHLORIDE 0.4 MG: 1 INJECTION, SOLUTION INTRAMUSCULAR; INTRAVENOUS; SUBCUTANEOUS at 16:04

## 2023-05-23 RX ADMIN — FENTANYL CITRATE 50 MCG: 50 INJECTION INTRAMUSCULAR; INTRAVENOUS at 15:37

## 2023-05-23 RX ADMIN — ROCURONIUM BROMIDE 20 MG: 50 INJECTION, SOLUTION INTRAVENOUS at 13:55

## 2023-05-23 RX ADMIN — PHENYLEPHRINE HYDROCHLORIDE 100 MCG: 10 INJECTION INTRAVENOUS at 12:48

## 2023-05-23 RX ADMIN — ROCURONIUM BROMIDE 50 MG: 50 INJECTION, SOLUTION INTRAVENOUS at 12:10

## 2023-05-23 RX ADMIN — FENTANYL CITRATE 50 MCG: 50 INJECTION INTRAMUSCULAR; INTRAVENOUS at 15:28

## 2023-05-23 RX ADMIN — ROCURONIUM BROMIDE 20 MG: 50 INJECTION, SOLUTION INTRAVENOUS at 13:20

## 2023-05-23 RX ADMIN — PHENYLEPHRINE HYDROCHLORIDE 100 MCG: 10 INJECTION INTRAVENOUS at 14:26

## 2023-05-23 RX ADMIN — OXYCODONE HYDROCHLORIDE 10 MG: 5 TABLET ORAL at 18:13

## 2023-05-23 RX ADMIN — PROPOFOL 100 MCG/KG/MIN: 10 INJECTION, EMULSION INTRAVENOUS at 12:10

## 2023-05-23 RX ADMIN — HYDROMORPHONE HYDROCHLORIDE 0.4 MG: 0.2 INJECTION, SOLUTION INTRAMUSCULAR; INTRAVENOUS; SUBCUTANEOUS at 21:34

## 2023-05-23 RX ADMIN — Medication 2 G: at 12:00

## 2023-05-23 RX ADMIN — LIDOCAINE HYDROCHLORIDE 50 MG: 20 INJECTION, SOLUTION INFILTRATION; PERINEURAL at 12:10

## 2023-05-23 RX ADMIN — SODIUM CHLORIDE, POTASSIUM CHLORIDE, SODIUM LACTATE AND CALCIUM CHLORIDE: 600; 310; 30; 20 INJECTION, SOLUTION INTRAVENOUS at 12:00

## 2023-05-23 ASSESSMENT — ACTIVITIES OF DAILY LIVING (ADL)
ADLS_ACUITY_SCORE: 35
ADLS_ACUITY_SCORE: 18
ADLS_ACUITY_SCORE: 35
ADLS_ACUITY_SCORE: 18
ADLS_ACUITY_SCORE: 18

## 2023-05-23 NOTE — ANESTHESIA PROCEDURE NOTES
Airway       Patient location during procedure: OR       Procedure Start/Stop Times: 5/23/2023 12:12 PM  Staff -        CRNA: Rakesh Dotson APRN CRNA       Performed By: CRNA  Consent for Airway        Urgency: elective  Indications and Patient Condition       Indications for airway management: candice-procedural       Induction type:intravenous       Mask difficulty assessment: 1 - vent by mask    Final Airway Details       Final airway type: endotracheal airway       Successful airway: ETT - single  Endotracheal Airway Details        ETT size (mm): 6.5       Cuffed: yes       Cuff volume (mL): 10       Successful intubation technique: direct laryngoscopy       DL Blade Type: MAC 3       Grade View of Cords: 1       Adjucts: stylet       Position: Right       Measured from: lips       Secured at (cm): 22       Bite block used: Oral Airway    Post intubation assessment        Placement verified by: capnometry, equal breath sounds and chest rise        Number of attempts at approach: 1       Number of other approaches attempted: 0       Secured with: silk tape       Ease of procedure: easy       Dentition: Intact and Unchanged    Medication(s) Administered   Medication Administration Time: 5/23/2023 12:12 PM

## 2023-05-23 NOTE — ANESTHESIA CARE TRANSFER NOTE
Patient: Vianca Kumar    Procedure: Procedure(s):  ROBOTIC ASSISTED LAPAROSCOPIC HERNIORRHAPHY, HIATAL WITH MESH AND MAGNETIC SPHINCTER AUGMENTATION       Diagnosis: Hiatal hernia [K44.9]  Gastroesophageal reflux disease with esophagitis, unspecified whether hemorrhage [K21.00]  Diagnosis Additional Information: No value filed.    Anesthesia Type:   General     Note:    Oropharynx: spontaneously breathing and oropharynx clear of all foreign objects  Level of Consciousness: awake  Oxygen Supplementation: face mask  Level of Supplemental Oxygen (L/min / FiO2): 10 L/M  Independent Airway: airway patency satisfactory and stable  Dentition: dentition unchanged  Vital Signs Stable: post-procedure vital signs reviewed and stable  Report to RN Given: handoff report given  Patient transferred to: PACU    Handoff Report: Identifed the Patient, Identified the Reponsible Provider, Reviewed the pertinent medical history, Discussed the surgical course, Reviewed Intra-OP anesthesia mangement and issues during anesthesia, Set expectations for post-procedure period and Allowed opportunity for questions and acknowledgement of understanding      Vitals:  Vitals Value Taken Time   /68 05/23/23 1514   Temp     Pulse 69 05/23/23 1519   Resp 11 05/23/23 1519   SpO2 98 % 05/23/23 1519   Vitals shown include unvalidated device data.    Electronically Signed By: SALLY Kathleen CRNA  May 23, 2023  3:20 PM

## 2023-05-23 NOTE — ANESTHESIA PREPROCEDURE EVALUATION
Anesthesia Pre-Procedure Evaluation    Patient: Vianca Kumar   MRN: [id number] : 1975        Procedure : Procedure(s):  ROBOTIC ASSISTED LAPAROSCOPIC HERNIORRHAPHY, HIATAL WITH MESH AND MAGNETIC SPHINCTER AUGMENTATION          Past Medical History:   Diagnosis Date     MDD (major depressive disorder)      Obesity (BMI 30-39.9)       Past Surgical History:   Procedure Laterality Date     CAPSULE/PILL CAM ENDOSCOPY N/A 2018    Procedure: CAPSULE/PILL CAM ENDOSCOPY;;  Surgeon: Guru Hallie Song MD;  Location: [facility]     COLONOSCOPY N/A 2017    Procedure: COLONOSCOPY;  Surgeon: Yosi Beck MD;  Location: [facility] OR;  Service:      COLONOSCOPY N/A 2022    Procedure: COLONOSCOPY, WITH POLYPECTOMY AND BIOPSY;  Surgeon: Lorri Holley DO;  Location: [facility] OR     COLONOSCOPY WITH CO2 INSUFFLATION N/A 2022    Procedure: COLONOSCOPY, WITH CO2 INSUFFLATION;  Surgeon: Lorri Holley DO;  Location: [facility] OR     COMBINED ESOPHAGOSCOPY, GASTROSCOPY, DUODENOSCOPY (EGD) WITH CO2 INSUFFLATION N/A 2022    Procedure: ESOPHAGOGASTRODUODENOSCOPY, WITH CO2 INSUFFLATION;  Surgeon: Lorri Holley DO;  Location: [facility] OR     COMBINED ESOPHAGOSCOPY, GASTROSCOPY, DUODENOSCOPY (EGD) WITH CO2 INSUFFLATION N/A 2022    Procedure: ESOPHAGOGASTRODUODENOSCOPY, WITH CO2 INSUFFLATION;  Surgeon: Lorri Holley DO;  Location: [facility] OR     ENDOSCOPIC ULTRASOUND UPPER GASTROINTESTINAL TRACT (GI) N/A 2018    Procedure: ENDOSCOPIC ULTRASOUND, ESOPHAGOSCOPY / UPPER GASTROINTESTINAL TRACT (GI);  EUS/Capsule ;  Surgeon: Guru Hallie Song MD;  Location: [facility]     ESOPHAGOSCOPY, GASTROSCOPY, DUODENOSCOPY (EGD), COMBINED N/A 2017    Procedure: ESOPHAGOGASTRODUODENOSCOPY (EGD);  Surgeon: Yosi Beck MD;  Location: [facility] OR;  Service:      ESOPHAGOSCOPY, GASTROSCOPY, DUODENOSCOPY (EGD), COMBINED N/A 2022    Procedure:  ESOPHAGOGASTRODUODENOSCOPY, WITH BIOPSY;  Surgeon: Lorri Holley DO;  Location: [facility] OR     ESOPHAGOSCOPY, GASTROSCOPY, DUODENOSCOPY (EGD), COMBINED N/A 11/25/2022    Procedure: ESOPHAGOGASTRODUODENOSCOPY, WITH BIOPSY;  Surgeon: Lorri Holley DO;  Location: [facility] OR     KNEE SURGERY Left     osteotomy; screws and plate in place     LAPAROSCOPIC CHOLECYSTECTOMY N/A 04/25/2018    Procedure: LAPAROSCOPIC CHOLECYSTECTOMY;  Laparoscopic Cholecystectomy ;  Surgeon: Alberto Noble MD;  Location: [facility] OR      Allergies   Allergen Reactions     Avocado Hives     Chantix [Varenicline Tartrate] Other (See Comments)     depression      Social History     Tobacco Use     Smoking status: Every Day     Packs/day: 1.00     Years: 31.00     Pack years: 31.00     Types: Cigarettes     Smokeless tobacco: Never     Tobacco comments:     31 years (as of 2023); ~1 cig/week (as of 2023)   Vaping Use     Vaping status: Never Used   Substance Use Topics     Alcohol use: Not Currently      Wt Readings from Last 1 Encounters:   05/08/23 93.6 kg (206 lb 6.4 oz)        Anesthesia Evaluation   Pt has had prior anesthetic.     No history of anesthetic complications       ROS/MED HX  ENT/Pulmonary:     (+) tobacco use,     Neurologic:       Cardiovascular:     (+) -----Previous cardiac testing   Echo: Date: 7/22/2016 Results:  TTE procedure:ECHO COMPLETE.     Procedure Date  Date: 07/22/2016 Start: 12:12 PM     Study Location: [facility]  Technical Quality: Adequate visualization     Patient Status: Routine     Height: 68 inches Weight: 174 pounds BSA: 1.93 m^2 BMI: 26.46 kg/m^2     HR: 61 bpm BP: 103/60 mmHg     Indications  Syncope.      Conclusions      Summary   Normal left ventricular size and wall thickness.   Left ventricular ejection fraction is visually estimated to be 65%.   No regional wall motion abnormalities.   Mild mitral regurgitation.      Findings      Left Ventricle   Normal left ventricular size and wall thickness.   Left  ventricular ejection fraction is visually estimated to be 65%.   No regional wall motion abnormalities.      Right Ventricle   Normal right ventricle size and systolic function.      Left Atrium   Normal size left atrium.      Right Atrium   Normal size right atrium.   No ASD visualized.      Great Vessels   Normal aortic root size.      Aortic Valve   Normal aortic valve structure and function.      Mitral Valve   Structurally normal mitral valve.   Mild mitral regurgitation.      Tricuspid Valve   Normal tricuspid valve structure.   Mild tricuspid regurgitation.      Pulmonic Valve   Normal pulmonic valve structure and function.      Pericardial Effusion   No evidence of pericardial effusion.     M-Mode/2D Measurements & Calculations      LV Diastolic LV Systolic Dimension: 3.7 cm  LA Dimension: 2.8 cmAO   Dimension: 5 cm  LV Volume Diastolic: 81 ml     Root Dimension: 3.1 cm   LV FS:26 %       LV Volume Systolic: 24 ml   LV PW Diastolic: LV EDV/LV EDV Index: 81 ml/42   0.9 cm           m^2LV ESV/LV ESV Index: 24   Septum           ml/12 m^2   Diastolic: 0.9   EF Calculated: 70.4 %   cm                                              LA/Aorta: 0.9   CO: 4.39 l/min   LV Length: 8.06 cm   CI: 2.27 l/m*m^2                                LA volume/Index: 30.7 ml                    LVOT: 1.9 cm                   /16m^2   LV Area   Diastolic: 26.6   cm^2   LV Area   Systolic: 11.6   cm^2     Doppler Measurements & Calculations      MV Peak E-Wave: 82.8                 LVOT Peak Velocity: 116 cm/s   cm/s                                 LVOT Mean Velocity: 76.7 cm/s   MV Peak A-Wave: 74.6                 LVOT Peak Gradient: 5 mmHgLVOT Mean   cm/s                                 Gradient: 3 mmHg   MV E/A Ratio: 1.11       LVOT VTI:   MV Peak Gradient: 2.74   25.4 cm   mmHg                                        TR Velocity:240 cm/s   MV Deceleration Time:                TR Gradient:23.04 mmHg   180 msec                 E/E':  5.7      MV E' Septal Velocity:   10.9 cm/s   MV E' Lateral Velocity:   14.6 cm/s      Signature      ----------------------------------------------------------------   Electronically signed by ADRIANO FORTUNE MD(Interpreting physician)   on 07/22/2016 01:20 PM  Stress Test:  Date: Results:    ECG Reviewed:  Date: 7/21/2016 Results:  Normal sinus rhythm   Normal ECG  Cath:  Date: Results:   (-) hypertension, arrhythmias and murmur   METS/Exercise Tolerance: >4 METS    Hematologic:       Musculoskeletal:       GI/Hepatic:     (+) GERD (currently asymptomatic but will have symptoms despite medication and despite being NPO), Asymptomatic on medication, hiatal hernia,  (-) liver disease   Renal/Genitourinary:    (-) renal disease   Endo:     (+) Obesity,     Psychiatric/Substance Use:       Infectious Disease:       Malignancy:       Other:            Physical Exam    Airway        Mallampati: I   TM distance: > 3 FB   Neck ROM: full   Mouth opening: > 3 cm    Respiratory Devices and Support         Dental           Cardiovascular          Rhythm and rate: regular and normal (-) no murmur    Pulmonary   pulmonary exam normal        breath sounds clear to auscultation           OUTSIDE LABS:  CBC:   Lab Results   Component Value Date    WBC 10.8 03/09/2023    WBC 15.0 (H) 01/11/2023    HGB 14.1 03/09/2023    HGB 14.7 05/28/2022    HCT 41.5 03/09/2023    HCT 43.8 05/28/2022     03/09/2023     05/28/2022     BMP:   Lab Results   Component Value Date     03/09/2023     05/28/2022    POTASSIUM 4.5 03/09/2023    POTASSIUM 4.1 05/28/2022    CHLORIDE 103 03/09/2023    CHLORIDE 105 05/28/2022    CO2 26 03/09/2023    CO2 25 05/28/2022    BUN 9.0 03/09/2023    BUN 10 05/28/2022    CR 0.66 03/09/2023    CR 0.71 05/28/2022    GLC 99 03/09/2023    GLC 99 05/28/2022     COAGS:   Lab Results   Component Value Date    PTT 45 (H) 10/03/2008    INR 1.02 03/27/2013     POC:   Lab Results   Component Value Date    BGM  103 (H) 04/25/2018    HCG Negative 05/28/2022    HCGS Negative 04/25/2018     HEPATIC:   Lab Results   Component Value Date    ALBUMIN 4.3 03/09/2023    PROTTOTAL 6.9 03/09/2023    ALT 15 03/09/2023    AST 21 03/09/2023    ALKPHOS 72 03/09/2023    BILITOTAL 0.2 03/09/2023     OTHER:   Lab Results   Component Value Date    LACT 1.8 06/02/2014    MARIAH 9.9 03/09/2023    LIPASE 87 05/28/2022    TSH 2.84 03/13/2018    T4 1.21 03/01/2013    CRP 0.4 02/25/2020    SED 8 03/12/2018       Anesthesia Plan    ASA Status:  2   NPO Status:  NPO Appropriate    Anesthesia Type: General.     - Reason for MAC: straight local not clinically adequate     - Airway: ETT   Induction: Intravenous, Propofol.   Maintenance: Inhalation.        Consents    Anesthesia Plan(s) and associated risks, benefits, and realistic alternatives discussed. Questions answered and patient/representative(s) expressed understanding.    - Discussed:     - Discussed with:  Patient      - Extended Intubation/Ventilatory Support Discussed: No.      - Patient is DNR/DNI Status: No    Use of blood products discussed: No .     Postoperative Care    Pain management: IV analgesics, Oral pain medications, Multi-modal analgesia.   PONV prophylaxis: Ondansetron (or other 5HT-3), Background Propofol Infusion, Dexamethasone or Solumedrol     Comments:    Other Comments: The risks and benefits of anesthesia, and the alternatives where applicable, have been discussed with the patient, and they wish to proceed.            SALLY Kathleen CRNA

## 2023-05-23 NOTE — INTERVAL H&P NOTE
"I have reviewed the surgical (or preoperative) H&P that is linked to this encounter, and examined the patient. There are no significant changes    Clinical Conditions Present on Arrival:  Clinically Significant Risk Factors Present on Admission                  # Obesity: Estimated body mass index is 31.38 kg/m  as calculated from the following:    Height as of 5/8/23: 1.727 m (5' 8\").    Weight as of 5/8/23: 93.6 kg (206 lb 6.4 oz).       "

## 2023-05-23 NOTE — PROVIDER NOTIFICATION
"Notified CRNAs x 2 of c/o upper left chest pain that is not responding to pain meds.  No changes noted on heart monitor.  Worsens with taking a deep breath. Described as \"deep and dull.\" Attempted position changes, as well. Believed to be \"shoulder strap pain.\"  Will continue to monitor.  "

## 2023-05-23 NOTE — OP NOTE
Date of Service: 5/23/2023     STAFF SURGEON:  Abdiaziz Tolbert MD     ASSISTANT:  None.     PREOPERATIVE DIAGNOSIS:   Hiatal hernia with refractory GERD with esophagitis     POSTOPERATIVE DIAGNOSIS:  Same     NAME OF PROCEDURE(S):   Robotic assisted laparoscopic hiatal hernia repair with mesh, LINX magnetic sphincter augmentation     INDICATIONS FOR PROCEDURE:  The patient is a 47-year-old female who has had up to grade D esophagitis incompletely resolved on twice daily PPI with large hiatal hernia who is referred to me for surgical therapy.  She had normal manometry and decided for the LINX implant in combination with her hiatal hernia repair for treatment of GERD.  Risks, benefits, terms discussed and consent obtained.     EBL: 30 cc    ANESTHESIA: General    COMPLICATIONS: None     DRAINS:  None.     SPECIMENS: * No specimens in log *     IMPLANTS:   Implant Name Type Inv. Item Serial No.  Lot No. LRB No. Used Action   MESH BIO-A TISSUE REINFORCEMENT 7X10CM [id number] - [id number] Mesh MESH BIO-A TISSUE REINFORCEMENT 7X10CM [id number] [employer]  N/A 1 Implanted   Linx Reflux Management System Other   [employer] [id number] N/A 1 Implanted   Size 17 LINX       OPERATIVE FINDINGS:   Type III paraesophageal hernia at least 6 cm.  No other intra abdominal pathology noted.     PROCEDURE DETAIL:  Following consent, the patient was brought from the preoperative holding area to the operating suite, and lying supine, underwent general anesthesia with endotracheal intubation without difficulty.  The abdomen was prepped and draped in the usual fashion.  A time-out procedure was performed.  Preoperative antibiotics given, and had sequentials and also received subcutaneous heparin preoperatively for DVT prophylaxis.     Following the timeout, I began with  a supraumbilical incision. I dissected to fascia which was elevated with kochers and divided with pizano scissors to gain peritoneal access. I placed two 0  vicryl stay sutures on either side of the defect and inserted the Roseann. This was attached to gas insufflation. Pneumoperitoneum achieved without difficulty and then I placed additional robotic ports to the left  and right upper abdomen, about two-fingerbreadths below the costal margin.  A third robotic port was then placed in the left lateral  abdomen. The patient was positioned reverse Trendelenburg and we brought the da Amanda XI surgical robot over the patient's head.  This was docked and I moved over to surgeon console.      I began with placement of a suture hammock for the liver retraction using a 12 inch 0 stratafix. I then began the dissection through the pars flaccida working towards and identifying the right sayra. Once the sayra identified I incised along the edge to enter the hiatus. I then worked anteriorly and around the esophagus towards the left side as well as posteriorly  enough to see the junction between the right and left crura. I then used the vesssel sealer to take down the short gastrics working up to the left sayra and dividing posterior attachements. I completed the retroesophageal window.  I placed a Penrose around the lower esophagus to assist with retraction then continued with the mediastinal dissection, freeing the esophageal attachments circumferentially. I was able to get easily 5 cm of intraabdominal esophageal length. Both anterior and posterior vagus nerves identified and preserved.  I dissected a window between the esophagus and the posterior vagus for placement of the LINX later.                 I closed the hiatus using interrupted figure-of-eight sutures with 2-0 Ethibond. I brought in the hiatal Bio-A mesh.  Mesh was secured with interrupted sutures of Ethibond.    Then used the Linx sizer through the window between the esophagus and posterior vagus.  Pop-off was at 13 but size 16 appeared to still be resting on the esophagus.  I went with the size 17 LINX implant which was  inserted into the abdomen and brought through that posterior vagus window around the lower esophagus.  This was locked in place.  Hillrose and Penrose were then removed from the abdomen.                 I moved back to patient bedside.  The robotic ports were removed.  The abdomen was desufflated. The supraumbilical fascial defect closed with figure of eight 0 vicryl and the previous stay sutures. The skin incisions were then closed with 4-0 Monocryl and then covered with Dermabond.  All incisions were injected with 0.5% Marcaine with epinephrine 1:200,000 for local anesthetic.      The patient tolerated the procedure well.  They were discharged from the operating room to PACU in stable condition then to be admitted for further recovery.           Abdiaziz Tolbert MD

## 2023-05-24 VITALS
OXYGEN SATURATION: 96 % | TEMPERATURE: 97.7 F | SYSTOLIC BLOOD PRESSURE: 108 MMHG | HEART RATE: 57 BPM | RESPIRATION RATE: 14 BRPM | DIASTOLIC BLOOD PRESSURE: 65 MMHG

## 2023-05-24 PROBLEM — Z98.890 STATUS POST REPAIR OF PARAESOPHAGEAL DIAPHRAGMATIC HERNIA: Status: ACTIVE | Noted: 2023-05-24

## 2023-05-24 PROBLEM — Z87.19 STATUS POST REPAIR OF PARAESOPHAGEAL DIAPHRAGMATIC HERNIA: Status: ACTIVE | Noted: 2023-05-24

## 2023-05-24 PROBLEM — K21.00 GASTROESOPHAGEAL REFLUX DISEASE WITH ESOPHAGITIS WITHOUT HEMORRHAGE: Status: ACTIVE | Noted: 2023-05-24

## 2023-05-24 LAB
ANION GAP SERPL CALCULATED.3IONS-SCNC: 11 MMOL/L (ref 7–15)
BUN SERPL-MCNC: 10.9 MG/DL (ref 6–20)
CALCIUM SERPL-MCNC: 9.1 MG/DL (ref 8.6–10)
CHLORIDE SERPL-SCNC: 103 MMOL/L (ref 98–107)
CREAT SERPL-MCNC: 0.6 MG/DL (ref 0.51–0.95)
DEPRECATED HCO3 PLAS-SCNC: 24 MMOL/L (ref 22–29)
ERYTHROCYTE [DISTWIDTH] IN BLOOD BY AUTOMATED COUNT: 11.9 % (ref 10–15)
GFR SERPL CREATININE-BSD FRML MDRD: >90 ML/MIN/1.73M2
GLUCOSE SERPL-MCNC: 110 MG/DL (ref 70–99)
HCT VFR BLD AUTO: 35.8 % (ref 35–47)
HGB BLD-MCNC: 11.7 G/DL (ref 11.7–15.7)
MCH RBC QN AUTO: 30.7 PG (ref 26.5–33)
MCHC RBC AUTO-ENTMCNC: 32.7 G/DL (ref 31.5–36.5)
MCV RBC AUTO: 94 FL (ref 78–100)
PLATELET # BLD AUTO: 308 10E3/UL (ref 150–450)
POTASSIUM SERPL-SCNC: 4.3 MMOL/L (ref 3.4–5.3)
RBC # BLD AUTO: 3.81 10E6/UL (ref 3.8–5.2)
SODIUM SERPL-SCNC: 138 MMOL/L (ref 136–145)
WBC # BLD AUTO: 12 10E3/UL (ref 4–11)

## 2023-05-24 PROCEDURE — 36415 COLL VENOUS BLD VENIPUNCTURE: CPT | Performed by: SURGERY

## 2023-05-24 PROCEDURE — 250N000011 HC RX IP 250 OP 636: Performed by: SURGERY

## 2023-05-24 PROCEDURE — 258N000003 HC RX IP 258 OP 636: Performed by: SURGERY

## 2023-05-24 PROCEDURE — 999N000123 HC STATISTIC OXYGEN O2DAILY TECH TIME

## 2023-05-24 PROCEDURE — 999N000156 HC STATISTIC RCP CONSULT EA 30 MIN

## 2023-05-24 PROCEDURE — 80048 BASIC METABOLIC PNL TOTAL CA: CPT | Performed by: SURGERY

## 2023-05-24 PROCEDURE — 96372 THER/PROPH/DIAG INJ SC/IM: CPT | Performed by: SURGERY

## 2023-05-24 PROCEDURE — 250N000013 HC RX MED GY IP 250 OP 250 PS 637: Performed by: SURGERY

## 2023-05-24 PROCEDURE — 85027 COMPLETE CBC AUTOMATED: CPT | Performed by: SURGERY

## 2023-05-24 PROCEDURE — 999N000157 HC STATISTIC RCP TIME EA 10 MIN

## 2023-05-24 RX ADMIN — ACETAMINOPHEN 975 MG: 325 TABLET ORAL at 09:49

## 2023-05-24 RX ADMIN — HYDROMORPHONE HYDROCHLORIDE 0.4 MG: 0.2 INJECTION, SOLUTION INTRAMUSCULAR; INTRAVENOUS; SUBCUTANEOUS at 03:58

## 2023-05-24 RX ADMIN — ESCITALOPRAM OXALATE 20 MG: 10 TABLET ORAL at 09:50

## 2023-05-24 RX ADMIN — SENNOSIDES AND DOCUSATE SODIUM 1 TABLET: 8.6; 5 TABLET ORAL at 09:50

## 2023-05-24 RX ADMIN — ENOXAPARIN SODIUM 40 MG: 40 INJECTION SUBCUTANEOUS at 09:50

## 2023-05-24 RX ADMIN — OXYCODONE HYDROCHLORIDE 10 MG: 5 TABLET ORAL at 12:14

## 2023-05-24 RX ADMIN — PANTOPRAZOLE SODIUM 40 MG: 40 TABLET, DELAYED RELEASE ORAL at 06:36

## 2023-05-24 RX ADMIN — OXYCODONE HYDROCHLORIDE 10 MG: 5 TABLET ORAL at 07:58

## 2023-05-24 RX ADMIN — SODIUM CHLORIDE, POTASSIUM CHLORIDE, SODIUM LACTATE AND CALCIUM CHLORIDE: 600; 310; 30; 20 INJECTION, SOLUTION INTRAVENOUS at 04:00

## 2023-05-24 RX ADMIN — POLYETHYLENE GLYCOL 3350 17 G: 17 POWDER, FOR SOLUTION ORAL at 09:49

## 2023-05-24 RX ADMIN — ACETAMINOPHEN 975 MG: 325 TABLET ORAL at 03:58

## 2023-05-24 ASSESSMENT — ACTIVITIES OF DAILY LIVING (ADL)
ADLS_ACUITY_SCORE: 21
ADLS_ACUITY_SCORE: 20
ADLS_ACUITY_SCORE: 18
ADLS_ACUITY_SCORE: 20
ADLS_ACUITY_SCORE: 18

## 2023-05-24 NOTE — PROGRESS NOTES
"Utilization Review department called and stated that patient \"has been changed to observation status and needs a code 44\".  Writer has communicated this to the charge nurse and provider.      Mallika Antony, [facility]   [phone]   "

## 2023-05-24 NOTE — ANESTHESIA POSTPROCEDURE EVALUATION
Patient: Vianca Kumar    Procedure: Procedure(s):  ROBOTIC ASSISTED LAPAROSCOPIC HERNIORRHAPHY, HIATAL WITH MESH AND MAGNETIC SPHINCTER AUGMENTATION       Anesthesia Type:  General    Note:  Disposition: Outpatient   Postop Pain Control: Uneventful            Sign Out: Well controlled pain   PONV: No   Neuro/Psych: Uneventful            Sign Out: Acceptable/Baseline neuro status   Airway/Respiratory: Uneventful            Sign Out: Acceptable/Baseline resp. status   CV/Hemodynamics: Uneventful            Sign Out: Acceptable CV status   Other NRE: NONE   DID A NON-ROUTINE EVENT OCCUR? No    Event details/Postop Comments:  Pt was happy with anesthesia care.  No complications.  I will follow up with the pt if needed.           Last vitals:  Vitals Value Taken Time   /54 05/23/23 1615   Temp 98.2  F (36.8  C) 05/23/23 1540   Pulse 76 05/23/23 1623   Resp 17 05/23/23 1623   SpO2 93 % 05/23/23 1623       Electronically Signed By: SALLY Dowell CRNA  May 24, 2023  12:23 PM

## 2023-05-24 NOTE — PROGRESS NOTES
S-(situation): Patient arrives to room 254 via cart from PACU    B-(background): hernia repair    A-(assessment): alert and oriented.  Capnography on. O2 on. Pain controlled with po analgesics. Oxycodone and tylenol. Start a regular diet. Ambulated from cart to bed.    R-(recommendations): Orders reviewed with pt. Will monitor patient per MD orders.     Inpatient nursing criteria listed below were met:    Health care directives status obtained and documented: Yes  VTE ordered/documented: Yes  Skin issues/needs documented:NA  Isolation addressed and Signage used: NA  Fall Prevention: Care plan updated NA Education given and documented NA  Care Plan initiated and Co-Morbidities added: Yes  Education Assessment documented:Yes  Admission Education Documented: Yes  If present CAUTI/CLABI Education done: NA  New medication patient education completed and documented (Possible Side Effects of Common Medications handout): Yes  Allergies Reviewed: Yes  Admission Medication Reconciliation completed: Yes  Home medications if not able to send immediately home with family stored here: NA  Reminder note placed in discharge instructions regarding home meds: NA  Individualized care needs/preferences addressed and charted: Yes  Provider Notified that patient has arrived to the unit: Yes

## 2023-05-24 NOTE — PLAN OF CARE
Goal Outcome Evaluation:      Plan of Care Reviewed With: patient    Overall Patient Progress: improvingOverall Patient Progress: improving    Outcome Evaluation: Abdominal pain is well-controlled with Oxycodone and Tylenol. Tolerating regular diet. Voiding. Incisions intact. VSS. On room air with sats in mid 90's. Has napped well this morning.    S-(situation): Observation patient discharged to home via wheelchair with family.     B-(background): Hernia repair    A-(assessment): as above    R-(recommendations): Discharge instructions reviewed with patient. Listed belongings gathered and returned to patient.         Discharge Nursing Criteria:     Care Plan and Patient education resolved: Yes    New Medications- pt has been educated about purpose and side effects: Yes    Vaccines  Influenza status verified at discharge:  Not Applicable      MISC  Prescriptions if needed, hard copies sent with patient  Yes  Home medications returned to patient: NA  Medication Bin checked and emptied on discharge Yes  Patient reports post-discharge pain management plan is effective: Yes

## 2023-05-24 NOTE — PLAN OF CARE
"  Problem: Plan of Care - These are the overarching goals to be used throughout the patient stay.    Goal: Plan of Care Review  Description: The Plan of Care Review/Shift note should be completed every shift.  The Outcome Evaluation is a brief statement about your assessment that the patient is improving, declining, or no change.  This information will be displayed automatically on your shift note.  Outcome: Progressing  Goal: Patient-Specific Goal (Individualized)  Description: You can add care plan individualizations to a care plan. Examples of Individualization might be:  \"Parent requests to be called daily at 9am for status\", \"I have a hard time hearing out of my right ear\", or \"Do not touch me to wake me up as it startles me\".  Outcome: Progressing  Goal: Absence of Hospital-Acquired Illness or Injury  Outcome: Progressing  Intervention: Identify and Manage Fall Risk  Recent Flowsheet Documentation  Taken 5/24/2023 0100 by Saul Lopez RN  Safety Promotion/Fall Prevention:   activity supervised   clutter free environment maintained   nonskid shoes/slippers when out of bed   room organization consistent   safety round/check completed   supervised activity  Taken 5/23/2023 1930 by Saul Lopez RN  Safety Promotion/Fall Prevention:   activity supervised   clutter free environment maintained   nonskid shoes/slippers when out of bed   room organization consistent   safety round/check completed   supervised activity  Intervention: Prevent and Manage VTE (Venous Thromboembolism) Risk  Recent Flowsheet Documentation  Taken 5/24/2023 0100 by Saul Lopez RN  VTE Prevention/Management: SCDs (sequential compression devices) on  Taken 5/23/2023 1930 by Saul Lopez RN  VTE Prevention/Management: SCDs (sequential compression devices) on  Goal: Optimal Comfort and Wellbeing  Outcome: Progressing  Intervention: Monitor Pain and Promote Comfort  Recent Flowsheet Documentation  Taken 5/23/2023 2230 by Jessica" ALONSO Hughes  Pain Management Interventions: medication (see MAR)  Taken 5/23/2023 2130 by Saul Lopez RN  Pain Management Interventions: medication (see MAR)  Taken 5/23/2023 1930 by Saul Lopez RN  Pain Management Interventions: medication (see MAR)  Goal: Readiness for Transition of Care  Outcome: Progressing     Problem: Surgery Nonspecified  Goal: Absence of Bleeding  Outcome: Progressing  Goal: Effective Bowel Elimination  Outcome: Progressing  Goal: Fluid and Electrolyte Balance  Outcome: Progressing  Goal: Blood Glucose Level Within Targeted Range  Outcome: Progressing  Goal: Absence of Infection Signs and Symptoms  Outcome: Progressing  Goal: Anesthesia/Sedation Recovery  Outcome: Progressing  Intervention: Optimize Anesthesia Recovery  Recent Flowsheet Documentation  Taken 5/24/2023 0100 by Saul Lopez RN  Safety Promotion/Fall Prevention:   activity supervised   clutter free environment maintained   nonskid shoes/slippers when out of bed   room organization consistent   safety round/check completed   supervised activity  Taken 5/23/2023 1930 by Saul Lopez RN  Safety Promotion/Fall Prevention:   activity supervised   clutter free environment maintained   nonskid shoes/slippers when out of bed   room organization consistent   safety round/check completed   supervised activity  Goal: Optimal Pain Control and Function  Outcome: Progressing  Intervention: Prevent or Manage Pain  Recent Flowsheet Documentation  Taken 5/23/2023 2230 by Saul Lopez RN  Pain Management Interventions: medication (see MAR)  Taken 5/23/2023 2130 by Saul Lopez RN  Pain Management Interventions: medication (see MAR)  Taken 5/23/2023 1930 by Saul Lopez RN  Pain Management Interventions: medication (see MAR)  Goal: Nausea and Vomiting Relief  Outcome: Progressing  Goal: Effective Urinary Elimination  Outcome: Progressing  Goal: Effective Oxygenation and Ventilation  Outcome: Progressing       10 mg Oxycodone  given once, and dilaudid given once for pain, Pt has since rated pain at a 5 after pain control measures, and since has been able to sleep. She has been up stand by assist and has been able to void, and tolerate oral intake. Surgical incisions are glued and intact. Capnography has been with in normal limits, and vitals stable.   Will continue to assess and treat pain as able and follow plan of care.

## 2023-05-24 NOTE — UTILIZATION REVIEW
Admission Status; Secondary Review Determination     Under the authority of the Utilization Management Committee, the utilization review process indicated a secondary review on the above patient. The review outcome is based on review of the medical records, discussions with staff, and applying clinical experience noted on the date of the review.     (x) Outpatient Status with extended recovery is appropriate - This patient does not meet hospital inpatient criteria. If this patient's primary payer is Medicare and was admitted as an inpatient, Condition Code 44 should be used and patient status changed to outpatient recovery.     RATIONALE FOR DETERMINATION:    46 yo female with a hx of hiatal hernia resulting in refractory esophagitis.  The patient underwent elective robotic assisted laparoscopic hiatal hernia repair with mesh, LINX magnetic sphincter augmentation yesterday.  There were no significant candice-operative issues reported and plan is for discharge today.      The surgical procedure codes as outpatient    I notified Dr. Tolbert of this recommendation via text message through [employer] today    Patient has Medicare and the procedure is not on the CMS inpatient list. No documented complications or unexpected recovery. Patient can be safely monitored for bleeding and recover in outpatient/extended recovery setting.   The severity of illness, intensity of service provided, expected LOS and risk for adverse outcome doesn't meet inpatient hospital admission.     The information on this document is developed by the utilization review team in order for the business office to ensure compliance. This only denotes the appropriateness of proper admission status and does not reflect the quality of care rendered.   The definitions of Inpatient Status and Observation Status used in making the determination above are those provided in the CMS Coverage Manual, Chapter 1 and Chapter 6, section 70.4.     Sincerely,     Brandon  MD Taylor  Utilization Review   Physician Advisor   [facility]

## 2023-06-14 ENCOUNTER — OFFICE VISIT (OUTPATIENT)
Dept: SURGERY | Facility: CLINIC | Age: 48
End: 2023-06-14
Payer: MEDICARE

## 2023-06-14 VITALS
SYSTOLIC BLOOD PRESSURE: 116 MMHG | WEIGHT: 199 LBS | DIASTOLIC BLOOD PRESSURE: 103 MMHG | HEART RATE: 63 BPM | BODY MASS INDEX: 30.26 KG/M2

## 2023-06-14 DIAGNOSIS — Z98.890 STATUS POST REPAIR OF PARAESOPHAGEAL DIAPHRAGMATIC HERNIA: Primary | ICD-10-CM

## 2023-06-14 DIAGNOSIS — S91.002A ANKLE WOUND, LEFT, INITIAL ENCOUNTER: ICD-10-CM

## 2023-06-14 DIAGNOSIS — Z87.19 STATUS POST REPAIR OF PARAESOPHAGEAL DIAPHRAGMATIC HERNIA: Primary | ICD-10-CM

## 2023-06-14 LAB
ERYTHROCYTE [DISTWIDTH] IN BLOOD BY AUTOMATED COUNT: 11.5 % (ref 10–15)
HCT VFR BLD AUTO: 38.7 % (ref 35–47)
HGB BLD-MCNC: 13.2 G/DL (ref 11.7–15.7)
MCH RBC QN AUTO: 30.8 PG (ref 26.5–33)
MCHC RBC AUTO-ENTMCNC: 34.1 G/DL (ref 31.5–36.5)
MCV RBC AUTO: 90 FL (ref 78–100)
PLATELET # BLD AUTO: 438 10E3/UL (ref 150–450)
RBC # BLD AUTO: 4.29 10E6/UL (ref 3.8–5.2)
WBC # BLD AUTO: 8.9 10E3/UL (ref 4–11)

## 2023-06-14 PROCEDURE — 85027 COMPLETE CBC AUTOMATED: CPT | Performed by: SURGERY

## 2023-06-14 PROCEDURE — 36415 COLL VENOUS BLD VENIPUNCTURE: CPT | Performed by: SURGERY

## 2023-06-14 PROCEDURE — 99024 POSTOP FOLLOW-UP VISIT: CPT | Performed by: SURGERY

## 2023-06-14 NOTE — LETTER
6/14/2023         RE: Vianca Kumar  [address]  [facility] [address]        Dear Colleague,    Thank you for referring your patient, Vianca Kumar, to the [facility]. Please see a copy of my visit note below.    General Surgery Post Op    Pt returns for follow up visit s/p robotic hiatal hernia + mesh + LINX on 5/23/2023.    Patient has been feeling run down, fatigued. Pain ok now was stronger first 5 days then improved since.  No heartburn which feels great.  Swallowing initially felt like things could get stuck, this has been better over the last week. Still taking bite of food every 1-2 hours during day.  Last night hit back of left foot/heel with isela gate so dealing with a cut there now    Physical exam: Vitals: BP (!) 116/103   Pulse 63   Wt 90.3 kg (199 lb)   LMP 01/01/2022 (Exact Date)   BMI 30.26 kg/m    BMI= Body mass index is 30.26 kg/m .    Exam:  Constitutional: healthy, alert and no distress  Gastrointestinal: Abdomen soft, non-tender. BS normal. No masses, organomegaly  Incisions intact no infection, glue starting to loosen    Assessment:     ICD-10-CM    1. Status post repair of paraesophageal diaphragmatic hernia  Z98.890 CBC with platelets    Z87.19 TD(ADULT),2 LF TETANUS TOXOID,PF,ADSORBED(TDVAX)      2. Ankle wound, left, initial encounter  S91.002A         Plan: We will see if we can get her tetanus shot here today with this recent wound but if not available she may need to see primary care for it.  With the ongoing fatigue and feeling crummy she was curious about blood counts we will go ahead and check a CBC for WBC and hemoglobin levels.  Continue with the regular swallowing during the day, scar tissue will still be forming over the next few weeks 1 make sure this stays soft and forms a capsule that keeps the Linx implant moving.  Follow-up with me again in 1 month for recheck.    Abdiaziz Tolbert MD        Again, thank you for  allowing me to participate in the care of your patient.        Sincerely,        Abdiaziz Tolbert MD

## 2023-06-14 NOTE — PROGRESS NOTES
General Surgery Post Op    Pt returns for follow up visit s/p robotic hiatal hernia + mesh + LINX on 5/23/2023.    Patient has been feeling run down, fatigued. Pain ok now was stronger first 5 days then improved since.  No heartburn which feels great.  Swallowing initially felt like things could get stuck, this has been better over the last week. Still taking bite of food every 1-2 hours during day.  Last night hit back of left foot/heel with isela gate so dealing with a cut there now    Physical exam: Vitals: BP (!) 116/103   Pulse 63   Wt 90.3 kg (199 lb)   LMP 01/01/2022 (Exact Date)   BMI 30.26 kg/m    BMI= Body mass index is 30.26 kg/m .    Exam:  Constitutional: healthy, alert and no distress  Gastrointestinal: Abdomen soft, non-tender. BS normal. No masses, organomegaly  Incisions intact no infection, glue starting to loosen    Assessment:     ICD-10-CM    1. Status post repair of paraesophageal diaphragmatic hernia  Z98.890 CBC with platelets    Z87.19 TD(ADULT),2 LF TETANUS TOXOID,PF,ADSORBED(TDVAX)      2. Ankle wound, left, initial encounter  S91.002A         Plan: We will see if we can get her tetanus shot here today with this recent wound but if not available she may need to see primary care for it.  With the ongoing fatigue and feeling crummy she was curious about blood counts we will go ahead and check a CBC for WBC and hemoglobin levels.  Continue with the regular swallowing during the day, scar tissue will still be forming over the next few weeks 1 make sure this stays soft and forms a capsule that keeps the Linx implant moving.  Follow-up with me again in 1 month for recheck.    Abdiaziz Tolbert MD

## 2023-07-05 ENCOUNTER — PATIENT OUTREACH (OUTPATIENT)
Dept: INTERNAL MEDICINE | Facility: CLINIC | Age: 48
End: 2023-07-05
Payer: MEDICARE

## 2023-07-05 ENCOUNTER — TELEPHONE (OUTPATIENT)
Dept: SURGERY | Facility: CLINIC | Age: 48
End: 2023-07-05
Payer: MEDICARE

## 2023-07-05 ENCOUNTER — HOSPITAL ENCOUNTER (EMERGENCY)
Facility: CLINIC | Age: 48
Discharge: HOME | End: 2023-07-05
Attending: EMERGENCY MEDICINE | Admitting: EMERGENCY MEDICINE
Payer: MEDICARE

## 2023-07-05 ENCOUNTER — APPOINTMENT (OUTPATIENT)
Dept: GENERAL RADIOLOGY | Facility: CLINIC | Age: 48
End: 2023-07-05
Attending: EMERGENCY MEDICINE
Payer: MEDICARE

## 2023-07-05 VITALS
OXYGEN SATURATION: 93 % | RESPIRATION RATE: 12 BRPM | SYSTOLIC BLOOD PRESSURE: 126 MMHG | WEIGHT: 200 LBS | HEART RATE: 80 BPM | BODY MASS INDEX: 30.31 KG/M2 | HEIGHT: 68 IN | TEMPERATURE: 98.2 F | DIASTOLIC BLOOD PRESSURE: 87 MMHG
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
## 2023-07-05 DIAGNOSIS — T18.128A ESOPHAGEAL OBSTRUCTION DUE TO FOOD IMPACTION: ICD-10-CM

## 2023-07-05 LAB
ATRIAL RATE - MUSE: 85 BPM
DIASTOLIC BLOOD PRESSURE - MUSE: NORMAL MMHG
INTERPRETATION ECG - MUSE: NORMAL
P AXIS - MUSE: 77 DEGREES
PR INTERVAL - MUSE: 156 MS
QRS DURATION - MUSE: 86 MS
QT - MUSE: 374 MS
QTC - MUSE: 445 MS
R AXIS - MUSE: 70 DEGREES
SYSTOLIC BLOOD PRESSURE - MUSE: NORMAL MMHG
T AXIS - MUSE: 66 DEGREES
VENTRICULAR RATE- MUSE: 85 BPM

## 2023-07-05 PROCEDURE — 99284 EMERGENCY DEPT VISIT MOD MDM: CPT | Mod: 25

## 2023-07-05 PROCEDURE — 71046 X-RAY EXAM CHEST 2 VIEWS: CPT

## 2023-07-05 PROCEDURE — 250N000013 HC RX MED GY IP 250 OP 250 PS 637: Performed by: EMERGENCY MEDICINE

## 2023-07-05 PROCEDURE — 93005 ELECTROCARDIOGRAM TRACING: CPT

## 2023-07-05 RX ORDER — MAGNESIUM HYDROXIDE/ALUMINUM HYDROXICE/SIMETHICONE 120; 1200; 1200 MG/30ML; MG/30ML; MG/30ML
30 SUSPENSION ORAL ONCE
Status: COMPLETED | OUTPATIENT
Start: 2023-07-05 | End: 2023-07-05

## 2023-07-05 RX ADMIN — ALUMINUM HYDROXIDE, MAGNESIUM HYDROXIDE, AND SIMETHICONE 30 ML: 200; 200; 20 SUSPENSION ORAL at 06:33

## 2023-07-05 ASSESSMENT — HEART SCORE
RISK FACTORS: NO KNOWN RISK FACTORS
ECG: NORMAL
HISTORY: SLIGHTLY SUSPICIOUS
TROPONIN: LESS THAN OR EQUAL TO NORMAL LIMIT
AGE: 45-64
HEART SCORE: 1

## 2023-07-05 ASSESSMENT — ACTIVITIES OF DAILY LIVING (ADL)
ADLS_ACUITY_SCORE: 35
ADLS_ACUITY_SCORE: 35

## 2023-07-05 NOTE — DISCHARGE INSTRUCTIONS
Take a liquid antiacid such as Maalox as needed, soft diet and clear liquids today.  Contact your GI surgeon today with an update.  If you develop worsening symptoms, return to the ER.

## 2023-07-05 NOTE — TELEPHONE ENCOUNTER
[facility]    Phone Message    May a detailed message be left on voicemail: yes     Reason for Call: The patient would like to speak with someone on Dr. Tolbert's team. The patient states that since her surgery she has been experiencing choking episodes. The patient states was seen in the ER yesterday due to worsening choking symptoms. The ER team advised her to follow-up with Dr. Tolbert's team about it as soon as possible. Can someone please give the patient a call regarding this? Thank you!    Action Taken: Message routed to:  Adult Clinics: Surgery, General p [phone]    Travel Screening: Not Applicable

## 2023-07-05 NOTE — ED PROVIDER NOTES
"  History     Chief Complaint:  Swallowed Foreign Body and Chest Pain       The history is provided by the patient.      Vianca Kumar is a 47 year old female who presents with substernal pain, after she felt like a cracker became stuck in her esophagus last night. Vianca recently had a paraesophageal hernia repair done in May, and states she has been doing great since then. She endorses dry-heave vomiting, but notes she was able to keep water down this morning. She denies chest pain, throat pain, or ability to keep down solids. Has not taken any antacids at home.     Independent Historian:   None - Patient Only    Review of External Notes:   I reviewed the patient's procedural note from 5/23, where she underwent a robotic assisted laparoscopic hiatal hernia repair with mesh, LINX magnetic sphincter augmentation.    Medications:    Lexapro    Past Medical History:    Depression  Elevated BMI    Past Surgical History:    Endoscopy  Colonoscopy (x3)  Esophagogastroduodenoscopy (x6)  Knee surgery  Cholecystectomy   Nissen fundoplication     Repair of paraesophageal hernia     Physical Exam     Patient Vitals for the past 24 hrs:   BP Temp Temp src Pulse Resp SpO2 Height Weight   07/05/23 0630 -- -- -- -- -- 93 % -- --   07/05/23 0615 -- -- -- -- -- 93 % -- --   07/05/23 0530 -- -- -- 80 12 97 % -- --   07/05/23 0515 -- -- -- 81 12 98 % -- --   07/05/23 0500 -- -- -- 83 12 97 % -- --   07/05/23 0445 -- -- -- 77 (!) 5 97 % -- --   07/05/23 0444 -- -- -- 78 (!) 7 97 % -- --   07/05/23 0431 126/87 98.2  F (36.8  C) Temporal 94 18 95 % 1.727 m (5' 8\") 90.7 kg (200 lb)        Physical Exam  Nursing note and vitals reviewed.  Constitutional:  Alert.  Appears uncomfortable.   HENT:    Mucus membranes are moist.  Eyes:    Conjunctivae are normal.   Cardiovascular:  Normal rate, regular rhythm.      Normal heart sounds.     No murmur heard.  Pulmonary/Chest:  Breath sounds normal. No respiratory distress.     No stridor. "   GI:   No epigastric tenderness.   Lymph:   No lymphadenopathy.  Neurological:   Alert and appropriate. No focal weakness.  Skin:    Skin is warm and dry. No rash noted. No diaphoresis.   Psychiatric:   Behavior is normal. Judgment and thought content normal.     Emergency Department Course   ECG  ECG taken at 0429, ECG read at 0615  Normal sinus rhythm.    Rate 85 bpm. UT interval 156 ms. QRS duration 86 ms. QT/QTc 374/445 ms. P-R-T axes 77 70 66.     Imaging:  XR Chest 2 Views   Final Result   IMPRESSION:    1. No evidence of active cardiopulmonary disease.   2. No unexpected radiopaque foreign object in the chest.                Report per radiology    Emergency Department Course & Assessments:    Interventions:  Medications   alum & mag hydroxide-simethicone (MAALOX) suspension 30 mL (30 mLs Oral $Given 7/5/23 0633)      Independent Interpretation (X-rays, CTs, rhythm strip):  I personally reviewed the patient's chest XR from today. I do not appreciate any pneumonia, pulmonary infiltrate, or rib fractures.     Assessments/Consultations/Discussion of Management or Tests:  ED Course as of 07/05/23 0739   Wed Jul 05, 2023   0618 Dr. Alegria' evaluation.    0710 Rechecked and updated.      Social Determinants of Health affecting care:   None    Disposition:  The patient was discharged to home.     Impression & Plan      CMS Diagnoses: None    Medical Decision Making:  Patient presents after having had a hiatal hernia repair and sphincter augmentation in May after eating crackers feeling like they got stuck with some lower substernal chest pain.  Her EKG is normal and she does not have any significant risk factors for coronary disease.  The pain gradually improved and she was given a liquid antiacid and chest x-ray was normal without evidence of mediastinal air or other abnormality.  The liquid antiacid seem to help as well and she was able to tolerate p.o. fluids and some applesauce without difficulty.  I believe  that she had a transient obstruction and may be just irritation from the crackers that have now resolved.  There is no evidence that this is coronary disease or other more serious pathology.  It happened right after she ate the crackers and she felt like they got stuck.  Patient be discharged home with recommended soft diet liquids and follow-up with her GI surgeon.  If she has recurrence of pain, she will return to the ER.    Take a liquid antiacid such as Maalox as needed, soft diet and clear liquids today.  Contact your GI surgeon today with an update.  If you develop worsening symptoms, return to the ER.    Diagnosis:    ICD-10-CM    1. Esophageal obstruction due to food impaction  K22.2     T18.128A     Resolved, cracker ingestion         Discharge Medications:  Discharge Medication List as of 7/5/2023  7:13 AM         Scribe Disclosure:  ICHARLENE, am serving as a scribe at 6:02 AM on 7/5/2023 to document services personally performed by Rebecca Jones MD based on my observations and the provider's statements to me.     7/5/2023   Rebecca Jones MD Powell, Tracy Alan, MD  07/05/23 0744

## 2023-07-05 NOTE — ED TRIAGE NOTES
EMS report: Pt states has a magnetic device in esphagus r/t previous problems vomiting. States around 0000 was eating a cracker and has sensation its stuck in throat. Pt able to swallow salivia and has drank and eaten since. Pt c/o CP EKG NSR, EMS gave 324mg PO aspirin and 1 Sublingual Nitroglycerin with no relief.      Triage Assessment     Row Name 07/05/23 0425       Triage Assessment (Adult)    Airway WDL WDL       Respiratory WDL    Respiratory WDL WDL       Skin Circulation/Temperature WDL    Skin Circulation/Temperature WDL WDL       Cardiac WDL    Cardiac WDL WDL       Peripheral/Neurovascular WDL    Peripheral Neurovascular WDL WDL       Cognitive/Neuro/Behavioral WDL    Cognitive/Neuro/Behavioral WDL WDL

## 2023-07-05 NOTE — TELEPHONE ENCOUNTER
Triage RN attempted to contact patient to follow-up on her ER visit yesterday. Upon chart review, patient has been in contact with her surgical team regarding her symptoms.     ED / Discharge Outreach Protocol    Patient Contact    Attempt # 1    Was call answered?  No.  Left message on voicemail with information to call me back.    Rhonda Larose RN

## 2023-07-05 NOTE — TELEPHONE ENCOUNTER
"Called pt and spoke to her about her ED visit today-pt had Toast today and this \"went okay\" not counting bites, but making sure she is conscious of chewing enough, smaller bites. Being more cautious with meats and breads as she has identified these as problem some and taking sips of water with these. Made done month f/u appt.    KANNAN Morales RN 7/5/2023 3:52 PM    "

## 2023-07-05 NOTE — TELEPHONE ENCOUNTER
What type of discharge? Emergency Department  Risk of Hospital admission or ED visit: 56%  Is a TCM episode required? No  When should the patient follow up with PCP? N/A, currently not scheduling patient's seen in the ER    Rhonda Larose RN  [facility]

## 2023-07-12 ENCOUNTER — OFFICE VISIT (OUTPATIENT)
Dept: SURGERY | Facility: CLINIC | Age: 48
End: 2023-07-12
Payer: MEDICARE

## 2023-07-12 VITALS
DIASTOLIC BLOOD PRESSURE: 87 MMHG | HEART RATE: 69 BPM | SYSTOLIC BLOOD PRESSURE: 114 MMHG | WEIGHT: 200 LBS | BODY MASS INDEX: 30.41 KG/M2

## 2023-07-12 DIAGNOSIS — Z98.890 STATUS POST REPAIR OF PARAESOPHAGEAL DIAPHRAGMATIC HERNIA: Primary | ICD-10-CM

## 2023-07-12 DIAGNOSIS — Z87.19 STATUS POST REPAIR OF PARAESOPHAGEAL DIAPHRAGMATIC HERNIA: Primary | ICD-10-CM

## 2023-07-12 PROCEDURE — 99024 POSTOP FOLLOW-UP VISIT: CPT | Performed by: SURGERY

## 2023-07-12 NOTE — PROGRESS NOTES
General Surgery Post Op    Pt returns for follow up visit s/p robotic hiatal hernia with mesh + LINX on 5/23/23.    Patient has been doing well, no reflux or heartburn. Off antacids  Had issues after eating crackers where felt like might have been stuck, had dry heaves and got sore, ended up going to the ER but was OK  Swallowing generally fine just sometimes get these episodes. Overall seems to be improving.    Physical exam: Vitals: /87   Pulse 69   Wt 90.7 kg (200 lb)   LMP 01/01/2022 (Exact Date)   BMI 30.41 kg/m    BMI= Body mass index is 30.41 kg/m .    Exam:  Constitutional: healthy, alert and no distress    Assessment:     ICD-10-CM    1. Status post repair of paraesophageal diaphragmatic hernia  Z98.890     Z87.19         Plan: Doing well with her LINX overall, and she is pleased with results and having no reflux.  These episodes are felt like she was either choking or having these heaves seem to be getting less as time goes on.  Okay to cut back on the extra bites of food during the day and get back to normal diet habits though did recommend trying to have smaller meals spread out during the day and she typically does this anyway.  Follow-up in clinic as needed.    Abdiaziz Tolbert MD

## 2023-07-12 NOTE — LETTER
7/12/2023         RE: Vianca Kumar  [address]  [place] [address]        Dear Colleague,    Thank you for referring your patient, Vianca Kumar, to the [facility]. Please see a copy of my visit note below.    General Surgery Post Op    Pt returns for follow up visit s/p robotic hiatal hernia with mesh + LINX on 5/23/23.    Patient has been doing well, no reflux or heartburn. Off antacids  Had issues after eating crackers where felt like might have been stuck, had dry heaves and got sore, ended up going to the ER but was OK  Swallowing generally fine just sometimes get these episodes. Overall seems to be improving.    Physical exam: Vitals: /87   Pulse 69   Wt 90.7 kg (200 lb)   LMP 01/01/2022 (Exact Date)   BMI 30.41 kg/m    BMI= Body mass index is 30.41 kg/m .    Exam:  Constitutional: healthy, alert and no distress    Assessment:     ICD-10-CM    1. Status post repair of paraesophageal diaphragmatic hernia  Z98.890     Z87.19         Plan: Doing well with her LINX overall, and she is pleased with results and having no reflux.  These episodes are felt like she was either choking or having these heaves seem to be getting less as time goes on.  Okay to cut back on the extra bites of food during the day and get back to normal diet habits though did recommend trying to have smaller meals spread out during the day and she typically does this anyway.  Follow-up in clinic as needed.    Abdiaziz Tolbert MD        Again, thank you for allowing me to participate in the care of your patient.        Sincerely,        Abdiaziz Tolbert MD

## 2023-08-29 ENCOUNTER — HOSPITAL ENCOUNTER (EMERGENCY)
Facility: CLINIC | Age: 48
Discharge: HOME | End: 2023-08-30
Attending: EMERGENCY MEDICINE | Admitting: EMERGENCY MEDICINE
Payer: MEDICARE

## 2023-08-29 DIAGNOSIS — R19.7 VOMITING AND DIARRHEA: ICD-10-CM

## 2023-08-29 DIAGNOSIS — R10.10 UPPER ABDOMINAL PAIN: ICD-10-CM

## 2023-08-29 DIAGNOSIS — Z98.890 POSTOPERATIVE STATE: ICD-10-CM

## 2023-08-29 DIAGNOSIS — R11.10 VOMITING AND DIARRHEA: ICD-10-CM

## 2023-08-29 LAB
ALBUMIN SERPL BCG-MCNC: 4.2 G/DL (ref 3.5–5.2)
ALP SERPL-CCNC: 107 U/L (ref 35–104)
ALT SERPL W P-5'-P-CCNC: 18 U/L (ref 0–50)
ANION GAP SERPL CALCULATED.3IONS-SCNC: 12 MMOL/L (ref 7–15)
AST SERPL W P-5'-P-CCNC: 20 U/L (ref 0–45)
BASOPHILS # BLD AUTO: 0.1 10E3/UL (ref 0–0.2)
BASOPHILS NFR BLD AUTO: 1 %
BILIRUB SERPL-MCNC: 0.8 MG/DL
BUN SERPL-MCNC: 5.6 MG/DL (ref 6–20)
CALCIUM SERPL-MCNC: 9.1 MG/DL (ref 8.6–10)
CHLORIDE SERPL-SCNC: 98 MMOL/L (ref 98–107)
CREAT SERPL-MCNC: 0.71 MG/DL (ref 0.51–0.95)
DEPRECATED HCO3 PLAS-SCNC: 23 MMOL/L (ref 22–29)
EOSINOPHIL # BLD AUTO: 0.6 10E3/UL (ref 0–0.7)
EOSINOPHIL NFR BLD AUTO: 5 %
ERYTHROCYTE [DISTWIDTH] IN BLOOD BY AUTOMATED COUNT: 11.9 % (ref 10–15)
GFR SERPL CREATININE-BSD FRML MDRD: >90 ML/MIN/1.73M2
GLUCOSE SERPL-MCNC: 99 MG/DL (ref 70–99)
HCG INTACT+B SERPL-ACNC: 3 MIU/ML
HCG SERPL QL: NEGATIVE
HCT VFR BLD AUTO: 37.5 % (ref 35–47)
HGB BLD-MCNC: 12.7 G/DL (ref 11.7–15.7)
HOLD SPECIMEN: NORMAL
IMM GRANULOCYTES # BLD: 0.1 10E3/UL
IMM GRANULOCYTES NFR BLD: 1 %
LIPASE SERPL-CCNC: 15 U/L (ref 13–60)
LYMPHOCYTES # BLD AUTO: 3.4 10E3/UL (ref 0.8–5.3)
LYMPHOCYTES NFR BLD AUTO: 26 %
MCH RBC QN AUTO: 31.1 PG (ref 26.5–33)
MCHC RBC AUTO-ENTMCNC: 33.9 G/DL (ref 31.5–36.5)
MCV RBC AUTO: 92 FL (ref 78–100)
MONOCYTES # BLD AUTO: 0.7 10E3/UL (ref 0–1.3)
MONOCYTES NFR BLD AUTO: 5 %
NEUTROPHILS # BLD AUTO: 8.2 10E3/UL (ref 1.6–8.3)
NEUTROPHILS NFR BLD AUTO: 62 %
NRBC # BLD AUTO: 0 10E3/UL
NRBC BLD AUTO-RTO: 0 /100
PLATELET # BLD AUTO: 352 10E3/UL (ref 150–450)
POTASSIUM SERPL-SCNC: 3.6 MMOL/L (ref 3.4–5.3)
PROT SERPL-MCNC: 6.6 G/DL (ref 6.4–8.3)
RBC # BLD AUTO: 4.08 10E6/UL (ref 3.8–5.2)
SODIUM SERPL-SCNC: 133 MMOL/L (ref 136–145)
WBC # BLD AUTO: 13 10E3/UL (ref 4–11)

## 2023-08-29 PROCEDURE — 84703 CHORIONIC GONADOTROPIN ASSAY: CPT | Performed by: EMERGENCY MEDICINE

## 2023-08-29 PROCEDURE — 85025 COMPLETE CBC W/AUTO DIFF WBC: CPT | Performed by: EMERGENCY MEDICINE

## 2023-08-29 PROCEDURE — 84702 CHORIONIC GONADOTROPIN TEST: CPT | Performed by: EMERGENCY MEDICINE

## 2023-08-29 PROCEDURE — 83690 ASSAY OF LIPASE: CPT | Performed by: EMERGENCY MEDICINE

## 2023-08-29 PROCEDURE — 80053 COMPREHEN METABOLIC PANEL: CPT | Performed by: EMERGENCY MEDICINE

## 2023-08-29 PROCEDURE — 99285 EMERGENCY DEPT VISIT HI MDM: CPT

## 2023-08-29 PROCEDURE — 36415 COLL VENOUS BLD VENIPUNCTURE: CPT | Performed by: EMERGENCY MEDICINE

## 2023-08-29 ASSESSMENT — ACTIVITIES OF DAILY LIVING (ADL): ADLS_ACUITY_SCORE: 33

## 2023-08-30 ENCOUNTER — APPOINTMENT (OUTPATIENT)
Dept: CT IMAGING | Facility: CLINIC | Age: 48
End: 2023-08-30
Attending: EMERGENCY MEDICINE
Payer: MEDICARE

## 2023-08-30 VITALS
BODY MASS INDEX: 30.31 KG/M2 | SYSTOLIC BLOOD PRESSURE: 124 MMHG | WEIGHT: 200 LBS | OXYGEN SATURATION: 95 % | DIASTOLIC BLOOD PRESSURE: 85 MMHG | HEART RATE: 77 BPM | RESPIRATION RATE: 22 BRPM | TEMPERATURE: 97 F | HEIGHT: 68 IN

## 2023-08-30 PROCEDURE — 96376 TX/PRO/DX INJ SAME DRUG ADON: CPT

## 2023-08-30 PROCEDURE — 250N000009 HC RX 250: Performed by: EMERGENCY MEDICINE

## 2023-08-30 PROCEDURE — 250N000011 HC RX IP 250 OP 636: Performed by: EMERGENCY MEDICINE

## 2023-08-30 PROCEDURE — 74177 CT ABD & PELVIS W/CONTRAST: CPT | Mod: MA

## 2023-08-30 PROCEDURE — 96374 THER/PROPH/DIAG INJ IV PUSH: CPT | Mod: 59

## 2023-08-30 PROCEDURE — 250N000011 HC RX IP 250 OP 636

## 2023-08-30 RX ORDER — HYDROCODONE BITARTRATE AND ACETAMINOPHEN 5; 325 MG/1; MG/1
1-2 TABLET ORAL EVERY 6 HOURS PRN
Qty: 10 TABLET | Refills: 0 | Status: SHIPPED | OUTPATIENT
Start: 2023-08-30 | End: 2023-10-13

## 2023-08-30 RX ORDER — IOPAMIDOL 755 MG/ML
100 INJECTION, SOLUTION INTRAVASCULAR ONCE
Status: COMPLETED | OUTPATIENT
Start: 2023-08-30 | End: 2023-08-30

## 2023-08-30 RX ORDER — HYDROMORPHONE HYDROCHLORIDE 1 MG/ML
0.5 INJECTION, SOLUTION INTRAMUSCULAR; INTRAVENOUS; SUBCUTANEOUS ONCE
Status: COMPLETED | OUTPATIENT
Start: 2023-08-30 | End: 2023-08-30

## 2023-08-30 RX ORDER — IOPAMIDOL 755 MG/ML
101 INJECTION, SOLUTION INTRAVASCULAR ONCE
Status: DISCONTINUED | OUTPATIENT
Start: 2023-08-30 | End: 2023-08-30 | Stop reason: HOSPADM

## 2023-08-30 RX ORDER — HYDROMORPHONE HYDROCHLORIDE 1 MG/ML
INJECTION, SOLUTION INTRAMUSCULAR; INTRAVENOUS; SUBCUTANEOUS
Status: COMPLETED
Start: 2023-08-30 | End: 2023-08-30

## 2023-08-30 RX ORDER — ONDANSETRON 4 MG/1
4 TABLET, ORALLY DISINTEGRATING ORAL EVERY 8 HOURS PRN
Qty: 10 TABLET | Refills: 0 | Status: SHIPPED | OUTPATIENT
Start: 2023-08-30 | End: 2023-10-13

## 2023-08-30 RX ADMIN — IOPAMIDOL 100 ML: 755 INJECTION, SOLUTION INTRAVENOUS at 04:08

## 2023-08-30 RX ADMIN — SODIUM CHLORIDE 69 ML: 9 INJECTION, SOLUTION INTRAVENOUS at 04:09

## 2023-08-30 RX ADMIN — HYDROMORPHONE HYDROCHLORIDE 0.5 MG: 1 INJECTION, SOLUTION INTRAMUSCULAR; INTRAVENOUS; SUBCUTANEOUS at 02:36

## 2023-08-30 RX ADMIN — HYDROMORPHONE HYDROCHLORIDE 1 MG: 1 INJECTION, SOLUTION INTRAMUSCULAR; INTRAVENOUS; SUBCUTANEOUS at 00:17

## 2023-08-30 ASSESSMENT — ACTIVITIES OF DAILY LIVING (ADL)
ADLS_ACUITY_SCORE: 35
ADLS_ACUITY_SCORE: 35

## 2023-08-30 NOTE — ED TRIAGE NOTES
Concerned about pancreatitis. C/o RUQ abdominal pain with N/V since yesterday. Hx.Gallbladder surgery     Triage Assessment       Row Name 08/29/23 2131       Triage Assessment (Adult)    Airway WDL WDL       Respiratory WDL    Respiratory WDL WDL       Skin Circulation/Temperature WDL    Skin Circulation/Temperature WDL WDL       Cardiac WDL    Cardiac WDL WDL       Peripheral/Neurovascular WDL    Peripheral Neurovascular WDL WDL       Cognitive/Neuro/Behavioral WDL    Cognitive/Neuro/Behavioral WDL WDL

## 2023-09-01 ENCOUNTER — PATIENT OUTREACH (OUTPATIENT)
Dept: CARE COORDINATION | Facility: CLINIC | Age: 48
End: 2023-09-01
Payer: MEDICARE

## 2023-09-01 NOTE — LETTER
Vianca Kumar  [address]  [facility] [address]    Dear Vianca Kumar,      I am a team member within the [facility] with [facility]. I recently tried to reach you to ensure you were doing well following a recent visit within our health system. I also wanted to take this chance to introduce Clinic Care Coordination.     Below is a description of Clinic Care Coordination and how this team can further assist you:       The Clinic Care Coordination team is made up of a Registered Nurse, , Financial Resource Worker, and a Community Health Worker who understand and can help navigate the health care system. The goal of clinic care coordination is to help you manage your health, improve access to care, and achieve optimal health outcomes. They work alongside your provider to assist you in determining your health and social needs, obtain health care and community resources, and provide you with necessary information and education. Clinic Care Coordination can work with you through any barriers and develop a care plan that helps coordinate and strengthen the relationship between you and your care team.    If you wish to connect with the Clinic Care Coordination Team, please let your [facility] Primary Care Provider or Clinic Care Team know and they can place a referral. The Clinic Care Coordination team will then reach out by phone to further support you.    We are focused on providing you with the highest-quality healthcare experience possible.    Sincerely,   Your care team with [facility]

## 2023-09-01 NOTE — PROGRESS NOTES
[facility] Contact  [facility]/Voicemail     Clinical Data: Care Coordination ED-sourced Outreach-     Outreach attempted x 2.  Left message on patient's voicemail, providing [facility]'s 24/7 scheduling and nurse triage phone number [phone] ([phone]) for questions/concerns and/or to schedule an appt with an [facility] provider.      Care Coordination introduction letter with explanation of Clinic Care Coordination services sent to patient via Erenist. Clinic Care Coordination services remain available via referral if needed.    Plan: [facility] will do no further outreaches at this time.       Mackenzie Cowart  Community Health Worker  [facility] Care Resource [place], [facility]    *Connected Care Resource Team does NOT follow patient ongoing. Referrals are identified based on internal discharge reports and the outreach is to ensure patient has an understanding of their discharge instructions.

## 2023-10-13 ENCOUNTER — APPOINTMENT (OUTPATIENT)
Dept: CT IMAGING | Facility: CLINIC | Age: 48
DRG: 155 | End: 2023-10-13
Attending: EMERGENCY MEDICINE
Payer: MEDICARE

## 2023-10-13 ENCOUNTER — HOSPITAL ENCOUNTER (INPATIENT)
Facility: CLINIC | Age: 48
LOS: 4 days | Discharge: HOME | DRG: 155 | End: 2023-10-17
Attending: EMERGENCY MEDICINE | Admitting: INTERNAL MEDICINE
Payer: MEDICARE

## 2023-10-13 DIAGNOSIS — L08.9 FACIAL INFECTION: ICD-10-CM

## 2023-10-13 DIAGNOSIS — F33.42 RECURRENT MAJOR DEPRESSIVE DISORDER, IN FULL REMISSION (H): Primary | ICD-10-CM

## 2023-10-13 DIAGNOSIS — F14.10 COCAINE ABUSE (H): ICD-10-CM

## 2023-10-13 LAB
ALBUMIN SERPL BCG-MCNC: 4.1 G/DL (ref 3.5–5.2)
ALP SERPL-CCNC: 126 U/L (ref 35–104)
ALT SERPL W P-5'-P-CCNC: 13 U/L (ref 0–50)
ANION GAP SERPL CALCULATED.3IONS-SCNC: 14 MMOL/L (ref 7–15)
AST SERPL W P-5'-P-CCNC: 18 U/L (ref 0–45)
BASO+EOS+MONOS # BLD AUTO: ABNORMAL 10*3/UL
BASO+EOS+MONOS NFR BLD AUTO: ABNORMAL %
BASOPHILS # BLD AUTO: 0.1 10E3/UL (ref 0–0.2)
BASOPHILS NFR BLD AUTO: 1 %
BILIRUB DIRECT SERPL-MCNC: <0.2 MG/DL (ref 0–0.3)
BILIRUB SERPL-MCNC: 0.6 MG/DL
BUN SERPL-MCNC: 3.4 MG/DL (ref 6–20)
CALCIUM SERPL-MCNC: 8.9 MG/DL (ref 8.6–10)
CHLORIDE SERPL-SCNC: 102 MMOL/L (ref 98–107)
CK SERPL-CCNC: 57 U/L (ref 26–192)
CREAT SERPL-MCNC: 0.63 MG/DL (ref 0.51–0.95)
CRP SERPL-MCNC: 35.09 MG/L
DEPRECATED HCO3 PLAS-SCNC: 23 MMOL/L (ref 22–29)
EGFRCR SERPLBLD CKD-EPI 2021: >90 ML/MIN/1.73M2
EOSINOPHIL # BLD AUTO: 0.4 10E3/UL (ref 0–0.7)
EOSINOPHIL NFR BLD AUTO: 3 %
ERYTHROCYTE [DISTWIDTH] IN BLOOD BY AUTOMATED COUNT: 12 % (ref 10–15)
ERYTHROCYTE [SEDIMENTATION RATE] IN BLOOD BY WESTERGREN METHOD: 4 MM/HR (ref 0–20)
FLUAV RNA SPEC QL NAA+PROBE: NEGATIVE
FLUBV RNA RESP QL NAA+PROBE: NEGATIVE
GLUCOSE SERPL-MCNC: 135 MG/DL (ref 70–99)
HCO3 BLDV-SCNC: 28 MMOL/L (ref 21–28)
HCT VFR BLD AUTO: 38.9 % (ref 35–47)
HGB BLD-MCNC: 13.5 G/DL (ref 11.7–15.7)
HOLD SPECIMEN: NORMAL
HOLD SPECIMEN: NORMAL
IMM GRANULOCYTES # BLD: 0.1 10E3/UL
IMM GRANULOCYTES NFR BLD: 1 %
LACTATE BLD-SCNC: 0.5 MMOL/L
LYMPHOCYTES # BLD AUTO: 1.9 10E3/UL (ref 0.8–5.3)
LYMPHOCYTES NFR BLD AUTO: 14 %
MCH RBC QN AUTO: 31.3 PG (ref 26.5–33)
MCHC RBC AUTO-ENTMCNC: 34.7 G/DL (ref 31.5–36.5)
MCV RBC AUTO: 90 FL (ref 78–100)
MONOCYTES # BLD AUTO: 0.8 10E3/UL (ref 0–1.3)
MONOCYTES NFR BLD AUTO: 6 %
NEUTROPHILS # BLD AUTO: 9.9 10E3/UL (ref 1.6–8.3)
NEUTROPHILS NFR BLD AUTO: 75 %
NRBC # BLD AUTO: 0 10E3/UL
NRBC BLD AUTO-RTO: 0 /100
PCO2 BLDV: 37 MM HG (ref 40–50)
PH BLDV: 7.48 [PH] (ref 7.32–7.43)
PLATELET # BLD AUTO: 385 10E3/UL (ref 150–450)
PO2 BLDV: 66 MM HG (ref 25–47)
POTASSIUM SERPL-SCNC: 4.2 MMOL/L (ref 3.4–5.3)
PROT SERPL-MCNC: 6.8 G/DL (ref 6.4–8.3)
RBC # BLD AUTO: 4.31 10E6/UL (ref 3.8–5.2)
RSV RNA SPEC NAA+PROBE: NEGATIVE
SAO2 % BLDV: 94 % (ref 94–100)
SARS-COV-2 RNA RESP QL NAA+PROBE: NEGATIVE
SODIUM SERPL-SCNC: 139 MMOL/L (ref 135–145)
WBC # BLD AUTO: 13.2 10E3/UL (ref 4–11)

## 2023-10-13 PROCEDURE — G1010 CDSM STANSON: HCPCS

## 2023-10-13 PROCEDURE — HZ2ZZZZ DETOXIFICATION SERVICES FOR SUBSTANCE ABUSE TREATMENT: ICD-10-PCS | Performed by: INTERNAL MEDICINE

## 2023-10-13 PROCEDURE — 99222 1ST HOSP IP/OBS MODERATE 55: CPT | Performed by: INTERNAL MEDICINE

## 2023-10-13 PROCEDURE — 87070 CULTURE OTHR SPECIMN AEROBIC: CPT | Performed by: OTOLARYNGOLOGY

## 2023-10-13 PROCEDURE — 250N000009 HC RX 250: Performed by: PHYSICIAN ASSISTANT

## 2023-10-13 PROCEDURE — 96365 THER/PROPH/DIAG IV INF INIT: CPT | Mod: 59

## 2023-10-13 PROCEDURE — 87641 MR-STAPH DNA AMP PROBE: CPT | Performed by: INTERNAL MEDICINE

## 2023-10-13 PROCEDURE — 82803 BLOOD GASES ANY COMBINATION: CPT

## 2023-10-13 PROCEDURE — 120N000001 HC R&B MED SURG/OB

## 2023-10-13 PROCEDURE — 250N000011 HC RX IP 250 OP 636: Mod: JZ | Performed by: INTERNAL MEDICINE

## 2023-10-13 PROCEDURE — 80048 BASIC METABOLIC PNL TOTAL CA: CPT | Performed by: EMERGENCY MEDICINE

## 2023-10-13 PROCEDURE — 96375 TX/PRO/DX INJ NEW DRUG ADDON: CPT

## 2023-10-13 PROCEDURE — 258N000003 HC RX IP 258 OP 636: Performed by: EMERGENCY MEDICINE

## 2023-10-13 PROCEDURE — 85025 COMPLETE CBC W/AUTO DIFF WBC: CPT | Performed by: EMERGENCY MEDICINE

## 2023-10-13 PROCEDURE — 258N000003 HC RX IP 258 OP 636: Performed by: PHYSICIAN ASSISTANT

## 2023-10-13 PROCEDURE — 36415 COLL VENOUS BLD VENIPUNCTURE: CPT | Performed by: INTERNAL MEDICINE

## 2023-10-13 PROCEDURE — 250N000011 HC RX IP 250 OP 636: Performed by: EMERGENCY MEDICINE

## 2023-10-13 PROCEDURE — 96361 HYDRATE IV INFUSION ADD-ON: CPT

## 2023-10-13 PROCEDURE — 99223 1ST HOSP IP/OBS HIGH 75: CPT | Mod: AI | Performed by: PHYSICIAN ASSISTANT

## 2023-10-13 PROCEDURE — 36415 COLL VENOUS BLD VENIPUNCTURE: CPT | Performed by: STUDENT IN AN ORGANIZED HEALTH CARE EDUCATION/TRAINING PROGRAM

## 2023-10-13 PROCEDURE — 80053 COMPREHEN METABOLIC PANEL: CPT | Performed by: STUDENT IN AN ORGANIZED HEALTH CARE EDUCATION/TRAINING PROGRAM

## 2023-10-13 PROCEDURE — 250N000009 HC RX 250: Performed by: EMERGENCY MEDICINE

## 2023-10-13 PROCEDURE — 82248 BILIRUBIN DIRECT: CPT | Performed by: PHYSICIAN ASSISTANT

## 2023-10-13 PROCEDURE — 87040 BLOOD CULTURE FOR BACTERIA: CPT | Performed by: INTERNAL MEDICINE

## 2023-10-13 PROCEDURE — 82550 ASSAY OF CK (CPK): CPT | Performed by: INTERNAL MEDICINE

## 2023-10-13 PROCEDURE — 85652 RBC SED RATE AUTOMATED: CPT | Performed by: INTERNAL MEDICINE

## 2023-10-13 PROCEDURE — 96366 THER/PROPH/DIAG IV INF ADDON: CPT

## 2023-10-13 PROCEDURE — 87077 CULTURE AEROBIC IDENTIFY: CPT | Performed by: OTOLARYNGOLOGY

## 2023-10-13 PROCEDURE — 83605 ASSAY OF LACTIC ACID: CPT

## 2023-10-13 PROCEDURE — 87637 SARSCOV2&INF A&B&RSV AMP PRB: CPT | Performed by: EMERGENCY MEDICINE

## 2023-10-13 PROCEDURE — 99285 EMERGENCY DEPT VISIT HI MDM: CPT | Mod: 25

## 2023-10-13 PROCEDURE — 250N000013 HC RX MED GY IP 250 OP 250 PS 637: Performed by: PHYSICIAN ASSISTANT

## 2023-10-13 PROCEDURE — 85025 COMPLETE CBC W/AUTO DIFF WBC: CPT | Performed by: STUDENT IN AN ORGANIZED HEALTH CARE EDUCATION/TRAINING PROGRAM

## 2023-10-13 PROCEDURE — 93005 ELECTROCARDIOGRAM TRACING: CPT

## 2023-10-13 PROCEDURE — 96376 TX/PRO/DX INJ SAME DRUG ADON: CPT

## 2023-10-13 PROCEDURE — 250N000011 HC RX IP 250 OP 636: Performed by: PHYSICIAN ASSISTANT

## 2023-10-13 PROCEDURE — 86140 C-REACTIVE PROTEIN: CPT | Performed by: INTERNAL MEDICINE

## 2023-10-13 RX ORDER — SODIUM CHLORIDE 9 MG/ML
INJECTION, SOLUTION INTRAVENOUS CONTINUOUS
Status: DISCONTINUED | OUTPATIENT
Start: 2023-10-13 | End: 2023-10-14

## 2023-10-13 RX ORDER — DIPHENHYDRAMINE HYDROCHLORIDE 50 MG/ML
25 INJECTION INTRAMUSCULAR; INTRAVENOUS ONCE
Status: COMPLETED | OUTPATIENT
Start: 2023-10-13 | End: 2023-10-13

## 2023-10-13 RX ORDER — LORAZEPAM 2 MG/ML
1-2 INJECTION INTRAMUSCULAR EVERY 30 MIN PRN
Status: DISCONTINUED | OUTPATIENT
Start: 2023-10-13 | End: 2023-10-14

## 2023-10-13 RX ORDER — ESCITALOPRAM OXALATE 10 MG/1
20 TABLET ORAL DAILY
Status: DISCONTINUED | OUTPATIENT
Start: 2023-10-14 | End: 2023-10-17 | Stop reason: HOSPADM

## 2023-10-13 RX ORDER — MULTIPLE VITAMINS W/ MINERALS TAB 9MG-400MCG
1 TAB ORAL DAILY
Status: DISCONTINUED | OUTPATIENT
Start: 2023-10-14 | End: 2023-10-17 | Stop reason: HOSPADM

## 2023-10-13 RX ORDER — HYDROMORPHONE HCL IN WATER/PF 6 MG/30 ML
0.4 PATIENT CONTROLLED ANALGESIA SYRINGE INTRAVENOUS
Status: DISCONTINUED | OUTPATIENT
Start: 2023-10-13 | End: 2023-10-17 | Stop reason: HOSPADM

## 2023-10-13 RX ORDER — FLUMAZENIL 0.1 MG/ML
0.2 INJECTION, SOLUTION INTRAVENOUS
Status: DISCONTINUED | OUTPATIENT
Start: 2023-10-13 | End: 2023-10-14

## 2023-10-13 RX ORDER — HYDROMORPHONE HYDROCHLORIDE 1 MG/ML
0.3 INJECTION, SOLUTION INTRAMUSCULAR; INTRAVENOUS; SUBCUTANEOUS
Status: DISCONTINUED | OUTPATIENT
Start: 2023-10-13 | End: 2023-10-17 | Stop reason: HOSPADM

## 2023-10-13 RX ORDER — ONDANSETRON 2 MG/ML
4 INJECTION INTRAMUSCULAR; INTRAVENOUS EVERY 6 HOURS PRN
Status: DISCONTINUED | OUTPATIENT
Start: 2023-10-13 | End: 2023-10-17 | Stop reason: HOSPADM

## 2023-10-13 RX ORDER — KETOROLAC TROMETHAMINE 15 MG/ML
15 INJECTION, SOLUTION INTRAMUSCULAR; INTRAVENOUS ONCE
Status: COMPLETED | OUTPATIENT
Start: 2023-10-13 | End: 2023-10-13

## 2023-10-13 RX ORDER — IOPAMIDOL 755 MG/ML
67 INJECTION, SOLUTION INTRAVASCULAR ONCE
Status: COMPLETED | OUTPATIENT
Start: 2023-10-13 | End: 2023-10-13

## 2023-10-13 RX ORDER — METOCLOPRAMIDE HYDROCHLORIDE 5 MG/ML
10 INJECTION INTRAMUSCULAR; INTRAVENOUS ONCE
Status: COMPLETED | OUTPATIENT
Start: 2023-10-13 | End: 2023-10-13

## 2023-10-13 RX ORDER — ONDANSETRON 4 MG/1
4 TABLET, ORALLY DISINTEGRATING ORAL EVERY 6 HOURS PRN
Status: DISCONTINUED | OUTPATIENT
Start: 2023-10-13 | End: 2023-10-17 | Stop reason: HOSPADM

## 2023-10-13 RX ORDER — AMPICILLIN AND SULBACTAM 2; 1 G/1; G/1
3 INJECTION, POWDER, FOR SOLUTION INTRAMUSCULAR; INTRAVENOUS ONCE
Status: COMPLETED | OUTPATIENT
Start: 2023-10-13 | End: 2023-10-13

## 2023-10-13 RX ORDER — MUPIROCIN 20 MG/G
OINTMENT TOPICAL DAILY
Status: DISCONTINUED | OUTPATIENT
Start: 2023-10-13 | End: 2023-10-17 | Stop reason: HOSPADM

## 2023-10-13 RX ORDER — ACETAMINOPHEN 650 MG/1
650 SUPPOSITORY RECTAL EVERY 6 HOURS PRN
Status: DISCONTINUED | OUTPATIENT
Start: 2023-10-13 | End: 2023-10-14

## 2023-10-13 RX ORDER — MORPHINE SULFATE 4 MG/ML
4 INJECTION, SOLUTION INTRAMUSCULAR; INTRAVENOUS
Status: COMPLETED | OUTPATIENT
Start: 2023-10-13 | End: 2023-10-13

## 2023-10-13 RX ORDER — AMPICILLIN AND SULBACTAM 2; 1 G/1; G/1
3 INJECTION, POWDER, FOR SOLUTION INTRAMUSCULAR; INTRAVENOUS EVERY 6 HOURS
Status: DISCONTINUED | OUTPATIENT
Start: 2023-10-13 | End: 2023-10-13

## 2023-10-13 RX ORDER — ACETAMINOPHEN 325 MG/1
650 TABLET ORAL EVERY 6 HOURS PRN
Status: DISCONTINUED | OUTPATIENT
Start: 2023-10-13 | End: 2023-10-14

## 2023-10-13 RX ORDER — PIPERACILLIN SODIUM, TAZOBACTAM SODIUM 3; .375 G/15ML; G/15ML
3.38 INJECTION, POWDER, LYOPHILIZED, FOR SOLUTION INTRAVENOUS EVERY 6 HOURS
Status: DISCONTINUED | OUTPATIENT
Start: 2023-10-13 | End: 2023-10-16

## 2023-10-13 RX ORDER — LORAZEPAM 0.5 MG/1
1-2 TABLET ORAL EVERY 30 MIN PRN
Status: DISCONTINUED | OUTPATIENT
Start: 2023-10-13 | End: 2023-10-14

## 2023-10-13 RX ORDER — CETIRIZINE HYDROCHLORIDE 10 MG/1
10 TABLET ORAL DAILY
COMMUNITY

## 2023-10-13 RX ORDER — OXYCODONE HYDROCHLORIDE 5 MG/1
5 TABLET ORAL EVERY 4 HOURS PRN
Status: DISCONTINUED | OUTPATIENT
Start: 2023-10-13 | End: 2023-10-17 | Stop reason: HOSPADM

## 2023-10-13 RX ORDER — FOLIC ACID 1 MG/1
1 TABLET ORAL DAILY
Status: DISCONTINUED | OUTPATIENT
Start: 2023-10-14 | End: 2023-10-17 | Stop reason: HOSPADM

## 2023-10-13 RX ADMIN — IOPAMIDOL 67 ML: 755 INJECTION, SOLUTION INTRAVENOUS at 07:05

## 2023-10-13 RX ADMIN — PIPERACILLIN AND TAZOBACTAM 3.38 G: 3; .375 INJECTION, POWDER, FOR SOLUTION INTRAVENOUS at 23:17

## 2023-10-13 RX ADMIN — HYDROMORPHONE HYDROCHLORIDE 0.4 MG: 0.2 INJECTION, SOLUTION INTRAMUSCULAR; INTRAVENOUS; SUBCUTANEOUS at 18:18

## 2023-10-13 RX ADMIN — FOLIC ACID: 5 INJECTION, SOLUTION INTRAMUSCULAR; INTRAVENOUS; SUBCUTANEOUS at 18:20

## 2023-10-13 RX ADMIN — METOCLOPRAMIDE 10 MG: 5 INJECTION, SOLUTION INTRAMUSCULAR; INTRAVENOUS at 04:32

## 2023-10-13 RX ADMIN — SODIUM CHLORIDE: 9 INJECTION, SOLUTION INTRAVENOUS at 10:54

## 2023-10-13 RX ADMIN — MORPHINE SULFATE 4 MG: 4 INJECTION, SOLUTION INTRAMUSCULAR; INTRAVENOUS at 04:32

## 2023-10-13 RX ADMIN — MUPIROCIN: 20 OINTMENT TOPICAL at 18:21

## 2023-10-13 RX ADMIN — SODIUM CHLORIDE 60 ML: 9 INJECTION, SOLUTION INTRAVENOUS at 07:05

## 2023-10-13 RX ADMIN — AMPICILLIN SODIUM AND SULBACTAM SODIUM 3 G: 2; 1 INJECTION, POWDER, FOR SOLUTION INTRAMUSCULAR; INTRAVENOUS at 10:55

## 2023-10-13 RX ADMIN — MORPHINE SULFATE 4 MG: 4 INJECTION, SOLUTION INTRAMUSCULAR; INTRAVENOUS at 10:57

## 2023-10-13 RX ADMIN — DIPHENHYDRAMINE HYDROCHLORIDE 25 MG: 50 INJECTION, SOLUTION INTRAMUSCULAR; INTRAVENOUS at 04:32

## 2023-10-13 RX ADMIN — KETOROLAC TROMETHAMINE 15 MG: 15 INJECTION, SOLUTION INTRAMUSCULAR; INTRAVENOUS at 04:32

## 2023-10-13 RX ADMIN — AMPICILLIN SODIUM AND SULBACTAM SODIUM 3 G: 2; 1 INJECTION, POWDER, FOR SOLUTION INTRAMUSCULAR; INTRAVENOUS at 05:00

## 2023-10-13 RX ADMIN — PIPERACILLIN AND TAZOBACTAM 3.38 G: 3; .375 INJECTION, POWDER, FOR SOLUTION INTRAVENOUS at 20:54

## 2023-10-13 RX ADMIN — MORPHINE SULFATE 4 MG: 4 INJECTION, SOLUTION INTRAMUSCULAR; INTRAVENOUS at 08:39

## 2023-10-13 RX ADMIN — VANCOMYCIN HYDROCHLORIDE 1500 MG: 10 INJECTION, POWDER, LYOPHILIZED, FOR SOLUTION INTRAVENOUS at 18:52

## 2023-10-13 RX ADMIN — OXYCODONE HYDROCHLORIDE 5 MG: 5 TABLET ORAL at 13:25

## 2023-10-13 RX ADMIN — SODIUM CHLORIDE 1000 ML: 9 INJECTION, SOLUTION INTRAVENOUS at 04:46

## 2023-10-13 RX ADMIN — HYDROMORPHONE HYDROCHLORIDE 0.4 MG: 0.2 INJECTION, SOLUTION INTRAMUSCULAR; INTRAVENOUS; SUBCUTANEOUS at 20:59

## 2023-10-13 ASSESSMENT — ACTIVITIES OF DAILY LIVING (ADL)
ADLS_ACUITY_SCORE: 20
ADLS_ACUITY_SCORE: 35
ADLS_ACUITY_SCORE: 18
ADLS_ACUITY_SCORE: 35
ADLS_ACUITY_SCORE: 20

## 2023-10-13 NOTE — ED TRIAGE NOTES
Pt presents to triage by self C/O new facial swelling, onset earlier today. Pt states whole family has been sick with a cold since beginning of September but facial swelling started today. Pt states she has hx of staph and this may be related to that.      Triage Assessment (Adult)       Row Name 10/13/23 0200          Triage Assessment    Airway WDL WDL        Respiratory WDL    Respiratory WDL WDL        Skin Circulation/Temperature WDL    Skin Circulation/Temperature WDL WDL        Cardiac WDL    Cardiac WDL WDL        Peripheral/Neurovascular WDL    Peripheral Neurovascular WDL WDL        Cognitive/Neuro/Behavioral WDL    Cognitive/Neuro/Behavioral WDL WDL

## 2023-10-13 NOTE — PHARMACY-ADMISSION MEDICATION HISTORY
Pharmacist Admission Medication History    Admission medication history is complete. The information provided in this note is only as accurate as the sources available at the time of the update.    Information Source(s): Patient and CareEverywhere/SureScripts via in-person    Pertinent Information: none    Changes made to PTA medication list:  Added: Zyrtec  Deleted: Norco, zofran odt  Changed: None    Medication Affordability:  Not including over the counter (OTC) medications, was there a time in the past 3 months when you did not take your medications as prescribed because of cost?: No    Allergies reviewed with patient and updates made in EHR: no    Medication History Completed By: Mark Aragon [facility] 10/13/2023 9:59 AM    Prior to Admission medications    Medication Sig Last Dose Taking? Auth Provider Long Term End Date         10/20/23   cetirizine (ZYRTEC) 10 MG tablet Take 10 mg by mouth daily 10/12/2023 Yes Unknown, Entered By History     escitalopram (LEXAPRO) 20 MG tablet Take 1 tablet (20 mg) by mouth daily 10/12/2023 Yes Whitney Perez MD Yes

## 2023-10-13 NOTE — CONSULTS
[facility]    Otolaryngology Consultation   ENT Specialty Care    Date of Admission:  10/13/2023    Assessment & Plan   Vianca Kumar is a 48 year old female who was admitted on 10/13/2023. I was asked to see the patient for nasal/facial cellulitis. She has a history of intranasal cocaine use and an extensive septal perforation.  She has had mild nasal congestion/URI symptoms x 2 weeks but developed acute tender facial swelling yesterday and presented to the ED.  WBC 13.2.  CT reports : IMPRESSION:   1. Findings concerning for anterior nose soft tissue infection, with a  potentially necrotizing component however felt to be less likely given  relative sparing of the adjacent structures. Changes do however  involve the columella which appears near completely eroded and  replaced by air and septations. Cartilaginous nasal septum is  perforated. Direct visualization and ENT consultation recommended.  2. Right first maxillary molar cavity, periodontitis and odontogenic  maxillary floor mucosal thickening. Dental evaluation recommended.    Nasal culture taken.  Unasyn initiated.  ID consulted--will defer to ID but may want to consider additional staph coverage.  Would suggest adding nasal saline rinses and application of mupirocin ointment to nares.     Code Status:      Clinically Significant Risk Factors Present on Admission                                  Nicole Zavala MD    ______________________________________________________________________    Reason for Consult   Reason for consult: nasal/facial cellulitis    Primary Care Physician   Whitney Perez    Chief Complaint   Nasal and facial swelling and pain      History of Present Illness   Vianca Kumar is a 48 year old female who presents with the history of having had chronic mild nasal congestion and drainage for several weeks with the acute onset of facial swelling and pain yesterday.  History obtained from the chart  notable for snorting cocaine including recent use.  Presented to the ED and noted to have WBD 13.2, facial CT demonstrates soft tissue swelling of the anterior nose cheeks and an extensive septal perforation.  Unasyn has been initiated           Physical Exam   Temp: 98.7  F (37.1  C) Temp src: Oral BP: 103/59 Pulse: 62   Resp: 18 SpO2: 95 % O2 Device: None (Room air)    Vital Signs with Ranges  Temp:  [98.7  F (37.1  C)] 98.7  F (37.1  C)  Pulse:  [] 62  Resp:  [18] 18  BP: ()/(45-81) 103/59  SpO2:  [89 %-96 %] 95 %  0 lbs 0 oz  General: resting in bed, no acute distress.  Diffuse puffy swelling of midface, nose, lower eyelids.  No demarcated erythema.  Nose: Extensive crusting of anterior nasal cavity--culture taken.  Large septal perforation  OC/OP: pharynx clear      Medical Decision Making             Data   Facial CT 10/13/23  IMPRESSION:   1. Findings concerning for anterior nose soft tissue infection, with a  potentially necrotizing component however felt to be less likely given  relative sparing of the adjacent structures. Changes do however  involve the columella which appears near completely eroded and  replaced by air and septations. Cartilaginous nasal septum is  perforated. Direct visualization and ENT consultation recommended.  2. Right first maxillary molar cavity, periodontitis and odontogenic  maxillary floor mucosal thickening. Dental evaluation recommended.

## 2023-10-13 NOTE — CONSULTS
[facility]    Infectious Disease Consultation     Date of Admission:  10/13/2023  Date of Consult (When I saw the patient): 10/13/23    Assessment & Plan   Vianca Kumar is a 48 year old who was admitted on 10/13/2023.     Impression:  49 yo patient with history of cocaine use.   Admitted with facial pain/swelling.  Patient reports that for the past week and a half or so she has been experiencing URI symptoms including nasal congestion, cough sinus pain, as well as intermittent fevers.  On the day prior to admission she noticed some nasal discomfort and mild swelling over her sinuses.  She went to bed and woke up at approximately 1 AM with significant facial swelling and tenderness.    CT was obtained showing anterior nose soft tissue infection with potentially necrotizing component, however that was felt less likely given sparing of the adjacent structures. Cartilaginous nasal septum was also found to be perforated.   She was started on IV Unasyn and ENT was contacted.    Recommendations:   Check for MRSA PCR   Start on vancomycin   Switch from unasyn to zosyn   If MRSA PCR negative then will stop vancomycin   Will follow up on ENT and Dental eval   Get a set of blood cultures   Will follow up on the nasal cultures     Pauly Lilly MD    Reason for Consult   Reason for consult: I was asked to evaluate this patient for nasal pain.    Primary Care Physician   Whitney Perez    Chief Complaint   Nasal pain     History is obtained from the patient and medical records    History of Present Illness   Vianca Kumar is a 48 year old female who presents with acute onset nasal pain after having URI symptoms for a while.     Past Medical History   I have reviewed this patient's medical history and updated it with pertinent information if needed.   Past Medical History:   Diagnosis Date    MDD (major depressive disorder)     Obesity (BMI 30-39.9)        Past Surgical History   I have  reviewed this patient's surgical history and updated it with pertinent information if needed.  Past Surgical History:   Procedure Laterality Date    CAPSULE/PILL CAM ENDOSCOPY N/A 04/03/2018    Procedure: CAPSULE/PILL CAM ENDOSCOPY;;  Surgeon: Guru Hallie Song MD;  Location: [facility]    COLONOSCOPY N/A 12/28/2017    Procedure: COLONOSCOPY;  Surgeon: Yosi Beck MD;  Location: [facility];  Service:     COLONOSCOPY N/A 08/11/2022    Procedure: COLONOSCOPY, WITH POLYPECTOMY AND BIOPSY;  Surgeon: Lorri Holley DO;  Location: [facility]    COLONOSCOPY WITH CO2 INSUFFLATION N/A 08/11/2022    Procedure: COLONOSCOPY, WITH CO2 INSUFFLATION;  Surgeon: Lorri Holley DO;  Location: [facility]    COMBINED ESOPHAGOSCOPY, GASTROSCOPY, DUODENOSCOPY (EGD) WITH CO2 INSUFFLATION N/A 08/11/2022    Procedure: ESOPHAGOGASTRODUODENOSCOPY, WITH CO2 INSUFFLATION;  Surgeon: Lorri Holley DO;  Location: [facility]    COMBINED ESOPHAGOSCOPY, GASTROSCOPY, DUODENOSCOPY (EGD) WITH CO2 INSUFFLATION N/A 11/25/2022    Procedure: ESOPHAGOGASTRODUODENOSCOPY, WITH CO2 INSUFFLATION;  Surgeon: Lorri Holley DO;  Location: [facility]    ENDOSCOPIC ULTRASOUND UPPER GASTROINTESTINAL TRACT (GI) N/A 04/03/2018    Procedure: ENDOSCOPIC ULTRASOUND, ESOPHAGOSCOPY / UPPER GASTROINTESTINAL TRACT (GI);  EUS/Capsule ;  Surgeon: Guru Hallie Song MD;  Location: [facility]    ESOPHAGOSCOPY, GASTROSCOPY, DUODENOSCOPY (EGD), COMBINED N/A 12/28/2017    Procedure: ESOPHAGOGASTRODUODENOSCOPY (EGD);  Surgeon: Yosi Beck MD;  Location: [facility] OR;  Service:     ESOPHAGOSCOPY, GASTROSCOPY, DUODENOSCOPY (EGD), COMBINED N/A 08/11/2022    Procedure: ESOPHAGOGASTRODUODENOSCOPY, WITH BIOPSY;  Surgeon: Lorri Holley DO;  Location: [facility]    ESOPHAGOSCOPY, GASTROSCOPY, DUODENOSCOPY (EGD), COMBINED N/A 11/25/2022    Procedure: ESOPHAGOGASTRODUODENOSCOPY, WITH BIOPSY;  Surgeon: Lorri Holley DO;  Location: [facility]    KNEE  SURGERY Left     osteotomy; screws and plate in place    LAPAROSCOPIC CHOLECYSTECTOMY N/A 04/25/2018    Procedure: LAPAROSCOPIC CHOLECYSTECTOMY;  Laparoscopic Cholecystectomy ;  Surgeon: Alberto Noble MD;  Location: [facility]    LAPAROSCOPIC NISSEN FUNDOPLICATION N/A 5/23/2023    Procedure: ROBOTIC ASSISTED LAPAROSCOPIC HERNIORRHAPHY, HIATAL WITH MESH AND MAGNETIC SPHINCTER AUGMENTATION;  Surgeon: Abdiaziz Tolbert MD;  Location:  OR       Prior to Admission Medications   Prior to Admission Medications   Prescriptions Last Dose Informant Patient Reported? Taking?   cetirizine (ZYRTEC) 10 MG tablet 10/12/2023 Self Yes Yes   Sig: Take 10 mg by mouth daily   escitalopram (LEXAPRO) 20 MG tablet 10/12/2023 Self No Yes   Sig: Take 1 tablet (20 mg) by mouth daily      Facility-Administered Medications: None     Allergies   Allergies   Allergen Reactions    Avocado Hives    Chantix [Varenicline Tartrate] Other (See Comments)     depression       Immunization History   Immunization History   Administered Date(s) Administered    COVID-19 Vaccine (Physitrack) 11/22/2021    Flu, Unspecified 01/14/2011, 10/17/2011    Influenza (IIV3) PF 01/14/2011, 10/17/2011    Influenza Vaccine, 6+MO IM (QUADRIVALENT W/PRESERVATIVES) 09/30/2015    TDAP Vaccine (Adacel) 01/14/2011, 01/19/2012    Td (Adult), Adsorbed 12/31/2001    Tdap (Adult) Unspecified Formulation 12/31/2001       Social History   I have reviewed this patient's social history and updated it with pertinent information if needed. Vianca Kumar  reports that she has been smoking cigarettes. She has a 31.00 pack-year smoking history. She has never used smokeless tobacco. She reports current alcohol use. She reports current drug use. Drug: Marijuana.    Family History   I have reviewed this patient's family history and updated it with pertinent information if needed.   Family History   Problem Relation Age of Onset    Hypertension Mother     Hyperlipidemia Mother      "Hypertension Brother     Hyperlipidemia Brother     Myocardial Infarction Maternal Grandfather 47    Myocardial Infarction Maternal Uncle         multiple later in life    Diabetes No family hx of     Breast Cancer No family hx of     Ovarian Cancer No family hx of     Colon Cancer No family hx of     Cerebrovascular Disease No family hx of        Review of Systems   The 10 point Review of Systems is negative    Physical Exam   Temp: 98.7  F (37.1  C) Temp src: Oral BP: 103/59 Pulse: 62   Resp: 18 SpO2: 95 % O2 Device: None (Room air)    Vital Signs with Ranges  Temp:  [98.7  F (37.1  C)] 98.7  F (37.1  C)  Pulse:  [] 62  Resp:  [18] 18  BP: ()/(45-81) 103/59  SpO2:  [89 %-96 %] 95 %  0 lbs 0 oz  There is no height or weight on file to calculate BMI.    GENERAL APPEARANCE:  awake  Severe facial swelling   EYES: Eyes grossly normal to inspection  NECK: no adenopathy  RESP: lungs clear   CV: regular rates and rhythm  LYMPHATICS: normal ant/post cervical and supraclavicular nodes  ABDOMEN: soft, nontender  MS: extremities normal  SKIN: no suspicious lesions or rashes        Data   All laboratory and imaging data in the past 24 hours reviewed  No results for input(s): \"CULT\" in the last 168 hours.  No lab results found.    Invalid input(s): \"UC\"       All cultures:  No results for input(s): \"CULTURE\" in the last 168 hours.   Blood culture:  Results for orders placed or performed in visit on 12/03/12   Blood culture    Specimen: Blood   Result Value Ref Range    Specimen Description Blood Left Arm     Culture Micro No growth     Micro Report Status FINAL 12/09/2012       Urine culture:  Results for orders placed or performed during the hospital encounter of 05/28/22   Urine Culture    Specimen: Urine, Midstream   Result Value Ref Range    Culture No Growth              "

## 2023-10-13 NOTE — PHARMACY-VANCOMYCIN DOSING SERVICE
"Pharmacy Vancomycin Initial Note  Date of Service 2023  Patient's  1975  48 year old, female    Indication: Bone and Joint Infection    Current estimated CrCl = Estimated Creatinine Clearance: 130 mL/min (based on SCr of 0.63 mg/dL).    Creatinine for last 3 days  10/13/2023:  3:24 AM Creatinine 0.63 mg/dL    Recent Vancomycin Level(s) for last 3 days  No results found for requested labs within last 3 days.      Vancomycin IV Administrations (past 72 hours)        No vancomycin orders with administrations in past 72 hours.                    Nephrotoxins and other renal medications (From now, onward)      Start     Dose/Rate Route Frequency Ordered Stop    10/13/23 1700  piperacillin-tazobactam (ZOSYN) 3.375 g vial to attach to  mL bag        Note to Pharmacy: For [facility], [facility] and [facility]: For Zosyn-naive patients, use the \"Zosyn initial dose + extended infusion\" order panel.    3.375 g  over 30 Minutes Intravenous EVERY 6 HOURS 10/13/23 1638      10/13/23 1645  vancomycin (VANCOCIN) 1,500 mg in 0.9% NaCl 250 mL intermittent infusion         1,500 mg  over 90 Minutes Intravenous EVERY 12 HOURS 10/13/23 1643      10/13/23 0000  amoxicillin-clavulanate (AUGMENTIN) 875-125 MG tablet         1 tablet Oral 2 TIMES DAILY 10/13/23 0612 10/20/23 2359            Contrast Orders - past 72 hours (72h ago, onward)      Start     Dose/Rate Route Frequency Stop    10/13/23 0650  iopamidol (ISOVUE-370) solution 67 mL         67 mL Intravenous ONCE 10/13/23 0705            InsightRX Prediction of Planned Initial Vancomycin Regimen  Loading dose: N/A  Regimen: 1500 mg IV every 12 hours.  Start time: 16:43 on 10/13/2023  Exposure target: AUC24 (range)400-600 mg/L.hr   AUC24,ss: 549 mg/L.hr  Probability of AUC24 > 400: 81 %  Ctrough,ss: 16.3 mg/L  Probability of Ctrough,ss > 20: 34 %  Probability of nephrotoxicity (Lodise KEVEN ): 12 %          Plan:  Start vancomycin 1500 mg IV q12h.   Vancomycin monitoring " method: AUC  Vancomycin therapeutic monitoring goal: 400-600 mg*h/L  Pharmacy will check vancomycin levels as appropriate in 1-3 Days.    Serum creatinine levels will be ordered daily for the first week of therapy and at least twice weekly for subsequent weeks.      Haylie Dunbar, SaraD

## 2023-10-13 NOTE — DISCHARGE INSTRUCTIONS
Outpatient appointment  Date: Friday, 10/20/2023  Time: 10:00 am - 11:00 am  Provider: Sharon Roberto MD, MD  Location: [facility], [address] , [address], [place] [address]  Phone: [phone]  Type: Medication Mgmt - Initial (In-Person)  Patient Instructions  THIS IS ONLY A RESERVATION. PATIENT MUST CALL [phone] TO PRE-REGISTER AND CONFIRM APPOINTMENT. Please arrive 15 min early with ID and insurance card. Insurance accepted: [place], PM[place], commercial insurance. If pt has no insurance, we have a [facility] available to assist in applying for state insurance. We have 3 locations - intake appts available in [place] or [place], depending on day/time of week. Please call [phone] to verify location and pre-register to confirm appt.      Online support groups  [facility]   [email or web address]       [place]   To contact our helpline, call [phone]  [email or web address]    [facility] Coalition   [phone]  [employer]    Growing through loss  Please contact us at [email or web address]  or call [phone] for information on joining the coalition.    [facility] Coalition for Grief Support  meets Saturdays 9:30-11:30 a.m.  Location varies.  [email or web address]

## 2023-10-13 NOTE — H&P
"[facility]    History and Physical - Hospitalist Service       Date of Admission:  10/13/2023    Assessment & Plan      Vianca Kumar is a 48 year old female with a past medical history significant for MDD, obesity, hx repair paraesophageal diaphragmatic hernia admitted on 10/13/2023 after presenting with facial pain/swelling.     Cellulitis anterior nose with septum perforation  Presents with rapid onset facial swelling overnight, extremely tender on exam. She has had URI symptoms for the past 1.5 weeks with intermittent fevers. (COVID, flu, RSV neg)  *CT facial shows soft tissue infection anterior nose with potential necrotizing component, though felt less likely, as well as septum perforation and gas like expansion in location of columella and soft tissue component of nasal septum. Potential necrotizing component though felt less likely given sparing of adjacent structures.   *Afebrile on presentation. BP intermittently soft (pt reports soft at baseline) though needs close monitoring for potential developing sepsis.   WBC 13.2, LA WNL, CRP 35, CK WNL  *also notably with recent snorting of cocaine  *Hx frequent \"boils,\" no known hx MRSA  -admit inpatient  --ENT consulted from ED, will see pt today. Recommending IV abx, ID consult, and nasal saline   --ID consulted, appreciate assistance   --given Unasyn in ED, will continue  -- pain control as needed   --keep NPO pending ENT evaluation  --nasal saline ordered though doubt patient will tolerate given degree of tenderness  --s/p 1L bolus in ED, continue NS @125/hr  --monitor UOP with prn plan for boluses if low     Right first maxillary molar cavity, periodontitis and odontogenic  maxillary floor mucosal thickening  Seen on CT. Will ask Dental team to weigh in on need for inpatient vs outpatient management. Appreciate assistance     Alcohol use  Cocaine use  Pt reports drinking 3-5 drinks 3-5 days per week as well cocaine ~2x/week. " Denies hx alcohol withdrawal. Last cocaine use 10/9.   --add hepatic panel   --CIWA protocol with symptom based dosing of ativan.   --IV vitamins x1, plan for po tomorrow   --will hold on scheduled gabapentin protocol pending clinical course  --consider chem dep once infection improving (have not yet discussed with patient)    Tobacco use  Declines nicotine replacement at this time     MDD  Continue Lexapro        Diet:  NPO until seen by ENT  DVT Prophylaxis: Ambulate every shift  Sloan Catheter: Not present  Lines: None     Cardiac Monitoring: None  Code Status:  FULL CODE    Clinically Significant Risk Factors Present on Admission                                  Disposition Plan      Expected Discharge Date: 10/15/2023                The patient's care was discussed with Dr. Barber who agrees with the above plan     Lise Ma PA-C  Hospitalist Service  [facility]  Securely message with [employer] (more info)  Text page via [facility] Paging/Directory     ______________________________________________________________________    Chief Complaint   Facial swelling     History is obtained from the patient    History of Present Illness   Vianca Kumar is a 48 year old female with a past medical history significant for MDD, obesity, hx repair paraesophageal diaphragmatic hernia admitted on 10/13/2023 after presenting with facial pain/swelling.  Patient reports that for the past week and a half or so she has been experiencing URI symptoms including nasal congestion, cough sinus pain, as well as intermittent fevers.  On the day prior to admission she noticed some nasal discomfort and mild swelling over her sinuses.  She went to bed and woke up at approximately 1 AM with significant facial swelling and tenderness.  She presented to the ED for evaluation.  CT was obtained showing anterior nose soft tissue infection with potentially necrotizing component, however that was felt less likely  "given sparing of the adjacent structures. Cartilaginous nasal septum was also found to be perforated.   She was started on IV Unasyn and ENT was contacted.  Based on their review of imaging no immediate surgical intervention was planned but they recommended IV antibiotics, ID consult, and nasal saline.  The patient is presently evaluated in her room in the emergency department.  She reports pain is somewhat improved after receiving IV morphine but she remains extremely tender.  Denies fevers or chills at present.  No dyspnea.  She noticed a bit of bloody drainage out of her nose yesterday, none today.  Denies pain in her jaw or teeth.  No known history of MRSA.  She does have a history of frequent \"boils \"that drained on their own at home.  She does intermittently use cocaine with last use earlier this week.      Past Medical History    Past Medical History:   Diagnosis Date    MDD (major depressive disorder)     Obesity (BMI 30-39.9)        Past Surgical History   Past Surgical History:   Procedure Laterality Date    CAPSULE/PILL CAM ENDOSCOPY N/A 04/03/2018    Procedure: CAPSULE/PILL CAM ENDOSCOPY;;  Surgeon: Guru Hallie Song MD;  Location:  GI    COLONOSCOPY N/A 12/28/2017    Procedure: COLONOSCOPY;  Surgeon: Yosi Beck MD;  Location: [facility];  Service:     COLONOSCOPY N/A 08/11/2022    Procedure: COLONOSCOPY, WITH POLYPECTOMY AND BIOPSY;  Surgeon: Lorri Holley DO;  Location:  OR    COLONOSCOPY WITH CO2 INSUFFLATION N/A 08/11/2022    Procedure: COLONOSCOPY, WITH CO2 INSUFFLATION;  Surgeon: Lorri Holley DO;  Location:  OR    COMBINED ESOPHAGOSCOPY, GASTROSCOPY, DUODENOSCOPY (EGD) WITH CO2 INSUFFLATION N/A 08/11/2022    Procedure: ESOPHAGOGASTRODUODENOSCOPY, WITH CO2 INSUFFLATION;  Surgeon: Lorri Holley DO;  Location:  OR    COMBINED ESOPHAGOSCOPY, GASTROSCOPY, DUODENOSCOPY (EGD) WITH CO2 INSUFFLATION N/A 11/25/2022    Procedure: " ESOPHAGOGASTRODUODENOSCOPY, WITH CO2 INSUFFLATION;  Surgeon: Lorri Holley DO;  Location: [facility] OR    ENDOSCOPIC ULTRASOUND UPPER GASTROINTESTINAL TRACT (GI) N/A 04/03/2018    Procedure: ENDOSCOPIC ULTRASOUND, ESOPHAGOSCOPY / UPPER GASTROINTESTINAL TRACT (GI);  EUS/Capsule ;  Surgeon: Guru Hallie Song MD;  Location: [facility] GI    ESOPHAGOSCOPY, GASTROSCOPY, DUODENOSCOPY (EGD), COMBINED N/A 12/28/2017    Procedure: ESOPHAGOGASTRODUODENOSCOPY (EGD);  Surgeon: Yosi Beck MD;  Location: [facility];  Service:     ESOPHAGOSCOPY, GASTROSCOPY, DUODENOSCOPY (EGD), COMBINED N/A 08/11/2022    Procedure: ESOPHAGOGASTRODUODENOSCOPY, WITH BIOPSY;  Surgeon: Lorri Holley DO;  Location:  OR    ESOPHAGOSCOPY, GASTROSCOPY, DUODENOSCOPY (EGD), COMBINED N/A 11/25/2022    Procedure: ESOPHAGOGASTRODUODENOSCOPY, WITH BIOPSY;  Surgeon: Lorri oHlley DO;  Location:  OR    KNEE SURGERY Left     osteotomy; screws and plate in place    LAPAROSCOPIC CHOLECYSTECTOMY N/A 04/25/2018    Procedure: LAPAROSCOPIC CHOLECYSTECTOMY;  Laparoscopic Cholecystectomy ;  Surgeon: Alberto Noble MD;  Location:  OR    LAPAROSCOPIC NISSEN FUNDOPLICATION N/A 5/23/2023    Procedure: ROBOTIC ASSISTED LAPAROSCOPIC HERNIORRHAPHY, HIATAL WITH MESH AND MAGNETIC SPHINCTER AUGMENTATION;  Surgeon: Abdiaziz Tolbert MD;  Location:  OR       Prior to Admission Medications   Prior to Admission Medications   Prescriptions Last Dose Informant Patient Reported? Taking?   HYDROcodone-acetaminophen (NORCO) 5-325 MG tablet   No No   Sig: Take 1-2 tablets by mouth every 6 hours as needed for severe pain   escitalopram (LEXAPRO) 20 MG tablet   No No   Sig: Take 1 tablet (20 mg) by mouth daily   ondansetron (ZOFRAN ODT) 4 MG ODT tab   No No   Sig: Take 1 tablet (4 mg) by mouth every 8 hours as needed for nausea      Facility-Administered Medications: None        Social History   I have reviewed this patient's social history and  updated it with pertinent information if needed.  Social History     Tobacco Use    Smoking status: Every Day     Packs/day: 1.00     Years: 31.00     Additional pack years: 0.00     Total pack years: 31.00     Types: Cigarettes    Smokeless tobacco: Never    Tobacco comments:     31 years (as of 2023); ~1 cig/week (as of 2023)   Vaping Use    Vaping Use: Never used   Substance Use Topics    Alcohol use: Yes     Comment: 5 drinks tonight    Drug use: Yes     Types: Marijuana     Comment: rare use     Reports variable cocaine use, often 2x weekly since the death of her brother last year.     Physical Exam   Temp: 98.7  F (37.1  C) Temp src: Oral BP: 94/81 Pulse: 58   Resp: 18 SpO2: 93 % O2 Device: None (Room air)    There were no vitals filed for this visit.  Vital Signs with Ranges  Temp:  [98.7  F (37.1  C)] 98.7  F (37.1  C)  Pulse:  [] 58  Resp:  [18] 18  BP: ()/(45-81) 94/81  SpO2:  [89 %-96 %] 93 %  I/O last 3 completed shifts:  In: 1100 [IV Piggyback:1100]  Out: -     Constitutional: Alert and oriented, laying down in bed. Appears fatigued. Tearful at times. Appropriately conversant.   ENT: significant facial swelling nose, maxillary sinuses. She is tender to even light touch. Small dried blood seen in nares. Able to open mouth. No significant erythema posterior pharynx.   Respiratory: Lungs clear to auscultation bilaterally, no increased work of breathing  Cardiovascular: Regular rate and rhythm  GI: active bowel sounds, abdomen soft, non-tender  MSK:  moves all four extremities. Normal tone       Medical Decision Making       70 MINUTES SPENT BY ME on the date of service doing chart review, history, exam, documentation & further activities per the note.      Data     I have personally reviewed the following data over the past 24 hrs:    13.2 (H)  \   13.5   / 385     139 102 3.4 (L) /  135 (H)   4.2 23 0.63 \     Procal: N/A CRP: 35.09 (H) Lactic Acid: 0.5         Imaging results reviewed over  the past 24 hrs:   Recent Results (from the past 24 hour(s))   Head CT w/o contrast    Narrative    CT HEAD WITHOUT CONTRAST  10/13/2023 7:22 AM     HISTORY: Facial cellulitis, evaluate extension intracranially.       COMPARISON: MRI 12/10/2012    TECHNIQUE: Using multidetector thin collimation helical acquisition  technique, axial, coronal and sagittal CT images from the skull base  to the vertex were obtained without intravenous contrast.   (topogram) image(s) also obtained and reviewed. Dose reduction  techniques were used.    FINDINGS:  No acute intracranial hemorrhage, mass effect, or midline shift. No CT  findings of acute infarct or hydrocephalus. Preserved subarachnoid  spaces.    Atraumatic calvarium. No substantial paranasal sinus mucosal disease.  Nasal columella appears to be replaced by air. Overlying nasal soft  tissue thickening. Findings better seen on same day/same time CT of  the face. Clear mastoid air cells. Nonfocal orbits.       Impression    IMPRESSION:   1. No acute intracranial pathology.  2. Please see the same day/same time CT facial bone for those images  and report.    MARY PETERS DO         SYSTEM ID:  [id number]   CT Facial Bones with Contrast    Narrative    CT FACIAL BONES WITH CONTRAST  10/13/2023 7:23 AM     HISTORY: Facial swelling bilaterally, concern for cellulitis, evaluate  abscess.       COMPARISON: None      TECHNIQUE: Using thin collimation multidetector helical acquisition  technique, axial, sagittal, and coronal thin section CT images were  reconstructed through the facial bones after the administration of  intravenous contrast. Images were reviewed in bone and soft tissue  windows. Dose reduction techniques were used.    CONTRAST: 67mL [id number]    FINDINGS:   Abnormal thickening, attenuation of the anterior soft tissue nodes  bilaterally. Gas like expansion and relative replacement of the  expected location of the columella and soft tissue component of  the  nasal septum. Nasal septal of the soft tissue component of the nasal  septum with a contiguous air-filled tract from the nasal orifice  posteriorly to the bony nasal septum (for example sagittal series 7,  image 45). No erosions of the bony component of the nasal septum.    Intact cribriform plate. Orbital structures are within normal limits.  Clear paranasal sinuses and mastoid air cells.     Right first maxillary molar cavity periapical lucencies, with a focal  area of associated area of maxillary cortical erosion (series 6,  images 45/46). Overlying odontogenic mucosal thickening. Mild left  maxillary sinus floor and sphenoid sinus mucosal thickening. Otherwise  clear paranasal sinuses.    Bilateral level 1, level 2 likely reactive lymphadenopathy. Otherwise  the visualized soft tissues of the upper neck, skull base, upper  airway and cervical spinal structures are within normal limits.      Impression    IMPRESSION:   1. Findings concerning for anterior nose soft tissue infection, with a  potentially necrotizing component however felt to be less likely given  relative sparing of the adjacent structures. Changes do however  involve the columella which appears near completely eroded and  replaced by air and septations. Cartilaginous nasal septum is  perforated. Direct visualization and ENT consultation recommended.  2. Right first maxillary molar cavity, periodontitis and odontogenic  maxillary floor mucosal thickening. Dental evaluation recommended.      Findings discussed with Dr. Drake by me at 0743 hours.    MARY PETERS DO         SYSTEM ID:  [id number]

## 2023-10-13 NOTE — ED PROVIDER NOTES
History     Chief Complaint:  Facial Swelling (Onset earlier today)       HPI   Vianca Kumar is a 48 year old female with a past medical history significant for status post paraesophageal hernia repair, tobacco use disorder who presents to the ED via/accompanied by self with a chief complaint of moderate to severe nonradiating facial pain and swelling onset today after awakening.  The patient reports that she has had cold symptoms for the last 2 weeks with her family having similar symptoms as well.  She reports maxillary sinus pain and drainage.  She reports significant headache and nausea.  She denies chest pain, shortness of breath, fevers, chills, changes in urination, changes in bowel movements, vision changes. She denies any new medications or out of the ordinary foods.      Independent Historian: history provided by the patient    Review of External Notes: See MDM    ROS:  Review of Systems  Full ROS completed and negative other than pertinent positives and negatives noted in HPI    Allergies:  Avocado  Chantix [Varenicline Tartrate]     Medications:    escitalopram (LEXAPRO) 20 MG tablet  HYDROcodone-acetaminophen (NORCO) 5-325 MG tablet  ondansetron (ZOFRAN ODT) 4 MG ODT tab        Past Medical History:    Past Medical History:   Diagnosis Date    MDD (major depressive disorder)     Obesity (BMI 30-39.9)        Past Surgical History:    Past Surgical History:   Procedure Laterality Date    CAPSULE/PILL CAM ENDOSCOPY N/A 04/03/2018    Procedure: CAPSULE/PILL CAM ENDOSCOPY;;  Surgeon: Guru Hallie Song MD;  Location:  GI    COLONOSCOPY N/A 12/28/2017    Procedure: COLONOSCOPY;  Surgeon: Yosi Beck MD;  Location: [facility];  Service:     COLONOSCOPY N/A 08/11/2022    Procedure: COLONOSCOPY, WITH POLYPECTOMY AND BIOPSY;  Surgeon: Lorri Holley DO;  Location:  OR    COLONOSCOPY WITH CO2 INSUFFLATION N/A 08/11/2022    Procedure: COLONOSCOPY, WITH CO2 INSUFFLATION;   Surgeon: Lorri Holley DO;  Location: [facility]    COMBINED ESOPHAGOSCOPY, GASTROSCOPY, DUODENOSCOPY (EGD) WITH CO2 INSUFFLATION N/A 08/11/2022    Procedure: ESOPHAGOGASTRODUODENOSCOPY, WITH CO2 INSUFFLATION;  Surgeon: Lorri Holley DO;  Location: [facility]    COMBINED ESOPHAGOSCOPY, GASTROSCOPY, DUODENOSCOPY (EGD) WITH CO2 INSUFFLATION N/A 11/25/2022    Procedure: ESOPHAGOGASTRODUODENOSCOPY, WITH CO2 INSUFFLATION;  Surgeon: Lorri Holley DO;  Location: [facility]    ENDOSCOPIC ULTRASOUND UPPER GASTROINTESTINAL TRACT (GI) N/A 04/03/2018    Procedure: ENDOSCOPIC ULTRASOUND, ESOPHAGOSCOPY / UPPER GASTROINTESTINAL TRACT (GI);  EUS/Capsule ;  Surgeon: Guru Hallie Song MD;  Location:  GI    ESOPHAGOSCOPY, GASTROSCOPY, DUODENOSCOPY (EGD), COMBINED N/A 12/28/2017    Procedure: ESOPHAGOGASTRODUODENOSCOPY (EGD);  Surgeon: Yosi Beck MD;  Location: [facility];  Service:     ESOPHAGOSCOPY, GASTROSCOPY, DUODENOSCOPY (EGD), COMBINED N/A 08/11/2022    Procedure: ESOPHAGOGASTRODUODENOSCOPY, WITH BIOPSY;  Surgeon: Lorri Holley DO;  Location:  OR    ESOPHAGOSCOPY, GASTROSCOPY, DUODENOSCOPY (EGD), COMBINED N/A 11/25/2022    Procedure: ESOPHAGOGASTRODUODENOSCOPY, WITH BIOPSY;  Surgeon: Lorri Holley DO;  Location:  OR    KNEE SURGERY Left     osteotomy; screws and plate in place    LAPAROSCOPIC CHOLECYSTECTOMY N/A 04/25/2018    Procedure: LAPAROSCOPIC CHOLECYSTECTOMY;  Laparoscopic Cholecystectomy ;  Surgeon: Alberto Noble MD;  Location: [facility] OR    LAPAROSCOPIC NISSEN FUNDOPLICATION N/A 5/23/2023    Procedure: ROBOTIC ASSISTED LAPAROSCOPIC HERNIORRHAPHY, HIATAL WITH MESH AND MAGNETIC SPHINCTER AUGMENTATION;  Surgeon: Abdiaziz Tolbert MD;  Location:  OR        Family History:    family history includes Hyperlipidemia in her brother and mother; Hypertension in her brother and mother; Myocardial Infarction in her maternal uncle; Myocardial Infarction (age of onset: 47) in her  maternal grandfather.    Social History:   reports that she has been smoking cigarettes. She has a 31.00 pack-year smoking history. She has never used smokeless tobacco. She reports current alcohol use. She reports current drug use. Drug: Marijuana.  PCP: Whitney Perez     Physical Exam   Patient Vitals for the past 24 hrs:   BP Temp Temp src Pulse Resp SpO2   10/13/23 0159 111/72 98.7  F (37.1  C) Oral 106 18 94 %        Physical Exam  Constitutional: Well developed, uncomfortable, nontox appearance  Head: Atraumatic.  Facial swelling and bilateral maxillary and frontal sinus tenderness  Mouth/Throat: Oropharynx is clear and moist.   Neck:  no stridor, no LAD, no meningismus  Eyes: no scleral icterus, EOMI  Cardiovascular: RRR, 2+ bilat radial pulses  Pulmonary/Chest: nml resp effort  Abdominal: ND, soft, NT, no rebound or guarding   Ext: Warm, well perfused  Neurological: A&O, symmetric facies, moves ext x4  Skin: Skin is warm and dry.   Psychiatric: Behavior is normal. Thought content normal.   Nursing note and vitals reviewed.    Emergency Department Course   ECG:  ECG results from 07/05/23   EKG 12 lead     Value    Systolic Blood Pressure     Diastolic Blood Pressure     Ventricular Rate 85    Atrial Rate 85    NE Interval 156    QRS Duration 86        QTc 445    P Axis 77    R AXIS 70    T Axis 66    Interpretation ECG      Sinus rhythm  Normal ECG  When compared with ECG of 21-JUL-2016 18:01,  No significant change was found  Confirmed by GENERATED REPORT, COMPUTER ([id number]),  Gautam Arnett ([id number]) on 7/5/2023 11:19:13 AM         Imaging:  No orders to display        Report per radiology unless otherwise specified in report or noted in MDM    Laboratory:  Labs Ordered and Resulted from Time of ED Arrival to Time of ED Departure   BASIC METABOLIC PANEL - Abnormal       Result Value    Sodium 139      Potassium 4.2      Chloride 102      Carbon Dioxide (CO2) 23      Anion Gap 14      Urea  Nitrogen 3.4 (*)     Creatinine 0.63      GFR Estimate >90      Calcium 8.9      Glucose 135 (*)    CBC WITH PLATELETS AND DIFFERENTIAL - Abnormal    WBC Count 13.2 (*)     RBC Count 4.31      Hemoglobin 13.5      Hematocrit 38.9      MCV 90      MCH 31.3      MCHC 34.7      RDW 12.0      Platelet Count 385      % Neutrophils 75      % Lymphocytes 14      % Monocytes 6      Mids % (Monos, Eos, Basos)        % Eosinophils 3      % Basophils 1      % Immature Granulocytes 1      NRBCs per 100 WBC 0      Absolute Neutrophils 9.9 (*)     Absolute Lymphocytes 1.9      Absolute Monocytes 0.8      Mids Abs (Monos, Eos, Basos)        Absolute Eosinophils 0.4      Absolute Basophils 0.1      Absolute Immature Granulocytes 0.1      Absolute NRBCs 0.0          Procedures       Emergency Department Course & Assessments:             Interventions:  Medications - No data to display     Independent Interpretation (X-rays, CTs, rhythm strip):  See MDM    Consultations/Discussion of Management or Tests:  none    Social Determinants of Health affecting care:  See MDM    Disposition:  Care of the patient was transferred to my colleague Dr. Drake pending CT imaging.     Impression & Plan    CMS Diagnoses: None    Medical Decision Makin year old female presenting w/ facial pain and swelling tobacco use increasing risk for complications secondary to tobacco use such as    Social determinants affecting patient's health include: Tobacco use with increased risk for complications secondary to tobacco use such as increased risk of infection     I reviewed medical records from ED visit on 2023, office visit on 2023    DDx includes facial cellulitis, sinusitis, allergic reaction, pots puffy tumor.  Doubt meningitis, encephalitis given history and physical exam.  Labs significant for leukocytosis, normal lactic acid level.  CT imaging ordered for further evaluation secluding complication of sinusitis.  Doubt meningitis given  physical exam and history.  Medications given as noted above as well as antibiotics.  Patient signed out in stable condition awaiting CT imaging.  Should the CT results returned reassuring, the patient is likely safe for discharge with outpatient antibiotic treatment for sinusitis.    Diagnosis:  No diagnosis found.     Discharge Medications:  New Prescriptions    No medications on file        10/13/2023   Russ Arguello MD Vaughn, Christopher E, MD  10/13/23 0639

## 2023-10-13 NOTE — ED PROVIDER NOTES
Spoke with radiology.  There are abnormalities on her facial CT.   Patient did snort cocaine on Monday.  She states that she used to snort cocaine a lot more in the past but has slowed down.  She does have a nose ring, that piercing was done 30 years ago, she has not had any problems with it.    Facial CT shows findings concerning for anterior nose soft tissue infection,  potentially with a necrotizing component however this is felt to be  less likely given relative sparing to the bony nasal septum and  surrounding paranasal sinuses and nasal bones. Changes do however  involve the columella which appears near completely eroded and  replaced by focal gas collection. Cartilaginous nasal septum is  perforated. Direct visualization and ENT consultation recommended.    Spoke with Dr. Mueller with ENT.  He recommends admission for IV antibiotics.  She needs an infectious disease consult.  He would recommend saline spray.  He would expect that these findings are consistent with her snorting cocaine.  Does not appear surgical at this time.    Rechecked the patient.  She is still in pain, I did did give her another dose of morphine.    Spoke with Dr. Barbara Barber.  Patient will be admitted for further evaluation and treatment.     Tuyet Drake MD  10/13/23 0992

## 2023-10-14 LAB
ANION GAP SERPL CALCULATED.3IONS-SCNC: 12 MMOL/L (ref 7–15)
BUN SERPL-MCNC: 4.2 MG/DL (ref 6–20)
CALCIUM SERPL-MCNC: 8.5 MG/DL (ref 8.6–10)
CHLORIDE SERPL-SCNC: 109 MMOL/L (ref 98–107)
CREAT SERPL-MCNC: 0.68 MG/DL (ref 0.51–0.95)
DEPRECATED HCO3 PLAS-SCNC: 20 MMOL/L (ref 22–29)
EGFRCR SERPLBLD CKD-EPI 2021: >90 ML/MIN/1.73M2
ERYTHROCYTE [DISTWIDTH] IN BLOOD BY AUTOMATED COUNT: 12 % (ref 10–15)
GLUCOSE SERPL-MCNC: 83 MG/DL (ref 70–99)
HCT VFR BLD AUTO: 34 % (ref 35–47)
HGB BLD-MCNC: 11.5 G/DL (ref 11.7–15.7)
MCH RBC QN AUTO: 31.5 PG (ref 26.5–33)
MCHC RBC AUTO-ENTMCNC: 33.8 G/DL (ref 31.5–36.5)
MCV RBC AUTO: 93 FL (ref 78–100)
MRSA DNA SPEC QL NAA+PROBE: NEGATIVE
PLATELET # BLD AUTO: 303 10E3/UL (ref 150–450)
POTASSIUM SERPL-SCNC: 3.8 MMOL/L (ref 3.4–5.3)
RBC # BLD AUTO: 3.65 10E6/UL (ref 3.8–5.2)
SA TARGET DNA: POSITIVE
SODIUM SERPL-SCNC: 141 MMOL/L (ref 135–145)
WBC # BLD AUTO: 7.4 10E3/UL (ref 4–11)

## 2023-10-14 PROCEDURE — 258N000003 HC RX IP 258 OP 636: Performed by: PHYSICIAN ASSISTANT

## 2023-10-14 PROCEDURE — 99232 SBSQ HOSP IP/OBS MODERATE 35: CPT | Performed by: INTERNAL MEDICINE

## 2023-10-14 PROCEDURE — 250N000011 HC RX IP 250 OP 636: Mod: JZ | Performed by: INTERNAL MEDICINE

## 2023-10-14 PROCEDURE — 80048 BASIC METABOLIC PNL TOTAL CA: CPT | Performed by: PHYSICIAN ASSISTANT

## 2023-10-14 PROCEDURE — 85027 COMPLETE CBC AUTOMATED: CPT | Performed by: PHYSICIAN ASSISTANT

## 2023-10-14 PROCEDURE — 250N000013 HC RX MED GY IP 250 OP 250 PS 637: Performed by: PHYSICIAN ASSISTANT

## 2023-10-14 PROCEDURE — 250N000013 HC RX MED GY IP 250 OP 250 PS 637: Performed by: INTERNAL MEDICINE

## 2023-10-14 PROCEDURE — 250N000011 HC RX IP 250 OP 636: Performed by: EMERGENCY MEDICINE

## 2023-10-14 PROCEDURE — 120N000001 HC R&B MED SURG/OB

## 2023-10-14 PROCEDURE — 258N000003 HC RX IP 258 OP 636: Performed by: EMERGENCY MEDICINE

## 2023-10-14 PROCEDURE — 36415 COLL VENOUS BLD VENIPUNCTURE: CPT | Performed by: PHYSICIAN ASSISTANT

## 2023-10-14 RX ORDER — NALOXONE HYDROCHLORIDE 0.4 MG/ML
0.2 INJECTION, SOLUTION INTRAMUSCULAR; INTRAVENOUS; SUBCUTANEOUS
Status: DISCONTINUED | OUTPATIENT
Start: 2023-10-14 | End: 2023-10-17 | Stop reason: HOSPADM

## 2023-10-14 RX ORDER — NALOXONE HYDROCHLORIDE 0.4 MG/ML
0.4 INJECTION, SOLUTION INTRAMUSCULAR; INTRAVENOUS; SUBCUTANEOUS
Status: DISCONTINUED | OUTPATIENT
Start: 2023-10-14 | End: 2023-10-17 | Stop reason: HOSPADM

## 2023-10-14 RX ORDER — ACETAMINOPHEN 500 MG
1000 TABLET ORAL EVERY 8 HOURS PRN
Status: DISCONTINUED | OUTPATIENT
Start: 2023-10-14 | End: 2023-10-17 | Stop reason: HOSPADM

## 2023-10-14 RX ADMIN — Medication 1 SPRAY: at 18:19

## 2023-10-14 RX ADMIN — THIAMINE HCL TAB 100 MG 100 MG: 100 TAB at 08:16

## 2023-10-14 RX ADMIN — ACETAMINOPHEN 650 MG: 325 TABLET, FILM COATED ORAL at 12:19

## 2023-10-14 RX ADMIN — OXYCODONE HYDROCHLORIDE 5 MG: 5 TABLET ORAL at 17:20

## 2023-10-14 RX ADMIN — PIPERACILLIN AND TAZOBACTAM 3.38 G: 3; .375 INJECTION, POWDER, FOR SOLUTION INTRAVENOUS at 22:20

## 2023-10-14 RX ADMIN — FOLIC ACID 1 MG: 1 TABLET ORAL at 08:16

## 2023-10-14 RX ADMIN — VANCOMYCIN HYDROCHLORIDE 1500 MG: 10 INJECTION, POWDER, LYOPHILIZED, FOR SOLUTION INTRAVENOUS at 05:14

## 2023-10-14 RX ADMIN — Medication 1 SPRAY: at 22:20

## 2023-10-14 RX ADMIN — MULTIPLE VITAMINS W/ MINERALS TAB 1 TABLET: TAB at 08:16

## 2023-10-14 RX ADMIN — OXYCODONE HYDROCHLORIDE 5 MG: 5 TABLET ORAL at 08:16

## 2023-10-14 RX ADMIN — OXYCODONE HYDROCHLORIDE 5 MG: 5 TABLET ORAL at 01:07

## 2023-10-14 RX ADMIN — OXYCODONE HYDROCHLORIDE 5 MG: 5 TABLET ORAL at 12:17

## 2023-10-14 RX ADMIN — OXYCODONE HYDROCHLORIDE 5 MG: 5 TABLET ORAL at 22:23

## 2023-10-14 RX ADMIN — MUPIROCIN: 20 OINTMENT TOPICAL at 08:17

## 2023-10-14 RX ADMIN — SODIUM CHLORIDE: 9 INJECTION, SOLUTION INTRAVENOUS at 08:15

## 2023-10-14 RX ADMIN — PIPERACILLIN AND TAZOBACTAM 3.38 G: 3; .375 INJECTION, POWDER, FOR SOLUTION INTRAVENOUS at 17:19

## 2023-10-14 RX ADMIN — PIPERACILLIN AND TAZOBACTAM 3.38 G: 3; .375 INJECTION, POWDER, FOR SOLUTION INTRAVENOUS at 10:49

## 2023-10-14 RX ADMIN — PIPERACILLIN AND TAZOBACTAM 3.38 G: 3; .375 INJECTION, POWDER, FOR SOLUTION INTRAVENOUS at 04:20

## 2023-10-14 RX ADMIN — ESCITALOPRAM OXALATE 20 MG: 10 TABLET ORAL at 08:16

## 2023-10-14 ASSESSMENT — ACTIVITIES OF DAILY LIVING (ADL)
ADLS_ACUITY_SCORE: 20

## 2023-10-14 NOTE — PLAN OF CARE
Reason for Admission: facial infection, Alcohol withdrawal    Cognitive Concerns/ Orientation : Alert and oriented x4  BEHAVIOR & AGGRESSION TOOL COLOR: Green  Neuro/CMS intact except headache from facial swelling  VS/O2: stable on room air except HR 50s  CARDIOPULMONARY: Denies chest pain and SOB  MOBILITY: SBA  PAIN MANAGMENT: Nose/face and headache-managed with prn oxycodone and Tylenol. Using ice packs to face  DIET: regular  BOWEL/BLADDER: continent  ABNL LAB/BG: platelets 123, CRP 35.09  DRAIN/DEVICES: PIV infusing NS 125ml/hr  TELEMETRY RHYTHM: None  SKIN: intact  TESTS/PROCEDURES: None  D/C DATE and PLACEMENT: TBD  OTHER IMPORTANT INFO and PLAN: CIWA 2, 0. On iv Zosyn. ID following.

## 2023-10-14 NOTE — PROGRESS NOTES
"[facility]    Hospitalist Progress Note    Date of Service (when I saw the patient): 10/14/2023  Admit date: 10/13/2023    Interval History   Full details of events over last 24 hours outlined below.   When asked how she is feeling she responds \"terrible\".  She is tearful.  She tells me that she lost her brother a year ago.  And she has been trying to cope as well as she can.  She asked that we do not talk about her drug use to her parents as they have suffered enough and she does not want them to worry.  She used a total of 10 mg of oxycodone with 8 mg of IV morphine and 0.8 mg of Dilaudid yesterday.  So far today she is only had 10 mg of oxycodone and acetaminophen.  She continues with a significant amount of pain around area of infection.  She remains afebrile hemodynamically stable with good oxygenation on room air  Denies any SOB, CP, abdominal pain, N/V/D.    Assessment & Plan   Vianca Kumar is a 48 year old female with a past medical history significant for MDD, obesity, hx repair paraesophageal diaphragmatic hernia admitted on 10/13/2023 after presenting with facial pain/swelling.      Cellulitis anterior nose with extensive septal perforation  Associated with intranasal cocaine use   Presents with rapid onset facial swelling overnight, extremely tender on exam. She has had URI symptoms for the past 1.5 weeks with intermittent fevers. (COVID, flu, RSV neg)  *CT facial shows soft tissue infection anterior nose with potential necrotizing component, though felt less likely, as well as septum perforation and gas like expansion in location of columella and soft tissue component of nasal septum. Potential necrotizing component though felt less likely given sparing of adjacent structures.   *Afebrile on presentation. BP intermittently soft (pt reports soft at baseline) though needs close monitoring for potential developing sepsis.   WBC 13.2, LA WNL, CRP 35, CK WNL  *Also notably with " "recent snorting of cocaine  *Hx frequent \"boils,\" no known hx MRSA    On 10/14, remains hemodynamically stable afebrile, slight improvement in facial swelling  Nasal culture came back growing Staph aureus, MRSA negative  ENT consult appreciated. Recommending IV abx, ID consult, and nasal saline   IV antibiotics and nasal saline rinses (ordered scheduled QID and PRN on 10/14/23  not sure she will be able to tolerate due to pain).  ID consulted > initially changed Unasyn to Vanco + Zosyn.  Vanco discontinued, when MRSA came back negative  Continues on Zosyn  DC IV fluids on 10/14  For pain using oxycodone judiciously.  Will schedule tylenol, and discuss ibuprofen with patient tomorrow (on selective serotonin reuptake inhibitor, which carries increased risk of GI bleeding on NSAID)        Right first maxillary molar cavity, periodontitis and odontogenic  maxillary floor mucosal thickening  Seen on CT. Will ask Dental team to weigh in on need for inpatient vs outpatient management. Appreciate assistance   Discussed findings with oral surgeon on 10/14.  No urgent intervention indicated.  Event location not source of infection above.   Recommend follow-up as outpatient with dentist     Alcohol use  Cocaine use  Pt reports drinking 3-5 drinks 3-5 days per week as well cocaine ~2x/week. Denies hx alcohol withdrawal. Last cocaine use 10/9.   No signs of withdrawal.  Stop CIWA  Wants to stop using and would like to meet Chem Dep  Continue folic acid and multivitamin while hospitalized       Tobacco use  Declines nicotine replacement at this time      MDD with grief reaction - She has a long history of depression on Lexapro which she feels has been helpful.  She lost her brother a year ago, who was her best friend and continues to actively grieve his loss.  She has started following with a therapist.  She does not have a psychiatrist.  She would like to meet with psychiatry while here.  Continue Lexapro.  She feels this is " worked for her and does not want to stop this as she had had a bad reaction when she stopped it in the past  Psych consulted after discussion with patient on 10/14    Acute mild anemia, noted. Likely secondary to dilution from IV fluids.  No sign of active bleeding.  Recent Labs   Lab 10/14/23  1153 10/13/23  0324   HGB 11.5* 13.5         Clinically Significant Risk Factors                                      Diet: Orders Placed This Encounter      Regular Diet Adult     IVF: Stop IVF on 10/14/23   Sloan Catheter: Not present     DVT Prophylaxis: Pneumatic Compression Devices, AMBULATE  Code Status: Full Code     Disposition: Expected discharge per ENT's recommendation.   Communication: Discussed with patient and RN on 10/14/23    Barbara Barber MD    Hospitalist Service  [facility]  Securely message with the Vocera Web Console (learn more here)  Text page via Swopboard Paging/Directory    Medical Decision Making       Over 35 MINUTES SPENT BY ME on the date of service doing chart review, history, exam, documentation & further activities per the note.      -Data reviewed today: I reviewed all new labs and imaging results over the last 24 hours. I personally reviewed no images or EKG's today.    Physical Exam   Temp: 98  F (36.7  C) Temp src: Oral BP: 133/83 Pulse: 52   Resp: 18 SpO2: 96 % O2 Device: None (Room air)    There were no vitals filed for this visit.  Vital Signs with Ranges  Temp:  [97.9  F (36.6  C)-98.1  F (36.7  C)] 98  F (36.7  C)  Pulse:  [52-69] 52  Resp:  [18-19] 18  BP: (104-136)/(73-90) 133/83  SpO2:  [89 %-96 %] 96 %  No intake/output data recorded.    Today's Exam  Constitutional: NAD,   ENT: swelling of both cheeks L> R and nose. EOM intact. OP clear. No periauricular or neck lymphadenopathy.  Area of swelling remains tender to touch with palpation  Neuropsyche: tearful, poor eye contact, alert and oriented, answers questions appropriately.   Respiratory:Breathing  comfortably, good air exchange, no wheezes, no crackles.   Cardiovascular:  Regular rate and rhythm, no edema.  GI:  soft, NT/ND, BS normal  Skin/Integumen:  No acute rash or sign of bleeding.     Medications   All medications reviewed on 10/14/23    sodium chloride 125 mL/hr at 10/14/23 0815      escitalopram  20 mg Oral Daily    folic acid  1 mg Oral Daily    multivitamin w/minerals  1 tablet Oral Daily    mupirocin   Topical Daily    piperacillin-tazobactam  3.375 g Intravenous Q6H    sodium chloride  1 spray Both Nostrils 4x Daily     PRN Meds: acetaminophen **OR** acetaminophen, flumazenil, HYDROmorphone, HYDROmorphone, LORazepam **OR** LORazepam, melatonin, naloxone **OR** naloxone **OR** naloxone **OR** naloxone, ondansetron **OR** ondansetron, oxyCODONE IR, sodium chloride    Data   Recent Labs   Lab 10/14/23  1153 10/13/23  0324   WBC 7.4 13.2*   HGB 11.5* 13.5   MCV 93 90    385    139   POTASSIUM 3.8 4.2   CHLORIDE 109* 102   CO2 20* 23   BUN 4.2* 3.4*   CR 0.68 0.63   ANIONGAP 12 14   MARIAH 8.5* 8.9   GLC 83 135*   ALBUMIN  --  4.1   PROTTOTAL  --  6.8   BILITOTAL  --  0.6   ALKPHOS  --  126*   ALT  --  13   AST  --  18       No results found for this or any previous visit (from the past 24 hour(s)).

## 2023-10-14 NOTE — PLAN OF CARE
Goal Outcome Evaluation:  Summary: nasal infection.   DATE & TIME: 10/13/23  [id number]   Cognitive Concerns/ Orientation : A&OX4   BEHAVIOR & AGGRESSION TOOL COLOR: Green  CIWA SCORE: 1,0   ABNL VS/O2: VSS @RA. Q4 vitals  MOBILITY: SBA,   PAIN MANAGMENT: facial pain., PRN dilaudid given. Effective.   DIET: Reg  BOWEL/BLADDER: Continent   ABNL LAB/BG: blood culture and MRSA PCR result pending.   DRAIN/DEVICES: PIV NS with vitamin @100ml/hr.  Pending NS @125ml/hr  TELEMETRY RHYTHM:   SKIN: Intact, facial +2/3 edema  TESTS/PROCEDURES: CT of the head   D/C DAY/GOALS/PLACE: Pending  OTHER IMPORTANT INFO: Pt able to make her needs known.

## 2023-10-14 NOTE — PROGRESS NOTES
[facility]    Infectious Disease Progress Note    Date of Service (when I saw the patient): 10/14/2023     Assessment & Plan   Vianca Kumar is a 48 year old who was admitted on 10/13/2023.     Impression:  49 yo patient with history of cocaine use.   Admitted with facial pain/swelling.  Patient reports that for the past week and a half or so she has been experiencing URI symptoms including nasal congestion, cough sinus pain, as well as intermittent fevers.  On the day prior to admission she noticed some nasal discomfort and mild swelling over her sinuses.  She went to bed and woke up at approximately 1 AM with significant facial swelling and tenderness.    CT was obtained showing anterior nose soft tissue infection with potentially necrotizing component, however that was felt less likely given sparing of the adjacent structures. Cartilaginous nasal septum was also found to be perforated.   She was started on IV Unasyn and ENT was contacted.     Recommendations:   MRSA PCR is negative   Can stop  vancomycin   Continue on  zosyn   ENT Eval noted Will follow up  Dental eval   Blood cultures and nasal cultures are pending.            Pauly Lilly MD    Interval History   Tolerating antibiotics ok   No new rashes or issues with antibiotics   Labs reviewed   No changes to past medical, social or family history   Swellin gin the face is about the same   A 10 point ROS was done and is negative other than noted in the interval history above     Physical Exam   Temp: 98  F (36.7  C) Temp src: Oral BP: 130/76 Pulse: 59   Resp: 18 SpO2: 95 % O2 Device: None (Room air)    There were no vitals filed for this visit.  Vital Signs with Ranges  Temp:  [97.9  F (36.6  C)-98.1  F (36.7  C)] 98  F (36.7  C)  Pulse:  [54-69] 59  Resp:  [18-19] 18  BP: ()/(59-91) 130/76  SpO2:  [89 %-96 %] 95 %    Constitutional: Awake, alert, cooperative, no apparent distress  Facial swelling though may be a little  "better   Lungs: Clear to auscultation bilaterally, no crackles or wheezing  Cardiovascular: Regular rate and rhythm, normal S1 and S2, and no murmur noted  Abdomen: Normal bowel sounds, soft, non-distended, non-tender  Skin: No rashes, no cyanosis, no edema  Other:    Medications    sodium chloride 125 mL/hr at 10/14/23 0815      escitalopram  20 mg Oral Daily    folic acid  1 mg Oral Daily    multivitamin w/minerals  1 tablet Oral Daily    mupirocin   Topical Daily    piperacillin-tazobactam  3.375 g Intravenous Q6H    thiamine  100 mg Oral Daily    vancomycin  1,500 mg Intravenous Q12H       Data   All microbiology laboratory data reviewed.  Recent Labs   Lab Test 10/13/23  0324 08/29/23  2134 06/14/23  0750   WBC 13.2* 13.0* 8.9   HGB 13.5 12.7 13.2   HCT 38.9 37.5 38.7   MCV 90 92 90    352 438     Recent Labs   Lab Test 10/13/23  0324 08/29/23  2134 05/24/23  0701   CR 0.63 0.71 0.60     Recent Labs   Lab Test 10/13/23  0953   SED 4     No lab results found.    Invalid input(s): \"UC\"    All cultures:  No results for input(s): \"CULTURE\" in the last 168 hours.   Blood culture:  Results for orders placed or performed in visit on 12/03/12   Blood culture    Specimen: Blood   Result Value Ref Range    Specimen Description Blood Left Arm     Culture Micro No growth     Micro Report Status FINAL 12/09/2012       Urine culture:  Results for orders placed or performed during the hospital encounter of 05/28/22   Urine Culture    Specimen: Urine, Midstream   Result Value Ref Range    Culture No Growth              "

## 2023-10-15 LAB
BACTERIA SPEC CULT: ABNORMAL
HGB BLD-MCNC: 11.6 G/DL (ref 11.7–15.7)

## 2023-10-15 PROCEDURE — 36415 COLL VENOUS BLD VENIPUNCTURE: CPT | Performed by: INTERNAL MEDICINE

## 2023-10-15 PROCEDURE — 250N000013 HC RX MED GY IP 250 OP 250 PS 637: Performed by: PHYSICIAN ASSISTANT

## 2023-10-15 PROCEDURE — 250N000011 HC RX IP 250 OP 636: Mod: JZ | Performed by: INTERNAL MEDICINE

## 2023-10-15 PROCEDURE — 120N000001 HC R&B MED SURG/OB

## 2023-10-15 PROCEDURE — 250N000013 HC RX MED GY IP 250 OP 250 PS 637: Performed by: INTERNAL MEDICINE

## 2023-10-15 PROCEDURE — 99231 SBSQ HOSP IP/OBS SF/LOW 25: CPT | Performed by: INTERNAL MEDICINE

## 2023-10-15 PROCEDURE — 85018 HEMOGLOBIN: CPT | Performed by: INTERNAL MEDICINE

## 2023-10-15 RX ORDER — IBUPROFEN 600 MG/1
600 TABLET, FILM COATED ORAL EVERY 6 HOURS PRN
Status: DISCONTINUED | OUTPATIENT
Start: 2023-10-15 | End: 2023-10-17 | Stop reason: HOSPADM

## 2023-10-15 RX ADMIN — MUPIROCIN: 20 OINTMENT TOPICAL at 08:56

## 2023-10-15 RX ADMIN — OXYCODONE HYDROCHLORIDE 5 MG: 5 TABLET ORAL at 10:17

## 2023-10-15 RX ADMIN — ESCITALOPRAM OXALATE 20 MG: 10 TABLET ORAL at 08:56

## 2023-10-15 RX ADMIN — PIPERACILLIN AND TAZOBACTAM 3.38 G: 3; .375 INJECTION, POWDER, FOR SOLUTION INTRAVENOUS at 10:17

## 2023-10-15 RX ADMIN — Medication 1 SPRAY: at 13:40

## 2023-10-15 RX ADMIN — Medication 1 SPRAY: at 22:27

## 2023-10-15 RX ADMIN — PIPERACILLIN AND TAZOBACTAM 3.38 G: 3; .375 INJECTION, POWDER, FOR SOLUTION INTRAVENOUS at 05:53

## 2023-10-15 RX ADMIN — ACETAMINOPHEN 1000 MG: 500 TABLET, FILM COATED ORAL at 10:17

## 2023-10-15 RX ADMIN — PIPERACILLIN AND TAZOBACTAM 3.38 G: 3; .375 INJECTION, POWDER, FOR SOLUTION INTRAVENOUS at 16:23

## 2023-10-15 RX ADMIN — MULTIPLE VITAMINS W/ MINERALS TAB 1 TABLET: TAB at 08:56

## 2023-10-15 RX ADMIN — OXYCODONE HYDROCHLORIDE 5 MG: 5 TABLET ORAL at 22:29

## 2023-10-15 RX ADMIN — PIPERACILLIN AND TAZOBACTAM 3.38 G: 3; .375 INJECTION, POWDER, FOR SOLUTION INTRAVENOUS at 22:27

## 2023-10-15 RX ADMIN — OXYCODONE HYDROCHLORIDE 5 MG: 5 TABLET ORAL at 16:23

## 2023-10-15 RX ADMIN — Medication 1 SPRAY: at 08:56

## 2023-10-15 RX ADMIN — FOLIC ACID 1 MG: 1 TABLET ORAL at 08:55

## 2023-10-15 RX ADMIN — OXYCODONE HYDROCHLORIDE 5 MG: 5 TABLET ORAL at 05:59

## 2023-10-15 ASSESSMENT — ACTIVITIES OF DAILY LIVING (ADL)
ADLS_ACUITY_SCORE: 22
ADLS_ACUITY_SCORE: 20
ADLS_ACUITY_SCORE: 22
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
## 2023-10-15 NOTE — PROGRESS NOTES
"[facility]    Hospitalist Progress Note    Date of Service (when I saw the patient): 10/15/2023  Admit date: 10/13/2023    Interval History   Full details of events over last 24 hours outlined below.   Vianca has been afebrile hemodynamically stable over the last 24 hours.  He continues to have a pain in her face.  The swelling has gone down.  She is very nervous about going home on an antibiotic as she states she has a history of her infections (history of boils) not responding to antibiotics.  She also wants to meet with chemical dependency and psychiatry.     Assessment & Plan   Vianca Kumar is a 48 year old female with a past medical history significant for MDD, obesity, hx repair paraesophageal diaphragmatic hernia admitted on 10/13/2023 after presenting with facial pain/swelling.      Cellulitis anterior nose with extensive septal perforation  Associated with intranasal cocaine use   Presents with rapid onset facial swelling overnight, extremely tender on exam. She has had URI symptoms for the past 1.5 weeks with intermittent fevers. (COVID, flu, RSV neg)  *CT facial shows soft tissue infection anterior nose with potential necrotizing component, though felt less likely, as well as septum perforation and gas like expansion in location of columella and soft tissue component of nasal septum. Potential necrotizing component though felt less likely given sparing of adjacent structures.   *Afebrile on presentation. BP intermittently soft (pt reports soft at baseline) though needs close monitoring for potential developing sepsis.   WBC 13.2, LA WNL, CRP 35, CK WNL  *Also notably with recent snorting of cocaine  *Hx frequent \"boils,\" no known hx MRSA    On 10/14, remains hemodynamically stable afebrile, slight improvement in facial swelling  Nasal culture came back growing Staph aureus (pansensitive)   ENT consult appreciated. Recommending IV abx, ID consult, and nasal saline   IV " antibiotics and nasal saline rinses (ordered scheduled QID and PRN on 10/14/23  not sure she will be able to tolerate due to pain).  ID consulted > initially changed Unasyn to Vanco + Zosyn.  Vanco discontinued, when MRSA came back negative  Continues on Zosyn per ID recommendations.  DC IV fluids on 10/14  For pain oxycodone 5 mg q 4 hous PRN (on 10/15 used a total of 25 mg over the last 24 hours), scheduled tylenol and added ibuprofen 600 mg q 6 hours PRN on 10/15/23.  Discussed increased risk of bleeding on SSRI.  Patient has tolerated in the past and would like to try it.  Discussed signs of GI bleeding.      Right first maxillary molar cavity, periodontitis and odontogenic  maxillary floor mucosal thickening  Seen on CT. Will ask Dental team to weigh in on need for inpatient vs outpatient management. Appreciate assistance   Discussed findings with oral surgeon on 10/14.  No urgent intervention indicated.  Event location not source of infection above.   Recommend follow-up as outpatient with dentist, discussed with patient on 10/15     Alcohol use  Cocaine use  Pt reports drinking 3-5 drinks 3-5 days per week as well cocaine ~2x/week. Denies hx alcohol withdrawal. Last cocaine use 10/9.   No signs of withdrawal.  Stop CIWA on 10/14  Wants to stop using and would like to meet Chem Dep  Continue folic acid and multivitamin while hospitalized.     Tobacco use  Declines nicotine replacement at this time      MDD with grief reaction - She has a long history of depression on Lexapro which she feels has been helpful.  She lost her brother a year ago, who was her best friend and continues to actively grieve his loss.  She has started following with a therapist.  She does not have a psychiatrist.  She would like to meet with psychiatry while here.  Continue Lexapro.  She feels this is worked for her and does not want to stop this as she had had a bad reaction when she stopped it in the past  Psych consulted after  discussion with patient on 10/14    Acute mild anemia, noted. Likely secondary to dilution from IV fluids.  No sign of active bleeding.  Hemoglobin stable on 10/15    Recent Labs   Lab 10/15/23  0838 10/14/23  1153 10/13/23  0324   HGB 11.6* 11.5* 13.5         Clinically Significant Risk Factors                                      Diet: Orders Placed This Encounter      Regular Diet Adult     IVF: Stop IVF on 10/14/23   Sloan Catheter: Not present     DVT Prophylaxis: Pneumatic Compression Devices, AMBULATE  Code Status: Full Code     Disposition: Expected discharge per ID and ENT's recommendation, once off IV abx.  Notably patient is very concerned about leaving the first day she is on oral antibiotics.  She requests we follow her 1 day after switching antibiotics.  We still have chemical dependency and psychiatry consults pending as well.   Communication: Discussed with patient and RN on 10/15/23     Barbara Barber MD    Hospitalist Service  [facility]  Securely message with the Vocera Web Console (learn more here)  Text page via [employer]    Medical Decision Making         -Data reviewed today: I reviewed all new labs and imaging results over the last 24 hours. I personally reviewed no images or EKG's today.    Physical Exam   Temp: 98  F (36.7  C) Temp src: Oral BP: 130/79 Pulse: 58   Resp: 16 SpO2: 94 % O2 Device: None (Room air)    There were no vitals filed for this visit.  Vital Signs with Ranges  Temp:  [97.7  F (36.5  C)-98.3  F (36.8  C)] 98  F (36.7  C)  Pulse:  [52-61] 58  Resp:  [16-18] 16  BP: (117-136)/(78-88) 130/79  SpO2:  [94 %-96 %] 94 %  I/O last 3 completed shifts:  In: 3019 [P.O.:500; I.V.:2519]  Out: -     Today's Exam  Constitutional: NAD,   ENT: swelling of both cheeks L> R and nose. EOM intact. OP clear. No periauricular or neck lymphadenopathy.  Area of swelling remains tender to touch with palpation  Neuropsyche: tearful, poor eye contact, alert  and oriented, answers questions appropriately.   Respiratory:Breathing comfortably, good air exchange, no wheezes, no crackles.   Cardiovascular:  Regular rate and rhythm, no edema.  GI:  soft, NT/ND, BS normal  Skin/Integumen:  No acute rash or sign of bleeding.     Medications   All medications reviewed on 10/15/23        escitalopram  20 mg Oral Daily    folic acid  1 mg Oral Daily    multivitamin w/minerals  1 tablet Oral Daily    mupirocin   Topical Daily    piperacillin-tazobactam  3.375 g Intravenous Q6H    sodium chloride  1 spray Both Nostrils 4x Daily     PRN Meds: acetaminophen, HYDROmorphone, HYDROmorphone, melatonin, naloxone **OR** naloxone **OR** naloxone **OR** naloxone, ondansetron **OR** ondansetron, oxyCODONE IR    Data   Recent Labs   Lab 10/15/23  0838 10/14/23  1153 10/13/23  0324   WBC  --  7.4 13.2*   HGB 11.6* 11.5* 13.5   MCV  --  93 90   PLT  --  303 385   NA  --  141 139   POTASSIUM  --  3.8 4.2   CHLORIDE  --  109* 102   CO2  --  20* 23   BUN  --  4.2* 3.4*   CR  --  0.68 0.63   ANIONGAP  --  12 14   MARIAH  --  8.5* 8.9   GLC  --  83 135*   ALBUMIN  --   --  4.1   PROTTOTAL  --   --  6.8   BILITOTAL  --   --  0.6   ALKPHOS  --   --  126*   ALT  --   --  13   AST  --   --  18       No results found for this or any previous visit (from the past 24 hour(s)).

## 2023-10-15 NOTE — PLAN OF CARE
Goal Outcome Evaluation:       Summary: nasal infection.   DATE & TIME: 10/14/23  1283-1720  Cognitive Concerns/ Orientation : A&OX4   BEHAVIOR & AGGRESSION TOOL COLOR: Green  CIWA SCORE: NA, discontinued.    ABNL VS/O2: VSS @RA. Q4 vitals  MOBILITY: IND   PAIN MANAGMENT: facial pain., PRN Oxy given x1. Effective.   DIET: Reg  BOWEL/BLADDER: Continent   ABNL LAB/BG: blood culture pending and MRSA PCR negative.   DRAIN/DEVICES: PIV SL with intermittent abx  SKIN: Intact, facial +2 edema, improving  TESTS/PROCEDURES: NA   D/C DAY/GOALS/PLACE: ENT recommendation pending  OTHER IMPORTANT INFO: Pt able to make her needs known.

## 2023-10-15 NOTE — PLAN OF CARE
Goal Outcome Evaluation:    Summary: nasal infection.   DATE & TIME: 10/14/23  5695-9593  Cognitive Concerns/ Orientation : A&OX4   BEHAVIOR & AGGRESSION TOOL COLOR: Green  CIWA SCORE: NA, discontinued.    ABNL VS/O2: VSS @RA. Q4 vitals  MOBILITY: IND   PAIN MANAGMENT: facial pain., PRN Oxy given x2. Effective.   DIET: Reg  BOWEL/BLADDER: Continent   ABNL LAB/BG: blood culture pending and MRSA PCR negative.   DRAIN/DEVICES: PIV SL  TELEMETRY RHYTHM:   SKIN: Intact, facial +2 edema, improving  TESTS/PROCEDURES: NA   D/C DAY/GOALS/PLACE: ENT recommendation pending  OTHER IMPORTANT INFO: Pt able to make her needs known.

## 2023-10-16 PROCEDURE — 99232 SBSQ HOSP IP/OBS MODERATE 35: CPT | Performed by: STUDENT IN AN ORGANIZED HEALTH CARE EDUCATION/TRAINING PROGRAM

## 2023-10-16 PROCEDURE — 250N000013 HC RX MED GY IP 250 OP 250 PS 637: Performed by: PHYSICIAN ASSISTANT

## 2023-10-16 PROCEDURE — 250N000013 HC RX MED GY IP 250 OP 250 PS 637: Performed by: INTERNAL MEDICINE

## 2023-10-16 PROCEDURE — 250N000013 HC RX MED GY IP 250 OP 250 PS 637: Performed by: STUDENT IN AN ORGANIZED HEALTH CARE EDUCATION/TRAINING PROGRAM

## 2023-10-16 PROCEDURE — 999N000216 HC STATISTIC ADULT CD FACE TO FACE-NO CHRG

## 2023-10-16 PROCEDURE — 120N000001 HC R&B MED SURG/OB

## 2023-10-16 PROCEDURE — 99232 SBSQ HOSP IP/OBS MODERATE 35: CPT | Performed by: INTERNAL MEDICINE

## 2023-10-16 PROCEDURE — 99222 1ST HOSP IP/OBS MODERATE 55: CPT

## 2023-10-16 PROCEDURE — 250N000011 HC RX IP 250 OP 636: Performed by: INTERNAL MEDICINE

## 2023-10-16 PROCEDURE — 250N000011 HC RX IP 250 OP 636: Performed by: HOSPITALIST

## 2023-10-16 RX ORDER — AMPICILLIN AND SULBACTAM 2; 1 G/1; G/1
3 INJECTION, POWDER, FOR SOLUTION INTRAMUSCULAR; INTRAVENOUS EVERY 6 HOURS
Status: DISCONTINUED | OUTPATIENT
Start: 2023-10-16 | End: 2023-10-16

## 2023-10-16 RX ORDER — DIPHENHYDRAMINE HYDROCHLORIDE 50 MG/ML
25 INJECTION INTRAMUSCULAR; INTRAVENOUS EVERY 6 HOURS PRN
Status: DISCONTINUED | OUTPATIENT
Start: 2023-10-16 | End: 2023-10-17 | Stop reason: HOSPADM

## 2023-10-16 RX ORDER — HYDROXYZINE HYDROCHLORIDE 25 MG/1
25-50 TABLET, FILM COATED ORAL 3 TIMES DAILY PRN
Status: DISCONTINUED | OUTPATIENT
Start: 2023-10-16 | End: 2023-10-17 | Stop reason: HOSPADM

## 2023-10-16 RX ORDER — CLINDAMYCIN PHOSPHATE 900 MG/50ML
900 INJECTION, SOLUTION INTRAVENOUS EVERY 8 HOURS
Status: DISCONTINUED | OUTPATIENT
Start: 2023-10-16 | End: 2023-10-17

## 2023-10-16 RX ORDER — DIPHENHYDRAMINE HCL 25 MG
25 CAPSULE ORAL EVERY 6 HOURS PRN
Status: DISCONTINUED | OUTPATIENT
Start: 2023-10-16 | End: 2023-10-17 | Stop reason: HOSPADM

## 2023-10-16 RX ADMIN — Medication 1 SPRAY: at 22:00

## 2023-10-16 RX ADMIN — OXYCODONE HYDROCHLORIDE 5 MG: 5 TABLET ORAL at 19:11

## 2023-10-16 RX ADMIN — PIPERACILLIN AND TAZOBACTAM 3.38 G: 3; .375 INJECTION, POWDER, FOR SOLUTION INTRAVENOUS at 11:04

## 2023-10-16 RX ADMIN — Medication 1 SPRAY: at 12:00

## 2023-10-16 RX ADMIN — CLINDAMYCIN PHOSPHATE 900 MG: 900 INJECTION, SOLUTION INTRAVENOUS at 22:00

## 2023-10-16 RX ADMIN — AMPICILLIN SODIUM AND SULBACTAM SODIUM 3 G: 2; 1 INJECTION, POWDER, FOR SOLUTION INTRAMUSCULAR; INTRAVENOUS at 17:32

## 2023-10-16 RX ADMIN — DIPHENHYDRAMINE HYDROCHLORIDE 25 MG: 25 CAPSULE ORAL at 11:32

## 2023-10-16 RX ADMIN — DIPHENHYDRAMINE HYDROCHLORIDE 25 MG: 25 CAPSULE ORAL at 19:11

## 2023-10-16 RX ADMIN — MULTIPLE VITAMINS W/ MINERALS TAB 1 TABLET: TAB at 07:53

## 2023-10-16 RX ADMIN — Medication 1 SPRAY: at 17:32

## 2023-10-16 RX ADMIN — OXYCODONE HYDROCHLORIDE 5 MG: 5 TABLET ORAL at 10:04

## 2023-10-16 RX ADMIN — IBUPROFEN 600 MG: 600 TABLET ORAL at 12:02

## 2023-10-16 RX ADMIN — MUPIROCIN: 20 OINTMENT TOPICAL at 07:54

## 2023-10-16 RX ADMIN — OXYCODONE HYDROCHLORIDE 5 MG: 5 TABLET ORAL at 05:53

## 2023-10-16 RX ADMIN — ESCITALOPRAM OXALATE 20 MG: 10 TABLET ORAL at 07:53

## 2023-10-16 RX ADMIN — FOLIC ACID 1 MG: 1 TABLET ORAL at 07:53

## 2023-10-16 RX ADMIN — PIPERACILLIN AND TAZOBACTAM 3.38 G: 3; .375 INJECTION, POWDER, FOR SOLUTION INTRAVENOUS at 05:53

## 2023-10-16 RX ADMIN — AMPICILLIN SODIUM AND SULBACTAM SODIUM 3 G: 2; 1 INJECTION, POWDER, FOR SOLUTION INTRAMUSCULAR; INTRAVENOUS at 11:56

## 2023-10-16 RX ADMIN — Medication 1 SPRAY: at 07:54

## 2023-10-16 RX ADMIN — OXYCODONE HYDROCHLORIDE 5 MG: 5 TABLET ORAL at 14:57

## 2023-10-16 ASSESSMENT — ACTIVITIES OF DAILY LIVING (ADL)
ADLS_ACUITY_SCORE: 20
ADLS_ACUITY_SCORE: 22
ADLS_ACUITY_SCORE: 20

## 2023-10-16 NOTE — CONSULTS
"      Initial Psychiatric Consult   Consult date: October 16, 2023         Reason for Consult, requesting source:    Medication review  Requesting source: Barbara Barber    This note is being entered to supplement the psychiatry consultation note that was completed on October 15, 2023 by the licensed mental health professional Barbara Lewis. They have reviewed with me the pertinent clinical details related to their encounter. I am being consulted to offer additional guidance on psychiatric pharmacological interventions.         HPI:   Vianca Kumar is a 48 year old female with a history of MDD, cocaine and alcohol use who presented on 10/13/23 for facial pain/swelling, found to have septal perforation. Symptoms improved with antibiotics. Psychiatry was consulted for depression/grief after loss of brother.    I met with Vianca in her room, she is seen sitting up in bed. Calm and cooperative. Feeling better today, and expressing motivation for sobriety. Feels this admission was a wake up call. Her younger brother recently passed away, they were very close and she remains close with his school-age children. She is struggling with grief and depression, and feels she was stuck in vicious cycle with drug use. She has CD resources now that she plans to utilize, does not think she is at the point to need IP CD treatment but is open to considering if current plans do not seem adequate. Anxiety is also an ongoing problem, doesn't think she has panic attacks but \"it gets close\". Often has difficulty falling asleep at night, has tried various medications in the past with minimal benefit. Trazodone did not help, Ambien caused sleepwalking (making online purchases during episodes) and did not feel rested afterward, melatonin does not help. She would like to try medication for both anxiety and sleep, while planning to meet with a therapist as well.            Physical ROS:   The 10 point Review of Systems is negative other " than noted in the HPI or here.           Medications:      ampicillin-sulbactam  3 g Intravenous Q6H    escitalopram  20 mg Oral Daily    folic acid  1 mg Oral Daily    multivitamin w/minerals  1 tablet Oral Daily    mupirocin   Topical Daily    sodium chloride  1 spray Both Nostrils 4x Daily            Physical and Psychiatric Examination:     BP (!) 136/90 (BP Location: Left arm)   Pulse 58   Temp 98.1  F (36.7  C) (Oral)   Resp 18   LMP 01/01/2022 (Exact Date)   SpO2 96%   Weight is 0 lbs 0 oz  There is no height or weight on file to calculate BMI.    Physical Exam:  I have reviewed the physical exam as documented by by the medical team and agree with findings and assessment and have no additional findings to add at this time.    Mental Status Exam:  Appearance: awake, alert and adequately groomed  Attitude:  cooperative  Eye Contact:  fair  Mood:  anxious and sad   Affect:  appropriate and in normal range and mood congruent  Speech:  clear, coherent  Psychomotor Behavior:  no evidence of tardive dyskinesia, dystonia, or tics  Throught Process:  logical, linear, and goal oriented  Associations:  no loose associations  Thought Content:  no evidence of suicidal ideation or homicidal ideation and no evidence of psychotic thought  Insight:  good  Judgement:  intact  Oriented to:  time, person, and place  Attention Span and Concentration:  intact  Recent and Remote Memory:  intact  Language: able to name/identify objects without impairment  Fund of Knowledge: intact with awareness of current and past events             DSM-5 Diagnosis:   Stimulant use disorder (cocaine), moderate   Alcohol use disorder, moderate  Generalized anxiety disorder, by history  Major depressive disorder, recurrent, moderate  R/O prolonged grief disorder          Assessment:   Vianca Kumar is a 48 year old female with MDD, RICCARDO, cocaine and alcohol use disorders who presents with ongoing symptoms of anxiety as well as ongoing  "struggles with grief after the death of her brother earlier this year. She has also been using cocaine and alcohol, which is very likely worsening anxiety and mood symptoms. Encouraged sobriety from all substances and utilizing CD resources that have been provided. She has been on escitalopram for years which has been helpful, she would like to continue this medication. Discussed option to try hydroxyzine PRN for potentially both anxiety and sleep. Will order as 25-50mg TID PRN, encouraged her to take lower dose during day time for anxiety to see how she responds to lower dose, but okay to use higher dose for sleep.            Summary of Recommendations:   Continue escitalopram 20mg daily.  Start hydroxyzine 25-50mg TID PRN for anxiety, sleep.   Will follow up 10/17 to see how she does with hydroxyzine tonight.      SALLY Thurman [facility]   Consult/Liaison Psychiatry   [facility]    Contact information available via [facility] Paging/Directory  If I am not available, then [facility] CL line ([phone]) should know who is covering our consult service.            \"This dictation was performed with voice recognition software and may contain errors,  omissions and inadvertent word substitution.\"           "

## 2023-10-16 NOTE — PLAN OF CARE
Goal Outcome Evaluation:    Summary: nasal infection.   DATE & TIME: 10/15/23  8972-2174  Cognitive Concerns/ Orientation : A&OX4   BEHAVIOR & AGGRESSION TOOL COLOR: Green  CIWA SCORE: NA, discontinued.    ABNL VS/O2: VSS @RA. Q4 vitals  MOBILITY: IND   PAIN MANAGMENT: facial pain., PRN Oxy given. Effective.   DIET: Reg  BOWEL/BLADDER: Continent   ABNL LAB/BG: blood culture negative 2nd day and MRSA PCR negative.   DRAIN/DEVICES: PIV SL with intermittent abx  SKIN: Intact, facial +2 edema, improving  TESTS/PROCEDURES: NA   D/C DAY/GOALS/PLACE: ENT recommendation pending  OTHER IMPORTANT INFO: Pt able to make her needs known.

## 2023-10-16 NOTE — PLAN OF CARE
Goal Outcome Evaluation:  DATE & TIME: 10/16/23  0700- 1530  Cognitive Concerns/ Orientation : A&OX4   BEHAVIOR & AGGRESSION TOOL COLOR: Green  CIWA SCORE: NA, discontinued.    ABNL VS/O2: VSS @RA.artur, HR in 50s, Q4 vitals  MOBILITY: IND   PAIN MANAGMENT: facial pain, manage with Oxy, Ibuprofen  and ice packs.  DIET: Reg, good appetite  BOWEL/BLADDER: Continent   ABNL LAB/BG: nothing new today, BC NTD  DRAIN/DEVICES: PIV SL with intermittent abx  SKIN: Intact, facial +2 edema, improving, new  itchy rash on back, given Benadryl x 1  TESTS/PROCEDURES: NA   D/C DAY/GOALS/PLACE: ENT recommendation pending  OTHER IMPORTANT INFO: Pt able to make her needs known. Psych , ID following. IV abx switched to Unasyn, c/o of itching of legs ans back ,MD  aware. Given Benadryl x 1

## 2023-10-16 NOTE — PLAN OF CARE
Goal Outcome Evaluation:       Summary: nasal infection.   DATE & TIME: 10/15/23  2517-1303  Cognitive Concerns/ Orientation : A&OX4   BEHAVIOR & AGGRESSION TOOL COLOR: Green  CIWA SCORE: NA, discontinued.    ABNL VS/O2: VSS @RA. Q4 vitals  MOBILITY: IND   PAIN MANAGMENT: facial pain., PRN Oxy given. Effective.   DIET: Reg  BOWEL/BLADDER: Continent   ABNL LAB/BG: blood culture negative 2nd day and MRSA PCR negative.   DRAIN/DEVICES: PIV SL with intermittent abx  SKIN: Intact, facial +2 edema, improving  TESTS/PROCEDURES: NA   D/C DAY/GOALS/PLACE: ENT recommendation pending  OTHER IMPORTANT INFO: Pt able to make her needs known.

## 2023-10-16 NOTE — PROGRESS NOTES
[facility]    Infectious Disease Progress Note    Date of Service (when I saw the patient): 10/16/2023     Assessment & Plan   Vianca Kumar is a 48 year old who was admitted on 10/13/2023.     Impression:  47 yo patient with history of cocaine use.   Admitted with facial pain/swelling.  Patient reports that for the past week and a half or so she has been experiencing URI symptoms including nasal congestion, cough sinus pain, as well as intermittent fevers.  On the day prior to admission she noticed some nasal discomfort and mild swelling over her sinuses.  She went to bed and woke up at approximately 1 AM with significant facial swelling and tenderness.    CT was obtained showing anterior nose soft tissue infection with potentially necrotizing component, however that was felt less likely given sparing of the adjacent structures. Cartilaginous nasal septum was also found to be perforated.   She was started on IV Unasyn and ENT was contacted.     Recommendations:   Nasal cultures with MSSA   Back to unasyn   Once ready for discharge ok to switch PO augmentin or keflex for 10 days   Today she noted a rash on the back, I examined it, does not look like a drug rash, monitor       Pauly Lilly MD    Interval History   Rash on the back   Tolerating antibiotics ok   No new rashes or issues with antibiotics   Labs reviewed   No changes to past medical, social or family history   Swellin gin the face is about the same   A 10 point ROS was done and is negative other than noted in the interval history above     Physical Exam   Temp: 97.4  F (36.3  C) Temp src: Oral BP: (!) 139/95 Pulse: 50   Resp: 18 SpO2: 96 % O2 Device: None (Room air)    There were no vitals filed for this visit.  Vital Signs with Ranges  Temp:  [97.4  F (36.3  C)-98.2  F (36.8  C)] 97.4  F (36.3  C)  Pulse:  [50-66] 50  Resp:  [18-20] 18  BP: (124-139)/(73-95) 139/95  SpO2:  [95 %-96 %] 96 %    Constitutional: Awake, alert,  "cooperative, no apparent distress  Facial swelling though may be a little better   Lungs: Clear to auscultation bilaterally, no crackles or wheezing  Cardiovascular: Regular rate and rhythm, normal S1 and S2, and no murmur noted  Abdomen: Normal bowel sounds, soft, non-distended, non-tender  Skin: rash on the back   Other:    Medications        escitalopram  20 mg Oral Daily    folic acid  1 mg Oral Daily    multivitamin w/minerals  1 tablet Oral Daily    mupirocin   Topical Daily    piperacillin-tazobactam  3.375 g Intravenous Q6H    sodium chloride  1 spray Both Nostrils 4x Daily       Data   All microbiology laboratory data reviewed.  Recent Labs   Lab Test 10/15/23  0838 10/14/23  1153 10/13/23  0324 08/29/23  2134   WBC  --  7.4 13.2* 13.0*   HGB 11.6* 11.5* 13.5 12.7   HCT  --  34.0* 38.9 37.5   MCV  --  93 90 92   PLT  --  303 385 352     Recent Labs   Lab Test 10/14/23  1153 10/13/23  0324 08/29/23  2134   CR 0.68 0.63 0.71     Recent Labs   Lab Test 10/13/23  0953   SED 4     No lab results found.    Invalid input(s): \"UC\"    All cultures:  Recent Labs   Lab 10/13/23  2029 10/13/23  1256   CULTURE No growth after 2 days  No growth after 2 days 2+ Staphylococcus aureus*      Blood culture:  Results for orders placed or performed during the hospital encounter of 10/13/23   Blood Culture Hand, Right    Specimen: Hand, Right; Blood   Result Value Ref Range    Culture No growth after 2 days    Blood Culture Hand, Left    Specimen: Hand, Left; Blood   Result Value Ref Range    Culture No growth after 2 days    Results for orders placed or performed in visit on 12/03/12   Blood culture    Specimen: Blood   Result Value Ref Range    Specimen Description Blood Left Arm     Culture Micro No growth     Micro Report Status FINAL 12/09/2012       Urine culture:  Results for orders placed or performed during the hospital encounter of 05/28/22   Urine Culture    Specimen: Urine, Midstream   Result Value Ref Range    " Culture No Growth

## 2023-10-16 NOTE — CONSULTS
10/16/2023    Met with pt via telephone to offer CD services. Pt reports she would not be interested in attending CD treatment at this time. Pt reports she would like to receive CD resources. Email has been sent to pt with CD resources. Pt is able to call Mental Health and Addiction Services Line: [phone] and make an appt through [employer] if she would like an evaluation and referrals to CD treatment after she is discharged.     Sober Support Groups    [employer]  https://www.[employer]OTE[employer] LOUNGE.org    Alcoholics Anonymous  http://www.aa.org  Community Drug & Alcohol Support Resources   Alcoholics Anonymous   24/7 Phone Line: [phone]   https://[place].org/   For additional list of online meetings: http://aa-intergroup.org      [place] [address] [address] [address]   Phone: [phone]  [email or web address]      CD Treatment-Medic[facility] Ad[facility]  Teresita Kan - [phone]  [email or web address]    [facility]  Schedule: [phone]    JESUS Cantu   Phone: [phone]  Email: [email or web address]

## 2023-10-16 NOTE — PROGRESS NOTES
MD Notification    Notified Person: MD    Notified Person Name:Dr Juarez    Notification Date/Time:10/16/23 , Noon    Notification Interaction:vocera    Purpose of Notification: Itchy rash on  upper back.    Orders Received:yes, received orders to Benadryl    Comments:

## 2023-10-16 NOTE — PROGRESS NOTES
"[facility]    Hospitalist Progress Note    Date of Service (when I saw the patient): 10/16/2023  Admit date: 10/13/2023    Interval History   Assumed care today. Patient reports ongoing improvement in her symptoms. Small runny nose is still present. She is tearful when asked about her substance use history and tells me this was a wake up call. She has no plans to resume cocaine or alcohol and just needs  resources for emergencies.    Assessment & Plan   Vianca Kumar is a 48 year old female with a past medical history significant for MDD, obesity, hx repair paraesophageal diaphragmatic hernia admitted on 10/13/2023 after presenting with facial pain/swelling.      Cellulitis anterior nose with extensive septal perforation  Associated with intranasal cocaine use   Presents with rapid onset facial swelling overnight, extremely tender on exam. She has had URI symptoms for the past 1.5 weeks with intermittent fevers. (COVID, flu, RSV neg)  *CT facial shows soft tissue infection anterior nose with potential necrotizing component, though felt less likely, as well as septum perforation and gas like expansion in location of columella and soft tissue component of nasal septum. Potential necrotizing component though felt less likely given sparing of adjacent structures.   *Afebrile on presentation. BP intermittently soft (pt reports soft at baseline) though needs close monitoring for potential developing sepsis.   WBC 13.2, LA WNL, CRP 35, CK WNL  *Also notably with recent snorting of cocaine  *Hx frequent \"boils,\" no known hx MRSA    - remains on zosyn per ID. Cultures returning negative.  - ENT evaluated and recommended to add saline rinses. No plans for surgery  - PRN pain control adequate     Right first maxillary molar cavity, periodontitis and odontogenic  maxillary floor mucosal thickening  Seen on CT. Will ask Dental team to weigh in on need for inpatient vs outpatient management. " Appreciate assistance   - Discussed findings with oral surgeon on 10/14.  No urgent intervention indicated.  Event location not source of infection above.   - Recommend follow-up as outpatient with dentist, discussed with patient on 10/15     Alcohol use  Cocaine use  Pt reports drinking 3-5 drinks 3-5 days per week as well cocaine ~2x/week. Denies hx alcohol withdrawal. Last cocaine use 10/9.   - CD consulted and providing patient with resources    Tobacco use  Declines nicotine replacement at this time      MDD with grief reaction - She has a long history of depression on Lexapro which she feels has been helpful.  She lost her brother a year ago, who was her best friend and continues to actively grieve his loss.  She has started following with a therapist.  She does not have a psychiatrist.  She would like to meet with psychiatry while here.    - Continue Lexapro.  Per previous provider, she feels this is worked for her and does not want to stop this as she had had a bad reaction when she stopped it in the past.  - Re consult psych today to discuss medication changes/supplements    Acute mild anemia, noted. Likely secondary to dilution from IV fluids.  No sign of active bleeding.  - Hemoglobin stable on 10/15      Clinically Significant Risk Factors                                      Diet: Orders Placed This Encounter      Regular Diet Adult     Sloan Catheter: Not present     DVT Prophylaxis: Pneumatic Compression Devices, AMBULATE  Code Status: Full Code     Disposition: 1-2 days   Communication: Discussed with patient and RN on 10/15/23     Yennifer Juarez DO    Hospitalist Service  [facility]  Securely message with the Vocera Web Console (learn more here)  Text page via [employer] Paging/Directory    Medical Decision Making         -Data reviewed today: I reviewed all new labs and imaging results over the last 24 hours. I personally reviewed no images or EKG's today.    Physical Exam    Temp: 97.4  F (36.3  C) Temp src: Oral BP: (!) 139/95 Pulse: 50   Resp: 18 SpO2: 96 % O2 Device: None (Room air)    There were no vitals filed for this visit.  Vital Signs with Ranges  Temp:  [97.4  F (36.3  C)-98.2  F (36.8  C)] 97.4  F (36.3  C)  Pulse:  [50-66] 50  Resp:  [18-20] 18  BP: (124-139)/(73-95) 139/95  SpO2:  [95 %-96 %] 96 %  I/O last 3 completed shifts:  In: 2400 [P.O.:2400]  Out: -     Today's Exam  Constitutional: NAD,   ENT: no major swelling noted to face  Neuropsyche: alert and oriented, answers questions appropriately, calm and cooperative but tearful when talking about substance use  Respiratory:Breathing comfortably, good air exchange, no wheezes, no crackles.   Cardiovascular:  Regular rate and rhythm, no edema.  GI:  soft, NT/ND, BS normal  Skin/Integumen:  No acute rash or sign of bleeding.     Medications   All medications reviewed on 10/15/23        escitalopram  20 mg Oral Daily    folic acid  1 mg Oral Daily    multivitamin w/minerals  1 tablet Oral Daily    mupirocin   Topical Daily    piperacillin-tazobactam  3.375 g Intravenous Q6H    sodium chloride  1 spray Both Nostrils 4x Daily     PRN Meds: acetaminophen, HYDROmorphone, HYDROmorphone, ibuprofen, melatonin, naloxone **OR** naloxone **OR** naloxone **OR** naloxone, ondansetron **OR** ondansetron, oxyCODONE IR    Data   Recent Labs   Lab 10/15/23  0838 10/14/23  1153 10/13/23  0324   WBC  --  7.4 13.2*   HGB 11.6* 11.5* 13.5   MCV  --  93 90   PLT  --  303 385   NA  --  141 139   POTASSIUM  --  3.8 4.2   CHLORIDE  --  109* 102   CO2  --  20* 23   BUN  --  4.2* 3.4*   CR  --  0.68 0.63   ANIONGAP  --  12 14   MARIAH  --  8.5* 8.9   GLC  --  83 135*   ALBUMIN  --   --  4.1   PROTTOTAL  --   --  6.8   BILITOTAL  --   --  0.6   ALKPHOS  --   --  126*   ALT  --   --  13   AST  --   --  18       No results found for this or any previous visit (from the past 24 hour(s)).

## 2023-10-17 VITALS
HEART RATE: 54 BPM | SYSTOLIC BLOOD PRESSURE: 136 MMHG | TEMPERATURE: 98 F | DIASTOLIC BLOOD PRESSURE: 97 MMHG | RESPIRATION RATE: 18 BRPM | OXYGEN SATURATION: 95 %

## 2023-10-17 PROCEDURE — 250N000011 HC RX IP 250 OP 636: Performed by: HOSPITALIST

## 2023-10-17 PROCEDURE — 250N000013 HC RX MED GY IP 250 OP 250 PS 637: Performed by: PHYSICIAN ASSISTANT

## 2023-10-17 PROCEDURE — 250N000013 HC RX MED GY IP 250 OP 250 PS 637: Performed by: STUDENT IN AN ORGANIZED HEALTH CARE EDUCATION/TRAINING PROGRAM

## 2023-10-17 PROCEDURE — 250N000013 HC RX MED GY IP 250 OP 250 PS 637: Performed by: INTERNAL MEDICINE

## 2023-10-17 PROCEDURE — 99239 HOSP IP/OBS DSCHRG MGMT >30: CPT | Performed by: STUDENT IN AN ORGANIZED HEALTH CARE EDUCATION/TRAINING PROGRAM

## 2023-10-17 PROCEDURE — 99232 SBSQ HOSP IP/OBS MODERATE 35: CPT

## 2023-10-17 RX ORDER — HYDROXYZINE HYDROCHLORIDE 25 MG/1
25-50 TABLET, FILM COATED ORAL 3 TIMES DAILY PRN
Qty: 15 TABLET | Refills: 0 | Status: SHIPPED | OUTPATIENT
Start: 2023-10-17 | End: 2023-11-13

## 2023-10-17 RX ORDER — CLINDAMYCIN HCL 300 MG
300 CAPSULE ORAL 4 TIMES DAILY
Qty: 40 CAPSULE | Refills: 0 | Status: SHIPPED | OUTPATIENT
Start: 2023-10-17 | End: 2023-10-27

## 2023-10-17 RX ORDER — OXYCODONE HYDROCHLORIDE 5 MG/1
5 TABLET ORAL EVERY 4 HOURS PRN
Qty: 15 TABLET | Refills: 0 | Status: SHIPPED | OUTPATIENT
Start: 2023-10-17 | End: 2023-11-13

## 2023-10-17 RX ADMIN — Medication 1 SPRAY: at 08:05

## 2023-10-17 RX ADMIN — OXYCODONE HYDROCHLORIDE 5 MG: 5 TABLET ORAL at 09:44

## 2023-10-17 RX ADMIN — ESCITALOPRAM OXALATE 20 MG: 10 TABLET ORAL at 08:01

## 2023-10-17 RX ADMIN — OXYCODONE HYDROCHLORIDE 5 MG: 5 TABLET ORAL at 14:58

## 2023-10-17 RX ADMIN — IBUPROFEN 600 MG: 600 TABLET ORAL at 08:01

## 2023-10-17 RX ADMIN — CEPHALEXIN 250 MG: 250 CAPSULE ORAL at 13:18

## 2023-10-17 RX ADMIN — ACETAMINOPHEN 1000 MG: 500 TABLET, FILM COATED ORAL at 08:01

## 2023-10-17 RX ADMIN — CLINDAMYCIN PHOSPHATE 900 MG: 900 INJECTION, SOLUTION INTRAVENOUS at 05:16

## 2023-10-17 RX ADMIN — Medication 1 SPRAY: at 12:43

## 2023-10-17 RX ADMIN — MUPIROCIN: 20 OINTMENT TOPICAL at 08:05

## 2023-10-17 RX ADMIN — FOLIC ACID 1 MG: 1 TABLET ORAL at 08:01

## 2023-10-17 RX ADMIN — MULTIPLE VITAMINS W/ MINERALS TAB 1 TABLET: TAB at 08:01

## 2023-10-17 ASSESSMENT — ACTIVITIES OF DAILY LIVING (ADL)
ADLS_ACUITY_SCORE: 20

## 2023-10-17 NOTE — PLAN OF CARE
Goal Outcome Evaluation:       .Discharge    Patient discharged to home via car with friend  Care plan note complete    Listed belongings gathered and given to patient (including from security/pharmacy). Yes  Care Plan and Patient education resolved: Yes  Prescriptions if needed, hard copies sent with patient  Yes  Medication Bin checked and emptied on discharge Yes  SW/care coordinator/charge RN aware of discharge: Yes

## 2023-10-17 NOTE — DISCHARGE SUMMARY
[facility]  Hospitalist Discharge Summary      Date of Admission:  10/13/2023  Date of Discharge:  10/17/2023  Discharging Provider: Yennifer Juarez DO  Discharge Service: Hospitalist Service    Discharge Diagnoses       Clinically Significant Risk Factors          Follow-ups Needed After Discharge   Follow-up Appointments     Follow-up and recommended labs and tests       Follow up with primary care provider, Whitney Perez, within 7 days   for hospital follow- up.  No follow up labs or test are needed.            Unresulted Labs Ordered in the Past 30 Days of this Admission       Date and Time Order Name Status Description    10/13/2023  4:38 PM Blood Culture Hand, Right Preliminary     10/13/2023  4:38 PM Blood Culture Hand, Left Preliminary             Discharge Disposition   Discharged to home  Condition at discharge: Stable    Hospital Course   Vianca Kumar is a 48 year old female with a past medical history significant for MDD, obesity, hx repair paraesophageal diaphragmatic hernia admitted on 10/13/2023 after presenting with facial pain/swelling.      Cellulitis anterior nose with extensive septal perforation  Associated with intranasal cocaine use   Presents with rapid onset facial swelling overnight, extremely tender on exam. She has had URI symptoms for the past 1.5 weeks with intermittent fevers. (COVID, flu, RSV neg)  *CT facial shows soft tissue infection anterior nose with potential necrotizing component, though felt less likely, as well as septum perforation and gas like expansion in location of columella and soft tissue component of nasal septum. Potential necrotizing component though felt less likely given sparing of adjacent structures.   *Afebrile on presentation. BP intermittently soft (pt reports soft at baseline) though needs close monitoring for potential developing sepsis.   WBC 13.2, LA WNL, CRP 35, CK WNL  *Also notably with recent snorting of  "cocaine  *Hx frequent \"boils,\" no known hx MRSA     - patient noted mild hives while on zosyn. This was also present when she transitioned to Keflex. Discussed with ID and will discharge with oral clindamycin for 10 days     Right first maxillary molar cavity, periodontitis and odontogenic  maxillary floor mucosal thickening  Seen on CT. Will ask Dental team to weigh in on need for inpatient vs outpatient management. Appreciate assistance   - Discussed findings with oral surgeon on 10/14.  No urgent intervention indicated.  Event location not source of infection above.   - Recommend follow-up as outpatient with dentist, discussed with patient on 10/15     Alcohol use  Cocaine use  Pt reports drinking 3-5 drinks 3-5 days per week as well cocaine ~2x/week. Denies hx alcohol withdrawal. Last cocaine use 10/9.   - CD consulted and providing patient with resources     Tobacco use  Declines nicotine replacement at this time      MDD with grief reaction - She has a long history of depression on Lexapro which she feels has been helpful.  She lost her brother a year ago, who was her best friend and continues to actively grieve his loss.  She has started following with a therapist.  She does not have a psychiatrist.  She would like to meet with psychiatry while here.     - Continue Lexapro.  Per previous provider, she feels this is worked for her and does not want to stop this as she had had a bad reaction when she stopped it in the past.  - will discharge with new hydroxyzine Rx per psychiatry     Acute mild anemia, noted. Likely secondary to dilution from IV fluids.  No sign of active bleeding.  - Hemoglobin stable on 10/15       Consultations This Hospital Stay   ENT IP CONSULT  INFECTIOUS DISEASES IP CONSULT  DENTIST IP CONSULT  PHARMACY TO DOSE VANCO  ORAL SURGERY IP CONSULT  CHEMICAL DEPENDENCY IP CONSULT  PSYCHIATRY IP CONSULT  PSYCHIATRY IP CONSULT    Code Status   Full Code    Time Spent on this Encounter   Yennifer CELAYA " DO Larry, personally saw the patient today and spent greater than 30 minutes discharging this patient.       Yennifer Juarez DO  [place] [address] [facility]  [address] [place] [address]  Phone: [phone]  ______________________________________________________________________    Physical Exam   Vital Signs: Temp: 98  F (36.7  C) Temp src: Oral BP: (!) 136/97 Pulse: 54   Resp: 18 SpO2: 95 % O2 Device: None (Room air)    Weight: 0 lbs 0 oz       Primary Care Physician   Whitney Perez    Discharge Orders      Reason for your hospital stay    You presented for swelling to your face and were found to have a significant nasal infection and damage to the inside of your nose. You will discharge to complete antibiotics course at home with oral antibiotics     Follow-up and recommended labs and tests     Follow up with primary care provider, Whitney Perez, within 7 days for hospital follow- up.  No follow up labs or test are needed.     Activity    Your activity upon discharge: activity as tolerated     Diet    Follow this diet upon discharge: Orders Placed This Encounter      Regular Diet Adult       Significant Results and Procedures   Results for orders placed or performed during the hospital encounter of 10/13/23   CT Facial Bones with Contrast    Narrative    CT FACIAL BONES WITH CONTRAST  10/13/2023 7:23 AM     HISTORY: Facial swelling bilaterally, concern for cellulitis, evaluate  abscess.       COMPARISON: None      TECHNIQUE: Using thin collimation multidetector helical acquisition  technique, axial, sagittal, and coronal thin section CT images were  reconstructed through the facial bones after the administration of  intravenous contrast. Images were reviewed in bone and soft tissue  windows. Dose reduction techniques were used.    CONTRAST: 67mL [id number]    FINDINGS:   Abnormal thickening, attenuation of the anterior soft tissue nodes  bilaterally. Gas like  expansion and relative replacement of the  expected location of the columella and soft tissue component of the  nasal septum. Nasal septal of the soft tissue component of the nasal  septum with a contiguous air-filled tract from the nasal orifice  posteriorly to the bony nasal septum (for example sagittal series 7,  image 45). No erosions of the bony component of the nasal septum.    Intact cribriform plate. Orbital structures are within normal limits.  Clear paranasal sinuses and mastoid air cells.     Right first maxillary molar cavity periapical lucencies, with a focal  area of associated area of maxillary cortical erosion (series 6,  images 45/46). Overlying odontogenic mucosal thickening. Mild left  maxillary sinus floor and sphenoid sinus mucosal thickening. Otherwise  clear paranasal sinuses.    Bilateral level 1, level 2 likely reactive lymphadenopathy. Otherwise  the visualized soft tissues of the upper neck, skull base, upper  airway and cervical spinal structures are within normal limits.      Impression    IMPRESSION:   1. Findings concerning for anterior nose soft tissue infection, with a  potentially necrotizing component however felt to be less likely given  relative sparing of the adjacent structures. Changes do however  involve the columella which appears near completely eroded and  replaced by air and septations. Cartilaginous nasal septum is  perforated. Direct visualization and ENT consultation recommended.  2. Right first maxillary molar cavity, periodontitis and odontogenic  maxillary floor mucosal thickening. Dental evaluation recommended.      Findings discussed with Dr. Drake by me at 0743 hours.    MARY PETERS DO         SYSTEM ID:  [id number]   Head CT w/o contrast    Narrative    CT HEAD WITHOUT CONTRAST  10/13/2023 7:22 AM     HISTORY: Facial cellulitis, evaluate extension intracranially.       COMPARISON: MRI 12/10/2012    TECHNIQUE: Using multidetector thin collimation helical  acquisition  technique, axial, coronal and sagittal CT images from the skull base  to the vertex were obtained without intravenous contrast.   (topogram) image(s) also obtained and reviewed. Dose reduction  techniques were used.    FINDINGS:  No acute intracranial hemorrhage, mass effect, or midline shift. No CT  findings of acute infarct or hydrocephalus. Preserved subarachnoid  spaces.    Atraumatic calvarium. No substantial paranasal sinus mucosal disease.  Nasal columella appears to be replaced by air. Overlying nasal soft  tissue thickening. Findings better seen on same day/same time CT of  the face. Clear mastoid air cells. Nonfocal orbits.       Impression    IMPRESSION:   1. No acute intracranial pathology.  2. Please see the same day/same time CT facial bone for those images  and report.    MARY PETERS DO         SYSTEM ID:  [id number]       Discharge Medications   Current Discharge Medication List        START taking these medications    Details   clindamycin (CLEOCIN) 300 MG capsule Take 1 capsule (300 mg) by mouth 4 times daily for 10 days  Qty: 40 capsule, Refills: 0    Associated Diagnoses: Facial infection      hydrOXYzine (ATARAX) 25 MG tablet Take 1-2 tablets (25-50 mg) by mouth 3 times daily as needed for anxiety or other (sleep)  Qty: 15 tablet, Refills: 0    Associated Diagnoses: Recurrent major depressive disorder, in full remission (H24)      oxyCODONE (ROXICODONE) 5 MG tablet Take 1 tablet (5 mg) by mouth every 4 hours as needed for moderate pain (IF pain not managed with non-pharmacological and non-opioid interventions)  Qty: 15 tablet, Refills: 0    Associated Diagnoses: Facial infection           CONTINUE these medications which have NOT CHANGED    Details   cetirizine (ZYRTEC) 10 MG tablet Take 10 mg by mouth daily      escitalopram (LEXAPRO) 20 MG tablet Take 1 tablet (20 mg) by mouth daily  Qty: 90 tablet, Refills: 3    Associated Diagnoses: Recurrent major depressive disorder, in  full remission (H24)           Allergies   Allergies   Allergen Reactions    Avocado Hives    Chantix [Varenicline Tartrate] Other (See Comments)     depression

## 2023-10-17 NOTE — PLAN OF CARE
Goal Outcome Evaluation:         Summary: nasal infection.   DATE & TIME: 10/16/23  [id number]  Cognitive Concerns/ Orientation : A&OX4   BEHAVIOR & AGGRESSION TOOL COLOR: Green  CIWA SCORE: n/a   ABNL VS/O2: VSS ex HR artur at times (50s), Q4 vitals  MOBILITY: IND   PAIN MANAGMENT: facial pain, rating 5/10, PRN Oxy available.  DIET: Reg, good appetite  BOWEL/BLADDER: Continent   ABNL LAB/BG: nothing new today, BC NTD  DRAIN/DEVICES: PIV SL with intermittent abx  SKIN: Intact, facial +2/+1 edema, improving, new  itchy rash on back in AM, spread to upper arms and L ankle (luke QUEEN notified), given Benadryl x 1  TESTS/PROCEDURES: NA   D/C DAY/GOALS/PLACE: ENT recommendation pending  OTHER IMPORTANT INFO: Pt able to make her needs known. Psych , ID following. IV abx switched to Unasyn in AM, then switched to clindamyacin in evening d/t c/o of itching of ankle, upper arms and back, luke QUEEN notified.

## 2023-10-17 NOTE — PLAN OF CARE
Summary: nasal infection.   DATE & TIME: 10/16/23  [id number]  Cognitive Concerns/ Orientation : A&OX4   BEHAVIOR & AGGRESSION TOOL COLOR: Green  CIWA SCORE: n/a   ABNL VS/O2: VSS ex HR artur at times (50s), Q4 vitals  MOBILITY: IND   PAIN MANAGMENT: facial pain, manage with PRN Oxy x1 and ice packs.  DIET: Reg, good appetite  BOWEL/BLADDER: Continent   ABNL LAB/BG: nothing new today, BC NTD  DRAIN/DEVICES: PIV SL with intermittent abx  SKIN: Intact, facial +2/+1 edema, improving, new  itchy rash on back in AM, spread to upper arms and L ankle (oncall MD notified), given Benadryl x 1  TESTS/PROCEDURES: NA   D/C DAY/GOALS/PLACE: ENT recommendation pending  OTHER IMPORTANT INFO: Pt able to make her needs known. Psych , ID following. IV abx switched to Unasyn in AM, then switched to clindamyacin in evening d/t c/o of itching of ankle, upper arms and back, oncall MD notified. Given Benadryl x 1

## 2023-10-17 NOTE — PLAN OF CARE
Goal Outcome Evaluation:      Plan of Care Reviewed With: patient    Overall Patient Progress: improvingOverall Patient Progress: improving         Summary: Nasal infection.   DATE & TIME: 10/17/23    Cognitive Concerns/ Orientation : A&O x4, pleasant.    BEHAVIOR & AGGRESSION TOOL COLOR: Green  ABNL VS/O2: VSS ex HR 50's at times.   MOBILITY: Ind, up ad cindy.   PAIN MANAGMENT: facial pain, rating 5/10, PRN Tylenol and advil given, Oxy for given x1 as requested.   DIET: Reg, good appetite.  BOWEL/BLADDER: Continent. C/O constipation, MD notified.   ABNL LAB: BC negative  DRAIN/DEVICES: PIV SL   SKIN: Intact, facial +2/+1 edema and rash on back improving.  TESTS/PROCEDURES: NA   D/C DAY/GOALS/PLACE: Home today if tolerating PO keflex.   OTHER IMPORTANT INFO: Changed to PO keflix, first dose given, felt itchy with a few red patches per pt report after 30 mins, no rash seen at 60mins after admin, MD notified.

## 2023-10-17 NOTE — CONSULTS
"      Psychiatry Consultation; Follow up              Reason for Consult, requesting source:    Follow up  Requesting source: Barbara Barber    Labs and imaging reviewed, discussed with nursing.               Interim history:    From my initial note 10/16: Vianca Kumar is a 48 year old female with a history of MDD, cocaine and alcohol use who presented on 10/13/23 for facial pain/swelling, found to have septal perforation. Symptoms improved with antibiotics. Psychiatry was consulted for depression/grief after loss of brother.     I met with Vianca in her room, she is seen sitting up in bed. Calm and cooperative. Feeling better today, and expressing motivation for sobriety. Feels this admission was a wake up call. Her younger brother recently passed away, they were very close and she remains close with his school-age children. She is struggling with grief and depression, and feels she was stuck in vicious cycle with drug use. She has CD resources now that she plans to utilize, does not think she is at the point to need IP CD treatment but is open to considering if current plans do not seem adequate. Anxiety is also an ongoing problem, doesn't think she has panic attacks but \"it gets close\". Often has difficulty falling asleep at night, has tried various medications in the past with minimal benefit. Trazodone did not help, Ambien caused sleepwalking (making online purchases during episodes) and did not feel rested afterward, melatonin does not help. She would like to try medication for both anxiety and sleep, while planning to meet with a therapist as well.        10/17 Follow up: I met with Vianca today to follow up after starting hydroxyzine yesterday- she states she forgot this was ordered and did not take any hydroxyzine. She did have an allergic reaction to antibiotic yesterday for which she received a couple of doses of Benadryl. She is hopeful that she is tolerating the new antibiotic today, so far no " "itching or rash. She anticipates staying one more night in the hospital. She plans to try hydroxyzine tonight. Anxiety has been manageable in hospital setting and she did sleep last night without any medications which is encouraging. She continues to plan for sobriety and utilizing CD resources. She denies any other concerns or complaints at this time.         Current Medications:      clindamycin  900 mg Intravenous Q8H    escitalopram  20 mg Oral Daily    folic acid  1 mg Oral Daily    multivitamin w/minerals  1 tablet Oral Daily    mupirocin   Topical Daily    sodium chloride  1 spray Both Nostrils 4x Daily              MSE:   Appearance: awake, alert and adequately groomed  Attitude:  cooperative  Eye Contact:  good  Mood:  anxious and better  Affect:  appropriate and in normal range and mood congruent  Speech:  clear, coherent  Psychomotor Behavior:  no evidence of tardive dyskinesia, dystonia, or tics  Thought Process:  logical, linear, and goal oriented  Associations:  no loose associations  Thought Content:  no evidence of suicidal ideation or homicidal ideation and no evidence of psychotic thought  Insight:  good  Judgement:  intact  Oriented to:  time, person, and place  Attention Span and Concentration:  intact  Recent and Remote Memory:  intact      Vital signs:  Temp: 97.5  F (36.4  C) Temp src: Oral BP: 120/80 Pulse: 56   Resp: 18 SpO2: 97 % O2 Device: None (Room air)        Estimated body mass index is 30.41 kg/m  as calculated from the following:    Height as of 8/29/23: 1.727 m (5' 8\").    Weight as of 8/29/23: 90.7 kg (200 lb).              DSM-5 Diagnosis:   Stimulant use disorder (cocaine), moderate   Alcohol use disorder, moderate  Generalized anxiety disorder, by history  Major depressive disorder, recurrent, moderate  R/O prolonged grief disorder          Assessment:   Vianca Kumar is a 48 year old female with history of MDD, RICCARDO, cocaine and alcohol abuse who is seen for follow up. " "Anxiety and sleep are her primary concerns at present, though she has not taken any hydroxyzine as of yet. She anticipates staying one more night in hospital and expects to be discharged tomorrow. Encouraged her to try hydroxyzine and if helpful, can be prescribed at discharge. She has been given CD resource info and has been connect with outpatient psychiatry.           Summary of Recommendations:   Hydroxyzine 25-50mg TID PRN for anxiety, sleep- if effective can prescribe at discharge.  Continue escitalopram 20mg daily.  Outpatient resources/appointments in place. Psychiatry will sign off.    SALLY Thurman [facility]  Consult/Liaison Psychiatry   [facility]    Contact information available via [facility] Paging/Directory  If I am not available, then [facility] CL line ([phone]) should know who is covering our consult service.   \"Much or all of the text in this note was generated through the use of Dragon Dictate voice to text software. Errors in spelling or words which appear to be out of contact are unintentional, may be present due having escaped editing\"           "

## 2023-10-17 NOTE — PROVIDER NOTIFICATION
".MD Notification    Notified Person: MD    Notified Person Name: Shiva Mast    Notification Date/Time: 10/16/23 @ 4717    Notification Interaction: Vocera Web    Purpose of Notification: \"Hello. this morning pt was given unasyn and a new rash appeared on back and complaints of itchiness. ID seen patient was wanted to further monitor. I gave her IV Unasyn and about an hour later got complaints of itchiness and rash on back in addition new rash/redness on L ankle, upper both arms, and both palms. Just gave her some benadryl. Should we hold the next dose ordered for 2330? please advice. thanks\"    Orders Received: Unasyn changed to clindamycin     Comments:   "

## 2023-10-17 NOTE — PROGRESS NOTES
Changed Abx to clindamycin as pt is developing rash more diffuse after pm dose compare to am with pnc , ID is following , to see in am for further rec

## 2023-10-18 LAB
BACTERIA BLD CULT: NO GROWTH
BACTERIA BLD CULT: NO GROWTH

## 2023-10-19 ENCOUNTER — PATIENT OUTREACH (OUTPATIENT)
Dept: CARE COORDINATION | Facility: CLINIC | Age: 48
End: 2023-10-19
Payer: MEDICARE

## 2023-10-19 NOTE — PROGRESS NOTES
Connected Care Resource Center Contact  [facility]/Voicemail     Clinical Data: Post-Discharge Outreach     Outreach attempted x 2.  Left message on patient's voicemail, providing [facility]'s 24/7 scheduling and nurse triage phone number [phone] ([phone]) for questions/concerns and/or to schedule an appt with an [facility] provider, if they do not have a PCP.      Plan:  [facility] will do no further outreaches at this time.       Maria Elena Plaza  [facility], [facility]    *Connected Care Resource Team does NOT follow patient ongoing. Referrals are identified based on internal discharge reports and the outreach is to ensure patient has an understanding of their discharge instructions.

## 2023-11-12 PROBLEM — Z98.890 STATUS POST REPAIR OF PARAESOPHAGEAL DIAPHRAGMATIC HERNIA: Status: RESOLVED | Noted: 2023-05-24 | Resolved: 2023-11-12

## 2023-11-12 PROBLEM — Z98.890 S/P REPAIR OF PARAESOPHAGEAL HERNIA: Status: RESOLVED | Noted: 2023-05-23 | Resolved: 2023-11-12

## 2023-11-12 PROBLEM — Z87.19 STATUS POST REPAIR OF PARAESOPHAGEAL DIAPHRAGMATIC HERNIA: Status: RESOLVED | Noted: 2023-05-24 | Resolved: 2023-11-12

## 2023-11-12 PROBLEM — K21.00 GASTROESOPHAGEAL REFLUX DISEASE WITH ESOPHAGITIS WITHOUT HEMORRHAGE: Status: RESOLVED | Noted: 2023-05-24 | Resolved: 2023-11-12

## 2023-11-12 PROBLEM — L08.9 FACIAL INFECTION: Status: RESOLVED | Noted: 2023-10-13 | Resolved: 2023-11-12

## 2023-11-12 PROBLEM — Z87.19 S/P REPAIR OF PARAESOPHAGEAL HERNIA: Status: RESOLVED | Noted: 2023-05-23 | Resolved: 2023-11-12

## 2023-11-13 ENCOUNTER — OFFICE VISIT (OUTPATIENT)
Dept: INTERNAL MEDICINE | Facility: CLINIC | Age: 48
End: 2023-11-13
Payer: MEDICARE

## 2023-11-13 VITALS
DIASTOLIC BLOOD PRESSURE: 84 MMHG | OXYGEN SATURATION: 98 % | HEART RATE: 76 BPM | TEMPERATURE: 97.1 F | BODY MASS INDEX: 30.11 KG/M2 | WEIGHT: 198 LBS | SYSTOLIC BLOOD PRESSURE: 122 MMHG

## 2023-11-13 DIAGNOSIS — R22.0 RIGHT FACIAL SWELLING: ICD-10-CM

## 2023-11-13 DIAGNOSIS — F33.1 MODERATE EPISODE OF RECURRENT MAJOR DEPRESSIVE DISORDER (H): ICD-10-CM

## 2023-11-13 DIAGNOSIS — F14.90 COCAINE USE: ICD-10-CM

## 2023-11-13 DIAGNOSIS — J34.0 CELLULITIS OF NOSE: ICD-10-CM

## 2023-11-13 DIAGNOSIS — Z09 HOSPITAL DISCHARGE FOLLOW-UP: Primary | ICD-10-CM

## 2023-11-13 DIAGNOSIS — Z78.9 ALCOHOL USE: ICD-10-CM

## 2023-11-13 DIAGNOSIS — J34.89 NASAL SEPTAL PERFORATION: ICD-10-CM

## 2023-11-13 PROCEDURE — 99214 OFFICE O/P EST MOD 30 MIN: CPT | Performed by: INTERNAL MEDICINE

## 2023-11-13 RX ORDER — CLINDAMYCIN HCL 300 MG
300 CAPSULE ORAL 4 TIMES DAILY
Qty: 40 CAPSULE | Refills: 0 | Status: SHIPPED | OUTPATIENT
Start: 2023-11-13 | End: 2023-11-24

## 2023-11-13 ASSESSMENT — PATIENT HEALTH QUESTIONNAIRE - PHQ9
10. IF YOU CHECKED OFF ANY PROBLEMS, HOW DIFFICULT HAVE THESE PROBLEMS MADE IT FOR YOU TO DO YOUR WORK, TAKE CARE OF THINGS AT HOME, OR GET ALONG WITH OTHER PEOPLE: SOMEWHAT DIFFICULT
SUM OF ALL RESPONSES TO PHQ QUESTIONS 1-9: 7
SUM OF ALL RESPONSES TO PHQ QUESTIONS 1-9: 7

## 2023-11-13 NOTE — COMMUNITY RESOURCES LIST (ENGLISH)
11/13/2023   [facility]  N/A  For questions about this resource list or additional care needs, please contact your primary care clinic or care manager.  Phone: [phone]   Email: N/A   Address: [address] [place] [address]   Hours: N/A        Transportation       Free or low-cost transportation  1  [facility] Distance: 5.76 miles      In-Person   [address] [place] [address]  Language: English  Hours: Mon - Fri 9:00 AM - 12:00 PM , Mon - Fri 1:00 PM - 3:00 PM  Fees: Self Pay   Phone: [phone]) [phone] Email: info@[employer].Signal Website: [email or web address]     2  [facility] Distance: 5.82 miles      In-Person   [address] [place] [address]  Language: English  Hours: Mon - Fri 8:00 AM - 3:00 PM  Fees: Free   Phone: (312) 958-4455 Ext.14 Email: neighborhood@[email or web address].Signal Website: [email or web address]     Transportation to medical appointments  3  [facility] Distance: 2.09 miles      In-Person   [address] [facility] [address] [place] [address]  Language: English  Hours: Mon - Sun Open 24 Hours  Fees: Insurance, Self Pay   Phone: [phone]) [phone] Website: [email or web address]     4  [facility] Distance: 3.16 miles      In-Person   [address] [facility] [address] [place] [address]  Language: English  Hours: Mon - Sun Open 24 Hours  Fees: Insurance, Self Pay   Phone: [phone]) [phone] Website: [email or web address]          Important Numbers & Websites       Emergency Services   911  City Services   311  Poison Control   (302) 938-4921  Suicide Prevention Lifeline   (678) 100-3952 (TALK)  Child Abuse Hotline   (552) 264-5095 (4-A-Child)  Sexual Assault Hotline   (647) 424-8021 (HOPE)  National Runaway Safeline   (831) 870-2586 (RUNAWAY)  All-Options Talkline   (828) 942-5087  Substance Abuse Referral    (755) 528-5918 (HELP)

## 2023-11-13 NOTE — PROGRESS NOTES
ASSESSMENT/PLAN                                                      (Z09) Hospital discharge follow-up  (primary encounter diagnosis)  (J34.0) Cellulitis of nose  (J34.89) Nasal septal perforation  (R22.0) Right facial swelling  Comment: patient was doing well until this morning when she developed right-sided facial swelling similar to hospital presentation.  Plan: REPEAT course of clindamycin; if symptoms worsen, change, or do not improve, patient to contact MD.      (F33.1) Moderate episode of recurrent major depressive disorder (H)  Comment: reasonably-controlled on current regimen.      (F14.90) Cocaine use  Comment: no use since hospitalization.    (Z78.9) Alcohol use  Comment: occasional use since hospitalization; not daily or heavily.    Whitney Perez MD   [address] [address]  T: [phone], F: [phone]    SUBJECTIVE                                                      Vianca Kumar is a very pleasant 48 year old female who presents for hospital follow-up:    Patient was hospitalized at [facility] 10/13/2023-10/17/2023 with diffuse, bilateral facial pain and swelling. CT demonstrated soft tissue infection involving her anterior nose as well as septum perforation.  Recent history of snorting cocaine.  Started on Zosyn, which was switched to Keflex, both of which caused mild hives.  Discharged on oral clindamycin, which she completed a couple weeks ago    Patient was doing well and improving until this morning when she woke up with right-sided facial swelling, pain, and pressure. Mild subjective fevers and chills. Similar presentation as prior hospitalization.    She has not used cocaine at all since hospitalization.  She still drinks on occasion, but not heavily or daily.    Mood is reasonably controlled on Lexapro.    OBJECTIVE                                                      /84   Pulse 76   Temp 97.1  F (36.2  C) (Temporal)   Wt 89.8 kg  (198 lb)   LMP 01/01/2022 (Exact Date)   SpO2 98%   BMI 30.11 kg/m    Constitutional: right upper cheek and upper and lower eyelid swelling and mild pinkness  Respiratory: normal respiratory effort; clear to auscultation bilaterally  Cardiovascular: regular rate and rhythm; no edema  Psych: normal judgment and insight; normal mood and affect; recent and remote memory intact    ---    (Note documentation was completed, in part, with [employer] voice-recognition software. Documentation was reviewed, but some grammatical, spelling, and word errors may remain.)

## 2023-11-24 ENCOUNTER — NURSE TRIAGE (OUTPATIENT)
Dept: INTERNAL MEDICINE | Facility: CLINIC | Age: 48
End: 2023-11-24

## 2023-11-24 ENCOUNTER — ANCILLARY PROCEDURE (OUTPATIENT)
Dept: CT IMAGING | Facility: CLINIC | Age: 48
DRG: 603 | End: 2023-11-24
Attending: PHYSICIAN ASSISTANT
Payer: MEDICARE

## 2023-11-24 ENCOUNTER — HOSPITAL ENCOUNTER (INPATIENT)
Facility: CLINIC | Age: 48
LOS: 2 days | Discharge: HOME | DRG: 603 | End: 2023-11-26
Attending: EMERGENCY MEDICINE | Admitting: HOSPITALIST
Payer: MEDICARE

## 2023-11-24 ENCOUNTER — OFFICE VISIT (OUTPATIENT)
Dept: PEDIATRICS | Facility: CLINIC | Age: 48
End: 2023-11-24
Payer: MEDICARE

## 2023-11-24 VITALS
HEIGHT: 68 IN | SYSTOLIC BLOOD PRESSURE: 142 MMHG | BODY MASS INDEX: 29.98 KG/M2 | WEIGHT: 197.8 LBS | OXYGEN SATURATION: 98 % | TEMPERATURE: 98 F | HEART RATE: 72 BPM | DIASTOLIC BLOOD PRESSURE: 100 MMHG

## 2023-11-24 DIAGNOSIS — J34.0 CELLULITIS OF NOSE: Primary | ICD-10-CM

## 2023-11-24 DIAGNOSIS — L03.211 FACIAL CELLULITIS: ICD-10-CM

## 2023-11-24 DIAGNOSIS — R51.9 FACIAL PAIN: ICD-10-CM

## 2023-11-24 DIAGNOSIS — R22.0 FACIAL SWELLING: ICD-10-CM

## 2023-11-24 LAB
ALBUMIN SERPL BCG-MCNC: 4.4 G/DL (ref 3.5–5.2)
ALP SERPL-CCNC: 117 U/L (ref 40–150)
ALT SERPL W P-5'-P-CCNC: 14 U/L (ref 0–50)
ANION GAP SERPL CALCULATED.3IONS-SCNC: 11 MMOL/L (ref 7–15)
AST SERPL W P-5'-P-CCNC: 19 U/L (ref 0–45)
BASOPHILS # BLD AUTO: 0.1 10E3/UL (ref 0–0.2)
BASOPHILS NFR BLD AUTO: 1 %
BILIRUB SERPL-MCNC: 0.7 MG/DL
BUN SERPL-MCNC: 2.9 MG/DL (ref 6–20)
CALCIUM SERPL-MCNC: 9.3 MG/DL (ref 8.6–10)
CHLORIDE SERPL-SCNC: 101 MMOL/L (ref 98–107)
CREAT SERPL-MCNC: 0.58 MG/DL (ref 0.51–0.95)
CRP SERPL-MCNC: 50.22 MG/L
DEPRECATED HCO3 PLAS-SCNC: 25 MMOL/L (ref 22–29)
EGFRCR SERPLBLD CKD-EPI 2021: >90 ML/MIN/1.73M2
EOSINOPHIL # BLD AUTO: 0.5 10E3/UL (ref 0–0.7)
EOSINOPHIL NFR BLD AUTO: 4 %
ERYTHROCYTE [DISTWIDTH] IN BLOOD BY AUTOMATED COUNT: 11.8 % (ref 10–15)
GLUCOSE SERPL-MCNC: 103 MG/DL (ref 70–99)
HCT VFR BLD AUTO: 43.8 % (ref 35–47)
HGB BLD-MCNC: 14.9 G/DL (ref 11.7–15.7)
HOLD SPECIMEN: NORMAL
HOLD SPECIMEN: NORMAL
IMM GRANULOCYTES # BLD: 0.1 10E3/UL
IMM GRANULOCYTES NFR BLD: 1 %
LYMPHOCYTES # BLD AUTO: 3.5 10E3/UL (ref 0.8–5.3)
LYMPHOCYTES NFR BLD AUTO: 30 %
MCH RBC QN AUTO: 30.8 PG (ref 26.5–33)
MCHC RBC AUTO-ENTMCNC: 34 G/DL (ref 31.5–36.5)
MCV RBC AUTO: 91 FL (ref 78–100)
MONOCYTES # BLD AUTO: 0.7 10E3/UL (ref 0–1.3)
MONOCYTES NFR BLD AUTO: 6 %
MRSA DNA SPEC QL NAA+PROBE: NEGATIVE
NEUTROPHILS # BLD AUTO: 6.9 10E3/UL (ref 1.6–8.3)
NEUTROPHILS NFR BLD AUTO: 59 %
PLATELET # BLD AUTO: 449 10E3/UL (ref 150–450)
POTASSIUM SERPL-SCNC: 3.9 MMOL/L (ref 3.4–5.3)
PROT SERPL-MCNC: 7.5 G/DL (ref 6.4–8.3)
RBC # BLD AUTO: 4.83 10E6/UL (ref 3.8–5.2)
SA TARGET DNA: NEGATIVE
SODIUM SERPL-SCNC: 137 MMOL/L (ref 135–145)
WBC # BLD AUTO: 11.7 10E3/UL (ref 4–11)

## 2023-11-24 PROCEDURE — 250N000013 HC RX MED GY IP 250 OP 250 PS 637: Performed by: HOSPITALIST

## 2023-11-24 PROCEDURE — 87040 BLOOD CULTURE FOR BACTERIA: CPT | Performed by: PHYSICIAN ASSISTANT

## 2023-11-24 PROCEDURE — 99285 EMERGENCY DEPT VISIT HI MDM: CPT | Mod: 25

## 2023-11-24 PROCEDURE — 85025 COMPLETE CBC W/AUTO DIFF WBC: CPT | Performed by: PHYSICIAN ASSISTANT

## 2023-11-24 PROCEDURE — 80053 COMPREHEN METABOLIC PANEL: CPT | Performed by: PHYSICIAN ASSISTANT

## 2023-11-24 PROCEDURE — 250N000009 HC RX 250: Performed by: PHYSICIAN ASSISTANT

## 2023-11-24 PROCEDURE — 258N000003 HC RX IP 258 OP 636: Performed by: EMERGENCY MEDICINE

## 2023-11-24 PROCEDURE — 250N000011 HC RX IP 250 OP 636: Performed by: EMERGENCY MEDICINE

## 2023-11-24 PROCEDURE — G0378 HOSPITAL OBSERVATION PER HR: HCPCS

## 2023-11-24 PROCEDURE — 250N000011 HC RX IP 250 OP 636: Mod: JZ | Performed by: HOSPITALIST

## 2023-11-24 PROCEDURE — 250N000011 HC RX IP 250 OP 636: Mod: JZ | Performed by: PHYSICIAN ASSISTANT

## 2023-11-24 PROCEDURE — 36415 COLL VENOUS BLD VENIPUNCTURE: CPT | Performed by: PHYSICIAN ASSISTANT

## 2023-11-24 PROCEDURE — G1010 CDSM STANSON: HCPCS

## 2023-11-24 PROCEDURE — 120N000001 HC R&B MED SURG/OB

## 2023-11-24 PROCEDURE — 86140 C-REACTIVE PROTEIN: CPT | Performed by: PHYSICIAN ASSISTANT

## 2023-11-24 PROCEDURE — 99215 OFFICE O/P EST HI 40 MIN: CPT | Performed by: PHYSICIAN ASSISTANT

## 2023-11-24 PROCEDURE — 250N000011 HC RX IP 250 OP 636: Mod: JZ | Performed by: INTERNAL MEDICINE

## 2023-11-24 PROCEDURE — 99223 1ST HOSP IP/OBS HIGH 75: CPT | Mod: A1 | Performed by: HOSPITALIST

## 2023-11-24 PROCEDURE — 87641 MR-STAPH DNA AMP PROBE: CPT | Performed by: HOSPITALIST

## 2023-11-24 RX ORDER — NICOTINE 21 MG/24HR
1 PATCH, TRANSDERMAL 24 HOURS TRANSDERMAL DAILY
Status: DISCONTINUED | OUTPATIENT
Start: 2023-11-24 | End: 2023-11-26 | Stop reason: HOSPADM

## 2023-11-24 RX ORDER — DIPHENHYDRAMINE HYDROCHLORIDE 50 MG/ML
50 INJECTION INTRAMUSCULAR; INTRAVENOUS ONCE
Status: COMPLETED | OUTPATIENT
Start: 2023-11-24 | End: 2023-11-24

## 2023-11-24 RX ORDER — HYDROMORPHONE HCL IN WATER/PF 6 MG/30 ML
0.4 PATIENT CONTROLLED ANALGESIA SYRINGE INTRAVENOUS EVERY 4 HOURS PRN
Status: DISCONTINUED | OUTPATIENT
Start: 2023-11-24 | End: 2023-11-25

## 2023-11-24 RX ORDER — MEROPENEM 500 MG/1
500 INJECTION, POWDER, FOR SOLUTION INTRAVENOUS EVERY 8 HOURS
Status: DISCONTINUED | OUTPATIENT
Start: 2023-11-24 | End: 2023-11-24

## 2023-11-24 RX ORDER — ACETAMINOPHEN 325 MG/1
975 TABLET ORAL EVERY 6 HOURS PRN
Status: DISCONTINUED | OUTPATIENT
Start: 2023-11-24 | End: 2023-11-26 | Stop reason: HOSPADM

## 2023-11-24 RX ORDER — IOPAMIDOL 755 MG/ML
67 INJECTION, SOLUTION INTRAVASCULAR ONCE
Status: COMPLETED | OUTPATIENT
Start: 2023-11-24 | End: 2023-11-24

## 2023-11-24 RX ORDER — PIPERACILLIN SODIUM, TAZOBACTAM SODIUM 3; .375 G/15ML; G/15ML
3.38 INJECTION, POWDER, LYOPHILIZED, FOR SOLUTION INTRAVENOUS ONCE
Status: DISCONTINUED | OUTPATIENT
Start: 2023-11-24 | End: 2023-11-24

## 2023-11-24 RX ORDER — IBUPROFEN 200 MG
400-600 TABLET ORAL EVERY 6 HOURS PRN
Status: ON HOLD | COMMUNITY
End: 2023-12-03

## 2023-11-24 RX ORDER — CALCIUM CARBONATE 500 MG/1
1000 TABLET, CHEWABLE ORAL 4 TIMES DAILY PRN
Status: DISCONTINUED | OUTPATIENT
Start: 2023-11-24 | End: 2023-11-26 | Stop reason: HOSPADM

## 2023-11-24 RX ORDER — OXYCODONE HYDROCHLORIDE 5 MG/1
5 TABLET ORAL EVERY 4 HOURS PRN
Status: DISCONTINUED | OUTPATIENT
Start: 2023-11-24 | End: 2023-11-25

## 2023-11-24 RX ORDER — AMOXICILLIN 250 MG
2 CAPSULE ORAL 2 TIMES DAILY PRN
Status: DISCONTINUED | OUTPATIENT
Start: 2023-11-24 | End: 2023-11-26 | Stop reason: HOSPADM

## 2023-11-24 RX ORDER — LIDOCAINE 40 MG/G
CREAM TOPICAL
Status: DISCONTINUED | OUTPATIENT
Start: 2023-11-24 | End: 2023-11-26 | Stop reason: HOSPADM

## 2023-11-24 RX ORDER — MEROPENEM 500 MG/1
500 INJECTION, POWDER, FOR SOLUTION INTRAVENOUS EVERY 6 HOURS
Status: DISCONTINUED | OUTPATIENT
Start: 2023-11-25 | End: 2023-11-26

## 2023-11-24 RX ORDER — ONDANSETRON 2 MG/ML
4 INJECTION INTRAMUSCULAR; INTRAVENOUS EVERY 6 HOURS PRN
Status: DISCONTINUED | OUTPATIENT
Start: 2023-11-24 | End: 2023-11-26 | Stop reason: HOSPADM

## 2023-11-24 RX ORDER — AMOXICILLIN 250 MG
1 CAPSULE ORAL 2 TIMES DAILY PRN
Status: DISCONTINUED | OUTPATIENT
Start: 2023-11-24 | End: 2023-11-26 | Stop reason: HOSPADM

## 2023-11-24 RX ORDER — ONDANSETRON 4 MG/1
4 TABLET, ORALLY DISINTEGRATING ORAL EVERY 6 HOURS PRN
Status: DISCONTINUED | OUTPATIENT
Start: 2023-11-24 | End: 2023-11-26 | Stop reason: HOSPADM

## 2023-11-24 RX ADMIN — IOPAMIDOL 67 ML: 755 INJECTION, SOLUTION INTRAVENOUS at 15:16

## 2023-11-24 RX ADMIN — HYDROMORPHONE HYDROCHLORIDE 0.4 MG: 0.2 INJECTION, SOLUTION INTRAMUSCULAR; INTRAVENOUS; SUBCUTANEOUS at 19:52

## 2023-11-24 RX ADMIN — VANCOMYCIN HYDROCHLORIDE 1750 MG: 10 INJECTION, POWDER, LYOPHILIZED, FOR SOLUTION INTRAVENOUS at 19:27

## 2023-11-24 RX ADMIN — SODIUM CHLORIDE 40 ML: 9 INJECTION, SOLUTION INTRAVENOUS at 15:16

## 2023-11-24 RX ADMIN — OXYCODONE HYDROCHLORIDE 5 MG: 5 TABLET ORAL at 22:51

## 2023-11-24 RX ADMIN — DIPHENHYDRAMINE HYDROCHLORIDE 50 MG: 50 INJECTION, SOLUTION INTRAMUSCULAR; INTRAVENOUS at 21:45

## 2023-11-24 RX ADMIN — ACETAMINOPHEN 975 MG: 325 TABLET, FILM COATED ORAL at 19:52

## 2023-11-24 RX ADMIN — NICOTINE 1 PATCH: 14 PATCH, EXTENDED RELEASE TRANSDERMAL at 21:44

## 2023-11-24 RX ADMIN — MEROPENEM 500 MG: 500 INJECTION, POWDER, FOR SOLUTION INTRAVENOUS at 18:27

## 2023-11-24 RX ADMIN — OXYCODONE HYDROCHLORIDE 5 MG: 5 TABLET ORAL at 18:26

## 2023-11-24 ASSESSMENT — ACTIVITIES OF DAILY LIVING (ADL)
ADLS_ACUITY_SCORE: 35
ADLS_ACUITY_SCORE: 33

## 2023-11-24 ASSESSMENT — PAIN SCALES - GENERAL: PAINLEVEL: MILD PAIN (3)

## 2023-11-24 NOTE — H&P
[facility]    History and Physical - Hospitalist Service       Date of Admission:  11/24/2023    Assessment & Plan        Vianca Kumar is a 48 year old female with a past medical history significant for MDD, obesity, hx paraesophageal diaphragmatic hernia repair, recent admission for facial cellulitis and nasal septal perforation, was sent to the ER from urgent care for evaluation of ongoing facial cellulitis and being admitted 11/24/2023 for further management.     Recurrent cellulitis anterior nose with extensive septal perforation  H/o intranasal cocaine use   Admitted here 10/13 - 10/17 and treated for cellulitis anterior nodes.  Patient was found to have extensive septal perforation thought to be related to intranasal cocaine use.  Was treated with Unasyn then Zosyn, ID and ENT consulted, had hives with zosyn and then to keflex as well.  And was discharged on clindamycin.  Ongoing pain and swelling.  Evaluated in urgent care today for increased facial swelling, labs and facial CT was done, shows persistent cellulitis and sent to ER.  Patient states that she has quit snorting cocaine since long.  Has been taking marijuana though.  -Admit to inpatient  -IV meropenem and vancomycin (zosyn/keflex allergy, noted hives)  -Nasal swab for MRSA  -ENT and ID consult  -Pain control-Tylenol, oxycodone, Dilaudid   -Follow leukocytosis and elevated CRP and blood cultures     H/o Alcohol use and Cocaine use  Patient denies alcohol or cocaine use lately.  States that she takes marijuana only.  -Monitor for signs of withdrawal     Tobacco use  -nicotine patch offered     MDD with grief reaction - She has a long history of depression on Lexapro which she feels has been helpful.  She lost her brother a year ago, who was her best friend and continues to actively grieve his loss.  She follows with a therapist.     - Continue Lexapro.       Acute mild anemia, noted. Likely secondary to dilution from IV  "fluids.  No sign of active bleeding.  - Hemoglobin stable on 10/15        Diet:  regular   DVT Prophylaxis: Ambulate every shift  Sloan Catheter: Not present  Lines: None     Cardiac Monitoring: None  Code Status:  full code     Clinically Significant Risk Factors Present on Admission                       # Obesity: Estimated body mass index is 30.08 kg/m  as calculated from the following:    Height as of an earlier encounter on 11/24/23: 1.727 m (5' 8\").    Weight as of an earlier encounter on 11/24/23: 89.7 kg (197 lb 12.8 oz).              Disposition Plan      Expected Discharge Date: 11/25/2023                  Ivonne Rivas MD  Hospitalist Service  [facility]  Securely message with [employer]umo (more info)  Text page via [employer] Paging/Directory     ______________________________________________________________________    Chief Complaint   Facial pain and swelling    History is obtained from the patient, chart review and discussion with ER MD    History of Present Illness   Vianca Kumar is a 48 year old female with a past medical history significant for MDD, obesity, hx paraesophageal diaphragmatic hernia repair.     Patient was admitted here from 10/13 - 10/17 and treated for cellulitis anterior nodes.  Patient was found to have extensive septal perforation thought to be related to intranasal cocaine use.  Was treated with Unasyn then Zosyn, ID and ENT consulted, had hives with zosyn and then to keflex as well.  The she was discharged on clindamycin.      Infection improved, patient felt better but then tit reoccurred after clinda was completed, and was seen by PCP 11/13 and a course of clindamycin was prescribed.  Patient was feeling better but then yesterday completed her clindamycin yesterday, this morning she woke up with increased swelling and pain.  Patient reports ongoing fever of up to 102 while she was on clindamycin last week.  Facial pain is 5/10.  Dark brown nasal " discharge but no epistaxis.  She denies snorting cocaine currently.      She went to the urgent care today and was evaluated there. Labs and facial CT was done, and CT shows persistent cellulitis and sent to ER.  Noted mild leukocytosis and elevated CRP.  Given this, patient was sent to ER for further evaluation.    CT facial:    IMPRESSION:   1.  Mild soft tissue thickening of the nose again noted but decreased from the comparison study possibly representing persistent cellulitis. No evidence for fluid collection or abscess.  2.  Anterior nasal septal defect again noted.  3.  Minimal mucosal thickening in the maxillary sinuses bilaterally again noted. No evidence for acute sinusitis.      Past Medical History    Past Medical History:   Diagnosis Date    MDD (major depressive disorder)     Obesity (BMI 30-39.9)        Past Surgical History   Past Surgical History:   Procedure Laterality Date    CAPSULE/PILL CAM ENDOSCOPY N/A 04/03/2018    Procedure: CAPSULE/PILL CAM ENDOSCOPY;;  Surgeon: Guru Hallie Song MD;  Location:  GI    COLONOSCOPY N/A 12/28/2017    Procedure: COLONOSCOPY;  Surgeon: Yosi Beck MD;  Location: [facility];  Service:     COLONOSCOPY N/A 08/11/2022    Procedure: COLONOSCOPY, WITH POLYPECTOMY AND BIOPSY;  Surgeon: Lorri Holley DO;  Location: [facility] OR    COLONOSCOPY WITH CO2 INSUFFLATION N/A 08/11/2022    Procedure: COLONOSCOPY, WITH CO2 INSUFFLATION;  Surgeon: Lorri Holley DO;  Location: [facility] OR    COMBINED ESOPHAGOSCOPY, GASTROSCOPY, DUODENOSCOPY (EGD) WITH CO2 INSUFFLATION N/A 08/11/2022    Procedure: ESOPHAGOGASTRODUODENOSCOPY, WITH CO2 INSUFFLATION;  Surgeon: Lorri Holley DO;  Location: [facility] OR    COMBINED ESOPHAGOSCOPY, GASTROSCOPY, DUODENOSCOPY (EGD) WITH CO2 INSUFFLATION N/A 11/25/2022    Procedure: ESOPHAGOGASTRODUODENOSCOPY, WITH CO2 INSUFFLATION;  Surgeon: Lorri Holley DO;  Location: [facility] OR    ENDOSCOPIC ULTRASOUND UPPER GASTROINTESTINAL  TRACT (GI) N/A 04/03/2018    Procedure: ENDOSCOPIC ULTRASOUND, ESOPHAGOSCOPY / UPPER GASTROINTESTINAL TRACT (GI);  EUS/Capsule ;  Surgeon: Guru Hallie Song MD;  Location: [facility] GI    ESOPHAGOSCOPY, GASTROSCOPY, DUODENOSCOPY (EGD), COMBINED N/A 12/28/2017    Procedure: ESOPHAGOGASTRODUODENOSCOPY (EGD);  Surgeon: Yosi Beck MD;  [facility];  Service:     ESOPHAGOSCOPY, GASTROSCOPY, DUODENOSCOPY (EGD), COMBINED N/A 08/11/2022    Procedure: ESOPHAGOGASTRODUODENOSCOPY, WITH BIOPSY;  Surgeon: Lorri Holley DO;  Location:  OR    ESOPHAGOSCOPY, GASTROSCOPY, DUODENOSCOPY (EGD), COMBINED N/A 11/25/2022    Procedure: ESOPHAGOGASTRODUODENOSCOPY, WITH BIOPSY;  Surgeon: Lorri Holley DO;  Location:  OR    KNEE SURGERY Left     osteotomy; screws and plate in place    LAPAROSCOPIC CHOLECYSTECTOMY N/A 04/25/2018    Procedure: LAPAROSCOPIC CHOLECYSTECTOMY;  Laparoscopic Cholecystectomy ;  Surgeon: Alberto Noble MD;  Location: [facility] OR    LAPAROSCOPIC NISSEN FUNDOPLICATION N/A 5/23/2023    Procedure: ROBOTIC ASSISTED LAPAROSCOPIC HERNIORRHAPHY, HIATAL WITH MESH AND MAGNETIC SPHINCTER AUGMENTATION;  Surgeon: Abdiaziz Tolbert MD;  Location:  OR       Prior to Admission Medications   Prior to Admission Medications   Prescriptions Last Dose Informant Patient Reported? Taking?   cetirizine (ZYRTEC) 10 MG tablet  Self Yes No   Sig: Take 10 mg by mouth daily   clindamycin (CLEOCIN) 300 MG capsule   No No   Sig: Take 1 capsule (300 mg) by mouth 4 times daily for 10 days   escitalopram (LEXAPRO) 20 MG tablet  Self No No   Sig: Take 1 tablet (20 mg) by mouth daily      Facility-Administered Medications Last Administration Doses Remaining   sodium chloride (PF) 0.9% PF flush 3 mL None recorded            Review of Systems    The 10 point Review of Systems is negative other than noted in the HPI or here.       Physical Exam   Vital Signs: Temp: 97.7  F (36.5  C) Temp src: Oral BP: 115/84  Pulse: 84   Resp: 16 SpO2: 96 % O2 Device: None (Room air)    Weight: 0 lbs 0 oz    General: AAOx3, appears comfortable.  HEENT: PERRLA EOMI. Mucosa moist.  Diffuse swelling of the anterior face around the nasal bridge, tender on exam.  Nasal mucosa is dry.  No discharge or bleeding noted.  Lungs: Bilateral equal air entry. Clear to auscultation, normal work of breathing.   CVS: S1S2 regular, no tachycardia or murmur.   Abdomen: Soft, NT, ND. BS heard.  MSK: No edema or deformities.  Neuro: AAOX3. CN 2-12 normal. Strength symmetrical.  Skin: No rash.       Medical Decision Making       78 MINUTES SPENT BY ME on the date of service doing chart review, history, exam, documentation & further activities per the note.      Data     I have personally reviewed the following data over the past 24 hrs:    11.7 (H)  \   14.9   / 449     137 101 2.9 (L) /  103 (H)   3.9 25 0.58 \     ALT: 14 AST: 19 AP: 117 TBILI: 0.7   ALB: 4.4 TOT PROTEIN: 7.5 LIPASE: N/A     Procal: N/A CRP: 50.22 (H) Lactic Acid: N/A         Imaging results reviewed over the past 24 hrs:   Recent Results (from the past 24 hour(s))   CT Facial Bones with Contrast    Narrative    EXAM: CT FACIAL BONES WITH CONTRAST  LOCATION: [facility]  DATE: 11/24/2023    INDICATION: Facial pain, swelling and fever: see previous CT scan results 10 13 23: Concerning for anterior nose soft tissue infection, with a potentially necrotizing component however felt to be less likely given relative sparing of the adjacent   structures.  COMPARISON: CT facial bones 10/13/2023  CONTRAST: 67ml isovue 370  TECHNIQUE: Routine CT Maxillofacial with IV contrast. Multiplanar reformats. Dose reduction techniques were used.     FINDINGS:  OSSEOUS STRUCTURES/SOFT TISSUES: Mild soft tissue swelling of both sides of the nose extending from the tip of the nose up to the glabella consistent with cellulitis. The degree of soft tissue swelling has decreased since the  comparison study. No   evidence for fluid collection or abscess. No aggressive bony changes of the adjacent bones. Large anterior nasal septal defect again noted. No facial bone fracture or malalignment. Scattered mildly prominent lymph nodes in the suprahyoid neck in both   sides again noted possibly reactive in nature.    ORBITAL CONTENTS: No acute abnormality.    SINUSES: Mild polypoid mucosal thickening in the maxillary sinuses again noted.    VISUALIZED INTRACRANIAL CONTENTS: No acute abnormality.       Impression    IMPRESSION:   1.  Mild soft tissue thickening of the nose again noted but decreased from the comparison study possibly representing persistent cellulitis. No evidence for fluid collection or abscess.  2.  Anterior nasal septal defect again noted.  3.  Minimal mucosal thickening in the maxillary sinuses bilaterally again noted. No evidence for acute sinusitis.

## 2023-11-24 NOTE — PROGRESS NOTES
Assessment & Plan     (J34.0) Cellulitis of nose  (primary encounter diagnosis)  Comment: persistent, recurrent despite 2 full courses of Clindamycin  Plan: TO ED now for further evaluation and treatment as well as consult with ENT.    ED informed of patient.      (R51.9) Facial pain  Comment:   Plan: sodium chloride (PF) 0.9% PF flush 3 mL, Blood         Culture Peripheral Blood, Blood Culture         Peripheral Blood, CBC with platelets         differential, CRP inflammation, Comprehensive         metabolic panel,  CT Facial Bones         without & with Contrast,  Blood         Culture Peripheral Blood            (R22.0) Facial swelling  Comment:   Plan:  CT Facial Bones without & with         Contrast,  Blood Culture Peripheral         Blood            61 minutes spent by me on the date of the encounter doing chart review, review of test results, interpretation of tests, patient visit, documentation, and discussion with other provider(s)        Dimple Holman PA-C  [facility] CRISTINA Coley is a 48 year old referred to [facility] by Dr. Perez's nurseline for recurrence of her facial swelling and pain since finishing clindamycin yesterday.  CT scan on 10/13/23 revealed:    CT FACIAL BONES WITH CONTRAST:  IMPRESSION:   1. Findings concerning for anterior nose soft tissue infection, with a  potentially necrotizing component however felt to be less likely given  relative sparing of the adjacent structures. Changes do however  involve the columella which appears near completely eroded and  replaced by air and septations. Cartilaginous nasal septum is  perforated. Direct visualization and ENT consultation recommended.  2. Right first maxillary molar cavity, periodontitis and odontogenic  maxillary floor mucosal thickening. Dental evaluation recommended.       Patient had a 4 day stay in the hospital for the nasal infection and reports that she was seen by ENT at that time.       She was seen by Dr. Perez on 11/13/23 and placed on QID Clindamycin.  States that she has not used cocaine since just before she was first hospitalized for the nasal infection.     Area under right eye and adjacent to right side of nose is swollen and tender.  Denies any trauma.          HPI     Concern - Facial Swelling  Onset: This morning  Description: Tenderness in the top of patients head and facial swelling on the right side  Intensity: mild  Progression of Symptoms:  worsening  Accompanying Signs & Symptoms: fatigue and upset stomach  Previous history of similar problem: yes, patient is prone to staff infections  Precipitating factors:        Worsened by: nothing  Alleviating factors:        Improved by: steam shower, warm and cool packs on face, and NSAID  Therapies tried and outcome: as above        Review of Systems   Constitutional, HEENT, cardiovascular, pulmonary, GI, , musculoskeletal, neuro, skin, endocrine and psych systems are negative, except as otherwise noted.      Objective    LMP 01/01/2022 (Exact Date)   There is no height or weight on file to calculate BMI.    Physical Exam   GENERAL: healthy, alert and no distress  HENT:   Nasal bridge widened with right sided edema of upper cheek.  Tender to palpation.    NOSE: purulent congestion  NECK: no adenopathy, no asymmetry, masses, or scars and thyroid normal to palpation    Results for orders placed or performed in visit on 11/24/23 (from the past 24 hour(s))   CBC with platelets differential    Narrative    The following orders were created for panel order CBC with platelets differential.  Procedure                               Abnormality         Status                     ---------                               -----------         ------                     CBC with platelets and d...[[id number]]  Abnormal            Final result                 Please view results for these tests on the individual orders.   CRP inflammation   Result Value  Ref Range    CRP Inflammation 50.22 (H) <5.00 mg/L   Comprehensive metabolic panel   Result Value Ref Range    Sodium 137 135 - 145 mmol/L    Potassium 3.9 3.4 - 5.3 mmol/L    Carbon Dioxide (CO2) 25 22 - 29 mmol/L    Anion Gap 11 7 - 15 mmol/L    Urea Nitrogen 2.9 (L) 6.0 - 20.0 mg/dL    Creatinine 0.58 0.51 - 0.95 mg/dL    GFR Estimate >90 >60 mL/min/1.73m2    Calcium 9.3 8.6 - 10.0 mg/dL    Chloride 101 98 - 107 mmol/L    Glucose 103 (H) 70 - 99 mg/dL    Alkaline Phosphatase 117 40 - 150 U/L    AST 19 0 - 45 U/L    ALT 14 0 - 50 U/L    Protein Total 7.5 6.4 - 8.3 g/dL    Albumin 4.4 3.5 - 5.2 g/dL    Bilirubin Total 0.7 <=1.2 mg/dL   CBC with platelets and differential   Result Value Ref Range    WBC Count 11.7 (H) 4.0 - 11.0 10e3/uL    RBC Count 4.83 3.80 - 5.20 10e6/uL    Hemoglobin 14.9 11.7 - 15.7 g/dL    Hematocrit 43.8 35.0 - 47.0 %    MCV 91 78 - 100 fL    MCH 30.8 26.5 - 33.0 pg    MCHC 34.0 31.5 - 36.5 g/dL    RDW 11.8 10.0 - 15.0 %    Platelet Count 449 150 - 450 10e3/uL    % Neutrophils 59 %    % Lymphocytes 30 %    % Monocytes 6 %    % Eosinophils 4 %    % Basophils 1 %    % Immature Granulocytes 1 %    Absolute Neutrophils 6.9 1.6 - 8.3 10e3/uL    Absolute Lymphocytes 3.5 0.8 - 5.3 10e3/uL    Absolute Monocytes 0.7 0.0 - 1.3 10e3/uL    Absolute Eosinophils 0.5 0.0 - 0.7 10e3/uL    Absolute Basophils 0.1 0.0 - 0.2 10e3/uL    Absolute Immature Granulocytes 0.1 <=0.4 10e3/uL       CT Scan Facial Bones:                                                            IMPRESSION:   1.  Mild soft tissue thickening of the nose again noted but decreased from the comparison study possibly representing persistent cellulitis. No evidence for fluid collection or abscess.  2.  Anterior nasal septal defect again noted.  3.  Minimal mucosal thickening in the maxillary sinuses bilaterally again noted. No evidence for acute sinusitis.

## 2023-11-24 NOTE — PATIENT INSTRUCTIONS
(J34.0) Cellulitis of nose  (primary encounter diagnosis)  Comment: persistent, recurrent.  Plan: TO ED now for further evaluation and treatment as well as consult with ENT.    ED informed of patient.      (R51.9) Facial pain  Comment:   Plan: sodium chloride (PF) 0.9% PF flush 3 mL, Blood         Culture Peripheral Blood, Blood Culture         Peripheral Blood, CBC with platelets         differential, CRP inflammation, Comprehensive         metabolic panel, CANCELED: CT Facial Bones         without & with Contrast, CANCELED: Blood         Culture Peripheral Blood            (R22.0) Facial swelling  Comment:   Plan: CANCELED: CT Facial Bones without & with         Contrast, CANCELED: Blood Culture Peripheral         Blood

## 2023-11-24 NOTE — TELEPHONE ENCOUNTER
Nurse Triage SBAR    Is this a 2nd Level Triage? YES, LICENSED PRACTITIONER REVIEW IS REQUIRED    Situation: Pt sent a mychart - in blue below.  Called pt to triage. Facial swelling infection symptoms returning. Stopped abx yesterday, had temp 100.2 last night, puffiness swelling, slight redness around nose/eyes. Denies SOB.  3rd reoccurrence. Denies cocaine use since last visit     Background: Ongoing infection sx worsening     Assessment: Pt most likely needs to go to ER or Urgent care.  Routing to PCP to see which option most appropriate given recent visit history     Protocol Recommended Disposition:   Go To Office Now    Recommendation: Please advise urgency of disposition.     Routed to provider    Does the patient meet one of the following criteria for [facility] visit consideration? 16+ years old, with an [facility] PCP     TIP  Providers, please consider if this condition is appropriate for management at one of our Acute and Diagnostic Services sites.     If patient is a good candidate, please use dotphrase <dot>triageresponse and select Refer to [facility] to document.    Hi Dr. Perez,  I finished the clindamycin and I'm fairly certain the staph is still lingering. My face swelling came back today and the pain and pressure is increasing.      I'm supposed to leave next Tuesday for [place] for 18 days and I'm not comfortable leaving town without a backup plan.     Should we repeat the CT? What do you recommend? The oral antibiotics aren't kicking it out.     Thank you!        Reason for Disposition   Looks infected (e.g., spreading redness, pus)    Additional Information   Negative: Life-threatening reaction (anaphylaxis) in the past to similar substance (e.g., food, insect bite/sting, chemical, etc.) and < 2 hours since exposure   Negative: Unresponsive, passed out or very weak   Negative: Swollen tongue   Negative: Difficulty breathing or wheezing   Negative: Sounds like a life-threatening emergency to the triager    Negative: Followed an injury to face   Negative: Bee sting and within last 24 hours   Negative: Mosquito bite suspected   Negative: Insect bite suspected   Negative: Swelling mainly of eyelid(s) and around the eyes   Negative: Pregnant > 20 weeks   Negative: Postpartum (from 0 to 6 weeks after delivery)   Negative: SEVERE swelling of entire face and < 2 hours since exposure to high-risk allergen (e.g., peanuts, tree nuts, fish, shellfish or 1st dose of drug) and no serious symptoms AND [4] no serious allergic reaction in the past    Protocols used: Face Swelling-A-OH

## 2023-11-24 NOTE — ED TRIAGE NOTES
Patient sent from urgent care. Was admitted for IV ABX October 12th for facial cellulitis. Had blood work done at .

## 2023-11-24 NOTE — ED NOTES
[facility]  ED Nurse Handoff Report    ED Chief complaint: Wound Check      ED Diagnosis:   Final diagnoses:   Facial cellulitis       Code Status: To be addressed by admitting provider    Allergies:   Allergies   Allergen Reactions    Keflex [Cephalexin] Hives    Avocado Hives    Chantix [Varenicline Tartrate] Other (See Comments)     depression       Patient Story: Patient sent from urgent care. Was admitted for IV ABX October 12th for facial cellulitis. Had blood work done at .    Focused Assessment:  See labs from .  Swelling and redness noted to side and face.     Treatments and/or interventions provided:   Medications   piperacillin-tazobactam (ZOSYN) 3.375 g vial to attach to  mL bag (has no administration in time range)   vancomycin (VANCOCIN) 1,750 mg in 0.9% NaCl 500 mL intermittent infusion (has no administration in time range)     Does this patient have any cognitive concerns?:  None    Activity level - Baseline/Home:  Independent  Activity Level - Current:   Independent    Patient's Preferred language: English   Needed?: No    Isolation: None  Infection: Not Applicable  Patient tested for COVID 19 prior to admission: NO  Bariatric?: No    Vital Signs:   Vitals:    11/24/23 1651   BP: 115/84   Pulse: 84   Resp: 16   Temp: 97.7  F (36.5  C)   TempSrc: Oral   SpO2: 96%

## 2023-11-25 LAB
ALBUMIN SERPL BCG-MCNC: 3.8 G/DL (ref 3.5–5.2)
ALP SERPL-CCNC: 94 U/L (ref 40–150)
ALT SERPL W P-5'-P-CCNC: 13 U/L (ref 0–50)
ANION GAP SERPL CALCULATED.3IONS-SCNC: 9 MMOL/L (ref 7–15)
AST SERPL W P-5'-P-CCNC: 20 U/L (ref 0–45)
BASOPHILS # BLD AUTO: 0.1 10E3/UL (ref 0–0.2)
BASOPHILS NFR BLD AUTO: 1 %
BILIRUB SERPL-MCNC: 0.6 MG/DL
BUN SERPL-MCNC: 4.8 MG/DL (ref 6–20)
CALCIUM SERPL-MCNC: 9.1 MG/DL (ref 8.6–10)
CHLORIDE SERPL-SCNC: 105 MMOL/L (ref 98–107)
CREAT SERPL-MCNC: 0.65 MG/DL (ref 0.51–0.95)
DEPRECATED HCO3 PLAS-SCNC: 26 MMOL/L (ref 22–29)
EGFRCR SERPLBLD CKD-EPI 2021: >90 ML/MIN/1.73M2
EOSINOPHIL # BLD AUTO: 0.5 10E3/UL (ref 0–0.7)
EOSINOPHIL NFR BLD AUTO: 7 %
ERYTHROCYTE [DISTWIDTH] IN BLOOD BY AUTOMATED COUNT: 12 % (ref 10–15)
GLUCOSE SERPL-MCNC: 92 MG/DL (ref 70–99)
HCT VFR BLD AUTO: 40.9 % (ref 35–47)
HGB BLD-MCNC: 13.7 G/DL (ref 11.7–15.7)
IMM GRANULOCYTES # BLD: 0.1 10E3/UL
IMM GRANULOCYTES NFR BLD: 1 %
LYMPHOCYTES # BLD AUTO: 3.4 10E3/UL (ref 0.8–5.3)
LYMPHOCYTES NFR BLD AUTO: 41 %
MCH RBC QN AUTO: 31.4 PG (ref 26.5–33)
MCHC RBC AUTO-ENTMCNC: 33.5 G/DL (ref 31.5–36.5)
MCV RBC AUTO: 94 FL (ref 78–100)
MONOCYTES # BLD AUTO: 0.6 10E3/UL (ref 0–1.3)
MONOCYTES NFR BLD AUTO: 7 %
NEUTROPHILS # BLD AUTO: 3.5 10E3/UL (ref 1.6–8.3)
NEUTROPHILS NFR BLD AUTO: 43 %
NRBC # BLD AUTO: 0 10E3/UL
NRBC BLD AUTO-RTO: 0 /100
PLATELET # BLD AUTO: 378 10E3/UL (ref 150–450)
POTASSIUM SERPL-SCNC: 4.1 MMOL/L (ref 3.4–5.3)
PROT SERPL-MCNC: 6.5 G/DL (ref 6.4–8.3)
RBC # BLD AUTO: 4.36 10E6/UL (ref 3.8–5.2)
SODIUM SERPL-SCNC: 140 MMOL/L (ref 135–145)
WBC # BLD AUTO: 8.2 10E3/UL (ref 4–11)

## 2023-11-25 PROCEDURE — 120N000001 HC R&B MED SURG/OB

## 2023-11-25 PROCEDURE — 99222 1ST HOSP IP/OBS MODERATE 55: CPT | Performed by: PHYSICIAN ASSISTANT

## 2023-11-25 PROCEDURE — 250N000011 HC RX IP 250 OP 636: Mod: JZ | Performed by: HOSPITALIST

## 2023-11-25 PROCEDURE — 85025 COMPLETE CBC W/AUTO DIFF WBC: CPT | Performed by: HOSPITALIST

## 2023-11-25 PROCEDURE — 250N000013 HC RX MED GY IP 250 OP 250 PS 637: Performed by: HOSPITALIST

## 2023-11-25 PROCEDURE — 99232 SBSQ HOSP IP/OBS MODERATE 35: CPT | Performed by: HOSPITALIST

## 2023-11-25 PROCEDURE — 36415 COLL VENOUS BLD VENIPUNCTURE: CPT | Performed by: HOSPITALIST

## 2023-11-25 PROCEDURE — 250N000013 HC RX MED GY IP 250 OP 250 PS 637: Performed by: INTERNAL MEDICINE

## 2023-11-25 PROCEDURE — 80053 COMPREHEN METABOLIC PANEL: CPT | Performed by: HOSPITALIST

## 2023-11-25 PROCEDURE — 250N000009 HC RX 250: Performed by: HOSPITALIST

## 2023-11-25 PROCEDURE — 250N000011 HC RX IP 250 OP 636: Performed by: EMERGENCY MEDICINE

## 2023-11-25 PROCEDURE — 258N000003 HC RX IP 258 OP 636: Performed by: EMERGENCY MEDICINE

## 2023-11-25 RX ORDER — NALOXONE HYDROCHLORIDE 0.4 MG/ML
0.2 INJECTION, SOLUTION INTRAMUSCULAR; INTRAVENOUS; SUBCUTANEOUS
Status: DISCONTINUED | OUTPATIENT
Start: 2023-11-25 | End: 2023-11-26 | Stop reason: HOSPADM

## 2023-11-25 RX ORDER — NALOXONE HYDROCHLORIDE 0.4 MG/ML
0.4 INJECTION, SOLUTION INTRAMUSCULAR; INTRAVENOUS; SUBCUTANEOUS
Status: DISCONTINUED | OUTPATIENT
Start: 2023-11-25 | End: 2023-11-26 | Stop reason: HOSPADM

## 2023-11-25 RX ORDER — OXYCODONE HYDROCHLORIDE 5 MG/1
5 TABLET ORAL EVERY 4 HOURS PRN
Status: DISCONTINUED | OUTPATIENT
Start: 2023-11-25 | End: 2023-11-26 | Stop reason: HOSPADM

## 2023-11-25 RX ORDER — OXYCODONE HYDROCHLORIDE 5 MG/1
10 TABLET ORAL EVERY 4 HOURS PRN
Status: DISCONTINUED | OUTPATIENT
Start: 2023-11-25 | End: 2023-11-26 | Stop reason: HOSPADM

## 2023-11-25 RX ORDER — MUPIROCIN 20 MG/G
OINTMENT TOPICAL 2 TIMES DAILY
Status: DISCONTINUED | OUTPATIENT
Start: 2023-11-25 | End: 2023-11-26 | Stop reason: HOSPADM

## 2023-11-25 RX ORDER — HYDROMORPHONE HCL IN WATER/PF 6 MG/30 ML
0.4 PATIENT CONTROLLED ANALGESIA SYRINGE INTRAVENOUS EVERY 8 HOURS PRN
Status: DISCONTINUED | OUTPATIENT
Start: 2023-11-25 | End: 2023-11-26 | Stop reason: HOSPADM

## 2023-11-25 RX ORDER — ESCITALOPRAM OXALATE 20 MG/1
20 TABLET ORAL DAILY
Status: DISCONTINUED | OUTPATIENT
Start: 2023-11-25 | End: 2023-11-26 | Stop reason: HOSPADM

## 2023-11-25 RX ADMIN — NICOTINE 1 PATCH: 14 PATCH, EXTENDED RELEASE TRANSDERMAL at 08:21

## 2023-11-25 RX ADMIN — OXYCODONE HYDROCHLORIDE 5 MG: 5 TABLET ORAL at 09:59

## 2023-11-25 RX ADMIN — HYDROMORPHONE HYDROCHLORIDE 0.4 MG: 0.2 INJECTION, SOLUTION INTRAMUSCULAR; INTRAVENOUS; SUBCUTANEOUS at 12:58

## 2023-11-25 RX ADMIN — ACETAMINOPHEN 975 MG: 325 TABLET, FILM COATED ORAL at 12:58

## 2023-11-25 RX ADMIN — MEROPENEM 500 MG: 500 INJECTION, POWDER, FOR SOLUTION INTRAVENOUS at 18:19

## 2023-11-25 RX ADMIN — ESCITALOPRAM OXALATE 20 MG: 20 TABLET, FILM COATED ORAL at 08:21

## 2023-11-25 RX ADMIN — MEROPENEM 500 MG: 500 INJECTION, POWDER, FOR SOLUTION INTRAVENOUS at 00:49

## 2023-11-25 RX ADMIN — MUPIROCIN: 20 OINTMENT TOPICAL at 21:33

## 2023-11-25 RX ADMIN — OXYCODONE HYDROCHLORIDE 10 MG: 5 TABLET ORAL at 18:19

## 2023-11-25 RX ADMIN — MEROPENEM 500 MG: 500 INJECTION, POWDER, FOR SOLUTION INTRAVENOUS at 08:16

## 2023-11-25 RX ADMIN — HYDROMORPHONE HYDROCHLORIDE 0.4 MG: 0.2 INJECTION, SOLUTION INTRAMUSCULAR; INTRAVENOUS; SUBCUTANEOUS at 01:41

## 2023-11-25 RX ADMIN — VANCOMYCIN HYDROCHLORIDE 1500 MG: 10 INJECTION, POWDER, LYOPHILIZED, FOR SOLUTION INTRAVENOUS at 05:33

## 2023-11-25 RX ADMIN — OXYCODONE HYDROCHLORIDE 5 MG: 5 TABLET ORAL at 03:28

## 2023-11-25 RX ADMIN — MUPIROCIN: 20 OINTMENT TOPICAL at 13:05

## 2023-11-25 RX ADMIN — MEROPENEM 500 MG: 500 INJECTION, POWDER, FOR SOLUTION INTRAVENOUS at 13:01

## 2023-11-25 ASSESSMENT — ACTIVITIES OF DAILY LIVING (ADL)
ADLS_ACUITY_SCORE: 35

## 2023-11-25 NOTE — CONSULTS
[facility]    Otolaryngology Consultation   ENT Specialty Care    Date of Admission:  2023    Assessment & Plan   Vianca Kumar is a 48 year old female who was admitted on 2023. I was asked to see the patient for recurrence of facial cellulitis with history of septal perforation.  She was last admitted 10/13-10/17 for cellulitis of her nose/face and was discharged on clindamycin which was refilled by her PCP with the last dose taken 3 days ago after which she developed progressive facial pain and swelling and presented to the  ED with a facial CT 23 demonstratin.  Mild soft tissue thickening of the nose again noted but decreased from the comparison study possibly representing persistent cellulitis. No evidence for fluid collection or abscess.  2.  Anterior nasal septal defect again noted.  3.  Minimal mucosal thickening in the maxillary sinuses bilaterally again noted. No evidence for acute sinusitis.  Antibiotic therapy has been initiated with vancomycin and meropenem (zosyn/keflex allergy--hives).  Will defer to ID regarding oral antibiotics when appropriate but would suggest doxycycline or bactrim.  Would recommend aggressive local care for nose and septal perforation including frequent nasal saline rinses and application of mupirocin ointment to nares 1-2 times daily.  Also discussed antibiotic irrigations as an outpatient withTricia (1/3 inch strip of mupirocin in warm nasal saline rinse).  Follow up in ENT clinic within 2 weeks of discharge.  Also, strong recommendation for dental care (numerous fractured and carious appearing teeth).       Code Status: Full Code          Nicole Zavala MD    ______________________________________________________________________    Reason for Consult   Reason for consult: facial cellulitis    Primary Care Physician   Whitney Perez    Chief Complaint   Facial and nasal pain and swelling    History is obtained from the  patient    History of Present Illness   Vianca Kumar is a 48 year old female who presents with a recurrence of facial pain and swelling (was hospitalized 10/13-10/17 with a septal perforation and facial cellulitis).  Was on 2 courses of clindamycin as an outpatient with recurrent facial pain/swelling developing when antibiotic course was completed 3 days ago.  Has a chronic sepal perforation with crusting/nasal obstruction.           Physical Exam   Temp: 97.8  F (36.6  C) Temp src: Oral BP: (!) 121/93 Pulse: 67   Resp: 17 SpO2: 97 % O2 Device: None (Room air)    Vital Signs with Ranges  Temp:  [97.7  F (36.5  C)-98.7  F (37.1  C)] 97.8  F (36.6  C)  Pulse:  [63-91] 67  Resp:  [16-17] 17  BP: (110-142)/() 121/93  SpO2:  [91 %-98 %] 97 %  200 lbs 6.37 oz  Alert, appropriate.  Mild diffuse midface swelling/tenderness worse over right anterior cheek.  Nose: extensive nasal crusting in area of septal perforation  OC/OP:  pharynx clear.  Extensively fractured and carious appearing dentition        Medical Decision Making             Data   Facial CT 11/25/23  IMPRESSION:   1.  Mild soft tissue thickening of the nose again noted but decreased from the comparison study possibly representing persistent cellulitis. No evidence for fluid collection or abscess.  2.  Anterior nasal septal defect again noted.  3.  Minimal mucosal thickening in the maxillary sinuses bilaterally again noted. No evidence for acute sinusitis.

## 2023-11-25 NOTE — PROGRESS NOTES
[facility]    Medicine Progress Note - Hospitalist Service    Date of Admission:  11/24/2023    Assessment & Plan   Vianca Kumar is a 48 year old female admitted on 11/24/2023.  Past medical history significant for MDD, obesity, hx paraesophageal diaphragmatic hernia repair, recent admission for facial cellulitis and nasal septal perforation 10/13-10/17, was sent to the ER from urgent care for evaluation of ongoing facial cellulitis.       Recurrent cellulitis anterior nose with extensive septal perforation  H/o intranasal cocaine use   *Admitted here 10/13 - 10/17 and treated for cellulitis anterior nodes.  Patient was found to have extensive septal perforation thought to be related to intranasal cocaine use.  Developed hives with zosyn and Keflex so completed treatment course with clindamycin per ID.  ENT also consulted.  *evaluated in Urgent Care day of admission for ongoing pain and increased facial swelling.  WBC minimally elevated at 11.7 and CRP 50;, facial CT showing persistent cellulitis  *MRSA nasal swab negative  *denies active cocaine use    -IV meropenem and vancomycin (note reportedly had hives with vancomycin infusion last evening but was able to complete it with decreased rate) pending ID consult  -ENT recommending frequent nasal saline rinses and application of mupirocin ointment to nares 1-2 times daily.  Also discussed antibiotic irrigations as an outpatient (1/3 inch strip of mupirocin in warm nasal saline rinse).  Follow up in ENT clinic within 2 weeks of discharge   -reports oxycodone not helping; will increase to 5-10 mg prn and try to wean IV dilaudid  -leukocytosis resolved, repeat CRP in 1-2 days     H/o Alcohol use and Cocaine use  Patient denies alcohol or cocaine use lately.  States that she takes marijuana only.  -Monitor for signs of withdrawal     Tobacco use  -nicotine patch offered     MDD with grief reaction   She has a long history of depression on  "Lexapro which she feels has been helpful.  She lost her brother a year ago, who was her best friend and continues to actively grieve his loss.  She follows with a therapist.     - Continue Lexapro.       Acute mild anemia, resolved  Mild anemia noted during last admission.  Hgb this admission wnl.             Diet: Combination Diet Regular Diet Adult    DVT Prophylaxis: Pneumatic Compression Devices  Sloan Catheter: Not present  Lines: None     Cardiac Monitoring: None  Code Status: Full Code      Clinically Significant Risk Factors Present on Admission                       # Obesity: Estimated body mass index is 30.47 kg/m  as calculated from the following:    Height as of an earlier encounter on 11/24/23: 1.727 m (5' 8\").    Weight as of this encounter: 90.9 kg (200 lb 6.4 oz).              Disposition Plan      Expected Discharge Date: 11/27/2023        Discharge Comments: ID consult, ENT            Arie Marino MD  Hospitalist Service  [facility]  Securely message with Granular (more info)  Text page via [employer] Paging/Directory   ______________________________________________________________________    Interval History   Reports no change in symptoms. Hives with vanco infusion last night but denies any tongue, lip or airway swelling.  Pain unchanged today.  Feels swelling is the same.      Physical Exam   Vital Signs: Temp: 97.8  F (36.6  C) Temp src: Oral BP: (!) 121/93 Pulse: 67   Resp: 17 SpO2: 97 % O2 Device: None (Room air)    Weight: 200 lbs 6.37 oz    General Appearance: Well nourished female in NAD  Respiratory: lungs CTAB  Cardiovascular: RRR, normal s1/s2 without murmur  GI: normal bowel sounds  Skin: no edema   Other: Alert and appropriate, cranial nerves grossly intact      Medical Decision Making       25 MINUTES SPENT BY ME on the date of service doing chart review, history, exam, documentation & further activities per the note.  MANAGEMENT DISCUSSED with the following over " the past 24 hours: bedside RN   Tests ORDERED & REVIEWED in the past 24 hours:  - BMP  - CBC  - LFTs  Medical complexity over the past 24 hours:  - Prescription DRUG MANAGEMENT performed      Data     I have personally reviewed the following data over the past 24 hrs:    8.2  \   13.7   / 378     140 105 4.8 (L) /  92   4.1 26 0.65 \     ALT: 13 AST: 20 AP: 94 TBILI: 0.6   ALB: 3.8 TOT PROTEIN: 6.5 LIPASE: N/A     Procal: N/A CRP: 50.22 (H) Lactic Acid: N/A

## 2023-11-25 NOTE — PHARMACY-VANCOMYCIN DOSING SERVICE
Pharmacy Vancomycin Initial Note  Date of Service 2023  Patient's  1975  48 year old, female    Indication: Skin and Soft Tissue Infection    Current estimated CrCl = Estimated Creatinine Clearance: 140.5 mL/min (based on SCr of 0.58 mg/dL).    Creatinine for last 3 days  2023:  2:25 PM Creatinine 0.58 mg/dL    Recent Vancomycin Level(s) for last 3 days  No results found for requested labs within last 3 days.      Vancomycin IV Administrations (past 72 hours)                     vancomycin (VANCOCIN) 1,750 mg in 0.9% NaCl 500 mL intermittent infusion (mg) 1,750 mg Given 23 1927                    Nephrotoxins and other renal medications (From now, onward)      Start     Dose/Rate Route Frequency Ordered Stop    23 0700  vancomycin (VANCOCIN) 1,500 mg in 0.9% NaCl 250 mL intermittent infusion         1,500 mg  over 90 Minutes Intravenous EVERY 12 HOURS 23 1720  vancomycin (VANCOCIN) 1,750 mg in 0.9% NaCl 500 mL intermittent infusion         1,750 mg  over 2 Hours Intravenous ONCE 23 1715              Contrast Orders - past 72 hours (72h ago, onward)      None            Insight[employer] Prediction of Planned Initial Vancomycin Regimen  Loading dose: N/A  Regimen: 1500 mg IV every 12 hours.  Start time: 07:27 on 2023  Exposure target: AUC24 (range)400-600 mg/L.hr   AUC24,ss: 553 mg/L.hr  Probability of AUC24 > 400: 80 %  Ctrough,ss: 16.4 mg/L  Probability of Ctrough,ss > 20: 35 %  Probability of nephrotoxicity (Lodise KEVEN ): 12 %        Plan:  Start vancomycin  1500 mg IV q12h (IV vancomycin 1750 mg given x1 in ED).   Vancomycin monitoring method: AUC  Vancomycin therapeutic monitoring goal: 400-600 mg*h/L  Pharmacy will check vancomycin levels as appropriate in 1-3 Days.    Serum creatinine levels will be ordered daily for the first week of therapy and at least twice weekly for subsequent weeks.      Bryce García [facility]

## 2023-11-25 NOTE — PROVIDER NOTIFICATION
MD Notification    Notified Person: MD    Notified Person Name: Ari Muse    Notification Date/Time: 11/25 6:08 am    Notification Interaction: Amcom    Purpose of Notification: Pt takes Lexapro daily in the AM at home. It is not on her MAR. Could you please order so she has med available for today?       Orders Received: Lexapro 20 mg    Comments:

## 2023-11-25 NOTE — PHARMACY-ADMISSION MEDICATION HISTORY
Pharmacist Admission Medication History    Admission medication history is complete. The information provided in this note is only as accurate as the sources available at the time of the update.    Information Source(s): Patient and CareEverywhere/SureScripts via in-person    Pertinent Information: Patient is a reliable historian.   Clindamycin filled 11/13/23 #10ds #40, patient confirms this was completed 2pm 11/23/23.     Changes made to PTA medication list:  Added: advil   Deleted: clindamycin (completed), NaCl flushes  Changed: None    Medication Affordability:  Not including over the counter (OTC) medications, was there a time in the past 3 months when you did not take your medications as prescribed because of cost?:  (not addressed)    Allergies reviewed with patient and updates made in EHR: yes    Medication History Completed By: Rhina Leon [facility] 11/24/2023 6:46 PM    PTA Med List   Medication Sig Last Dose    cetirizine (ZYRTEC) 10 MG tablet Take 10 mg by mouth daily 11/24/2023 at AM    escitalopram (LEXAPRO) 20 MG tablet Take 1 tablet (20 mg) by mouth daily 11/24/2023 at AM    ibuprofen (ADVIL/MOTRIN) 200 MG tablet Take 200 mg by mouth every 4 hours as needed for pain Unknown at PRN

## 2023-11-25 NOTE — CONSULTS
[facility]    Infectious Disease Consultation     Date of Admission:  11/24/2023  Date of Consult (When I saw the patient): 11/25/23    Assessment & Plan   Vianca Kumar is a 48 year old who was admitted on 11/24/2023.     Impression:  49 yo patient with history of cocaine use.   Admitted 10/13-10/17 with cellulitis of nose. Found to have perforated nasal septum. Culture from nares with MSSA. Treated with Unasyn and then Zosyn, but developed hives to Zosyn and also then Keflex. Discharged on Clindamycin.   Cellulitis recurred 11/13/23. Repeated course of Clindamycin.  Cellulitis again recurred 11/24/23.   CT with mild soft tissue thickening of the nose again noted but decreased from the comparison study possibly representing persistent cellulitis. No evidence for fluid collection or abscess.   CRP 50.22. Leukocytosis on admission (now resolved).   She was started on IV Vancomycin and Meropenem.       Recommendations:   -Continue Meropenem for now.   -Stop IV Vancomycin.   -Once she has clinically improved, could switch to oral Bactrim or Doxycycline at discharge for 2-3 weeks given recurrent cellulitis.   -ENT evaluated the patient. They recommend local aggressive care with frequent nasal saline rinses and application of mupirocin ointment as well as antibiotic irrigations of nose. They will follow-up with her outpatient in 2 weeks.       Lily Edge PA-C    Reason for Consult   Reason for consult: Asked to evaluate this patient for recurrent facial cellulitis.    Primary Care Physician   Whitney Perez    Chief Complaint   Recurrent facial cellulitis    History is obtained from the patient and medical records    History of Present Illness   Vianca Kumar is a 48 year old female with a history of MDD and obesity who was admitted to the hospital 10/13/23-10/17/23 for cellulitis of her anterior nose. She was found to have extensive septal perforation thought to be related to  intranasal cocaine use.  Nasal culture grew MSSA. She was treated with Unasyn then Zosyn. She had a rash with Zosyn and then to Keflex as well.  She was ultimately discharged on Clindamycin.     Once she completed her Clindamycin, her cellulitis recurred and she saw her PCP on 11/13/23. Another course of Clindamycin was prescribed. However, shortly after finishing it she again developed recurring face swelling and pain. She was advised to go to Urgent Care. WBC was elevated at 11.7. CRP was up to 50.22. They did a CT of the face again and this showed ongoing cellulitis, although somewhat improved from prior CT. She was then advised to go to the ED for further evaluation and care.     She was started on Meropenem and Vancomycin and admitted. She has been afebrile (states she had fevers at home prior to admission to [facility]). WBC has normalized.     Past Medical History   I have reviewed this patient's medical history and updated it with pertinent information if needed.   Past Medical History:   Diagnosis Date    MDD (major depressive disorder)     Obesity (BMI 30-39.9)        Past Surgical History   I have reviewed this patient's surgical history and updated it with pertinent information if needed.  Past Surgical History:   Procedure Laterality Date    CAPSULE/PILL CAM ENDOSCOPY N/A 04/03/2018    Procedure: CAPSULE/PILL CAM ENDOSCOPY;;  Surgeon: Guru Hallie Song MD;  Location:  GI    COLONOSCOPY N/A 12/28/2017    Procedure: COLONOSCOPY;  Surgeon: Yosi Beck MD;  Location: [facility];  Service:     COLONOSCOPY N/A 08/11/2022    Procedure: COLONOSCOPY, WITH POLYPECTOMY AND BIOPSY;  Surgeon: Lorri Holley DO;  Location:  OR    COLONOSCOPY WITH CO2 INSUFFLATION N/A 08/11/2022    Procedure: COLONOSCOPY, WITH CO2 INSUFFLATION;  Surgeon: Lorri Holley DO;  Location:  OR    COMBINED ESOPHAGOSCOPY, GASTROSCOPY, DUODENOSCOPY (EGD) WITH CO2 INSUFFLATION N/A 08/11/2022    Procedure:  ESOPHAGOGASTRODUODENOSCOPY, WITH CO2 INSUFFLATION;  Surgeon: Lorri Holley DO;  Location: [facility] OR    COMBINED ESOPHAGOSCOPY, GASTROSCOPY, DUODENOSCOPY (EGD) WITH CO2 INSUFFLATION N/A 11/25/2022    Procedure: ESOPHAGOGASTRODUODENOSCOPY, WITH CO2 INSUFFLATION;  Surgeon: Lorri Holley DO;  Location: [facility] OR    ENDOSCOPIC ULTRASOUND UPPER GASTROINTESTINAL TRACT (GI) N/A 04/03/2018    Procedure: ENDOSCOPIC ULTRASOUND, ESOPHAGOSCOPY / UPPER GASTROINTESTINAL TRACT (GI);  EUS/Capsule ;  Surgeon: Guru Hallie Song MD;  Location:  GI    ESOPHAGOSCOPY, GASTROSCOPY, DUODENOSCOPY (EGD), COMBINED N/A 12/28/2017    Procedure: ESOPHAGOGASTRODUODENOSCOPY (EGD);  Surgeon: Yosi Beck MD;  Location: [facility];  Service:     ESOPHAGOSCOPY, GASTROSCOPY, DUODENOSCOPY (EGD), COMBINED N/A 08/11/2022    Procedure: ESOPHAGOGASTRODUODENOSCOPY, WITH BIOPSY;  Surgeon: Lorri Holley DO;  Location:  OR    ESOPHAGOSCOPY, GASTROSCOPY, DUODENOSCOPY (EGD), COMBINED N/A 11/25/2022    Procedure: ESOPHAGOGASTRODUODENOSCOPY, WITH BIOPSY;  Surgeon: Lorri Holley DO;  Location:  OR    KNEE SURGERY Left     osteotomy; screws and plate in place    LAPAROSCOPIC CHOLECYSTECTOMY N/A 04/25/2018    Procedure: LAPAROSCOPIC CHOLECYSTECTOMY;  Laparoscopic Cholecystectomy ;  Surgeon: Alberto Noble MD;  Location:  OR    LAPAROSCOPIC NISSEN FUNDOPLICATION N/A 5/23/2023    Procedure: ROBOTIC ASSISTED LAPAROSCOPIC HERNIORRHAPHY, HIATAL WITH MESH AND MAGNETIC SPHINCTER AUGMENTATION;  Surgeon: Abdiaziz Tolbert MD;  Location:  OR       Prior to Admission Medications   Prior to Admission Medications   Prescriptions Last Dose Informant Patient Reported? Taking?   cetirizine (ZYRTEC) 10 MG tablet 11/24/2023 at AM Self Yes Yes   Sig: Take 10 mg by mouth daily   escitalopram (LEXAPRO) 20 MG tablet 11/24/2023 at AM Self No Yes   Sig: Take 1 tablet (20 mg) by mouth daily   ibuprofen (ADVIL/MOTRIN) 200 MG tablet Unknown  at PRN Self Yes Yes   Sig: Take 200 mg by mouth every 4 hours as needed for pain      Facility-Administered Medications: None     Allergies   Allergies   Allergen Reactions    Zosyn [Piperacillin-Tazobactam In Dex] Hives and Itching    Keflex [Cephalexin] Hives    Avocado Hives    Chantix [Varenicline Tartrate] Other (See Comments)     depression       Immunization History   Immunization History   Administered Date(s) Administered    COVID-19 Vaccine (Allan) 11/22/2021    Flu, Unspecified 01/14/2011, 10/17/2011    Influenza (IIV3) PF 01/14/2011, 10/17/2011    Influenza Vaccine, 6+MO IM (QUADRIVALENT W/PRESERVATIVES) 09/30/2015    TDAP Vaccine (Adacel) 01/14/2011, 01/19/2012    Td (Adult), Adsorbed 12/31/2001    Tdap (Adult) Unspecified Formulation 12/31/2001       Social History   I have reviewed this patient's social history and updated it with pertinent information if needed. Vianca Kumar  reports that she has been smoking cigarettes. She has a 31.00 pack-year smoking history. She has never used smokeless tobacco. She reports current alcohol use. She reports current drug use. Drug: Marijuana.    Family History   I have reviewed this patient's family history and updated it with pertinent information if needed.   Family History   Problem Relation Age of Onset    Hypertension Mother     Hyperlipidemia Mother     Hypertension Brother     Hyperlipidemia Brother     Myocardial Infarction Maternal Grandfather 47    Myocardial Infarction Maternal Uncle         multiple later in life    Diabetes No family hx of     Breast Cancer No family hx of     Ovarian Cancer No family hx of     Colon Cancer No family hx of     Cerebrovascular Disease No family hx of        Review of Systems   The 10 point Review of Systems is negative    Physical Exam   Temp: 97.8  F (36.6  C) Temp src: Oral BP: (!) 121/93 Pulse: 67   Resp: 17 SpO2: 97 % O2 Device: None (Room air)    Vital Signs with Ranges  Temp:  [97.7  F (36.5  C)-98.7  F  (37.1  C)] 97.8  F (36.6  C)  Pulse:  [63-91] 67  Resp:  [16-17] 17  BP: (110-142)/() 121/93  SpO2:  [91 %-98 %] 97 %  200 lbs 6.37 oz  Body mass index is 30.47 kg/m .    GENERAL APPEARANCE:  awake  EYES: Eyes grossly normal to inspection but she does have some swelling around the eyes and nose.   NECK: no adenopathy  RESP: lungs clear   CV: regular rates and rhythm  LYMPHATICS: normal ant/post cervical and supraclavicular nodes  ABDOMEN: soft, nontender  MS: extremities normal  SKIN: no suspicious lesions or rashes        Data   All laboratory data reviewed    Component      Latest Ref [facility] 11/24/2023  2:25 PM 11/25/2023  6:06 AM   WBC      4.0 - 11.0 10e3/uL 11.7 (H)  8.2    RBC Count      3.80 - 5.20 10e6/uL 4.83  4.36    Hemoglobin      11.7 - 15.7 g/dL 14.9  13.7    Hematocrit      35.0 - 47.0 % 43.8  40.9    MCV      78 - 100 fL 91  94    MCH      26.5 - 33.0 pg 30.8  31.4    MCHC      31.5 - 36.5 g/dL 34.0  33.5    RDW      10.0 - 15.0 % 11.8  12.0    Platelet Count      150 - 450 10e3/uL 449  378    % Neutrophils      % 59  43    % Lymphocytes      % 30  41    % Monocytes      % 6  7    % Eosinophils      % 4  7    % Basophils      % 1  1    % Immature Granulocytes      % 1  1    NRBCs per 100 WBC      <1 /100  0    Absolute Neutrophils      1.6 - 8.3 10e3/uL 6.9  3.5    Absolute Lymphocytes      0.8 - 5.3 10e3/uL 3.5  3.4    Absolute Monocytes      0.0 - 1.3 10e3/uL 0.7  0.6    Absolute Eosinophils      0.0 - 0.7 10e3/uL 0.5  0.5    Absolute Basophils      0.0 - 0.2 10e3/uL 0.1  0.1    Absolute Immature Granulocytes      <=0.4 10e3/uL 0.1  0.1    Absolute NRBCs      10e3/uL  0.0       Component      Latest Ref [facility] 11/24/2023  2:25 PM 11/25/2023  6:06 AM   Sodium      135 - 145 mmol/L 137  140    Potassium      3.4 - 5.3 mmol/L 3.9  4.1    Carbon Dioxide (CO2)      22 - 29 mmol/L 25  26    Anion Gap      7 - 15 mmol/L 11  9    Urea Nitrogen      6.0 - 20.0 mg/dL 2.9 (L)  4.8 (L)    Creatinine      0.51  - 0.95 mg/dL 0.58  0.65    GFR Estimate      >60 mL/min/1.73m2 >90  >90    Calcium      8.6 - 10.0 mg/dL 9.3  9.1    Chloride      98 - 107 mmol/L 101  105    Glucose      70 - 99 mg/dL 103 (H)  92    Alkaline Phosphatase      40 - 150 U/L 117  94    AST      0 - 45 U/L 19  20    ALT      0 - 50 U/L 14  13    Protein Total      6.4 - 8.3 g/dL 7.5  6.5    Albumin      3.5 - 5.2 g/dL 4.4  3.8    Bilirubin Total      <=1.2 mg/dL 0.7  0.6            Component      Latest Ref Rng 10/13/2023  3:24 AM 11/24/2023  2:25 PM   CRP Inflammation      <5.00 mg/L 35.09 (H)  50.22 (H)         MRSA nasal swab negative 10/13/23    Blood cultures 10/13 and 11/24/23: No growth to date.       EXAM: CT FACIAL BONES WITH CONTRAST  LOCATION: [facility]  DATE: 11/24/2023     INDICATION: Facial pain, swelling and fever: see previous CT scan results 10 13 23: Concerning for anterior nose soft tissue infection, with a potentially necrotizing component however felt to be less likely given relative sparing of the adjacent   structures.  COMPARISON: CT facial bones 10/13/2023  CONTRAST: 67ml isovue 370  TECHNIQUE: Routine CT Maxillofacial with IV contrast. Multiplanar reformats. Dose reduction techniques were used.      FINDINGS:  OSSEOUS STRUCTURES/SOFT TISSUES: Mild soft tissue swelling of both sides of the nose extending from the tip of the nose up to the glabella consistent with cellulitis. The degree of soft tissue swelling has decreased since the comparison study. No   evidence for fluid collection or abscess. No aggressive bony changes of the adjacent bones. Large anterior nasal septal defect again noted. No facial bone fracture or malalignment. Scattered mildly prominent lymph nodes in the suprahyoid neck in both   sides again noted possibly reactive in nature.     ORBITAL CONTENTS: No acute abnormality.     SINUSES: Mild polypoid mucosal thickening in the maxillary sinuses again noted.     VISUALIZED  INTRACRANIAL CONTENTS: No acute abnormality.                                                                       IMPRESSION:   1.  Mild soft tissue thickening of the nose again noted but decreased from the comparison study possibly representing persistent cellulitis. No evidence for fluid collection or abscess.  2.  Anterior nasal septal defect again noted.  3.  Minimal mucosal thickening in the maxillary sinuses bilaterally again noted. No evidence for acute sinusitis.

## 2023-11-25 NOTE — ED PROVIDER NOTES
History     Chief Complaint:  Wound Check       HPI   Vianca Kumar is a 48 year old female who presents to the emergency department with facial swelling and redness.  She has a history of recurrent MRSA infections and just finished her final course of clindamycin yesterday.  She states the redness and swelling worsened today.  She has a long history of nasal cocaine abuse which may be contributing.  She denies.  High fevers she states in the past oral antibiotics do not initially work and she requires IV antibiotics.  She went to urgent care before coming here had blood work blood cultures and CT scan of the face done.  It did show nasal and facial swelling consistent with cellulitis.        Independent Historian:    None    Review of External Notes:  Urgent care notes    Medications:    cetirizine (ZYRTEC) 10 MG tablet  escitalopram (LEXAPRO) 20 MG tablet  ibuprofen (ADVIL/MOTRIN) 200 MG tablet        Past Medical History:    Past Medical History:   Diagnosis Date    MDD (major depressive disorder)     Obesity (BMI 30-39.9)        Past Surgical History:    Past Surgical History:   Procedure Laterality Date    CAPSULE/PILL CAM ENDOSCOPY N/A 04/03/2018    Procedure: CAPSULE/PILL CAM ENDOSCOPY;;  Surgeon: Guru Hallie Song MD;  Location:  GI    COLONOSCOPY N/A 12/28/2017    Procedure: COLONOSCOPY;  Surgeon: Yosi Beck MD;  Location: [facility];  Service:     COLONOSCOPY N/A 08/11/2022    Procedure: COLONOSCOPY, WITH POLYPECTOMY AND BIOPSY;  Surgeon: Lorri Holley DO;  Location:  OR    COLONOSCOPY WITH CO2 INSUFFLATION N/A 08/11/2022    Procedure: COLONOSCOPY, WITH CO2 INSUFFLATION;  Surgeon: Lorri Holley DO;  Location:  OR    COMBINED ESOPHAGOSCOPY, GASTROSCOPY, DUODENOSCOPY (EGD) WITH CO2 INSUFFLATION N/A 08/11/2022    Procedure: ESOPHAGOGASTRODUODENOSCOPY, WITH CO2 INSUFFLATION;  Surgeon: Lorri Holley DO;  Location:  OR    COMBINED ESOPHAGOSCOPY,  GASTROSCOPY, DUODENOSCOPY (EGD) WITH CO2 INSUFFLATION N/A 11/25/2022    Procedure: ESOPHAGOGASTRODUODENOSCOPY, WITH CO2 INSUFFLATION;  Surgeon: Lorri Holley DO;  Location: [facility] OR    ENDOSCOPIC ULTRASOUND UPPER GASTROINTESTINAL TRACT (GI) N/A 04/03/2018    Procedure: ENDOSCOPIC ULTRASOUND, ESOPHAGOSCOPY / UPPER GASTROINTESTINAL TRACT (GI);  EUS/Capsule ;  Surgeon: Guru Hallie Song MD;  Location: [facility] GI    ESOPHAGOSCOPY, GASTROSCOPY, DUODENOSCOPY (EGD), COMBINED N/A 12/28/2017    Procedure: ESOPHAGOGASTRODUODENOSCOPY (EGD);  Surgeon: Yosi Beck MD;  Location: [facility];  Service:     ESOPHAGOSCOPY, GASTROSCOPY, DUODENOSCOPY (EGD), COMBINED N/A 08/11/2022    Procedure: ESOPHAGOGASTRODUODENOSCOPY, WITH BIOPSY;  Surgeon: Lorri Holley DO;  Location:  OR    ESOPHAGOSCOPY, GASTROSCOPY, DUODENOSCOPY (EGD), COMBINED N/A 11/25/2022    Procedure: ESOPHAGOGASTRODUODENOSCOPY, WITH BIOPSY;  Surgeon: Lorri Holley DO;  Location:  OR    KNEE SURGERY Left     osteotomy; screws and plate in place    LAPAROSCOPIC CHOLECYSTECTOMY N/A 04/25/2018    Procedure: LAPAROSCOPIC CHOLECYSTECTOMY;  Laparoscopic Cholecystectomy ;  Surgeon: Alberto Noble MD;  [facility]    LAPAROSCOPIC NISSEN FUNDOPLICATION N/A 5/23/2023    Procedure: ROBOTIC ASSISTED LAPAROSCOPIC HERNIORRHAPHY, HIATAL WITH MESH AND MAGNETIC SPHINCTER AUGMENTATION;  Surgeon: Abdiaziz Tolbert MD;  Location: [facility] OR          Physical Exam   Patient Vitals for the past 24 hrs:   BP Temp Temp src Pulse Resp SpO2   11/24/23 1826 129/79 -- -- -- -- 91 %   11/24/23 1651 115/84 97.7  F (36.5  C) Oral 84 16 96 %        Physical Exam  Constitutional: White female sitting no respiratory distress  HENT: No signs of trauma.  Mild redness and swelling over right cheek dorsum of nose and glabellar region of forehead  Eyes: EOM are normal. Pupils are equal, round, and reactive to light.   Neck: Normal range of motion. No JVD present. No  cervical adenopathy.  Cardiovascular: Regular rhythm.  Exam reveals no gallop and no friction rub.    No murmur heard.  Pulmonary/Chest: Bilateral breath sounds normal. No wheezes, rhonchi or rales.  Abdominal: Soft. No tenderness. No rebound or guarding.   Musculoskeletal: No edema. No tenderness.   Lymphadenopathy: No lymphadenopathy.   Neurological: Alert and oriented to person, place, and time. Normal strength. Coordination normal.   Skin: Skin is warm and dry. No rash noted. No erythema.      Emergency Department Course       Imaging:  No orders to display     Report per radiologist    Laboratory:  Labs Ordered and Resulted from Time of ED Arrival to Time of ED Departure - No data to display     Procedures   None seen and examined    Emergency Department Course & Assessments:             Interventions:  Medications   vancomycin (VANCOCIN) 1,750 mg in 0.9% NaCl 500 mL intermittent infusion (1,750 mg Intravenous $Given 11/24/23 1927)   meropenem (MERREM) 500 mg vial to attach to  mL bag for ADULTS or 25 mL bag for PEDS (500 mg Intravenous $New Bag 11/24/23 1827)   oxyCODONE IR (ROXICODONE) half-tab 2.5 mg (has no administration in time range)   oxyCODONE (ROXICODONE) tablet 5 mg (5 mg Oral $Given 11/24/23 1826)        Assessments:  Seen examined outside urgent care records reviewed    Independent Interpretation (X-rays, CTs, rhythm strip):  None    Consultations/Discussion of Management or Tests:  Hospitalist       Social Determinants of Health affecting care:  None     Disposition:  Admit    Impression & Plan    CMS Diagnoses: None  Medical Decision Making:  This patient with recurrent MRSA infections presents to the ED with increased facial swelling and redness.  She just finished a course of outpatient antibiotics.  She feels IV antibiotics is the only way to treat this.  Urgent care white count and CRP were both elevated.  Patient will be admitted for antibiotics.        Diagnosis:    ICD-10-CM    1.  Facial cellulitis  L03.211            Discharge Medications:  New Prescriptions    No medications on file          Winston Morales MD  11/24/2023   Winston Morales MD Steinman, Randall Ira, MD  11/24/23 1944

## 2023-11-25 NOTE — PROGRESS NOTES
Pt arrived to unit around 2040. AO x4. VSS. CMS intact. On bedrest. Pt has a cough/congested. Stated pain is mostly on R side of face and top of head. Bilateral nasal swelling and perforated septum. MRSA lab test negative. Intermittent ABX infusing. Pt became extremely itchy and a rash started to form around neck/chest after pt came up from ED. MD campos, benadryl administered. Vanco started at a slower rate. Itching subsided. Oxy for pain. IV Dilaudid given 1x.

## 2023-11-25 NOTE — ED NOTES
[facility]  ED Nurse Handoff Report    ED Chief complaint: Wound Check      ED Diagnosis:   Final diagnoses:   Facial cellulitis       Code Status: Full Code    Allergies:   Allergies   Allergen Reactions    Zosyn [Piperacillin-Tazobactam In Dex] Hives and Itching    Keflex [Cephalexin] Hives    Avocado Hives    Chantix [Varenicline Tartrate] Other (See Comments)     depression       Patient Story: pt has had cellulitis for 1month on her nose. Finished oral abx yesterday and had no improvement. Urgent care sent here for IV abx and ENT consult.   Focused Assessment:  nose appears swollen and red, pt states it is painful.    Treatments and/or interventions provided: IV abx, oxy 5mg for pain  Patient's response to treatments and/or interventions: tolerated pain med well    To be done/followed up on inpatient unit:  continue iv abx and pain control.     Does this patient have any cognitive concerns?:  NA    Activity level - Baseline/Home:  Independent  Activity Level - Current:   Independent    Patient's Preferred language: English   Needed?: No    Isolation: None  Infection: Not Applicable  Patient tested for COVID 19 prior to admission: NO  Bariatric?: No    Vital Signs:   Vitals:    11/24/23 1651 11/24/23 1826   BP: 115/84 129/79   Pulse: 84    Resp: 16    Temp: 97.7  F (36.5  C)    TempSrc: Oral    SpO2: 96% 91%       Cardiac Rhythm:      Was the PSS-3 completed:   Yes  What interventions are required if any?               Family Comments: n/a family not present  OBS brochure/video discussed/provided to patient/family: No    For the majority of the shift this patient's behavior was Green.   Behavioral interventions performed were care and rounding.    ED NURSE PHONE NUMBER: [phone]

## 2023-11-25 NOTE — PROGRESS NOTES
RECEIVING UNIT ED HANDOFF REVIEW    ED Nurse Handoff Report was reviewed by: Sonya Mejia RN on November 24, 2023 at 8:08 PM

## 2023-11-25 NOTE — PROVIDER NOTIFICATION
MD Notification    Notified Person: MD    Notified Person Name: Mohsan Chaudhry    Notification Date/Time: 11/24 9:05 pm    Notification Interaction: [employer]     Purpose of Notification:  Pt has had Vanco infusing since 7:27 pm. She is having severe itching and a rash forming around neck. Stated it started around the time the Vanco was started. Stopped IV.     Sonya GUPTA ortho spine [phone]    Orders Received: Benadryl    Comments:

## 2023-11-26 VITALS
DIASTOLIC BLOOD PRESSURE: 67 MMHG | TEMPERATURE: 97.8 F | SYSTOLIC BLOOD PRESSURE: 124 MMHG | OXYGEN SATURATION: 97 % | BODY MASS INDEX: 30.37 KG/M2 | RESPIRATION RATE: 19 BRPM | WEIGHT: 199.74 LBS | HEART RATE: 63 BPM

## 2023-11-26 LAB — POTASSIUM SERPL-SCNC: 4.1 MMOL/L (ref 3.4–5.3)

## 2023-11-26 PROCEDURE — 250N000011 HC RX IP 250 OP 636: Performed by: HOSPITALIST

## 2023-11-26 PROCEDURE — 99239 HOSP IP/OBS DSCHRG MGMT >30: CPT | Performed by: HOSPITALIST

## 2023-11-26 PROCEDURE — 250N000013 HC RX MED GY IP 250 OP 250 PS 637: Performed by: HOSPITALIST

## 2023-11-26 PROCEDURE — 84132 ASSAY OF SERUM POTASSIUM: CPT | Performed by: HOSPITALIST

## 2023-11-26 PROCEDURE — 250N000013 HC RX MED GY IP 250 OP 250 PS 637: Performed by: INTERNAL MEDICINE

## 2023-11-26 PROCEDURE — 36415 COLL VENOUS BLD VENIPUNCTURE: CPT | Performed by: HOSPITALIST

## 2023-11-26 RX ORDER — DOXYCYCLINE 100 MG/1
100 CAPSULE ORAL EVERY 12 HOURS SCHEDULED
Status: DISCONTINUED | OUTPATIENT
Start: 2023-11-26 | End: 2023-11-26

## 2023-11-26 RX ORDER — OXYCODONE HYDROCHLORIDE 5 MG/1
5-10 TABLET ORAL EVERY 8 HOURS PRN
Qty: 6 TABLET | Refills: 0 | Status: SHIPPED | OUTPATIENT
Start: 2023-11-26 | End: 2023-11-27

## 2023-11-26 RX ORDER — DOXYCYCLINE 100 MG/1
100 CAPSULE ORAL EVERY 12 HOURS
Qty: 28 CAPSULE | Refills: 0 | Status: ON HOLD | OUTPATIENT
Start: 2023-11-26 | End: 2023-12-03

## 2023-11-26 RX ORDER — MUPIROCIN 20 MG/G
OINTMENT TOPICAL 2 TIMES DAILY
Start: 2023-11-26

## 2023-11-26 RX ORDER — DOXYCYCLINE 100 MG/1
100 CAPSULE ORAL EVERY 12 HOURS SCHEDULED
Status: DISCONTINUED | OUTPATIENT
Start: 2023-11-26 | End: 2023-11-26 | Stop reason: HOSPADM

## 2023-11-26 RX ADMIN — NICOTINE 1 PATCH: 14 PATCH, EXTENDED RELEASE TRANSDERMAL at 10:41

## 2023-11-26 RX ADMIN — ESCITALOPRAM OXALATE 20 MG: 20 TABLET, FILM COATED ORAL at 10:40

## 2023-11-26 RX ADMIN — OXYCODONE HYDROCHLORIDE 10 MG: 5 TABLET ORAL at 15:15

## 2023-11-26 RX ADMIN — ACETAMINOPHEN 975 MG: 325 TABLET, FILM COATED ORAL at 00:32

## 2023-11-26 RX ADMIN — MEROPENEM 500 MG: 500 INJECTION, POWDER, FOR SOLUTION INTRAVENOUS at 00:26

## 2023-11-26 RX ADMIN — MUPIROCIN: 20 OINTMENT TOPICAL at 10:46

## 2023-11-26 RX ADMIN — ACETAMINOPHEN 975 MG: 325 TABLET, FILM COATED ORAL at 15:15

## 2023-11-26 RX ADMIN — MEROPENEM 500 MG: 500 INJECTION, POWDER, FOR SOLUTION INTRAVENOUS at 06:10

## 2023-11-26 RX ADMIN — OXYCODONE HYDROCHLORIDE 10 MG: 5 TABLET ORAL at 10:46

## 2023-11-26 ASSESSMENT — ACTIVITIES OF DAILY LIVING (ADL)
ADLS_ACUITY_SCORE: 35

## 2023-11-26 NOTE — PLAN OF CARE
Date & Time: 11/25-26/23, [id number]  Behavior & Aggression: green  Fall Risk: no  Orientation: AXOx4  ABNL VS/O2: VSS on RA  ABNL Labs:   Pain Management: PRN tylenol   Bowel/Bladder: continent   Drains: PIV SL with intermittent ABX  Diet: regular  Activity Level: IND  Tests/Procedures:   Anticipated  DC Date: 11/27/23, pending improvements  Significant Information:  No signs of withdrawal, localized swelling the face, tenderness of bilateral nostrils

## 2023-11-26 NOTE — DISCHARGE SUMMARY
"[facility]  Hospitalist Discharge Summary      Date of Admission:  11/24/2023  Date of Discharge:  11/26/2023  Discharging Provider: Arie Marino MD  Discharge Service: Hospitalist Service    Discharge Diagnoses   Recurrent nasal cellulitis with extensive septal perforation  Hx intranasal cocaine use  Hx alcohol use and cocaine use  Tobacco use disorder  MDD with grief reaction    Clinically Significant Risk Factors     # Obesity: Estimated body mass index is 30.37 kg/m  as calculated from the following:    Height as of an earlier encounter on 11/24/23: 1.727 m (5' 8\").    Weight as of this encounter: 90.6 kg (199 lb 11.8 oz).       Follow-ups Needed After Discharge   Follow-up Appointments     Follow-up and recommended labs and tests       Follow up with primary care provider, Whitney Perez, within 7 days   for hospital follow- up.  No follow up labs or test are needed.  Follow up with ENT within 2 weeks.            Unresulted Labs Ordered in the Past 30 Days of this Admission       Date and Time Order Name Status Description    11/24/2023  2:09 PM BLOOD CULTURE Preliminary     11/24/2023  2:07 PM BLOOD CULTURE Preliminary         These results will be followed up by: hospitalist group; would suspect any positive blood culture following discharge is likely contaminant    Discharge Disposition   Discharged to home  Condition at discharge: Stable    Hospital Course   Vianca Kumar is a 48 year old female admitted on 11/24/2023.  Past medical history significant for MDD, obesity, hx paraesophageal diaphragmatic hernia repair, recent admission for facial cellulitis and nasal septal perforation 10/13-10/17, was sent to the ER from urgent care for evaluation of ongoing facial cellulitis.       Recurrent cellulitis anterior nose with extensive septal perforation  H/o intranasal cocaine use   See H&P and prior discharge summary for details of admission 10/13 - 10/17 for cellulitis " anterior nose and extensive septal perforation.  She presented to Urgent Care day of admission for ongoing pain and increased facial swelling.  WBC minimally elevated at 11.7 and CRP 50; facial CT showing persistent cellulitis.  ID and ENT were consulted.  She was placed on ertapenem during admission with plan to transition to doxycycline x2-3 weeks at discharge per ID.  ENT recommended twice daily antibiotic irrigation and follow up in clinic within 2 weeks. She will discharge with very limited course of oxycodone for pain control.      H/o Alcohol use and Cocaine use  Patient denies alcohol or cocaine use lately.  States that she takes marijuana only.     Tobacco use disorder  Declined nicotine patch     MDD with grief reaction   Continue PTA Lexapro and follow up with outpatient therapist.         Consultations This Hospital Stay   INFECTIOUS DISEASES IP CONSULT  ENT IP CONSULT  PHARMACY TO DOSE VANCO  CARE MANAGEMENT / SOCIAL WORK IP CONSULT    Code Status   Full Code    Time Spent on this Encounter   I, Arie Marino MD, personally saw the patient today and spent greater than 30 minutes discharging this patient.       Arie Marino MD  [facility]  [address] [facility] [address] [address]  Phone: [phone]  Fax: [phone]  ______________________________________________________________________    Physical Exam   Vital Signs: Temp: 97.8  F (36.6  C) Temp src: Oral BP: 124/67 Pulse: 63   Resp: 19 SpO2: 97 % O2 Device: None (Room air)    Weight: 199 lbs 11.79 oz  General Appearance: Well nourished female in NAD  Respiratory: lungs CTAB, no wheezes or crackles  Cardiovascular: RRR, normal s1/s2 without murmur  GI: normal bowel sounds  Skin: minimal persisting edema of nasal bridge, no erythema  Other: Alert and appropriate, CN grossly intact        Primary Care Physician   Whitney Perez    Discharge Orders      Reason for your hospital stay    You were admitted for  recurrent nasal cellulitis related to septal perforation.     Follow-up and recommended labs and tests     Follow up with primary care provider, Whitney Perez, within 7 days for hospital follow- up.  No follow up labs or test are needed.  Follow up with ENT within 2 weeks.     Activity    Your activity upon discharge: activity as tolerated     Wound care and dressings    Instructions to care for your wound at home: Perform antibiotic irrigations (1/3 inch strip of mupirocin in warm nasal saline rinse) twice daily.  If unable to do this, perform frequent nasal saline rinses and application of mupirocin ointment to nares 1-2 times daily.     When to contact your care team    Call your primary care doctor if you develop persistent, watery diarrhea or new abdominal pain while on antibiotics or within about 2 weeks of completing antibiotic course (mild loose stool is not of concern) as this may be a sign of a separate infection needing a different antibiotic.     Diet    Follow this diet upon discharge: Regular Diet Adult       Significant Results and Procedures   Most Recent 3 CBC's:  Recent Labs   Lab Test 11/25/23  0606 11/24/23  1425 10/15/23  0838 10/14/23  1153   WBC 8.2 11.7*  --  7.4   HGB 13.7 14.9 11.6* 11.5*   MCV 94 91  --  93    449  --  303     Most Recent 3 BMP's:  Recent Labs   Lab Test 11/26/23  1025 11/25/23  0606 11/24/23  1425 10/14/23  1153   NA  --  140 137 141   POTASSIUM 4.1 4.1 3.9 3.8   CHLORIDE  --  105 101 109*   CO2  --  26 25 20*   BUN  --  4.8* 2.9* 4.2*   CR  --  0.65 0.58 0.68   ANIONGAP  --  9 11 12   MARIAH  --  9.1 9.3 8.5*   GLC  --  92 103* 83     Most Recent 2 LFT's:  Recent Labs   Lab Test 11/25/23  0606 11/24/23  1425   AST 20 19   ALT 13 14   ALKPHOS 94 117   BILITOTAL 0.6 0.7     Most Recent ESR & CRP:  Recent Labs   Lab Test 11/24/23  1425 10/13/23  0953 10/13/23  0324 02/25/20  1558   SED  --  4  --   --    CRP  --   --   --  0.4   CRPI 50.22*  --    < >  --     < > =  values in this interval not displayed.   ,   Results for orders placed or performed in visit on 11/24/23   CT Facial Bones with Contrast    Narrative    EXAM: CT FACIAL BONES WITH CONTRAST  LOCATION: [facility]  DATE: 11/24/2023    INDICATION: Facial pain, swelling and fever: see previous CT scan results 10 13 23: Concerning for anterior nose soft tissue infection, with a potentially necrotizing component however felt to be less likely given relative sparing of the adjacent   structures.  COMPARISON: CT facial bones 10/13/2023  CONTRAST: 67ml isovue 370  TECHNIQUE: Routine CT Maxillofacial with IV contrast. Multiplanar reformats. Dose reduction techniques were used.     FINDINGS:  OSSEOUS STRUCTURES/SOFT TISSUES: Mild soft tissue swelling of both sides of the nose extending from the tip of the nose up to the glabella consistent with cellulitis. The degree of soft tissue swelling has decreased since the comparison study. No   evidence for fluid collection or abscess. No aggressive bony changes of the adjacent bones. Large anterior nasal septal defect again noted. No facial bone fracture or malalignment. Scattered mildly prominent lymph nodes in the suprahyoid neck in both   sides again noted possibly reactive in nature.    ORBITAL CONTENTS: No acute abnormality.    SINUSES: Mild polypoid mucosal thickening in the maxillary sinuses again noted.    VISUALIZED INTRACRANIAL CONTENTS: No acute abnormality.       Impression    IMPRESSION:   1.  Mild soft tissue thickening of the nose again noted but decreased from the comparison study possibly representing persistent cellulitis. No evidence for fluid collection or abscess.  2.  Anterior nasal septal defect again noted.  3.  Minimal mucosal thickening in the maxillary sinuses bilaterally again noted. No evidence for acute sinusitis.       Discharge Medications   Current Discharge Medication List        START taking these medications    Details    doxycycline hyclate (VIBRAMYCIN) 100 MG capsule Take 1 capsule (100 mg) by mouth every 12 hours for 14 days  Qty: 28 capsule, Refills: 0    Associated Diagnoses: Facial cellulitis      mupirocin (BACTROBAN) 2 % external ointment Apply topically 2 times daily    Associated Diagnoses: Facial cellulitis           CONTINUE these medications which have NOT CHANGED    Details   cetirizine (ZYRTEC) 10 MG tablet Take 10 mg by mouth daily      escitalopram (LEXAPRO) 20 MG tablet Take 1 tablet (20 mg) by mouth daily  Qty: 90 tablet, Refills: 3    Associated Diagnoses: Recurrent major depressive disorder, in full remission (H24)      ibuprofen (ADVIL/MOTRIN) 200 MG tablet Take 200 mg by mouth every 4 hours as needed for pain           Allergies   Allergies   Allergen Reactions    Zosyn [Piperacillin-Tazobactam In Dex] Hives and Itching    Keflex [Cephalexin] Hives    Avocado Hives    Chantix [Varenicline Tartrate] Other (See Comments)     depression

## 2023-11-26 NOTE — PROGRESS NOTES
"Dr. Ned spencer messaged \" are you comfortable prescribing pain medication for this patient? None have been ordered.\"  "

## 2023-11-26 NOTE — PLAN OF CARE
A&Ox4. Independent in room. Rash improved on R neck. Still has some bilateral nasal congestion and swelling. Adequate I/O. PIV SL w/ intermittent antibx. Pain managed w/ PRN oxycodone, tylenol and IV dilaudid x1.

## 2023-11-27 ENCOUNTER — PATIENT OUTREACH (OUTPATIENT)
Dept: CARE COORDINATION | Facility: CLINIC | Age: 48
End: 2023-11-27
Payer: MEDICARE

## 2023-11-27 DIAGNOSIS — L03.211 FACIAL CELLULITIS: ICD-10-CM

## 2023-11-27 RX ORDER — OXYCODONE HYDROCHLORIDE 5 MG/1
5-10 TABLET ORAL EVERY 8 HOURS PRN
Qty: 6 TABLET | Refills: 0 | Status: ON HOLD | OUTPATIENT
Start: 2023-11-27 | End: 2023-12-03

## 2023-11-27 NOTE — PROGRESS NOTES
Clinic Care Coordination Contact  [facility]: Post-Discharge Note  SITUATION                                                      Admission:    Admission Date: 11/24/23   Reason for Admission: Facial cellulitis  Discharge:   Discharge Date: 11/26/23  Discharge Diagnosis: Recurrent nasal cellulitis with extensive septal perforation  Hx intranasal cocaine use  Hx alcohol use and cocaine use  Tobacco use disorder  MDD with grief reaction    BACKGROUND                                                      Per hospital discharge summary and inpatient provider notes:    Vianca Kumar is a 48 year old female who presents to the emergency department with facial swelling and redness.  She has a history of recurrent MRSA infections and just finished her final course of clindamycin yesterday.  She states the redness and swelling worsened today.  She has a long history of nasal cocaine abuse which may be contributing.  She denies.  High fevers she states in the past oral antibiotics do not initially work and she requires IV antibiotics.  She went to urgent care before coming here had blood work blood cultures and CT scan of the face done.  It did show nasal and facial swelling consistent with cellulitis.        ASSESSMENT           Discharge Assessment  How are you doing now that you are home?: Patient states she just woke up a little bit ago.  States she feels pretty crummy.  Patient states her symptoms are about the same as when she left the hospital.  Patient is doing the wound care at home, this is going okay.  Denies any worsening symptoms.  How are your symptoms? (Red Flag symptoms escalate to triage hotline per guidelines): Unchanged  Do you feel your condition is stable enough to be safe at home until your provider visit?: Yes  Does the patient have their discharge instructions? : Yes  Does the patient have questions regarding their discharge instructions? : No  Were you started on any new medications or were  there changes to any of your previous medications? : Yes  Does the patient have all of their medications?: No (see comment) (Patient states the pharmacy was not open to get the pain medication, she states she will get that today.)  Do you have questions regarding any of your medications? : No  Do you have all of your needed medical supplies or equipment (DME)?  (i.e. oxygen tank, CPAP, cane, etc.): Yes  Discharge follow-up appointment scheduled within 14 calendar days? : No  Is patient agreeable to assistance with scheduling? : Yes         Post-op (Clinicians Only)  Did the patient have surgery or a procedure: No  Fever: No  Chills: No        PLAN                                                      Outpatient Plan:   Follow up with primary care provider, Whitney Perez, within 7 days   for hospital follow- up.  No follow up labs or test are needed.  Follow up with ENT within 2 weeks.        No future appointments.      For any urgent concerns, please contact our 24 hour nurse triage line: [phone] ([phone])         Zenobia Ahuja RN

## 2023-11-27 NOTE — TELEPHONE ENCOUNTER
Dr Perez,     Patient called. She was recently discharged from the hospital. On discharge they accidentally sent her pain medication to a [facility] in [place], [place] instead of the one in the [facility]     Pt reports she has had no pain control since yesterday. Pt asking if PCP would send ASAP to correct (pended) pharmacy?     Jada MONTERO, Triage RN  [facility]

## 2023-11-29 LAB
BACTERIA BLD CULT: NO GROWTH
BACTERIA BLD CULT: NO GROWTH

## 2023-12-03 ENCOUNTER — HOSPITAL ENCOUNTER (INPATIENT)
Facility: CLINIC | Age: 48
LOS: 3 days | Discharge: HOME | DRG: 603 | End: 2023-12-06
Attending: STUDENT IN AN ORGANIZED HEALTH CARE EDUCATION/TRAINING PROGRAM | Admitting: INTERNAL MEDICINE
Payer: MEDICARE

## 2023-12-03 ENCOUNTER — APPOINTMENT (OUTPATIENT)
Dept: CT IMAGING | Facility: CLINIC | Age: 48
DRG: 603 | End: 2023-12-03
Attending: PHYSICIAN ASSISTANT
Payer: MEDICARE

## 2023-12-03 ENCOUNTER — HOSPITAL ENCOUNTER (INPATIENT)
Facility: CLINIC | Age: 48
LOS: 1 days | Discharge: ANOTHER HOSPITAL | DRG: 603 | End: 2023-12-03
Attending: EMERGENCY MEDICINE | Admitting: HOSPITALIST
Payer: MEDICARE

## 2023-12-03 ENCOUNTER — NURSE TRIAGE (OUTPATIENT)
Dept: NURSING | Facility: CLINIC | Age: 48
End: 2023-12-03
Payer: MEDICARE

## 2023-12-03 VITALS
SYSTOLIC BLOOD PRESSURE: 107 MMHG | HEART RATE: 66 BPM | DIASTOLIC BLOOD PRESSURE: 67 MMHG | OXYGEN SATURATION: 93 % | WEIGHT: 198 LBS | RESPIRATION RATE: 16 BRPM | HEIGHT: 68 IN | TEMPERATURE: 98.5 F | BODY MASS INDEX: 30.01 KG/M2

## 2023-12-03 DIAGNOSIS — L03.211 FACIAL CELLULITIS: Primary | ICD-10-CM

## 2023-12-03 DIAGNOSIS — H04.123 DRY EYES: ICD-10-CM

## 2023-12-03 DIAGNOSIS — L03.211 FACIAL CELLULITIS: ICD-10-CM

## 2023-12-03 LAB
ANION GAP SERPL CALCULATED.3IONS-SCNC: 10 MMOL/L (ref 7–15)
BASOPHILS # BLD AUTO: 0.1 10E3/UL (ref 0–0.2)
BASOPHILS NFR BLD AUTO: 1 %
BUN SERPL-MCNC: 4.1 MG/DL (ref 6–20)
CALCIUM SERPL-MCNC: 9.2 MG/DL (ref 8.6–10)
CHLORIDE SERPL-SCNC: 103 MMOL/L (ref 98–107)
CREAT SERPL-MCNC: 0.63 MG/DL (ref 0.51–0.95)
CRP SERPL-MCNC: 19.59 MG/L
DEPRECATED HCO3 PLAS-SCNC: 25 MMOL/L (ref 22–29)
EGFRCR SERPLBLD CKD-EPI 2021: >90 ML/MIN/1.73M2
EOSINOPHIL # BLD AUTO: 0.4 10E3/UL (ref 0–0.7)
EOSINOPHIL NFR BLD AUTO: 3 %
ERYTHROCYTE [DISTWIDTH] IN BLOOD BY AUTOMATED COUNT: 11.8 % (ref 10–15)
GLUCOSE SERPL-MCNC: 128 MG/DL (ref 70–99)
HCO3 BLDV-SCNC: 24 MMOL/L (ref 21–28)
HCT VFR BLD AUTO: 42.2 % (ref 35–47)
HGB BLD-MCNC: 14.2 G/DL (ref 11.7–15.7)
IMM GRANULOCYTES # BLD: 0.1 10E3/UL
IMM GRANULOCYTES NFR BLD: 1 %
LACTATE BLD-SCNC: 0.9 MMOL/L
LYMPHOCYTES # BLD AUTO: 2 10E3/UL (ref 0.8–5.3)
LYMPHOCYTES NFR BLD AUTO: 17 %
MCH RBC QN AUTO: 31.1 PG (ref 26.5–33)
MCHC RBC AUTO-ENTMCNC: 33.6 G/DL (ref 31.5–36.5)
MCV RBC AUTO: 92 FL (ref 78–100)
MONOCYTES # BLD AUTO: 0.7 10E3/UL (ref 0–1.3)
MONOCYTES NFR BLD AUTO: 6 %
NEUTROPHILS # BLD AUTO: 8.8 10E3/UL (ref 1.6–8.3)
NEUTROPHILS NFR BLD AUTO: 72 %
NRBC # BLD AUTO: 0 10E3/UL
NRBC BLD AUTO-RTO: 0 /100
PCO2 BLDV: 38 MM HG (ref 40–50)
PH BLDV: 7.4 [PH] (ref 7.32–7.43)
PLATELET # BLD AUTO: 389 10E3/UL (ref 150–450)
PO2 BLDV: 40 MM HG (ref 25–47)
POTASSIUM SERPL-SCNC: 3.6 MMOL/L (ref 3.4–5.3)
PROCALCITONIN SERPL IA-MCNC: <0.02 NG/ML
PROCALCITONIN SERPL IA-MCNC: <0.02 NG/ML
RBC # BLD AUTO: 4.57 10E6/UL (ref 3.8–5.2)
SAO2 % BLDV: 75 % (ref 94–100)
SODIUM SERPL-SCNC: 138 MMOL/L (ref 135–145)
WBC # BLD AUTO: 12 10E3/UL (ref 4–11)

## 2023-12-03 PROCEDURE — 250N000009 HC RX 250: Performed by: OTOLARYNGOLOGY

## 2023-12-03 PROCEDURE — 258N000003 HC RX IP 258 OP 636: Performed by: INTERNAL MEDICINE

## 2023-12-03 PROCEDURE — 96374 THER/PROPH/DIAG INJ IV PUSH: CPT

## 2023-12-03 PROCEDURE — 99285 EMERGENCY DEPT VISIT HI MDM: CPT | Mod: 25

## 2023-12-03 PROCEDURE — 250N000009 HC RX 250: Performed by: HOSPITALIST

## 2023-12-03 PROCEDURE — 36415 COLL VENOUS BLD VENIPUNCTURE: CPT | Performed by: EMERGENCY MEDICINE

## 2023-12-03 PROCEDURE — G1010 CDSM STANSON: HCPCS

## 2023-12-03 PROCEDURE — 250N000011 HC RX IP 250 OP 636: Performed by: INTERNAL MEDICINE

## 2023-12-03 PROCEDURE — 09JK8ZZ INSPECTION OF NASAL MUCOSA AND SOFT TISSUE, VIA NATURAL OR ARTIFICIAL OPENING ENDOSCOPIC: ICD-10-PCS | Performed by: OTOLARYNGOLOGY

## 2023-12-03 PROCEDURE — 250N000011 HC RX IP 250 OP 636: Mod: JZ | Performed by: EMERGENCY MEDICINE

## 2023-12-03 PROCEDURE — 36415 COLL VENOUS BLD VENIPUNCTURE: CPT | Performed by: INTERNAL MEDICINE

## 2023-12-03 PROCEDURE — 84145 PROCALCITONIN (PCT): CPT | Performed by: PHYSICIAN ASSISTANT

## 2023-12-03 PROCEDURE — 250N000011 HC RX IP 250 OP 636: Mod: JZ | Performed by: PHYSICIAN ASSISTANT

## 2023-12-03 PROCEDURE — 82803 BLOOD GASES ANY COMBINATION: CPT

## 2023-12-03 PROCEDURE — 99207 PR APP CREDIT; MD BILLING SHARED VISIT: CPT | Performed by: STUDENT IN AN ORGANIZED HEALTH CARE EDUCATION/TRAINING PROGRAM

## 2023-12-03 PROCEDURE — 250N000011 HC RX IP 250 OP 636: Performed by: HOSPITALIST

## 2023-12-03 PROCEDURE — 80048 BASIC METABOLIC PNL TOTAL CA: CPT | Performed by: EMERGENCY MEDICINE

## 2023-12-03 PROCEDURE — 85025 COMPLETE CBC W/AUTO DIFF WBC: CPT | Performed by: EMERGENCY MEDICINE

## 2023-12-03 PROCEDURE — 120N000001 HC R&B MED SURG/OB

## 2023-12-03 PROCEDURE — 86140 C-REACTIVE PROTEIN: CPT | Performed by: EMERGENCY MEDICINE

## 2023-12-03 PROCEDURE — 96375 TX/PRO/DX INJ NEW DRUG ADDON: CPT

## 2023-12-03 PROCEDURE — 250N000013 HC RX MED GY IP 250 OP 250 PS 637: Performed by: INTERNAL MEDICINE

## 2023-12-03 PROCEDURE — 83605 ASSAY OF LACTIC ACID: CPT

## 2023-12-03 PROCEDURE — 87040 BLOOD CULTURE FOR BACTERIA: CPT | Performed by: EMERGENCY MEDICINE

## 2023-12-03 PROCEDURE — 250N000013 HC RX MED GY IP 250 OP 250 PS 637: Performed by: OTOLARYNGOLOGY

## 2023-12-03 PROCEDURE — 84145 PROCALCITONIN (PCT): CPT | Performed by: INTERNAL MEDICINE

## 2023-12-03 PROCEDURE — 120N000002 HC R&B MED SURG/OB UMMC

## 2023-12-03 PROCEDURE — 99207 PR APP CREDIT; MD BILLING SHARED VISIT: CPT | Performed by: PHYSICIAN ASSISTANT

## 2023-12-03 PROCEDURE — 99207 PR NO BILLABLE SERVICE THIS VISIT: CPT | Performed by: HOSPITALIST

## 2023-12-03 PROCEDURE — 250N000013 HC RX MED GY IP 250 OP 250 PS 637: Performed by: PHYSICIAN ASSISTANT

## 2023-12-03 PROCEDURE — 99223 1ST HOSP IP/OBS HIGH 75: CPT | Performed by: PHYSICIAN ASSISTANT

## 2023-12-03 PROCEDURE — 99207 PR APP CREDIT; MD BILLING SHARED VISIT: CPT | Performed by: INTERNAL MEDICINE

## 2023-12-03 RX ORDER — ERTAPENEM 1 G/1
1 INJECTION, POWDER, LYOPHILIZED, FOR SOLUTION INTRAMUSCULAR; INTRAVENOUS ONCE
Status: COMPLETED | OUTPATIENT
Start: 2023-12-03 | End: 2023-12-03

## 2023-12-03 RX ORDER — ACETAMINOPHEN 325 MG/1
650 TABLET ORAL EVERY 4 HOURS PRN
Status: DISCONTINUED | OUTPATIENT
Start: 2023-12-03 | End: 2023-12-03 | Stop reason: HOSPADM

## 2023-12-03 RX ORDER — SODIUM CHLORIDE 9 MG/ML
INJECTION, SOLUTION INTRAVENOUS
Status: COMPLETED
Start: 2023-12-03 | End: 2023-12-03

## 2023-12-03 RX ORDER — NALOXONE HYDROCHLORIDE 0.4 MG/ML
0.2 INJECTION, SOLUTION INTRAMUSCULAR; INTRAVENOUS; SUBCUTANEOUS
Status: DISCONTINUED | OUTPATIENT
Start: 2023-12-03 | End: 2023-12-06 | Stop reason: HOSPADM

## 2023-12-03 RX ORDER — LIDOCAINE 40 MG/G
CREAM TOPICAL
Status: DISCONTINUED | OUTPATIENT
Start: 2023-12-03 | End: 2023-12-06 | Stop reason: HOSPADM

## 2023-12-03 RX ORDER — ESCITALOPRAM OXALATE 20 MG/1
20 TABLET ORAL DAILY
Status: DISCONTINUED | OUTPATIENT
Start: 2023-12-04 | End: 2023-12-03 | Stop reason: HOSPADM

## 2023-12-03 RX ORDER — NALOXONE HYDROCHLORIDE 0.4 MG/ML
0.4 INJECTION, SOLUTION INTRAMUSCULAR; INTRAVENOUS; SUBCUTANEOUS
Status: DISCONTINUED | OUTPATIENT
Start: 2023-12-03 | End: 2023-12-03 | Stop reason: HOSPADM

## 2023-12-03 RX ORDER — OXYCODONE HYDROCHLORIDE 5 MG/1
5 TABLET ORAL EVERY 4 HOURS PRN
Status: DISCONTINUED | OUTPATIENT
Start: 2023-12-03 | End: 2023-12-06 | Stop reason: HOSPADM

## 2023-12-03 RX ORDER — MUPIROCIN 20 MG/G
OINTMENT TOPICAL 3 TIMES DAILY
Status: DISCONTINUED | OUTPATIENT
Start: 2023-12-03 | End: 2023-12-03 | Stop reason: HOSPADM

## 2023-12-03 RX ORDER — AMOXICILLIN 250 MG
2 CAPSULE ORAL 2 TIMES DAILY PRN
Status: DISCONTINUED | OUTPATIENT
Start: 2023-12-03 | End: 2023-12-03 | Stop reason: HOSPADM

## 2023-12-03 RX ORDER — MEROPENEM 500 MG/1
500 INJECTION, POWDER, FOR SOLUTION INTRAVENOUS EVERY 8 HOURS
Status: ON HOLD | DISCHARGE
Start: 2023-12-04 | End: 2023-12-06

## 2023-12-03 RX ORDER — AMOXICILLIN 250 MG
1 CAPSULE ORAL 2 TIMES DAILY PRN
Status: DISCONTINUED | OUTPATIENT
Start: 2023-12-03 | End: 2023-12-06 | Stop reason: HOSPADM

## 2023-12-03 RX ORDER — CALCIUM CARBONATE 500 MG/1
1000 TABLET, CHEWABLE ORAL 4 TIMES DAILY PRN
Status: DISCONTINUED | OUTPATIENT
Start: 2023-12-03 | End: 2023-12-06 | Stop reason: HOSPADM

## 2023-12-03 RX ORDER — AMOXICILLIN 250 MG
1 CAPSULE ORAL 2 TIMES DAILY PRN
Status: DISCONTINUED | OUTPATIENT
Start: 2023-12-03 | End: 2023-12-03 | Stop reason: HOSPADM

## 2023-12-03 RX ORDER — DEXAMETHASONE SODIUM PHOSPHATE 4 MG/ML
10 INJECTION, SOLUTION INTRA-ARTICULAR; INTRALESIONAL; INTRAMUSCULAR; INTRAVENOUS; SOFT TISSUE ONCE
Status: COMPLETED | OUTPATIENT
Start: 2023-12-03 | End: 2023-12-03

## 2023-12-03 RX ORDER — SODIUM CHLORIDE 9 MG/ML
INJECTION, SOLUTION INTRAVENOUS CONTINUOUS
Status: DISCONTINUED | OUTPATIENT
Start: 2023-12-03 | End: 2023-12-05

## 2023-12-03 RX ORDER — IOPAMIDOL 755 MG/ML
67 INJECTION, SOLUTION INTRAVASCULAR ONCE
Status: COMPLETED | OUTPATIENT
Start: 2023-12-03 | End: 2023-12-03

## 2023-12-03 RX ORDER — ACETAMINOPHEN 650 MG/1
650 SUPPOSITORY RECTAL EVERY 4 HOURS PRN
Status: DISCONTINUED | OUTPATIENT
Start: 2023-12-03 | End: 2023-12-03 | Stop reason: HOSPADM

## 2023-12-03 RX ORDER — NALOXONE HYDROCHLORIDE 0.4 MG/ML
0.4 INJECTION, SOLUTION INTRAMUSCULAR; INTRAVENOUS; SUBCUTANEOUS
Status: DISCONTINUED | OUTPATIENT
Start: 2023-12-03 | End: 2023-12-06 | Stop reason: HOSPADM

## 2023-12-03 RX ORDER — MEROPENEM 500 MG/1
500 INJECTION, POWDER, FOR SOLUTION INTRAVENOUS EVERY 8 HOURS
Status: DISCONTINUED | OUTPATIENT
Start: 2023-12-04 | End: 2023-12-03 | Stop reason: HOSPADM

## 2023-12-03 RX ORDER — NALOXONE HYDROCHLORIDE 0.4 MG/ML
0.2 INJECTION, SOLUTION INTRAMUSCULAR; INTRAVENOUS; SUBCUTANEOUS
Status: DISCONTINUED | OUTPATIENT
Start: 2023-12-03 | End: 2023-12-03 | Stop reason: HOSPADM

## 2023-12-03 RX ORDER — ONDANSETRON 2 MG/ML
4 INJECTION INTRAMUSCULAR; INTRAVENOUS EVERY 6 HOURS PRN
Status: DISCONTINUED | OUTPATIENT
Start: 2023-12-03 | End: 2023-12-03 | Stop reason: HOSPADM

## 2023-12-03 RX ORDER — LIDOCAINE 40 MG/G
CREAM TOPICAL
Status: DISCONTINUED | OUTPATIENT
Start: 2023-12-03 | End: 2023-12-03 | Stop reason: HOSPADM

## 2023-12-03 RX ORDER — MEROPENEM 1 G/1
1 INJECTION, POWDER, FOR SOLUTION INTRAVENOUS EVERY 8 HOURS
Status: DISCONTINUED | OUTPATIENT
Start: 2023-12-03 | End: 2023-12-06 | Stop reason: HOSPADM

## 2023-12-03 RX ORDER — ONDANSETRON 4 MG/1
4 TABLET, ORALLY DISINTEGRATING ORAL EVERY 6 HOURS PRN
Status: DISCONTINUED | OUTPATIENT
Start: 2023-12-03 | End: 2023-12-03 | Stop reason: HOSPADM

## 2023-12-03 RX ORDER — MUPIROCIN 20 MG/G
OINTMENT TOPICAL 2 TIMES DAILY
Status: DISCONTINUED | OUTPATIENT
Start: 2023-12-03 | End: 2023-12-03

## 2023-12-03 RX ORDER — HYDROMORPHONE HYDROCHLORIDE 2 MG/1
2 TABLET ORAL
Status: DISCONTINUED | OUTPATIENT
Start: 2023-12-03 | End: 2023-12-03 | Stop reason: HOSPADM

## 2023-12-03 RX ORDER — AMOXICILLIN 250 MG
2 CAPSULE ORAL 2 TIMES DAILY PRN
Status: DISCONTINUED | OUTPATIENT
Start: 2023-12-03 | End: 2023-12-06 | Stop reason: HOSPADM

## 2023-12-03 RX ORDER — CALCIUM CARBONATE 500 MG/1
1000 TABLET, CHEWABLE ORAL 4 TIMES DAILY PRN
Status: DISCONTINUED | OUTPATIENT
Start: 2023-12-03 | End: 2023-12-03 | Stop reason: HOSPADM

## 2023-12-03 RX ORDER — HYDROMORPHONE HYDROCHLORIDE 1 MG/ML
0.5 INJECTION, SOLUTION INTRAMUSCULAR; INTRAVENOUS; SUBCUTANEOUS
Status: DISCONTINUED | OUTPATIENT
Start: 2023-12-03 | End: 2023-12-06 | Stop reason: HOSPADM

## 2023-12-03 RX ORDER — HYDROMORPHONE HYDROCHLORIDE 1 MG/ML
0.5 INJECTION, SOLUTION INTRAMUSCULAR; INTRAVENOUS; SUBCUTANEOUS EVERY 30 MIN PRN
Status: DISCONTINUED | OUTPATIENT
Start: 2023-12-03 | End: 2023-12-03 | Stop reason: HOSPADM

## 2023-12-03 RX ADMIN — MUPIROCIN: 20 OINTMENT TOPICAL at 16:44

## 2023-12-03 RX ADMIN — SODIUM CHLORIDE: 9 INJECTION, SOLUTION INTRAVENOUS at 19:53

## 2023-12-03 RX ADMIN — Medication 1 SPRAY: at 16:44

## 2023-12-03 RX ADMIN — IOPAMIDOL 67 ML: 755 INJECTION, SOLUTION INTRAVENOUS at 12:06

## 2023-12-03 RX ADMIN — SODIUM CHLORIDE 90 ML: 9 INJECTION, SOLUTION INTRAVENOUS at 12:06

## 2023-12-03 RX ADMIN — HYDROMORPHONE HYDROCHLORIDE 0.5 MG: 1 INJECTION, SOLUTION INTRAMUSCULAR; INTRAVENOUS; SUBCUTANEOUS at 08:11

## 2023-12-03 RX ADMIN — MEROPENEM 1 G: 1 INJECTION, POWDER, FOR SOLUTION INTRAVENOUS at 21:21

## 2023-12-03 RX ADMIN — HYDROMORPHONE HYDROCHLORIDE 0.5 MG: 1 INJECTION, SOLUTION INTRAMUSCULAR; INTRAVENOUS; SUBCUTANEOUS at 21:20

## 2023-12-03 RX ADMIN — ERTAPENEM SODIUM 1 G: 1 INJECTION, POWDER, LYOPHILIZED, FOR SOLUTION INTRAMUSCULAR; INTRAVENOUS at 10:03

## 2023-12-03 RX ADMIN — OXYCODONE HYDROCHLORIDE 5 MG: 5 TABLET ORAL at 23:31

## 2023-12-03 RX ADMIN — DEXAMETHASONE SODIUM PHOSPHATE 10 MG: 4 INJECTION, SOLUTION INTRAMUSCULAR; INTRAVENOUS at 14:42

## 2023-12-03 RX ADMIN — ACETAMINOPHEN 650 MG: 325 TABLET, FILM COATED ORAL at 16:27

## 2023-12-03 RX ADMIN — HYDROMORPHONE HYDROCHLORIDE 2 MG: 2 TABLET ORAL at 16:27

## 2023-12-03 RX ADMIN — HYDROMORPHONE HYDROCHLORIDE 0.5 MG: 1 INJECTION, SOLUTION INTRAMUSCULAR; INTRAVENOUS; SUBCUTANEOUS at 13:30

## 2023-12-03 ASSESSMENT — ACTIVITIES OF DAILY LIVING (ADL)
ADLS_ACUITY_SCORE: 35

## 2023-12-03 NOTE — TELEPHONE ENCOUNTER
Nurse Triage SBAR    Is this a 2nd Level Triage? YES, LICENSED PRACTITIONER REVIEW IS REQUIRED    Situation: Sinus infection, on antibiotics    Background: Staph infection since October, started as a skin issue and migrated into sinus cavities. Has been hospitalized twice for IV antibiotics and then discharged to home with PO antibiotics. Was discharged last weekend, taking doxycycline at home. Now woke and her face is swelling. Wondering if she needs to go back for another IV treatment?     Assessment: Afebrile (temp 99.9 temporal, patient reports she usually runs low), vision blurry. Face is red, painful, warm to the touch, swelling to face is not as bad as last admission, currently the right side of face (eye - nearly swollen shut, sinus, cheek). Pain rated at 6 on scale of 0-10.     Protocol Recommended Disposition:   Go to ED Now (Or PCP Triage)  0632 Dr. Yamileth Hoover paged [phone] Dr. Yamileth Hoover paged  [phone] Dr. Hoover says patient needs to return to hospital for IV antibiotics.     Recommendation: Shared physician recommendations with patient as well as care advice. Patient will return now to emergency department as recommended. No further questions at this time.      Paged to provider    Does the patient meet one of the following criteria for ADS visit consideration? 16+ years old, with an [facility] PCP     TIP  Providers, please consider if this condition is appropriate for management at one of our Acute and Diagnostic Services sites.     If patient is a good candidate, please use dotphrase <dot>triageresponse and select Refer to ADS to document.    Reason for Disposition   [1] Redness or swelling on the cheek, forehead or around the eye AND [2] fever    Additional Information   Negative: SEVERE difficulty breathing (e.g., struggling for each breath, speaks in single words)   Negative: Sounds like a life-threatening emergency to the triager   Negative: [1] Difficulty breathing AND [2] not from stuffy  nose (e.g., not relieved by cleaning out the nose)   Negative: [1] SEVERE headache AND [2] fever   Negative: [1] Taking antibiotic > 24 hours AND [2] fever > 103 F (39.4 C)    Protocols used: Sinus Infection on Antibiotic Follow-up Call-A-[facility]

## 2023-12-03 NOTE — PLAN OF CARE
Goal Outcome Evaluation:       Care resumed @ 1530. Patient is A&O x4. Up independent in room. Rating pain 5/10, managed with PO dilaudid and tylenol. IV-SL( ok per ANS for transfer) . Denies chest pain or SOB. Transferring to [facility] via stretcher ride between [id number] with belonging.

## 2023-12-03 NOTE — ED PROVIDER NOTES
History     Chief Complaint:  Sinusitis       HPI   Vianca Kumar is a 48 year old female with a complex history of sinusitis and facial cellulitis, most recently admitted in November for IV antibiotics for facial cellulitis and MSSA.  Was sent home on doxycycline and has not been on that.  Sounds like the swelling worsened overnight and increased pain.  She felt warm but did not take her temperature.  No nausea or vomiting, no problem with her airway.  She called the provider line and they recommended she come in for admission and IV antibiotics again.  She has a history of cocaine abuse and said she has used in the last couple of months and has a septal perforation as well.  She does not have a deep headache just pain in her face.      Independent Historian:   None - Patient Only    Review of External Notes:   I reviewed the discharge summary from 10/17/2023 and 11/26/2023     [facility]  Hospitalist Discharge Summary       Date of Admission:  11/24/2023  Date of Discharge:  11/26/2023  Discharging Provider: Arie Marino MD  Discharge Service: Hospitalist Service        Discharge Diagnoses  Recurrent nasal cellulitis with extensive septal perforation  Hx intranasal cocaine use  Hx alcohol use and cocaine use  Tobacco use disorder  MDD with grief reaction             Medications:    cetirizine (ZYRTEC) 10 MG tablet  doxycycline hyclate (VIBRAMYCIN) 100 MG capsule  escitalopram (LEXAPRO) 20 MG tablet  ibuprofen (ADVIL/MOTRIN) 200 MG tablet  mupirocin (BACTROBAN) 2 % external ointment  oxyCODONE (ROXICODONE) 5 MG tablet        Past Medical History:    Past Medical History:   Diagnosis Date    MDD (major depressive disorder)     Obesity (BMI 30-39.9)        Past Surgical History:    Past Surgical History:   Procedure Laterality Date    CAPSULE/PILL CAM ENDOSCOPY N/A 04/03/2018    Procedure: CAPSULE/PILL CAM ENDOSCOPY;;  Surgeon: Guru Hallie Song  MD Dario;  Location: [facility] GI    COLONOSCOPY N/A 12/28/2017    Procedure: COLONOSCOPY;  Surgeon: Yosi Beck MD;  Location: [facility] OR;  Service:     COLONOSCOPY N/A 08/11/2022    Procedure: COLONOSCOPY, WITH POLYPECTOMY AND BIOPSY;  Surgeon: Lorri Holley DO;  Location: [facility] OR    COLONOSCOPY WITH CO2 INSUFFLATION N/A 08/11/2022    Procedure: COLONOSCOPY, WITH CO2 INSUFFLATION;  Surgeon: Lorri Holley DO;  Location: [facility] OR    COMBINED ESOPHAGOSCOPY, GASTROSCOPY, DUODENOSCOPY (EGD) WITH CO2 INSUFFLATION N/A 08/11/2022    Procedure: ESOPHAGOGASTRODUODENOSCOPY, WITH CO2 INSUFFLATION;  Surgeon: Lorri Holley DO;  Location: [facility] OR    COMBINED ESOPHAGOSCOPY, GASTROSCOPY, DUODENOSCOPY (EGD) WITH CO2 INSUFFLATION N/A 11/25/2022    Procedure: ESOPHAGOGASTRODUODENOSCOPY, WITH CO2 INSUFFLATION;  Surgeon: Lorri Holley DO;  Location: [facility] OR    ENDOSCOPIC ULTRASOUND UPPER GASTROINTESTINAL TRACT (GI) N/A 04/03/2018    Procedure: ENDOSCOPIC ULTRASOUND, ESOPHAGOSCOPY / UPPER GASTROINTESTINAL TRACT (GI);  EUS/Capsule ;  Surgeon: Guru Hallie Song MD;  [facility]    ESOPHAGOSCOPY, GASTROSCOPY, DUODENOSCOPY (EGD), COMBINED N/A 12/28/2017    Procedure: ESOPHAGOGASTRODUODENOSCOPY (EGD);  Surgeon: Yosi Beck MD;  Location: [facility];  Service:     ESOPHAGOSCOPY, GASTROSCOPY, DUODENOSCOPY (EGD), COMBINED N/A 08/11/2022    Procedure: ESOPHAGOGASTRODUODENOSCOPY, WITH BIOPSY;  Surgeon: Lorri Holley DO;  Location: [facility] OR    ESOPHAGOSCOPY, GASTROSCOPY, DUODENOSCOPY (EGD), COMBINED N/A 11/25/2022    Procedure: ESOPHAGOGASTRODUODENOSCOPY, WITH BIOPSY;  Surgeon: Lorri Holley DO;  Location: [facility] OR    KNEE SURGERY Left     osteotomy; screws and plate in place    LAPAROSCOPIC CHOLECYSTECTOMY N/A 04/25/2018    Procedure: LAPAROSCOPIC CHOLECYSTECTOMY;  Laparoscopic Cholecystectomy ;  Surgeon: Alberto Noble MD;  Location: [facility] OR    LAPAROSCOPIC NISSEN FUNDOPLICATION N/A 5/23/2023  "   Procedure: ROBOTIC ASSISTED LAPAROSCOPIC HERNIORRHAPHY, HIATAL WITH MESH AND MAGNETIC SPHINCTER AUGMENTATION;  Surgeon: Abdiaziz Tolbert MD;  Location: [facility] OR        Physical Exam   Patient Vitals for the past 24 hrs:   BP Temp Temp src Pulse Resp SpO2 Height Weight   12/03/23 0825 120/84 -- -- -- -- 92 % -- --   12/03/23 0726 (!) 129/98 98.1  F (36.7  C) Temporal 86 16 96 % 1.727 m (5' 8\") 89.8 kg (198 lb)        Physical Exam  Nursing note and vitals reviewed.    Constitutional:  Appears uncomfortable  HENT:               Significant facial swelling, especially on the right side over the maxillary sinus that extends up around the eye and across the nose toward the left cheek.  There is redness tenderness and warmth.  No nasal discharge, she does have a nasal septal perforation.  Pharynx is normal, voice is normal no evidence of oral or pharyngeal swelling.  Eyes:    Conjunctivae are normal without injection.  Pupils are equal.  She does have periorbital swelling worse on the right than the left.  Cardiovascular:  Normal rate, regular rhythm with normal S1 and S2.      Normal heart sounds and peripheral pulses 2+ and equal.    Pulmonary:  Effort normal and breath sounds clear to auscultation bilaterally.    No respiratory distress.     GI:    Soft. No distension and no mass. No tenderness.   Musculoskeletal:  Normal range of motion. No extremity deformity.     No edema and no tenderness.    Neurological:   Alert and oriented. No focal weakness.  Skin:    Skin is warm and dry.  Redness with swelling of the face as noted above.  Psychiatric:   Behavior is normal. Appropriate mood and affect.     Judgment and thought content normal.       Emergency Department Course       Imaging:  No orders to display          Laboratory:  Labs Ordered and Resulted from Time of ED Arrival to Time of ED Departure   BASIC METABOLIC PANEL - Abnormal       Result Value    Sodium 138      Potassium 3.6      Chloride 103      " Carbon Dioxide (CO2) 25      Anion Gap 10      Urea Nitrogen 4.1 (*)     Creatinine 0.63      GFR Estimate >90      Calcium 9.2      Glucose 128 (*)    CRP INFLAMMATION - Abnormal    CRP Inflammation 19.59 (*)    CBC WITH PLATELETS AND DIFFERENTIAL - Abnormal    WBC Count 12.0 (*)     RBC Count 4.57      Hemoglobin 14.2      Hematocrit 42.2      MCV 92      MCH 31.1      MCHC 33.6      RDW 11.8      Platelet Count 389      % Neutrophils 72      % Lymphocytes 17      % Monocytes 6      % Eosinophils 3      % Basophils 1      % Immature Granulocytes 1      NRBCs per 100 WBC 0      Absolute Neutrophils 8.8 (*)     Absolute Lymphocytes 2.0      Absolute Monocytes 0.7      Absolute Eosinophils 0.4      Absolute Basophils 0.1      Absolute Immature Granulocytes 0.1      Absolute NRBCs 0.0     ISTAT GASES LACTATE VENOUS POCT - Abnormal    Lactic Acid POCT 0.9      Bicarbonate Venous POCT 24      O2 Sat, Venous POCT 75 (*)     pCO2 Venous POCT 38 (*)     pH Venous POCT 7.40      pO2 Venous POCT 40     PROCALCITONIN   BLOOD CULTURE   BLOOD CULTURE        Procedures   none    Emergency Department Course & Assessments:             Interventions:  Medications   HYDROmorphone (PF) (DILAUDID) injection 0.5 mg (0.5 mg Intravenous $Given 12/3/23 0811)   ertapenem (INVanz) 1 g vial to attach to  mL bag (has no administration in time range)   HYDROmorphone (DILAUDID) tablet 2 mg (has no administration in time range)        Assessments:  0745    Independent Interpretation (X-rays, CTs, rhythm strip):  None    Consultations/Discussion of Management or Tests:  I discussed the case with Barbara Lindo the hospitalist PA       Social Determinants of Health affecting care:   None    Disposition:  The patient was admitted to the hospital under the care of Dr. Wiggins.     Impression & Plan      Medical Decision Making:  Patient comes in with worsening facial swelling and cellulitis.  She has had this recurrent and I agree that she needs  to come in for IV antibiotics.  Her CRP is elevated at 19, white count is 12,000.  No airway issues.  She has had CTs but all of these have been preseptal cellulitis and not deep tissue infections.  She has no cranial nerve deficit.  Her vital signs are normal here.  Invanz was used last time per recommendation apparently of the infectious disease doctor and she has allergies so we will start Invanz again.  I talked with the PA who will be admitting the patient under the care of Dr. Wiggins.  She will be on a medical floor and consultation with the ENT and infectious disease will be made.      Diagnosis:    ICD-10-CM    1. Facial cellulitis  L03.211            Discharge Medications:  New Prescriptions    No medications on file          Rebecca Jones MD  12/3/2023   Rebecca Jones MD Powell, Tracy Alan, MD  12/03/23 0955

## 2023-12-03 NOTE — PROGRESS NOTES
"Transfer Type: [facility]  Transfer Triage Note    Date of call: 12/03/23  Time of call: 3:11 PM    Current Patient Location:  [facility]  Current Level of Care: Med Surg    Vitals: Temp: 98.1  F (36.7  C) Temp src: Oral BP: 112/78 Pulse: 63   Resp: 16 SpO2: 96 % Height: 172.7 cm (5' 8\") Weight: 89.8 kg (198 lb)  O2 Device: None (Room air) at    Diagnosis: facial cellulitis; reduced visual acuity  Reason for requested transfer: Further diagnostic work up, management, and consultation for specialized care   Isolation Needs: None    If patient is transferring for specialty care or specific procedure, the specialist required has participated in the transfer call and agreed with need for transfer and anticipated timeline: No    Transfer accepted: Yes  Stability of Patient: Patient is vitally stable, with no critical labs, and will likely remain stable throughout the transfer process  Is the patient appropriate for [facility]? Yes  Level of Care Needed: Med Surg  Telemetry Needed:  None  Expected Time of Arrival for Transfer: 0-8 hours  Arrival Location:  [facility]     Recommendations for Management and Stabilization: Not needed    Additional Comments: 48 year old woman who presented to ED with facial swelling, recurrent, clinically consistent with cellulitis. ENT evaluated, recommended dexamethasone. Started ertapenem based on prior culture. The patient reports blurred vision on the right starting in the last 48 hours correlating with swelling and pain. No pain in the eye. Pupils are reactive, exam and CT normal. Request transfer for ophthalmology evaluation.  Blood cultures collected. Plan for [facility] for ophthalmology consult; probable bed available now and expect transfer this evening.    Dante Nava DO  "

## 2023-12-03 NOTE — PROGRESS NOTES
RECEIVING UNIT ED HANDOFF REVIEW    ED Nurse Handoff Report was reviewed by: Queen Shayy RN on December 3, 2023 at 11:12 AM

## 2023-12-03 NOTE — ED NOTES
"[facility]  ED Nurse Handoff Report    ED Chief complaint: Sinusitis      ED Diagnosis:   Final diagnoses:   Facial cellulitis       Code Status: Full Code    Allergies:   Allergies   Allergen Reactions    Zosyn [Piperacillin-Tazobactam In Dex] Hives and Itching    Keflex [Cephalexin] Hives    Avocado Hives    Chantix [Varenicline Tartrate] Other (See Comments)     depression       Patient Story: sinusitis  Focused Assessment:  Patient with ongoing sinus infection not improving with oral antibiotics. Ongoing Cocaine use not helping.     Treatments and/or interventions provided: see MAR  Patient's response to treatments and/or interventions: TBD    To be done/followed up on inpatient unit:  IV antibiotics    Does this patient have any cognitive concerns?:  na    Activity level - Baseline/Home:  Independent  Activity Level - Current:   Independent    Patient's Preferred language: English   Needed?: No    Isolation: None  Infection: Not Applicable  Patient tested for COVID 19 prior to admission: NO  Bariatric?: No    Vital Signs:   Vitals:    12/03/23 0726 12/03/23 0825   BP: (!) 129/98 120/84   Pulse: 86    Resp: 16    Temp: 98.1  F (36.7  C)    TempSrc: Temporal    SpO2: 96% 92%   Weight: 89.8 kg (198 lb)    Height: 1.727 m (5' 8\")        Cardiac Rhythm:     Was the PSS-3 completed:   Yes  What interventions are required if any?               Family Comments: na  OBS brochure/video discussed/provided to patient/family: N/A              Name of person given brochure if not patient: na              Relationship to patient: na    For the majority of the shift this patient's behavior was Green.   Behavioral interventions performed were none.    ED NURSE PHONE NUMBER: *[id number]         "

## 2023-12-03 NOTE — DISCHARGE SUMMARY
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    Weight as of this encounter: 89.8 kg (198 lb).       Follow-ups Needed After Discharge   Follow up with ENT as scheduled    Unresulted Labs Ordered in the Past 30 Days of this Admission       Date and Time Order Name Status Description    12/3/2023  7:39 AM Blood Culture Peripheral Blood In process     12/3/2023  7:39 AM Blood Culture Peripheral Blood In process         These results will be followed up by Receiving hospitalist     Discharge Disposition   Transferred to [facility]  Condition at discharge: Stable    Hospital Course        Assessment & Plan  Vianca Kumar is a 48 year old female admitted on 12/3/2023. She has PMH of MDD, obesity, hx of paraesophageal diaphragmatic hernia repair, tobacco abuse, polysubstance abuse, chronic septal perforation with recent hospital admissions 10/13-10/17 and 11/24-11/26 for facial cellulitis, who presents to the ED for evaluation of facial swelling and pain. Patient admitted to Medicine for further evaluation and care of recurrent facial cellulitis.     ## Recurrent facial cellulitis w/ chronic septal perforation  ## hx of intranasal cocaine use  ## Blurry vision and facial pain: Recent admissions 10-13-10/17 and 11/24-11/26/23 s/p treatment for facial cellulitis. Discharged home with clindamycin to complete two course. Has been taking abx as prescribed. Reports that facial pain and swelling " increased in past couple days, prompting ED evaluation. Also reports bluury vison that has been prtesent  ID and ENT consulted on recent admission. Hx of extensive septal perforation s/t intranasal cocaine use. CRP 19.0, no leukocytosis. Afebrile. Received 1g ertapenem at [facility] ED on 12/3/23  - ENT and ID consults placed. Discussed with ENT evaluated patient today  - Start Meropenem- 500 mg q8h in AM(12/4/23) per recent admission ID recs; received dose of Ertapenem in ED  - BCx2- pending  - Add procal   - 10 mg IV x1 decadron per ENT recs  - STAT CT facial bones with contrast: IMPRESSION:   1.  There is localized soft tissue stranding and edema now present in the right premaxillary soft tissues. This soft tissue edema extends along the soft tissues of the nose/upper lip as well as along the superficial aspect of the right periorbital and   glabellar regions. Milder soft tissue edema in the superficial left periorbital tissues and left premaxillary soft tissues. These findings are more prominent and extensive than on CT 11/24/2023. No fluid collection or definite abscess. Findings likely   secondary to cellulitis.  2.  Stable large defect in the anterior nasal septum.  3.  No intraorbital postseptal abnormality.  - Pain control: Tylenol and PO dilaudid  - Ophthalmology needed with vision changes. Patient accepted in transfer to [facility] for Ophthalmology consultation      ## Hx of Polysubstance abuse: Etoh and cocaine. No recent use.   - Monitor, recommend cessation    ## Tobacco Abuse: Current PPD smoker  - Recommend cessation  - Declined nicotine patch     ## MDD: PTA lexapro. Follows with therapist. Reports significant stress related to loss brotherin past year.   - Continue PTA medication   - Follow up with therapist as scheduled       Consultations This Hospital Stay   ENT IP CONSULT  INFECTIOUS DISEASES IP CONSULT    Code Status   No CPR- Pre-arrest intubation OK    Time Spent on this Encounter   Barbara CELAYA  MAURY Lindo, personally saw the patient today and spent greater than 30 minutes discharging this patient.       Barbara Lindo PA-C  [facility]  [address] [place] [place] [address]  Phone: [phone]  Fax: [phone]  ______________________________________________________________________    Physical Exam   Vital Signs: Temp: 98.5  F (36.9  C) Temp src: Oral BP: 107/67 Pulse: 66   Resp: 16 SpO2: 93 % O2 Device: None (Room air)    Weight: 198 lbs 0 oz  Physical Exam   Constitutional:   Well nourished, well developed, resting comfortably   HEENT:   Head: Normocephalic and atraumatic.   Eyes: significant right-sided facial swelling- right cheek and eye. Pupils are equal, round, and reactive to light.  Pharynx has no erythema or exudate, mucous membranes are moist  Neck:   No adenopathy, no bony tenderness  Cardiovascular: Regular rate and rhythm without murmurs or gallops  Pulmonary/Chest: Clear to auscultation bilaterally, with no wheezes or retractions. No respiratory distress.  GI: Soft with good bowel sounds.  Non-tender, non-distended, with no guarding, no rebound, no peritoneal signs.   Back:  No bony or CVA tenderness   Musculoskeletal:  No edema or clubbing   Skin: Skin is warm and dry. No rash noted.   Neurological: Alert and oriented to person, place, and time. Nonfocal exam  Psychiatric:  Normal mood and affect.       Primary Care Physician   Whitney Perez    Discharge Orders      Reason for your hospital stay    Admitted for recurrent facial cellulitis     Activity - Up ad cindy     No CPR- Pre-arrest intubation OK     Diet    Follow this diet upon discharge: Orders Placed This Encounter      Regular Diet Adult       Significant Results and Procedures   Results for orders placed or performed during the hospital encounter of 12/03/23   CT Facial Bones with Contrast    Narrative    EXAM: CT FACIAL BONES WITH CONTRAST  LOCATION: [facility]  HOSPITAL  DATE: 12/3/2023    INDICATION: recurrent facial cellulitis  COMPARISON: CT 11/24/2023.  CONTRAST: 67mL Isovue 370  TECHNIQUE: Routine CT Maxillofacial with IV contrast. Multiplanar reformats. Dose reduction techniques were used.     FINDINGS:  OSSEOUS STRUCTURES/SOFT TISSUES: There is localized soft tissue stranding and edema in the right premaxillary soft tissues new from prior CT. Soft tissue edema extends along the soft tissues of the nose and upper lip as well as along the superficial   aspect of the right periorbital tissues and glabella region. Mild soft tissue edema in the superficial left periorbital tissues and left premaxillary region. No fluid collection or abscess and findings likely related to cellulitis. No facial bone   fracture or malalignment. Again noted is a large defect in the anterior nasal septum similar to prior study. No evidence for dental trauma or periapical abscess. Several dental caries noted. Right nasal piercing. Left premaxillary piercing.    ORBITAL CONTENTS: No acute abnormality. No post septal edema or fluid on either side.    SINUSES: Mild mucosal thickening scattered about the paranasal sinuses.    VISUALIZED INTRACRANIAL CONTENTS: No acute abnormality.       Impression    IMPRESSION:   1.  There is localized soft tissue stranding and edema now present in the right premaxillary soft tissues. This soft tissue edema extends along the soft tissues of the nose/upper lip as well as along the superficial aspect of the right periorbital and   glabellar regions. Milder soft tissue edema in the superficial left periorbital tissues and left premaxillary soft tissues. These findings are more prominent and extensive than on CT 11/24/2023. No fluid collection or definite abscess. Findings likely   secondary to cellulitis.  2.  Stable large defect in the anterior nasal septum.  3.  No intraorbital postseptal abnormality.       Discharge Medications   Current Discharge Medication List         START taking these medications    Details   meropenem (MERREM) 500 MG vial Inject 500 mg into the vein every 8 hours    Associated Diagnoses: Facial cellulitis           CONTINUE these medications which have NOT CHANGED    Details   cetirizine (ZYRTEC) 10 MG tablet Take 10 mg by mouth daily      escitalopram (LEXAPRO) 20 MG tablet Take 1 tablet (20 mg) by mouth daily  Qty: 90 tablet, Refills: 3    Associated Diagnoses: Recurrent major depressive disorder, in full remission (H24)      mupirocin (BACTROBAN) 2 % external ointment Apply topically 2 times daily    Associated Diagnoses: Facial cellulitis           STOP taking these medications       doxycycline hyclate (VIBRAMYCIN) 100 MG capsule Comments:   Reason for Stopping:         ibuprofen (ADVIL/MOTRIN) 200 MG tablet Comments:   Reason for Stopping:         NONFORMULARY Comments:   Reason for Stopping:         oxyCODONE (ROXICODONE) 5 MG tablet Comments:   Reason for Stopping:             Allergies   Allergies   Allergen Reactions    Zosyn [Piperacillin-Tazobactam In Dex] Hives and Itching    Keflex [Cephalexin] Hives    Avocado Hives    Chantix [Varenicline Tartrate] Other (See Comments)     depression

## 2023-12-03 NOTE — PROVIDER NOTIFICATION
Call placed to Dr. Veliz, ENT, to discuss lack of continuing orders for abx. Last dose of ertapenem was last evening.    Dr. Veliz states that he discussed with hospitalist, who was planning to order abx if ID did not see pt today. Will need to follow up with hospitalist this evening if no orders entered by ID before then.    Dr. Veliz would also like pt to have [place] nasal spray TID and mupirocin ointment TID after [place] sprays.---Orders entered.    Addis RuizD

## 2023-12-03 NOTE — H&P
[facility]    History and Physical - Hospitalist Service       Date of Admission:  12/3/2023    Assessment & Plan      Vianca Kumar is a 48 year old female admitted on 12/3/2023. She has PMH of MDD, obesity, hx of paraesophageal diaphragmatic hernia repair, tobacco abuse, polysubstance abuse, chronic septal perforation with recent hospital admissions 10/13-10/17 and 11/24-11/26 for facial cellulitis, who presents to the ED for evaluation of facial swelling and pain. Patient admitted to Medicine for further evaluation and care of recurrent facial cellulitis.    ## Recurrent facial cellulitis w/ chronic septal perforation  ## hx of intranasal cocaine use  ## Blurry vision and facial pain: Recent admissions 10-13-10/17 and 11/24-11/26/23 s/p treatment for facial cellulitis. Discharged home with clindamycin to complete two course. Has been taking abx as prescribed. Reports that facial pain and swelling increased in past couple days, prompting ED evaluation. Also reports bluury vison that has been prtesent  ID and ENT consulted on recent admission. Hx of extensive septal perforation s/t intranasal cocaine use. CRP 19.0, no leukocytosis. Afebrile  - ENT and ID consults placed. Discussed with ENT who will see patient today  - Start Meropenem in AM per recent admission; received dose of Ertapenem in ED  - BCx2  - Add procal   - 10 mg IV decadron per ENT recs  - STAT CT facial bones with contrast   - Pain control: Tylenol and PO dilaudid  - Ophthalmology needed with vision changes. Discussed with Patient placement at [facility] to discuss transfer. Awaiting call back     ## Hx of Polysubstance abuse: Etoh and cocaine. No recent use.   - Monitor, recommend cessation    ## Tobacco Abuse: Current PPD smoker  - Recommend cessation  - Declined nicotine patch    ## MDD: PTA lexapro. Follows with therapist. Reports significant stress related to loss brotherin past year.   - Continue PTA medication   -  "Follow up with therapist as scheduled        Diet:  Regular  DVT Prophylaxis: Pneumatic Compression Devices  Sloan Catheter: Not present  Lines: None     Cardiac Monitoring: None  Code Status:  Full Code     Clinically Significant Risk Factors Present on Admission                       # Obesity: Estimated body mass index is 30.11 kg/m  as calculated from the following:    Height as of this encounter: 1.727 m (5' 8\").    Weight as of this encounter: 89.8 kg (198 lb).              Disposition Plan      Expected Discharge Date: 12/05/2023                The patient's care was discussed with the Attending Physician, Dr. Wiggins .    Barbara Lindo PA-C  Hospitalist Service  [employer]  Securely message with [employer] (more info)  Text page via [employer] Paging/Directory     ______________________________________________________________________    Chief Complaint   Right facial pain and swelling.     History is obtained from the patient and EMR    History of Present Illness   Vianca Kumar is a 48 year old female w/ PMH as outlined above who presents for evaluation of right-sides facial swelling and pain. Patient reports that she had been doing well since recent discharge on 11/23/23, and taking doxycycline BID as prescribed, though noticed an increase in facial swelling and pain in past couple days, prompting ED evaluation. Low-grade fevers and blurry vision no right noted. We discussed POC as outlined above and patient agreeable.     Past Medical History    Past Medical History:   Diagnosis Date    MDD (major depressive disorder)     Obesity (BMI 30-39.9)        Past Surgical History   Past Surgical History:   Procedure Laterality Date    CAPSULE/PILL CAM ENDOSCOPY N/A 04/03/2018    Procedure: CAPSULE/PILL CAM ENDOSCOPY;;  Surgeon: Guru Hallie Song MD;  Location:  GI    COLONOSCOPY N/A 12/28/2017    Procedure: COLONOSCOPY;  Surgeon: Yosi Beck MD;  Location: " [facility];  Service:     COLONOSCOPY N/A 08/11/2022    Procedure: COLONOSCOPY, WITH POLYPECTOMY AND BIOPSY;  Surgeon: Lorri Holley DO;  Location: [facility] OR    COLONOSCOPY WITH CO2 INSUFFLATION N/A 08/11/2022    Procedure: COLONOSCOPY, WITH CO2 INSUFFLATION;  Surgeon: Lorri Holley DO;  Location: [facility] OR    COMBINED ESOPHAGOSCOPY, GASTROSCOPY, DUODENOSCOPY (EGD) WITH CO2 INSUFFLATION N/A 08/11/2022    Procedure: ESOPHAGOGASTRODUODENOSCOPY, WITH CO2 INSUFFLATION;  Surgeon: Lorri Holley DO;  Location: [facility] OR    COMBINED ESOPHAGOSCOPY, GASTROSCOPY, DUODENOSCOPY (EGD) WITH CO2 INSUFFLATION N/A 11/25/2022    Procedure: ESOPHAGOGASTRODUODENOSCOPY, WITH CO2 INSUFFLATION;  Surgeon: Lorri Holley DO;  Location: [facility] OR    ENDOSCOPIC ULTRASOUND UPPER GASTROINTESTINAL TRACT (GI) N/A 04/03/2018    Procedure: ENDOSCOPIC ULTRASOUND, ESOPHAGOSCOPY / UPPER GASTROINTESTINAL TRACT (GI);  EUS/Capsule ;  Surgeon: Guru Hallie Song MD;  Location:  GI    ESOPHAGOSCOPY, GASTROSCOPY, DUODENOSCOPY (EGD), COMBINED N/A 12/28/2017    Procedure: ESOPHAGOGASTRODUODENOSCOPY (EGD);  Surgeon: Yosi Beck MD;  Location: [facility];  Service:     ESOPHAGOSCOPY, GASTROSCOPY, DUODENOSCOPY (EGD), COMBINED N/A 08/11/2022    Procedure: ESOPHAGOGASTRODUODENOSCOPY, WITH BIOPSY;  Surgeon: Lorri Holley DO;  Location: [facility] OR    ESOPHAGOSCOPY, GASTROSCOPY, DUODENOSCOPY (EGD), COMBINED N/A 11/25/2022    Procedure: ESOPHAGOGASTRODUODENOSCOPY, WITH BIOPSY;  Surgeon: Lorri Holley DO;  Location: [facility] OR    KNEE SURGERY Left     osteotomy; screws and plate in place    LAPAROSCOPIC CHOLECYSTECTOMY N/A 04/25/2018    Procedure: LAPAROSCOPIC CHOLECYSTECTOMY;  Laparoscopic Cholecystectomy ;  Surgeon: Alberto Noble MD;  Location: [facility] OR    LAPAROSCOPIC NISSEN FUNDOPLICATION N/A 5/23/2023    Procedure: ROBOTIC ASSISTED LAPAROSCOPIC HERNIORRHAPHY, HIATAL WITH MESH AND MAGNETIC SPHINCTER AUGMENTATION;  Surgeon:  Abdiaziz Tolbert MD;  Location:  OR       Prior to Admission Medications   Prior to Admission Medications   Prescriptions Last Dose Informant Patient Reported? Taking?   cetirizine (ZYRTEC) 10 MG tablet  Self Yes No   Sig: Take 10 mg by mouth daily   doxycycline hyclate (VIBRAMYCIN) 100 MG capsule   No No   Sig: Take 1 capsule (100 mg) by mouth every 12 hours for 14 days   escitalopram (LEXAPRO) 20 MG tablet  Self No No   Sig: Take 1 tablet (20 mg) by mouth daily   ibuprofen (ADVIL/MOTRIN) 200 MG tablet  Self Yes No   Sig: Take 200 mg by mouth every 4 hours as needed for pain   mupirocin (BACTROBAN) 2 % external ointment   No No   Sig: Apply topically 2 times daily   oxyCODONE (ROXICODONE) 5 MG tablet   No No   Sig: Take 1-2 tablets (5-10 mg) by mouth every 8 hours as needed for moderate pain (IF pain not managed with non-pharmacological and non-opioid interventions)      Facility-Administered Medications: None        Review of Systems    The 10 point Review of Systems is negative other than noted in the HPI or here.      Physical Exam   Vital Signs: Temp: 98.1  F (36.7  C) Temp src: Temporal BP: 120/84 Pulse: 86   Resp: 16 SpO2: 92 %      Weight: 198 lbs 0 oz      Physical Exam   Constitutional:   Well nourished, well developed, resting comfortably   HEENT:   Head: Normocephalic and atraumatic.   Eyes: significant right-sided facial swelling- right cheek and eye. Pupils are equal, round, and reactive to light.  Pharynx has no erythema or exudate, mucous membranes are moist  Neck:   No adenopathy, no bony tenderness  Cardiovascular: Regular rate and rhythm without murmurs or gallops  Pulmonary/Chest: Clear to auscultation bilaterally, with no wheezes or retractions. No respiratory distress.  GI: Soft with good bowel sounds.  Non-tender, non-distended, with no guarding, no rebound, no peritoneal signs.   Back:  No bony or CVA tenderness   Musculoskeletal:  No edema or clubbing   Skin: Skin is warm and dry. No  rash noted.   Neurological: Alert and oriented to person, place, and time. Nonfocal exam  Psychiatric:  Normal mood and affect.      Medical Decision Making       75 MINUTES SPENT BY ME on the date of service doing chart review, history, exam, documentation & further activities per the note.      Data     I have personally reviewed the following data over the past 24 hrs:    12.0 (H)  \   14.2   / 389     138 103 4.1 (L) /  128 (H)   3.6 25 0.63 \     Procal: <0.02 CRP: 19.59 (H) Lactic Acid: 0.9

## 2023-12-03 NOTE — PHARMACY-ADMISSION MEDICATION HISTORY
Pharmacy Intern Admission Medication History    Admission medication history is complete. The information provided in this note is only as accurate as the sources available at the time of the update.    Information Source(s): Patient and CareEverywhere/SureScripts via in-person    Pertinent Information:     - pt reports currently approx. 1/2 done with doxycycline course (prescribed 23, #28, 14 DS)     - pt confirms full completion of clindamycin course last month (fill date 23, directions: t1c QID x10 days, #40, 10 DS)     Changes made to PTA medication list:  Added: THC gummies  Deleted: none  Changed: ibuprofen 200 m tablet per dose -> 2-3 tablets per dose    Medication Affordability:  Not including over the counter (OTC) medications, was there a time in the past 3 months when you did not take your medications as prescribed because of cost?: No    Allergies reviewed with patient and updates made in EHR: yes    Medication History Completed By: Marion Washburn 12/3/2023 10:10 AM    PTA Med List   Medication Sig Last Dose    cetirizine (ZYRTEC) 10 MG tablet Take 10 mg by mouth daily 12/3/2023 at AM    doxycycline hyclate (VIBRAMYCIN) 100 MG capsule Take 1 capsule (100 mg) by mouth every 12 hours for 14 days 12/3/2023 at AM    escitalopram (LEXAPRO) 20 MG tablet Take 1 tablet (20 mg) by mouth daily 12/3/2023 at AM    ibuprofen (ADVIL/MOTRIN) 200 MG tablet Take 400-600 mg by mouth every 6 hours as needed for pain PRN    mupirocin (BACTROBAN) 2 % external ointment Apply topically 2 times daily Past Week    NONFORMULARY THC gummies: takes 2-3 times per week in evening Past Week

## 2023-12-03 NOTE — ED TRIAGE NOTES
Patient presents with complaints of a worsening sinus infection for which she has been admitted to the hospital and treated with IV ABX. Patient has continued to take PO ABX at home but the infection is worsening and she was told to come to the ED for IV ABX.     Triage Assessment (Adult)       Row Name 12/03/23 0727          Triage Assessment    Airway WDL WDL        Respiratory WDL    Respiratory WDL WDL        Skin Circulation/Temperature WDL    Skin Circulation/Temperature WDL WDL        Cardiac WDL    Cardiac WDL WDL        Peripheral/Neurovascular WDL    Peripheral Neurovascular WDL WDL        Cognitive/Neuro/Behavioral WDL    Cognitive/Neuro/Behavioral WDL WDL

## 2023-12-03 NOTE — CONSULTS
[facility] Consultation by [facility]    Vianca Kumar MRN# [id number]   Age: 48 year old YOB: 1975     Date of Admission:  12/3/2023  Date of Consult:    12/03/23    Reason for consult: Right facial cellulitis       Requesting physician: Ruy Wiggins MD                           Chief Complaint:   Sinusitis              HPI:      Vianca Kumar is a 48 year old female admitted on 12/3/2023. She has PMH of MDD, obesity, hx of paraesophageal diaphragmatic hernia repair, tobacco abuse, polysubstance abuse, chronic septal perforation with recent hospital admissions 10/13-10/17 and 11/24-11/26 for facial cellulitis, who presents to the ED for evaluation of facial swelling and pain. Patient admitted to Medicine for further evaluation and care of recurrent facial cellulitis.     ## Recurrent facial cellulitis w/ chronic septal perforation  ## hx of intranasal cocaine use  ## Blurry vision and facial pain: Recent admissions 10-13-10/17 and 11/24-11/26/23 s/p treatment for facial cellulitis. Discharged home with clindamycin to complete two course. Has been taking abx as prescribed. Reports that facial pain and swelling increased in past couple days, prompting ED evaluation. Also reports bluury vison that has been prtesent  ID and ENT consulted on recent admission. Hx of extensive septal perforation s/t intranasal cocaine use.   - ENT and ID consults placed. Discussed with ENT who will see patient today  - Start Meropenem in AM per recent admission; received dose of Ertapenem in ED  - BCx2  - Add procal   - STAT CT facial bones with contrast   - Pain control: Tylenol and PO dilaudid  - Ophthalmology consult if with acute CT findings- would need to discuss with [facility]         ## Hx of Polysubstance abuse: Etoh and cocaine. No recent use.   - Monitor, recommend cessation    ## Tobacco Abuse: Current PPD smoker  - Recommend cessation  - Declined nicotine patch    Currently,  "patient describes significant facial tenderness and nasal congestion and pressure.  She has had recurrent Staph aureus cellulitis involving her torso and face.  She has had recurrence of the facial soft tissue cellulitis over the past 2-1/2 months.  She notes progression of current symptoms over the past 48 hours.  She continues to use intranasal drugs on occasion.    Previous tests and diagnostic procedures: see \"Tests and Procedures\" and \"PFSH\".               Past Medical History:     Past Medical History:   Diagnosis Date    MDD (major depressive disorder)     Obesity (BMI 30-39.9)                Past Surgical History:     Past Surgical History:   Procedure Laterality Date    CAPSULE/PILL CAM ENDOSCOPY N/A 04/03/2018    Procedure: CAPSULE/PILL CAM ENDOSCOPY;;  Surgeon: Guru Hallie Song MD;  Location:  GI    COLONOSCOPY N/A 12/28/2017    Procedure: COLONOSCOPY;  Surgeon: Yosi Beck MD;  Location: [facility];  Service:     COLONOSCOPY N/A 08/11/2022    Procedure: COLONOSCOPY, WITH POLYPECTOMY AND BIOPSY;  Surgeon: Lorri Holley DO;  Location: [facility] OR    COLONOSCOPY WITH CO2 INSUFFLATION N/A 08/11/2022    Procedure: COLONOSCOPY, WITH CO2 INSUFFLATION;  Surgeon: Lorri Holley DO;  Location: [facility] OR    COMBINED ESOPHAGOSCOPY, GASTROSCOPY, DUODENOSCOPY (EGD) WITH CO2 INSUFFLATION N/A 08/11/2022    Procedure: ESOPHAGOGASTRODUODENOSCOPY, WITH CO2 INSUFFLATION;  Surgeon: Lorri Holley DO;  Location: [facility] OR    COMBINED ESOPHAGOSCOPY, GASTROSCOPY, DUODENOSCOPY (EGD) WITH CO2 INSUFFLATION N/A 11/25/2022    Procedure: ESOPHAGOGASTRODUODENOSCOPY, WITH CO2 INSUFFLATION;  Surgeon: Lorri Holley DO;  Location: [facility] OR    ENDOSCOPIC ULTRASOUND UPPER GASTROINTESTINAL TRACT (GI) N/A 04/03/2018    Procedure: ENDOSCOPIC ULTRASOUND, ESOPHAGOSCOPY / UPPER GASTROINTESTINAL TRACT (GI);  EUS/Capsule ;  Surgeon: Guru Hallie Song MD;  Location:  GI    ESOPHAGOSCOPY, " GASTROSCOPY, DUODENOSCOPY (EGD), COMBINED N/A 12/28/2017    Procedure: ESOPHAGOGASTRODUODENOSCOPY (EGD);  Surgeon: Yosi Beck MD;  Location: [facility];  Service:     ESOPHAGOSCOPY, GASTROSCOPY, DUODENOSCOPY (EGD), COMBINED N/A 08/11/2022    Procedure: ESOPHAGOGASTRODUODENOSCOPY, WITH BIOPSY;  Surgeon: Lorri Holley DO;  Location: [facility] OR    ESOPHAGOSCOPY, GASTROSCOPY, DUODENOSCOPY (EGD), COMBINED N/A 11/25/2022    Procedure: ESOPHAGOGASTRODUODENOSCOPY, WITH BIOPSY;  Surgeon: Lorri Holley DO;  Location:  OR    KNEE SURGERY Left     osteotomy; screws and plate in place    LAPAROSCOPIC CHOLECYSTECTOMY N/A 04/25/2018    Procedure: LAPAROSCOPIC CHOLECYSTECTOMY;  Laparoscopic Cholecystectomy ;  Surgeon: Alberto Noble MD;  Location: [facility] OR    LAPAROSCOPIC NISSEN FUNDOPLICATION N/A 5/23/2023    Procedure: ROBOTIC ASSISTED LAPAROSCOPIC HERNIORRHAPHY, HIATAL WITH MESH AND MAGNETIC SPHINCTER AUGMENTATION;  Surgeon: Abdiaziz Tolbert MD;  Location:  OR               Social History:     Social History     Socioeconomic History    Marital status:      Spouse name: Not on file    Number of children: Not on file    Years of education: Not on file    Highest education level: Not on file   Occupational History    Occupation: Not working currently   Tobacco Use    Smoking status: Every Day     Packs/day: 1.00     Years: 31.00     Additional pack years: 0.00     Total pack years: 31.00     Types: Cigarettes    Smokeless tobacco: Never    Tobacco comments:     31 years (as of 2023); ~1 cig/week (as of 2023)   Vaping Use    Vaping Use: Never used   Substance and Sexual Activity    Alcohol use: Yes     Comment: 5 drinks tonight    Drug use: Yes     Types: Marijuana     Comment: rare use    Sexual activity: Not Currently   Other Topics Concern    Parent/sibling w/ CABG, MI or angioplasty before 65F 55M? Not Asked   Social History Narrative    Single.    No kids.    Walks 3-4x/week; takes care of  niece and nephew regularly.     Social Determinants of Health     Financial Resource Strain: Low Risk  (11/13/2023)    Financial Resource Strain     Within the past 12 months, have you or your family members you live with been unable to get utilities (heat, electricity) when it was really needed?: No   Food Insecurity: Low Risk  (11/13/2023)    Food Insecurity     Within the past 12 months, did you worry that your food would run out before you got money to buy more?: No     Within the past 12 months, did the food you bought just not last and you didn t have money to get more?: No   Transportation Needs: High Risk (11/13/2023)    Transportation Needs     Within the past 12 months, has lack of transportation kept you from medical appointments, getting your medicines, non-medical meetings or appointments, work, or from getting things that you need?: Yes   Physical Activity: Not on file   Stress: Not on file   Social Connections: Not on file   Interpersonal Safety: Not on file   Housing Stability: Low Risk  (11/13/2023)    Housing Stability     Do you have housing? : Yes     Are you worried about losing your housing?: No               Family History:     Family History   Problem Relation Age of Onset    Hypertension Mother     Hyperlipidemia Mother     Hypertension Brother     Hyperlipidemia Brother     Myocardial Infarction Maternal Grandfather 47    Myocardial Infarction Maternal Uncle         multiple later in life    Diabetes No family hx of     Breast Cancer No family hx of     Ovarian Cancer No family hx of     Colon Cancer No family hx of     Cerebrovascular Disease No family hx of                Immunizations:     Immunization History   Administered Date(s) Administered    COVID-19 Vaccine (Knotch) 11/22/2021    Flu, Unspecified 01/14/2011, 10/17/2011    Influenza (IIV3) PF 01/14/2011, 10/17/2011    Influenza Vaccine, 6+MO IM (QUADRIVALENT W/PRESERVATIVES) 09/30/2015    TDAP Vaccine (Adacel) 01/14/2011,  01/19/2012    Td (Adult), Adsorbed 12/31/2001    Tdap (Adult) Unspecified Formulation 12/31/2001               Allergies:   Zosyn [piperacillin-tazobactam in dex], Keflex [cephalexin], Avocado, and Chantix [varenicline tartrate]          Medications:     Current Facility-Administered Medications:     acetaminophen (TYLENOL) tablet 650 mg, 650 mg, Oral, Q4H PRN **OR** acetaminophen (TYLENOL) Suppository 650 mg, 650 mg, Rectal, Q4H PRN, Barbara Lindo PA-C    calcium carbonate (TUMS) chewable tablet 1,000 mg, 1,000 mg, Oral, 4x Daily PRN, Barbara Lindo PA-C    [START ON 12/4/2023] escitalopram (LEXAPRO) tablet 20 mg, 20 mg, Oral, Daily, Barbara Lindo PA-C    HYDROmorphone (DILAUDID) tablet 2 mg, 2 mg, Oral, Q3H PRN, Barbara Lindo PA-C    HYDROmorphone (PF) (DILAUDID) injection 0.5 mg, 0.5 mg, Intravenous, Q30 Min PRN, Rebecca Jones MD, 0.5 mg at 12/03/23 0811    lidocaine (LMX4) cream, , Topical, Q1H PRN, Barbara Lindo PA-C    lidocaine 1 % 0.1-1 mL, 0.1-1 mL, Other, Q1H PRN, Barbara Lindo PA-C    mupirocin (BACTROBAN) 2 % ointment, , Topical, BID, Barbara Lindo PA-C    ondansetron (ZOFRAN ODT) ODT tab 4 mg, 4 mg, Oral, Q6H PRN **OR** ondansetron (ZOFRAN) injection 4 mg, 4 mg, Intravenous, Q6H PRN, Barbara Lindo PA-C    senna-docusate (SENOKOT-S/PERICOLACE) 8.6-50 MG per tablet 1 tablet, 1 tablet, Oral, BID PRN **OR** senna-docusate (SENOKOT-S/PERICOLACE) 8.6-50 MG per tablet 2 tablet, 2 tablet, Oral, BID PRN, Barbara Lindo PA-BRITTANIE    sodium chloride (PF) 0.9% PF flush 3 mL, 3 mL, Intracatheter, Q8H, Barbara Lindo PA-C, 3 mL at 12/03/23 1130    sodium chloride (PF) 0.9% PF flush 3 mL, 3 mL, Intracatheter, q1 min prn, Barbara Lindo PA-C          Review of Systems:   Review Of Systems  Skin: as above  Eyes: negative  Ears/Nose/Throat: as above  Respiratory: No shortness of breath, dyspnea on exertion, cough, or  "hemoptysis  Cardiovascular: negative  Gastrointestinal: negative  Genitourinary: negative  Musculoskeletal: negative  Neurologic: negative  Psychiatric: feeling anxious  Hematologic/Lymphatic/Immunologic: negative  Endocrine: negative          Physical exam:   CONSTITUTION:    /78   Pulse 63   Temp 98.1  F (36.7  C) (Oral)   Resp 16   Ht 1.727 m (5' 8\")   Wt 89.8 kg (198 lb)   LMP 01/01/2022 (Exact Date)   SpO2 96%   BMI 30.11 kg/m       General appearance:Well developed, well nourished and groomed. No apparent acute or chronic distress.    Ability to communicate: normal.       HEAD, FACE, SALIVARY GLANDS AND TMJ:    Inspection of Head and Face: Right facial cellulitis with edema involving facial skin and including the upper and lower eyelids  Palpation/Percussion of the Face: No tenderness, deformity or instability.    Palpation of Parotid and Submaxillary glands: Normal.    Facial Mobility: Normal.       EYES: Ocular Motility: gaze appears conjugate in all positions; no evident nystagmus.       EAR, NOSE, MOUTH AND THROAT:    Pinnas: Normal  External Nose: Mildly cellulitic right lateral alar skin.    Hearing: Conversational speech rarely or never misunderstands words.    Nasal Interior:  Mucosa -severe crusting, septal perforation cartilaginous septum, no intranasal purulence.    Lips, Teeth and Gums: Normal.    Oral Cavity and Oropharynx: Normal.    Base of tongue, Pharyngeal walls, Vallecula and Pyriform Sinuses: Unable to complete mirror examination because of hyperactive gag.    Larynx: Unable to visualize with mirror because of hyperactive gag.    Nasopharynx examination: Could not visualize with the mirror due to gag.       NECK AND THYROID:    Neck: normal symmetry; trachea midline; normal laryngeal crepitation; no adenopathy; no neck masses; no skin lesions; no scars.    Thyroid: normal size; no masses or tenderness.       LYMPH NODES: Neck Nodes: normal, no adenopathy.       NEUROLOGIC:  "   Level of orientation: normal to time, place, person and situation.    Cranial nerves: Cranial nerves II-XII grossly intact and symmetrical.       PSYCHIATRIC:    Level of consciousness: awake and alert.    Judgment and insight: normal.    Mood and affect: Labile and teary    Nasal Endoscopy:          Anesthesia Topical applied, Oxymetazoline & 2% lidocaine.          Scope 30 degree.     MEDICAL DECISION MAKING: After the patient was evaluated at the bedside, including reviewing their ongoing symptomatology, a management decision was made to proceed with endoscopic examination of the nasal space. Informed consent was verbally obtained.   PROCEDURE NOTE    FINDINGS:   Nasal septum - perforation with severe intranasal crusting  Right inferior turbinate - Norrmal  Right middle turbinate - normal  Right middle meatus - normal  Right sphenoethmoid recess - normal  Right posterior choanae - normal  Left inferior turbinate - normal  Left middle turbinate - normal  Left middle meatus - normal  Left sphenoethmoid recess -normal  Left posterior choanae - normal   Adenoid mass - not obstructive  PROCEDURE: After vasoconstriction and topical anesthesia was established with Lidocaine/phenylephrine, the endoscope was introduced into the right nasal airway and structures from the nares to the posterior choanae were evaluated. The scope was then removed and the same procedure repeated on the opposite side. The findings are noted above.  TOLERANCE: Good .          Indication - Sinusitis.          Areas examined Right, Left, Nasal mucosa, Septum, Inferior turbinate and meatus, Middle turbinate and meatus, Superior meatus, Sphenoethmoidal recess, Nasopharynx and eustachian tubes.            Data:     Results for orders placed or performed during the hospital encounter of 12/03/23   Basic metabolic panel     Status: Abnormal   Result Value Ref Range    Sodium 138 135 - 145 mmol/L    Potassium 3.6 3.4 - 5.3 mmol/L    Chloride 103 98 -  107 mmol/L    Carbon Dioxide (CO2) 25 22 - 29 mmol/L    Anion Gap 10 7 - 15 mmol/L    Urea Nitrogen 4.1 (L) 6.0 - 20.0 mg/dL    Creatinine 0.63 0.51 - 0.95 mg/dL    GFR Estimate >90 >60 mL/min/1.73m2    Calcium 9.2 8.6 - 10.0 mg/dL    Glucose 128 (H) 70 - 99 mg/dL   CRP inflammation     Status: Abnormal   Result Value Ref Range    CRP Inflammation 19.59 (H) <5.00 mg/L   CBC with platelets and differential     Status: Abnormal   Result Value Ref Range    WBC Count 12.0 (H) 4.0 - 11.0 10e3/uL    RBC Count 4.57 3.80 - 5.20 10e6/uL    Hemoglobin 14.2 11.7 - 15.7 g/dL    Hematocrit 42.2 35.0 - 47.0 %    MCV 92 78 - 100 fL    MCH 31.1 26.5 - 33.0 pg    MCHC 33.6 31.5 - 36.5 g/dL    RDW 11.8 10.0 - 15.0 %    Platelet Count 389 150 - 450 10e3/uL    % Neutrophils 72 %    % Lymphocytes 17 %    % Monocytes 6 %    % Eosinophils 3 %    % Basophils 1 %    % Immature Granulocytes 1 %    NRBCs per 100 WBC 0 <1 /100    Absolute Neutrophils 8.8 (H) 1.6 - 8.3 10e3/uL    Absolute Lymphocytes 2.0 0.8 - 5.3 10e3/uL    Absolute Monocytes 0.7 0.0 - 1.3 10e3/uL    Absolute Eosinophils 0.4 0.0 - 0.7 10e3/uL    Absolute Basophils 0.1 0.0 - 0.2 10e3/uL    Absolute Immature Granulocytes 0.1 <=0.4 10e3/uL    Absolute NRBCs 0.0 10e3/uL   iStat Gases (lactate) venous, POCT     Status: Abnormal   Result Value Ref Range    Lactic Acid POCT 0.9 <=2.0 mmol/L    Bicarbonate Venous POCT 24 21 - 28 mmol/L    O2 Sat, Venous POCT 75 (L) 94 - 100 %    pCO2 Venous POCT 38 (L) 40 - 50 mm Hg    pH Venous POCT 7.40 7.32 - 7.43    pO2 Venous POCT 40 25 - 47 mm Hg   Procalcitonin     Status: Normal   Result Value Ref Range    Procalcitonin <0.02 <0.50 ng/mL   CBC with platelets differential     Status: Abnormal    Narrative    The following orders were created for panel order CBC with platelets differential.  Procedure                               Abnormality         Status                     ---------                               -----------         ------                      CBC with platelets and d...[[id number]]  Abnormal            Final result                 Please view results for these tests on the individual orders.        Assessment and Plan:   48-year-old with history of acute on chronic recurrent right facial Staph aureus cellulitis since September 2023.  She has history of intranasal drug use and known septal perforation.  She has noted progressive facial swelling over the past 24 to 48 hours leading to hospitalization this morning.  She continues to have facial discomfort and nasal congestion.  CT imaging was reviewed which demonstrates large perforation and significant appearing rhinitis.  There is no sinusitis.  Examination reveals chronic appearing nasal scabbing and crusting with no purulence.  Perforation is large but appears chronic and stable.  There is no discharge from the middle meatal regions.  Recommend restarting meropenem as noted in H&P and await blood test results and input from ID to alter antibiotic therapy.  I recommend irrigation with saline and mupirocin ointment 2% within the compounded saline solution and mupirocin ointment to be placed into the nasal vestibule 3 times daily.  Anticipate need for antibiotic therapy over the next 3 weeks and recommend follow-up with ENT in 2 to 3 weeks.  Please call with questions or concerns.    Attestation:  I have reviewed today's vital signs, notes, medications, medication allergies, and PFSH/ROSlabs and imaging.    Emaon Veliz MD

## 2023-12-04 ENCOUNTER — APPOINTMENT (OUTPATIENT)
Dept: MRI IMAGING | Facility: CLINIC | Age: 48
DRG: 603 | End: 2023-12-04
Attending: INTERNAL MEDICINE
Payer: MEDICARE

## 2023-12-04 LAB
ERYTHROCYTE [DISTWIDTH] IN BLOOD BY AUTOMATED COUNT: 11.7 % (ref 10–15)
HCT VFR BLD AUTO: 39.2 % (ref 35–47)
HGB BLD-MCNC: 13.6 G/DL (ref 11.7–15.7)
HOLD SPECIMEN: NORMAL
MCH RBC QN AUTO: 31.9 PG (ref 26.5–33)
MCHC RBC AUTO-ENTMCNC: 34.7 G/DL (ref 31.5–36.5)
MCV RBC AUTO: 92 FL (ref 78–100)
PLATELET # BLD AUTO: 373 10E3/UL (ref 150–450)
RBC # BLD AUTO: 4.27 10E6/UL (ref 3.8–5.2)
WBC # BLD AUTO: 11 10E3/UL (ref 4–11)

## 2023-12-04 PROCEDURE — 85027 COMPLETE CBC AUTOMATED: CPT | Performed by: INTERNAL MEDICINE

## 2023-12-04 PROCEDURE — 250N000013 HC RX MED GY IP 250 OP 250 PS 637: Performed by: INTERNAL MEDICINE

## 2023-12-04 PROCEDURE — 70553 MRI BRAIN STEM W/O & W/DYE: CPT | Mod: 26 | Performed by: RADIOLOGY

## 2023-12-04 PROCEDURE — A9585 GADOBUTROL INJECTION: HCPCS | Mod: JZ | Performed by: INTERNAL MEDICINE

## 2023-12-04 PROCEDURE — 99222 1ST HOSP IP/OBS MODERATE 55: CPT | Mod: GC | Performed by: INTERNAL MEDICINE

## 2023-12-04 PROCEDURE — 36415 COLL VENOUS BLD VENIPUNCTURE: CPT | Performed by: INTERNAL MEDICINE

## 2023-12-04 PROCEDURE — 70553 MRI BRAIN STEM W/O & W/DYE: CPT

## 2023-12-04 PROCEDURE — 99232 SBSQ HOSP IP/OBS MODERATE 35: CPT | Performed by: INTERNAL MEDICINE

## 2023-12-04 PROCEDURE — 258N000003 HC RX IP 258 OP 636

## 2023-12-04 PROCEDURE — 120N000002 HC R&B MED SURG/OB UMMC

## 2023-12-04 PROCEDURE — 258N000003 HC RX IP 258 OP 636: Performed by: INTERNAL MEDICINE

## 2023-12-04 PROCEDURE — 255N000002 HC RX 255 OP 636: Mod: JZ | Performed by: INTERNAL MEDICINE

## 2023-12-04 PROCEDURE — 250N000011 HC RX IP 250 OP 636: Performed by: INTERNAL MEDICINE

## 2023-12-04 RX ORDER — CETIRIZINE HYDROCHLORIDE 10 MG/1
10 TABLET ORAL DAILY
Status: DISCONTINUED | OUTPATIENT
Start: 2023-12-04 | End: 2023-12-06 | Stop reason: HOSPADM

## 2023-12-04 RX ORDER — SODIUM CHLORIDE 9 MG/ML
INJECTION, SOLUTION INTRAVENOUS
Status: DISPENSED
Start: 2023-12-04 | End: 2023-12-04

## 2023-12-04 RX ORDER — ESCITALOPRAM OXALATE 10 MG/1
20 TABLET ORAL DAILY
Status: DISCONTINUED | OUTPATIENT
Start: 2023-12-04 | End: 2023-12-06 | Stop reason: HOSPADM

## 2023-12-04 RX ORDER — GADOBUTROL 604.72 MG/ML
8.9 INJECTION INTRAVENOUS ONCE
Status: COMPLETED | OUTPATIENT
Start: 2023-12-04 | End: 2023-12-04

## 2023-12-04 RX ORDER — SODIUM CHLORIDE 9 MG/ML
INJECTION, SOLUTION INTRAVENOUS
Status: COMPLETED
Start: 2023-12-04 | End: 2023-12-04

## 2023-12-04 RX ORDER — CARBOXYMETHYLCELLULOSE SODIUM 5 MG/ML
1 SOLUTION/ DROPS OPHTHALMIC
Status: DISCONTINUED | OUTPATIENT
Start: 2023-12-04 | End: 2023-12-06 | Stop reason: HOSPADM

## 2023-12-04 RX ORDER — SODIUM CHLORIDE 9 MG/ML
INJECTION, SOLUTION INTRAVENOUS
Status: COMPLETED
Start: 2023-12-04 | End: 2023-12-05

## 2023-12-04 RX ORDER — LIDOCAINE HYDROCHLORIDE AND EPINEPHRINE 10; 10 MG/ML; UG/ML
3 INJECTION, SOLUTION INFILTRATION; PERINEURAL ONCE
Qty: 20 ML | Refills: 0 | Status: COMPLETED | OUTPATIENT
Start: 2023-12-04 | End: 2023-12-04

## 2023-12-04 RX ADMIN — ESCITALOPRAM OXALATE 20 MG: 10 TABLET ORAL at 12:20

## 2023-12-04 RX ADMIN — GADOBUTROL 8.9 ML: 604.72 INJECTION INTRAVENOUS at 18:29

## 2023-12-04 RX ADMIN — SODIUM CHLORIDE 1000 ML: 9 INJECTION, SOLUTION INTRAVENOUS at 22:17

## 2023-12-04 RX ADMIN — OXYCODONE HYDROCHLORIDE 5 MG: 5 TABLET ORAL at 04:04

## 2023-12-04 RX ADMIN — HYDROMORPHONE HYDROCHLORIDE 0.5 MG: 1 INJECTION, SOLUTION INTRAMUSCULAR; INTRAVENOUS; SUBCUTANEOUS at 09:52

## 2023-12-04 RX ADMIN — MEROPENEM 1 G: 1 INJECTION, POWDER, FOR SOLUTION INTRAVENOUS at 14:26

## 2023-12-04 RX ADMIN — SODIUM CHLORIDE: 9 INJECTION, SOLUTION INTRAVENOUS at 03:59

## 2023-12-04 RX ADMIN — HYDROMORPHONE HYDROCHLORIDE 0.5 MG: 1 INJECTION, SOLUTION INTRAMUSCULAR; INTRAVENOUS; SUBCUTANEOUS at 14:30

## 2023-12-04 RX ADMIN — MEROPENEM 1 G: 1 INJECTION, POWDER, FOR SOLUTION INTRAVENOUS at 22:22

## 2023-12-04 RX ADMIN — SODIUM CHLORIDE 500 ML: 9 INJECTION, SOLUTION INTRAVENOUS at 18:34

## 2023-12-04 RX ADMIN — SODIUM CHLORIDE: 0.9 INJECTION, SOLUTION INTRAVENOUS at 14:42

## 2023-12-04 RX ADMIN — CETIRIZINE HYDROCHLORIDE 10 MG: 10 TABLET, FILM COATED ORAL at 12:20

## 2023-12-04 RX ADMIN — SODIUM CHLORIDE: 9 INJECTION, SOLUTION INTRAVENOUS at 22:18

## 2023-12-04 RX ADMIN — HYDROMORPHONE HYDROCHLORIDE 0.5 MG: 1 INJECTION, SOLUTION INTRAMUSCULAR; INTRAVENOUS; SUBCUTANEOUS at 19:50

## 2023-12-04 RX ADMIN — OXYCODONE HYDROCHLORIDE 5 MG: 5 TABLET ORAL at 12:20

## 2023-12-04 RX ADMIN — OXYCODONE HYDROCHLORIDE 5 MG: 5 TABLET ORAL at 18:33

## 2023-12-04 RX ADMIN — MEROPENEM 1 G: 1 INJECTION, POWDER, FOR SOLUTION INTRAVENOUS at 05:21

## 2023-12-04 ASSESSMENT — ACTIVITIES OF DAILY LIVING (ADL)
ADLS_ACUITY_SCORE: 35

## 2023-12-04 NOTE — PROGRESS NOTES
[facility]    Medicine Progress Note - Hospitalist Service, GOLD TEAM 21    Date of Admission:  12/3/2023    Assessment & Plan   Vianca Kumar is a 48 year old woman with history of chronic septal perforation with recent hospital admissions 10/13-10/17 and 11/24-11/26 for facial cellulitis as well as MDD, obesity, paraesophageal diaphragmatic hernia repair, tobacco abuse, polysubstance abuse, who presented to [facility] for evaluation of facial swelling and pain. Transferred to [facility] for further management of facial cellulitis with need for ophthalmology consultation due to blurred vision.     Recurrent facial cellulitis w/ chronic septal perforation  Blurred vision  History of extensive septal perforation due to intranasal cocaine use. Recent admissions 10/13-10/17 and 11/24-11/26 for treatment for facial cellulitis. Most recently discharged home with clindamycin to complete two week course. Despite antibiotics, facial pain and swelling increased in days preceding admission. Also developed blurred vision.     Afebrile. CRP 19. Leukocytosis to 12.     CT on presentation (12/3 ) showed: localized soft tissue stranding and edema now present in the right premaxillary soft tissues. This soft tissue edema extends along the soft tissues of the nose/upper lip as well as along the superficial aspect of the right periorbital and   glabellar regions. Milder soft tissue edema in the superficial left periorbital tissues and left premaxillary soft tissues. These findings are more prominent and extensive than on CT 11/24/2023.     ENT consulted at [facility] and recommended:   antibiotics as below  ID consult  Irrigation with saline   Mupirocin ointment to be placed into the nasal vestibule 3 times daily.    Anticipate need for antibiotic therapy over the next 3 weeks and recommend follow-up with ENT in 2 to 3 weeks.     ID consulted 12/4 and recommended  Continuing  "meropenem   MRI brain/orbits. Pt has a Linx device in her neck. Made a copy and faxed MRI safety card to MRI.     - Continue antibiotics as below  - Pain consult with tylenol, oxycodone, and dilaudid  - Ophthalmology consulted 12/4. Recs pending.     Antimicrobials  - Ertapenem (12/3 only)  - meropenem (12/3 - present)    Microbiology  - blood culture x2 12/3: NGTD  - MRSA nares 11/24: negative     History of Polysubstance abuse:   Etoh and cocaine. Last cocaine use 1 month ago  - Monitor for withdrawal    Tobacco Abuse:   Current PPD smoker  - Recommend cessation  - Declined nicotine patch     MDD, recurrent, moderate   PTA lexapro. Follows with therapist. Reports significant stress related to loss of brother in past year.   - Continue PTA medication   - Follow up with therapist as scheduled          Diet: Combination Diet Regular Diet Adult    DVT Prophylaxis: Pneumatic Compression Devices  Sloan Catheter: Not present  Lines: None     Cardiac Monitoring: None  Code Status: Full Code      Clinically Significant Risk Factors Present on Admission                       # Obesity: Estimated body mass index is 30.11 kg/m  as calculated from the following:    Height as of an earlier encounter on 12/3/23: 1.727 m (5' 8\").    Weight as of an earlier encounter on 12/3/23: 89.8 kg (198 lb).              Disposition Plan      Expected Discharge Date: 12/05/2023                    Thomas Warner MD  Hospitalist Service, GOLD TEAM 21  [facility]  Securely message with Talkpush (more info)  Text page via [facility] Paging/Directory   See signed in provider for up to date coverage information  ______________________________________________________________________    Interval History   Continues to have significant pain. Gets relief from pain regimen as above. No fevers. Continues to have blurred vision R > L.     Physical Exam   Vital Signs: Temp: 98.2  F (36.8  C) Temp src: Oral BP: " 113/67 Pulse: 54   Resp: 16 SpO2: 97 % O2 Device: None (Room air)    Weight: 0 lbs 0 oz    Gen: Awake, alert, NAD  Eyes: no erythema or drainage. PERRL  ENT: MMM, marked facial edema   CV: RRR, no MRG, no LE edema  Resp: CTAB, non-labored  GI: Soft, NT, ND, NABS      Medical Decision Making       40 MINUTES SPENT BY ME on the date of service doing chart review, history, exam, documentation & further activities per the note.      Data     I have personally reviewed the following data over the past 24 hrs:    11.0  \   13.6   / 373     N/A N/A N/A /  N/A   N/A N/A N/A \     Procal: <0.02 CRP: N/A Lactic Acid: N/A

## 2023-12-04 NOTE — PROGRESS NOTES
In hospital for tx of facial cellulitis. Scheduled MRI delayed due to piercings in face. Patient removed piercing in nose but was unable to remove stud in upper lip. Attempts at removal caused increased pain at site. ENT also attempted to remove the piercing but with no luck. MD aware. Went down to MRI at later time and they proceeded with caution. Pain managed well with IV and PO pain medications. Continues on IV ABT with no s/s infiltration. Continue POC.

## 2023-12-04 NOTE — PROGRESS NOTES
Shift [id number];Facial Cellulitis  History of Polysubstance Abuse,Chronic Septal Perforation  Summary:Pain managed with Oxycodone PRN and Ice Packs  VS:       Pt A/O X 4. Afebrile. VSS. Lungs- Clear bilaterally . Denies nausea, shortness of breath, and chest pain.     Output:       Bowels- + in all four quadrants. Voids spontaneously without   difficulty in the toilet.      Activity:       Pt up on her room and is independent with her cares .     Skin:   Redness,Swelling and Tenderness of her Face .     Pain:       Has pain in the Face and given Ice to the area.   Pt states that she has had floaters in the Left Eye and Blurring in the Right Eye   CMS:       CMS and Neuro's are intact. Denies numbness and tingling in all extremities.      Dressing:     None      Diet:       Pt is on a Regular Diet .       LDA:       PIV is patent in the Right Wrist .      Equipment:       Refused PCD's on BLE's. Bilateral heels are elevated off the bed.  Pt is ambulatory   Plan:       Pt is able to make needs known and the call light is within the pt's reach. Continue to monitor.       Additional Info:        .

## 2023-12-04 NOTE — CONSULTS
Otolaryngology Consult Note  December 4, 2023      CC: Piercing removal    HPI: Vianca Kumar is a 48 year old female with a past medical history of MDD, obesity, hx of paraesophageal diaphragmatic hernia repair, tobacco use, polysubstance use disorder, chronic septal perforation with recent hospital admissions 10/13-10/17 and 11/24-11/26 for facial cellulitis. who presents to the ED for evaluation of facial swelling and pain. She is currently admitted for return of facial cellulitis. She was first seen at [facility] where a CT was performed and showed soft tissue edema in the right premaxillary soft tissues which extends along the nose/upper lip and superficial aspect of the right periorbital and glabellar regions. There was no evidence of fluid collection or abscess but these findings were more prominent and extensive than her prior CT on 11/24/2023. Patient was seen by ENT at [facility] who determined she did not have sinusitis and there was no drainage or abnormal findings on endoscopy except her chronic septal perforation. Patient was transferred to [place] for further management. ENT was consulted today to assist with the removal of a lip piercing as patient will be undergoing MRI. Patient reports she has already cut out her nose piercing.    Patient reports 3 episodes of cellulitis in the last 6 weeks. She states one of her last episodes stemmed from boils on her abdomen. She reports having staph infections that have been hard to manage. She denies any difficulty breathing or eating. She has a smoking history but denies COPD or asthma diagnoses.     Past Medical History:   Diagnosis Date    MDD (major depressive disorder)     Obesity (BMI 30-39.9)        Past Surgical History:   Procedure Laterality Date    CAPSULE/PILL CAM ENDOSCOPY N/A 04/03/2018    Procedure: CAPSULE/PILL CAM ENDOSCOPY;;  Surgeon: Guru Hallie Song MD;  Location:  GI    COLONOSCOPY N/A 12/28/2017     Procedure: COLONOSCOPY;  Surgeon: Yosi Beck MD;  Location: [facility] OR;  Service:     COLONOSCOPY N/A 08/11/2022    Procedure: COLONOSCOPY, WITH POLYPECTOMY AND BIOPSY;  Surgeon: Lorri Holley DO;  Location: [facility] OR    COLONOSCOPY WITH CO2 INSUFFLATION N/A 08/11/2022    Procedure: COLONOSCOPY, WITH CO2 INSUFFLATION;  Surgeon: Lorri Holley DO;  Location: [facility] OR    COMBINED ESOPHAGOSCOPY, GASTROSCOPY, DUODENOSCOPY (EGD) WITH CO2 INSUFFLATION N/A 08/11/2022    Procedure: ESOPHAGOGASTRODUODENOSCOPY, WITH CO2 INSUFFLATION;  Surgeon: Lorri Holley DO;  Location: [facility] OR    COMBINED ESOPHAGOSCOPY, GASTROSCOPY, DUODENOSCOPY (EGD) WITH CO2 INSUFFLATION N/A 11/25/2022    Procedure: ESOPHAGOGASTRODUODENOSCOPY, WITH CO2 INSUFFLATION;  Surgeon: Lorri Holley DO;  Location: [facility] OR    ENDOSCOPIC ULTRASOUND UPPER GASTROINTESTINAL TRACT (GI) N/A 04/03/2018    Procedure: ENDOSCOPIC ULTRASOUND, ESOPHAGOSCOPY / UPPER GASTROINTESTINAL TRACT (GI);  EUS/Capsule ;  Surgeon: Guru Hallie Song MD;  Location: [facility] GI    ESOPHAGOSCOPY, GASTROSCOPY, DUODENOSCOPY (EGD), COMBINED N/A 12/28/2017    Procedure: ESOPHAGOGASTRODUODENOSCOPY (EGD);  Surgeon: Yosi Beck MD;  Location: [facility];  Service:     ESOPHAGOSCOPY, GASTROSCOPY, DUODENOSCOPY (EGD), COMBINED N/A 08/11/2022    Procedure: ESOPHAGOGASTRODUODENOSCOPY, WITH BIOPSY;  Surgeon: Lorri Holley DO;  Location: [facility] OR    ESOPHAGOSCOPY, GASTROSCOPY, DUODENOSCOPY (EGD), COMBINED N/A 11/25/2022    Procedure: ESOPHAGOGASTRODUODENOSCOPY, WITH BIOPSY;  Surgeon: Lorri Holley DO;  Location: [facility] OR    KNEE SURGERY Left     osteotomy; screws and plate in place    LAPAROSCOPIC CHOLECYSTECTOMY N/A 04/25/2018    Procedure: LAPAROSCOPIC CHOLECYSTECTOMY;  Laparoscopic Cholecystectomy ;  Surgeon: Alberto Noble MD;  Location: [facility] OR    LAPAROSCOPIC NISSEN FUNDOPLICATION N/A 5/23/2023    Procedure: ROBOTIC ASSISTED LAPAROSCOPIC HERNIORRHAPHY,  HIATAL WITH MESH AND MAGNETIC SPHINCTER AUGMENTATION;  Surgeon: Abdiaziz Tolbert MD;  Location: [facility] OR       No current outpatient medications on file.          Allergies   Allergen Reactions    Zosyn [Piperacillin-Tazobactam In Dex] Hives and Itching    Keflex [Cephalexin] Hives    Avocado Hives    Chantix [Varenicline Tartrate] Other (See Comments)     depression       Social History     Socioeconomic History    Marital status:      Spouse name: Not on file    Number of children: Not on file    Years of education: Not on file    Highest education level: Not on file   Occupational History    Occupation: Not working currently   Tobacco Use    Smoking status: Every Day     Packs/day: 1.00     Years: 31.00     Additional pack years: 0.00     Total pack years: 31.00     Types: Cigarettes    Smokeless tobacco: Never    Tobacco comments:     31 years (as of 2023); ~1 cig/week (as of 2023)   Vaping Use    Vaping Use: Never used   Substance and Sexual Activity    Alcohol use: Yes     Comment: 5 drinks tonight    Drug use: Yes     Types: Marijuana     Comment: rare use    Sexual activity: Not Currently   Other Topics Concern    Parent/sibling w/ CABG, MI or angioplasty before 65F 55M? Not Asked   Social History Narrative    Single.    No kids.    Walks 3-4x/week; takes care of niece and nephew regularly.     Social Determinants of Health     Financial Resource Strain: Low Risk  (11/13/2023)    Financial Resource Strain     Within the past 12 months, have you or your family members you live with been unable to get utilities (heat, electricity) when it was really needed?: No   Food Insecurity: Low Risk  (11/13/2023)    Food Insecurity     Within the past 12 months, did you worry that your food would run out before you got money to buy more?: No     Within the past 12 months, did the food you bought just not last and you didn t have money to get more?: No   Transportation Needs: High Risk (11/13/2023)     Transportation Needs     Within the past 12 months, has lack of transportation kept you from medical appointments, getting your medicines, non-medical meetings or appointments, work, or from getting things that you need?: Yes   Physical Activity: Not on file   Stress: Not on file   Social Connections: Not on file   Interpersonal Safety: Not on file   Housing Stability: Low Risk  (11/13/2023)    Housing Stability     Do you have housing? : Yes     Are you worried about losing your housing?: No       Family History   Problem Relation Age of Onset    Hypertension Mother     Hyperlipidemia Mother     Hypertension Brother     Hyperlipidemia Brother     Myocardial Infarction Maternal Grandfather 47    Myocardial Infarction Maternal Uncle         multiple later in life    Diabetes No family hx of     Breast Cancer No family hx of     Ovarian Cancer No family hx of     Colon Cancer No family hx of     Cerebrovascular Disease No family hx of        ROS: 12 point review of systems is negative unless noted in HPI.    PHYSICAL EXAM:  General: laying in bed, no acute distress  /71 (BP Location: Right arm)   Pulse 59   Temp 98  F (36.7  C) (Oral)   Resp 16   LMP 01/01/2022 (Exact Date)   SpO2 96%   HEAD: normocephalic, atraumatic  Face: Right facial cellulitis with edema involving facial skin and including the upper and lower eyelids , left side of face appears normal without swelling or erythema, small stud piercing above her upper lip   Eyes: EOMI, ight eyelids swollen   Ears: Hearing normal to conversational voice, no external deformity  Nose: no anterior drainage, no external deformity  Mouth: moist, no ulcers, no jaw or tooth tenderness, tongue midline and symmetric  Neck: no LAD, trachea midline  Neuro: alert and oriented, responding appropriately  Respiratory: breathing non-labored on RA, no stridor, breathing is audible but patient believes this is her baseline with her smoking history  Skin: no rashes or skin  lesions of the face/neck  Psych: pleasant affect  Cardio: extremities warm and well perfused       ROUTINE IP LABS (Last four results)  BMP  Recent Labs   Lab 12/03/23  0807      POTASSIUM 3.6   CHLORIDE 103   MARIAH 9.2   CO2 25   BUN 4.1*   CR 0.63   *     CBC  Recent Labs   Lab 12/04/23  0633 12/03/23  0807   WBC 11.0 12.0*   RBC 4.27 4.57   HGB 13.6 14.2   HCT 39.2 42.2   MCV 92 92   MCH 31.9 31.1   MCHC 34.7 33.6   RDW 11.7 11.8    389     INRNo lab results found in last 7 days.    Imaging:  Results for orders placed or performed during the hospital encounter of 12/03/23   CT Facial Bones with Contrast    Narrative    EXAM: CT FACIAL BONES WITH CONTRAST  LOCATION: [facility]  DATE: 12/3/2023    INDICATION: recurrent facial cellulitis  COMPARISON: CT 11/24/2023.  CONTRAST: 67mL Isovue 370  TECHNIQUE: Routine CT Maxillofacial with IV contrast. Multiplanar reformats. Dose reduction techniques were used.     FINDINGS:  OSSEOUS STRUCTURES/SOFT TISSUES: There is localized soft tissue stranding and edema in the right premaxillary soft tissues new from prior CT. Soft tissue edema extends along the soft tissues of the nose and upper lip as well as along the superficial   aspect of the right periorbital tissues and glabella region. Mild soft tissue edema in the superficial left periorbital tissues and left premaxillary region. No fluid collection or abscess and findings likely related to cellulitis. No facial bone   fracture or malalignment. Again noted is a large defect in the anterior nasal septum similar to prior study. No evidence for dental trauma or periapical abscess. Several dental caries noted. Right nasal piercing. Left premaxillary piercing.    ORBITAL CONTENTS: No acute abnormality. No post septal edema or fluid on either side.    SINUSES: Mild mucosal thickening scattered about the paranasal sinuses.    VISUALIZED INTRACRANIAL CONTENTS: No acute abnormality.        Impression    IMPRESSION:   1.  There is localized soft tissue stranding and edema now present in the right premaxillary soft tissues. This soft tissue edema extends along the soft tissues of the nose/upper lip as well as along the superficial aspect of the right periorbital and   glabellar regions. Milder soft tissue edema in the superficial left periorbital tissues and left premaxillary soft tissues. These findings are more prominent and extensive than on CT 11/24/2023. No fluid collection or definite abscess. Findings likely   secondary to cellulitis.  2.  Stable large defect in the anterior nasal septum.  3.  No intraorbital postseptal abnormality.         Assessment and Plan  Vianca Kumar is a 48 year old female with a past medical history MDD, obesity, hx of paraesophageal diaphragmatic hernia repair, tobacco use, polysubstance use disorder, chronic septal perforation who is currently admitted for recurrent facial cellulitis. Primary team has ordered an MRI and requested to have the facial piercing removed. Area around piercing was injected with 1% lidocaine with 1:100,000 epinephrine and a miesha clamp and forceps were used to attempt to unscrew the post from the back of the earring. This was unsuccessful after multiple attempts. Patient reports having MRI with facial piercing in the past. May consider proceeding with MRI without removing piercing. Additionally, ID was in the patient's room when ENT entered and there was mention of a culture of some sort. Per chart review, it appears ID may request a sinus culture. It is unclear if this would yield any useful information as patient does not have sinusitis. Please reach out if there is further discussion or request for this.    - Consider proceeding with MRI with piercing still in place  - Please notify ENT if patient has any change in breathing status  - Contact ENT with any additional questions or concerns    --Patient and plan discussed with   Dianna Mckoy PA-C pg via [facility] or [facility]  Otolaryngology-Head & Neck Surgery  Please page ENT with questions by dialing [phone] and entering job code [phone] when prompted.

## 2023-12-04 NOTE — CONSULTS
"  GENERAL ID SERVICE: NEW CONSULTATION  Patient:  Vianca Kumar, Date of birth 1975, Medical record number [id number]  Date of Admission: 12/3/2023  Date of Visit:  12/4/2023  Requesting Provider: Thomas Warner  Reason for consult: \"facial cellulitis, recurrent\"          Assessment and Recommendations:   Problem List:   1. Chronic septal perforation, dx 10/14/23   2. Recurrent facial cellulitis   3. History of intranasal cocaine use, last use 11/23  Discussion:  Vianca Peters is a 49 yo female with relevant history of long term intranasal cocaine use and chronic septal perforation diagnosed in October who presents with a recurrence of facial cellulitis despite oral antibiotics. She appears to be responding well to IV antibiotics. The Meropenem is covering gram-negatives and psudomonas, which the Clindamycin she takes home has not been, so those pathogens may be to blame. Given the recurrent infections and her complaints of head pressure and vision changes, a brain MRI should help exclude tracking of infection into the cranial vault. If ENT gets involved, a new specimen and culture will help guide antibiotics.     Recommendations:    1. MRI brain to evaluate tracking of infection    2. Continue IV Meropenem    3. Repeat CRP tomorrow    4. If not clinically improving, consult ENT tomorrow and ask for a new specimen culture to guide our abx management.     Recommendations discussed with Dr. Warner in person.    Thank you for this consult. ID will continue to follow this patient. Please feel free to call with any questions.     I have reviewed interval events, vital, labs, images, cultures and antibiotics    Regi Gonsales MD  PGY-1 Med-Peds  Division of Infectious Diseases        History of Present Illness:   The patient reports that her facial swelling started 10/11 shortly after a day or so of feeling fatigued and achy and waking to find her face swollen diffusely. She has had a long course (see " "below) of IV and oral antibiotics. She feels the oral agent that she has been sent home on multiple times is not working, because she always starts to feel the fatigue, face pain, and head \"pressure\" a few days after stopping the IV antibiotics. She reports that her current symptoms are nasal pain, swelling below the eyes, blurred vision, and malaise, and that these have improved since starting the IV Meropenem yesterday. She last used intranasal substances in November on the anniversary of her brother's passing, no use since last discharge.          Review of Systems:   Complete 12 point review of systems negative except as noted in HPI.        Past Medical History:   History of multiple years of intranasal cocaine use. Admitted 10/13-10/17 with cellulitis of the nose and found at that time to have a perforated nasal septum. Culture from the nares with MSSA. Treated at that time with Unasyn and then Zosyn. Unfortunately developed hives to Zosyn and later to Keflex. She was discharged of QID Clindamycin. Facial cellulitis recurred 11/13/23, started on course of Cindamycin. Worsening on 11/24, admitted to the hospital, started on IV Vancomycin and Meropenem. CT showed mild soft tissue thickening of the nose again noted but decreased from the comparison study possibly representing persistent cellulitis. No evidence for fluid collection or abscess. Discharged home 11/26 with another course of Clindamycin. Readmitted 7 days later with pain and swelling in bilateral paranasal region, malaise, concern for recurrent cellulitis.       Allergies:      Allergies   Allergen Reactions    Zosyn [Piperacillin-Tazobactam In Dex] Hives and Itching    Keflex [Cephalexin] Hives    Avocado Hives    Chantix [Varenicline Tartrate] Other (See Comments)     depression   Allergies confirmed on chart review, reactions witnessed by staff.        Family History:   Reviewed and noncontributory.   Family History   Problem Relation Age of Onset "    Hypertension Mother     Hyperlipidemia Mother     Hypertension Brother     Hyperlipidemia Brother     Myocardial Infarction Maternal Grandfather 47    Myocardial Infarction Maternal Uncle         multiple later in life    Diabetes No family hx of     Breast Cancer No family hx of     Ovarian Cancer No family hx of     Colon Cancer No family hx of     Cerebrovascular Disease No family hx of             Social History:   Currently every day tobacco smoker, not currently working, not currently sexually active.              Physical Exam:   /71 (BP Location: Right arm)   Pulse 59   Temp 98  F (36.7  C) (Oral)   Resp 16   LMP 01/01/2022 (Exact Date)   SpO2 96%    Exam:  GENERAL:  Well-developed, well-nourished, sitting in bed in no acute distress.   ENT:  Head is normocephalic, atraumatic. Mild periorbital and preseptal swelling. No erythema. Tenderness to palpation over the nasal bridge.   EYES:  Eyes have anicteric sclerae. No entrapment.   NECK:  Supple. Full range of motion.   LUNGS:  Clear to auscultation.  CARDIOVASCULAR:  Regular rate and rhythm. Limbs well perfused.   ABDOMEN:  Nondistended.   EXT: Extremities warm and without edema.  SKIN:  Careful skin exam without any rashes on the upper torso, shoulders, arms.   NEUROLOGIC:  Grossly nonfocal.         Laboratory Data:   WC 11  Plt 373  CRP 20 (last admission ~50)     Pertinent Recent Microbiology Data:     BC 12/3 NGTD       Imaging:   CT Facial Bones 12/3:   IMPRESSION:   1.  There is localized soft tissue stranding and edema now present in the right premaxillary soft tissues. This soft tissue edema extends along the soft tissues of the nose/upper lip as well as along the superficial aspect of the right periorbital and   glabellar regions. Milder soft tissue edema in the superficial left periorbital tissues and left premaxillary soft tissues. These findings are more prominent and extensive than on CT 11/24/2023. No fluid collection or definite  abscess. Findings likely   secondary to cellulitis.  2.  Stable large defect in the anterior nasal septum.  3.  No intraorbital postseptal abnormality.

## 2023-12-04 NOTE — PLAN OF CARE
Goal Outcome Evaluation:           Overall Patient Progress: improvingOverall Patient Progress: improving    Outcome Evaluation: pt arrived at 6:30pm from [facility] where she was admitted for facial cellulitis. reporting pain of 7/10 managed with IV dilaudid prn Q2hr. A&O x4, has call light within reach and is able to make needs known. independent in room, continue to monitor.      VS: Temp: 98.3  F (36.8  C) Temp src: Oral BP: 132/75 Pulse: 67   Resp: 16 SpO2: 94 % O2 Device: None (Room air)       O2: SpO2>90% on room air, denies shortness of breath and chest pain   Output: Voiding independently in bathroom, denies issues   Last BM: 12/03/2023   Activity: Up with standby assist   Skin: Intact. some swelling, redness,and tenderness on face   Pain: Face, head, and radiating down spine. Pt reports 7/10 pain. Managed currently with Iv dilaudid but pt seems agreeable to taking oral oxy for next dose.    CMS: A&O x4, denies N/T   Dressing: none   Diet: Regular, tolerating well, denies nausea   LDA: R wrist PIV infusing NS 100mL/hr   Equipment: IV pole, personal belongings   Plan: Continue to monitor, TBD   Additional Info:

## 2023-12-04 NOTE — PHARMACY-ADMISSION MEDICATION HISTORY
Admission medication history completed at [facility]. Please see Pharmacy Intern Admission Medication History note from 12/3/2023.

## 2023-12-04 NOTE — CONSULTS
OPHTHALMOLOGY CONSULT NOTE  12/04/23    Patient: Vianca Kumar      ASSESSMENT/PLAN:     Vianca Kumar is a 48 year old female who presented with blurry vision right eye and floaters left eye.    #Dry eyes right eye   #Meibomian gland dysfunction of both upper and lower lids of both eyes  MGD with symptomatic ocular surface disease of both eyes. Discussed that discomfort, fatigue, redness, increased tearing, a evelyn/gritty or burning sensation, and/or fluctuating vision are all symptoms of surface dryness. The patient was educated on the chronicity of surface dryness and emphasized the importance of consistent use of treatments to prevent symptoms.  No lagophthalmos seen on exam.  - Recommended artificial tears QID both eyes  - Recommend warm compress twice daily    #Vitreous syneresis left eye   Patient does not have a Kwan ring on 20D exam today. We discussed that this is likely syneretic change in her affected eye and could represent an impending vitreous detachment. We discussed urgent follow up if she notices a new floater like a fly or a ring in her vision.  -No retinal breaks/tears/detachments on 360   -RD precautions discussed  -Follow up in clinic    #Preseptal edema right eye   #Reactive inflammation left eye   Right premaxillary soft tissue stranding and edema that extends along the inferior eyelid.   -Currently being managed by primary team and ENT   -Not likely related to above symptoms except exacerbating MGD right eye    We will establish follow up for the patient in clinic;.patient to follow up in 1-2 weeks or sooner if signs and symptoms of concern present  Counseled return/RD precautions    It is our pleasure to participate in this patient's care and treatment. Please contact us with any further questions or concerns.    Ophthalmology will sign off. Please contact ophthalmologist on call with any questions or concerns. We appreciate your care of this patient.    Thank you for  entrusting us with your care  Noreen Wallis MD, PGY3  Ophthalmology Resident  [facility]      HISTORY OF PRESENTING ILLNESS:     Vianca Kumar is a 48 year old female with PMH of major depressive disorder, obesity,  paraesophageal diaphragmatic hernia repair, chronic septal perforation with two recent hospitalizations for facial cellulitis presented to [facility] ED for evaluation of facial swelling and pain. She was transferred from [facility] to the  for ophthalmology consult due to complaints of bilateral blurry vision and floaters.     She reports that her right eye has been intermittently blurry since her infection started. She reports that blurry vision is worse towards the end of the day. She also notices blurry vision while reading. This has happened in the past with her previous infections.    She also notes floaters left eye for 2 days. She describes them as hairs in the inferior portion of her vision. They move with her eyes as she looks in different directions.    10+ review of systems were otherwise negative except for that which has been stated above.      OCULAR/MEDICAL/SURGICAL HISTORIES:     Past Ocular History:   Last eye exam: Several years ago  Previously diagnosed ocular conditions: Refractive error in her 20s  Prior eye surgery/laser: None  Contact lens wear: None  Glasses: None  Eyedrops: Occasional allergy drops    Pertinent Systemic Medications:   Current Facility-Administered Medications   Medication    calcium carbonate (TUMS) chewable tablet 1,000 mg    carboxymethylcellulose PF (REFRESH PLUS) 0.5 % ophthalmic solution 1 drop    cetirizine (zyrTEC) tablet 10 mg    escitalopram (LEXAPRO) tablet 20 mg    HYDROmorphone (PF) (DILAUDID) injection 0.5 mg    lidocaine (LMX4) cream    lidocaine 1 % 0.1-1 mL    lidocaine 1% with EPINEPHrine 1:100,000 injection 3 mL    meropenem (MERREM) 1 g vial to attach to  mL bag    naloxone (NARCAN) injection 0.2 mg    Or    naloxone  (NARCAN) injection 0.4 mg    Or    naloxone (NARCAN) injection 0.2 mg    Or    naloxone (NARCAN) injection 0.4 mg    oxyCODONE (ROXICODONE) tablet 5 mg    senna-docusate (SENOKOT-S/PERICOLACE) 8.6-50 MG per tablet 1 tablet    Or    senna-docusate (SENOKOT-S/PERICOLACE) 8.6-50 MG per tablet 2 tablet    sodium chloride (PF) 0.9% PF flush 3 mL    sodium chloride (PF) 0.9% PF flush 3 mL    sodium chloride 0.9 % infusion    sodium chloride 0.9 % infusion         Past Medical History:  Past Medical History:   Diagnosis Date    MDD (major depressive disorder)     Obesity (BMI 30-39.9)        Past Surgical History:   Past Surgical History:   Procedure Laterality Date    CAPSULE/PILL CAM ENDOSCOPY N/A 04/03/2018    Procedure: CAPSULE/PILL CAM ENDOSCOPY;;  Surgeon: Guru Hallie Song MD;  Location:  GI    COLONOSCOPY N/A 12/28/2017    Procedure: COLONOSCOPY;  Surgeon: Yosi Beck MD;  Location: [facility];  Service:     COLONOSCOPY N/A 08/11/2022    Procedure: COLONOSCOPY, WITH POLYPECTOMY AND BIOPSY;  Surgeon: Lorri Holley DO;  Location: [facility]    COLONOSCOPY WITH CO2 INSUFFLATION N/A 08/11/2022    Procedure: COLONOSCOPY, WITH CO2 INSUFFLATION;  Surgeon: Lorri Holley DO;  Location: [facility]    COMBINED ESOPHAGOSCOPY, GASTROSCOPY, DUODENOSCOPY (EGD) WITH CO2 INSUFFLATION N/A 08/11/2022    Procedure: ESOPHAGOGASTRODUODENOSCOPY, WITH CO2 INSUFFLATION;  Surgeon: Lorri Holley DO;  Location: [facility]    COMBINED ESOPHAGOSCOPY, GASTROSCOPY, DUODENOSCOPY (EGD) WITH CO2 INSUFFLATION N/A 11/25/2022    Procedure: ESOPHAGOGASTRODUODENOSCOPY, WITH CO2 INSUFFLATION;  Surgeon: Lorri Holley DO;  Location: [facility]    ENDOSCOPIC ULTRASOUND UPPER GASTROINTESTINAL TRACT (GI) N/A 04/03/2018    Procedure: ENDOSCOPIC ULTRASOUND, ESOPHAGOSCOPY / UPPER GASTROINTESTINAL TRACT (GI);  EUS/Capsule ;  Surgeon: Guru Hallie Song MD;  Location: [facility]    ESOPHAGOSCOPY, GASTROSCOPY, DUODENOSCOPY  (EGD), COMBINED N/A 12/28/2017    Procedure: ESOPHAGOGASTRODUODENOSCOPY (EGD);  Surgeon: Yosi Beck MD;  Location: [facility];  Service:     ESOPHAGOSCOPY, GASTROSCOPY, DUODENOSCOPY (EGD), COMBINED N/A 08/11/2022    Procedure: ESOPHAGOGASTRODUODENOSCOPY, WITH BIOPSY;  Surgeon: Lorri Holley DO;  [facility]: [facility]    ESOPHAGOSCOPY, GASTROSCOPY, DUODENOSCOPY (EGD), COMBINED N/A 11/25/2022    Procedure: ESOPHAGOGASTRODUODENOSCOPY, WITH BIOPSY;  Surgeon: Lorri Holley DO;  Location: [facility]    KNEE SURGERY Left     osteotomy; screws and plate in place    LAPAROSCOPIC CHOLECYSTECTOMY N/A 04/25/2018    Procedure: LAPAROSCOPIC CHOLECYSTECTOMY;  Laparoscopic Cholecystectomy ;  Surgeon: Alberto Noble MD;  Location: [facility]    LAPAROSCOPIC NISSEN FUNDOPLICATION N/A 5/23/2023    Procedure: ROBOTIC ASSISTED LAPAROSCOPIC HERNIORRHAPHY, HIATAL WITH MESH AND MAGNETIC SPHINCTER AUGMENTATION;  Surgeon: Abdiaziz Tolbert MD;  Location:  OR       Family History:  No history of macular degeneration or glaucoma    Social History:  Current tobacco use - about a pack a day    EXAMINATION:     Base Eye Exam       Visual Acuity (Snellen - Linear)         Right Left    Near sc 20/25-1 20/25-1              Tonometry (Tonopen, 3:25 PM)         Right Left    Pressure 16 14              Pupils         Shape React APD    Right Round Brisk None    Left Round Brisk None              Visual Fields         Left Right     Full Full              Extraocular Movement         Right Left     Full Full              Dilation       Both eyes: 1.0% Mydriacyl, 2.5% Bryan Synephrine @ 3:26 PM                  Additional Tests       Color         Right Left    Ishihara 10/11 10/11   Missed 2 plate on both sides                 Slit Lamp and Fundus Exam       External Exam         Right Left    External Periorbital edema, erythema, TTP, heat Periorbital edema, erythema, TTP, heat              Slit Lamp Exam         Right Left    Lids/Lashes  Inspissated meibomian glands Sluggish meibomian glands    Conjunctiva/Sclera White and quiet, minimal conjchalasis White and quiet, minimal conjchalasis    Cornea tr PEE Clear    Anterior Chamber Deep and quiet Deep and quiet    Iris Dilated Dilated    Lens tr NS tr NS    Anterior Vitreous Normal Syneresis without Kwan ring on 20D exam              Fundus Exam         Right Left    Disc Normal Normal    C/D Ratio 0.5 0.5    Macula Normal Normal    Vessels Normal Normal    Periphery Normal Normal                    Labs/Studies/Imaging Performed    CT facial bones 12/3/23     IMPRESSION:   1.  There is localized soft tissue stranding and edema now present in the right premaxillary soft tissues. This soft tissue edema extends along the soft tissues of the nose/upper lip as well as along the superficial aspect of the right periorbital and   glabellar regions. Milder soft tissue edema in the superficial left periorbital tissues and left premaxillary soft tissues. These findings are more prominent and extensive than on CT 11/24/2023. No fluid collection or definite abscess. Findings likely   secondary to cellulitis.  2.  Stable large defect in the anterior nasal septum.  3.  No intraorbital postseptal abnormality.       Thank you for entrusting us with your care  Noreen Wallis MD, PGY3  Ophthalmology Resident  [facility]

## 2023-12-04 NOTE — H&P
[facility] History and Physical  Department of Internal Medicine    Patient Name: Vinaca Kumar MRN# [id number]   Age: 48 year old YOB: 1975     Date of Admission:12/3/2023      Primary care provider: Whitney Perez  Date of Service: 12/3/2023           Assessment and Plan:   Vianca Kumar is a 48 year old female with past medical history of major depressive disorder, obesity, paraesophageal diaphragmatic hernia repair, tobacco use disorder, polysubstance abuse, chronic septal perforation with recent hospitalizations x 2 for facial cellulitis who presented to [facility] ED for evaluation of facial swelling and pain.  Patient is transferred here for ophthalmology consultation for complaints of blurring of vision      Recurrent facial cellulitis with chronic septal perforation.  Patient currently afebrile, has mild leukocytosis.  Received a dose of ertapenem in the ED.  Will start patient on meropenem in a.m.  ID consult  History of intranasal drug use and known septal perforation.  CT imaging done in the ED was reviewed by ENT.  There is a large perforation and significant appearing rhinitis.  There is no sinusitis.  Exam revealed nasal scabbing and crusting with no purulence.  Perforation is chronic and stable.  ENT recommends to continue meropenem, await blood test results and input from ID regarding antibiotic therapy.  Irrigation of the nostrils with saline and Ciproxin ointment 2% within the compounded saline solution and vasopressin ointment to be placed into the nasal vestibule 3 times daily.  Nasal septal perforation.  Follow-up with ENT in 2 to 3 weeks  Leukocytosis due to #1  Blurring of vision.  No pain in the eyes.  Ophthalmology consult.  Symptoms are apparently ongoing with the facial pain.  Ophthalmology consult in a.m.  Facial pain and cephalgia.  Dilaudid IV as needed for severe pain and Oxy as needed.  Major depressive disorder, PTA Lexapro continued.  Patient  follows with therapist as outpatient.  Follow-up as an outpatient.  History of polysubstance use disorder, nicotine dependence.  Patient also uses alcohol.  Declined nicotine patch.  Intranasal cocaine use.  Last use was a month ago.  CODE: Full code  Diet/IVF: Regular diet, normal saline at 100 cc per  DVT ppx: Lovenox 40 mg subcu daily  Disposition/Admission Status: Inpatient    Ancelmo Osborne MD  Internal Medicine Staff Hospitalist  [facility]  Pager: [phone]       Chief Complaint:   Facial pain and swelling         HPI:   This is a 48-year-old female patient who has history of polysubstance use disorder, intranasal cocaine use with chronic septal perforation who has been admitted twice since October and was receiving IV antibiotics for facial cellulitis.  Patient was discharged home on clindamycin for 10 days twice after inpatient IV antibiotic therapy.  She was last prescribed doxycycline for 14 days which she took for about a week.  Today she presented to the ED at [facility] stating that the facial pain and swelling had gotten worse over the last couple of days.  She states she had a temp recently of 100.2 but denies chills.  No any significant nasal discharge at this moment.  She states that she has been having worsening pain and her face and behind the eyes and also in the foreheads.  Patient states she last used intranasal cocaine about a month ago on the anniversary of the passing of her younger brother who passed about a year ago.  Patient reports having some stressors in her life.  She reports smoking about a pack a day and using also alcohol.  In the ED, patient is afebrile vital signs stable does have mild leukocytosis.  Normal serum creatinine level.  CT of the face obtained showed no sinusitis.  She does have nasal scabs which according to ENT would need to be irrigated and also application of mupirocin 2% ointment 3 times daily.  ENT was consulted and recommended to  continue meropenem, mupirocin ointment with saline into the nostrils.  Follow-up with ENT in 2 to 3 weeks.  Ophthalmology consult is recommended given the patient's complaints of blurring in the right eye and left eye with floaters in the left eye which she states she has been having for some time now.  She denies any pain in the eyes.  Patient was given Ertapenem IV and today.           Past Medical History:     Past Medical History:   Diagnosis Date    MDD (major depressive disorder)     Obesity (BMI 30-39.9)    Reviewed           Past Surgical History:     Past Surgical History:   Procedure Laterality Date    CAPSULE/PILL CAM ENDOSCOPY N/A 04/03/2018    Procedure: CAPSULE/PILL CAM ENDOSCOPY;;  Surgeon: Guru Hallie Song MD;  Location: [facility]    COLONOSCOPY N/A 12/28/2017    Procedure: COLONOSCOPY;  Surgeon: Yosi Beck MD;  Location: [facility];  Service:     COLONOSCOPY N/A 08/11/2022    Procedure: COLONOSCOPY, WITH POLYPECTOMY AND BIOPSY;  Surgeon: Lorri Holley DO;  Location: [facility]    COLONOSCOPY WITH CO2 INSUFFLATION N/A 08/11/2022    Procedure: COLONOSCOPY, WITH CO2 INSUFFLATION;  Surgeon: Lorri Holley DO;  Location: [facility]    COMBINED ESOPHAGOSCOPY, GASTROSCOPY, DUODENOSCOPY (EGD) WITH CO2 INSUFFLATION N/A 08/11/2022    Procedure: ESOPHAGOGASTRODUODENOSCOPY, WITH CO2 INSUFFLATION;  Surgeon: Lorri Holley DO;  Location: [facility]    COMBINED ESOPHAGOSCOPY, GASTROSCOPY, DUODENOSCOPY (EGD) WITH CO2 INSUFFLATION N/A 11/25/2022    Procedure: ESOPHAGOGASTRODUODENOSCOPY, WITH CO2 INSUFFLATION;  Surgeon: Lorri Holley DO;  Location: [facility]    ENDOSCOPIC ULTRASOUND UPPER GASTROINTESTINAL TRACT (GI) N/A 04/03/2018    Procedure: ENDOSCOPIC ULTRASOUND, ESOPHAGOSCOPY / UPPER GASTROINTESTINAL TRACT (GI);  EUS/Capsule ;  Surgeon: Guru Hallie Song MD;  Location:  GI    ESOPHAGOSCOPY, GASTROSCOPY, DUODENOSCOPY (EGD), COMBINED N/A 12/28/2017    Procedure:  ESOPHAGOGASTRODUODENOSCOPY (EGD);  Surgeon: Yosi Beck MD;  Location: [facility];  Service:     ESOPHAGOSCOPY, GASTROSCOPY, DUODENOSCOPY (EGD), COMBINED N/A 08/11/2022    Procedure: ESOPHAGOGASTRODUODENOSCOPY, WITH BIOPSY;  Surgeon: Lorri Holley DO;  Location:  OR    ESOPHAGOSCOPY, GASTROSCOPY, DUODENOSCOPY (EGD), COMBINED N/A 11/25/2022    Procedure: ESOPHAGOGASTRODUODENOSCOPY, WITH BIOPSY;  Surgeon: Lorri Holley DO;  Location:  OR    KNEE SURGERY Left     osteotomy; screws and plate in place    LAPAROSCOPIC CHOLECYSTECTOMY N/A 04/25/2018    Procedure: LAPAROSCOPIC CHOLECYSTECTOMY;  Laparoscopic Cholecystectomy ;  Surgeon: Alberto Noble MD;  Location: [facility]    LAPAROSCOPIC NISSEN FUNDOPLICATION N/A 5/23/2023    Procedure: ROBOTIC ASSISTED LAPAROSCOPIC HERNIORRHAPHY, HIATAL WITH MESH AND MAGNETIC SPHINCTER AUGMENTATION;  Surgeon: Abdiaziz Tolbert MD;  Location:  OR              Social History:   Reviewed  Social History     Socioeconomic History    Marital status:      Spouse name: Not on file    Number of children: Not on file    Years of education: Not on file    Highest education level: Not on file   Occupational History    Occupation: Not working currently   Tobacco Use    Smoking status: Every Day     Packs/day: 1.00     Years: 31.00     Additional pack years: 0.00     Total pack years: 31.00     Types: Cigarettes    Smokeless tobacco: Never    Tobacco comments:     31 years (as of 2023); ~1 cig/week (as of 2023)   Vaping Use    Vaping Use: Never used   Substance and Sexual Activity    Alcohol use: Yes     Comment: 5 drinks tonight    Drug use: Yes     Types: Marijuana     Comment: rare use    Sexual activity: Not Currently   Other Topics Concern    Parent/sibling w/ CABG, MI or angioplasty before 65F 55M? Not Asked   Social History Narrative    Single.    No kids.    Walks 3-4x/week; takes care of niece and nephew regularly.     Social Determinants of Health      Financial Resource Strain: Low Risk  (11/13/2023)    Financial Resource Strain     Within the past 12 months, have you or your family members you live with been unable to get utilities (heat, electricity) when it was really needed?: No   Food Insecurity: Low Risk  (11/13/2023)    Food Insecurity     Within the past 12 months, did you worry that your food would run out before you got money to buy more?: No     Within the past 12 months, did the food you bought just not last and you didn t have money to get more?: No   Transportation Needs: High Risk (11/13/2023)    Transportation Needs     Within the past 12 months, has lack of transportation kept you from medical appointments, getting your medicines, non-medical meetings or appointments, work, or from getting things that you need?: Yes   Physical Activity: Not on file   Stress: Not on file   Social Connections: Not on file   Interpersonal Safety: Not on file   Housing Stability: Low Risk  (11/13/2023)    Housing Stability     Do you have housing? : Yes     Are you worried about losing your housing?: No            Family History:     Family History   Problem Relation Age of Onset    Hypertension Mother     Hyperlipidemia Mother     Hypertension Brother     Hyperlipidemia Brother     Myocardial Infarction Maternal Grandfather 47    Myocardial Infarction Maternal Uncle         multiple later in life    Diabetes No family hx of     Breast Cancer No family hx of     Ovarian Cancer No family hx of     Colon Cancer No family hx of     Cerebrovascular Disease No family hx of      Reviewed         Immunizations:     Immunization History   Administered Date(s) Administered    COVID-19 Vaccine (SUNDAYTOZ) 11/22/2021    Flu, Unspecified 01/14/2011, 10/17/2011    Influenza (IIV3) PF 01/14/2011, 10/17/2011    Influenza Vaccine, 6+MO IM (QUADRIVALENT W/PRESERVATIVES) 09/30/2015    TDAP Vaccine (Adacel) 01/14/2011, 01/19/2012    Td (Adult), Adsorbed 12/31/2001    Tdap (Adult)  Unspecified Formulation 12/31/2001              Allergies:      Allergies   Allergen Reactions    Zosyn [Piperacillin-Tazobactam In Dex] Hives and Itching    Keflex [Cephalexin] Hives    Avocado Hives    Chantix [Varenicline Tartrate] Other (See Comments)     depression            Medications:     Medications Prior to Admission   Medication Sig Dispense Refill Last Dose    cetirizine (ZYRTEC) 10 MG tablet Take 10 mg by mouth daily       escitalopram (LEXAPRO) 20 MG tablet Take 1 tablet (20 mg) by mouth daily 90 tablet 3     [START ON 12/4/2023] meropenem (MERREM) 500 MG vial Inject 500 mg into the vein every 8 hours       mupirocin (BACTROBAN) 2 % external ointment Apply topically 2 times daily                Review of Systems:   A complete ROS was performed and is negative other than what is stated in the HPI.          Physical Exam:   /75 (BP Location: Right arm)   Pulse 67   Temp 98.3  F (36.8  C) (Oral)   Resp 16   LMP 01/01/2022 (Exact Date)   SpO2 94%      GENERAL: Alert and oriented x 3. NAD.   HEENT: Anicteric sclera. Mucous membranes moist.  Erythema, mild and tenderness to the maxillary sinus areas.  No submandibular swelling or significant submandibular lymphadenopathy.  CV: RRR. S1, S2. No murmurs appreciated.   RESPIRATORY: Effort normal on room air. Lungs CTAB with no wheezing, rales, rhonchi.   GI: Abdomen soft and non distended with normoactive bowel sounds present in all quadrants. No tenderness, rebound, guarding.   NEUROLOGICAL: No focal deficits. Moves all extremities.    EXTREMITIES: No peripheral edema. Intact bilateral pedal pulses.   SKIN: No jaundice. No rashes.          Data:   CBC RESULTS:   Recent Labs   Lab Test 12/03/23  0807   WBC 12.0*   RBC 4.57   HGB 14.2   HCT 42.2   MCV 92   MCH 31.1   MCHC 33.6   RDW 11.8        Last Comprehensive Metabolic Panel:  Lab Results   Component Value Date     12/03/2023    POTASSIUM 3.6 12/03/2023    CHLORIDE 103 12/03/2023     CO2 25 12/03/2023    ANIONGAP 10 12/03/2023     (H) 12/03/2023    BUN 4.1 (L) 12/03/2023    CR 0.63 12/03/2023    GFRESTIMATED >90 12/03/2023    MARIAH 9.2 12/03/2023

## 2023-12-05 ENCOUNTER — APPOINTMENT (OUTPATIENT)
Dept: CT IMAGING | Facility: CLINIC | Age: 48
DRG: 603 | End: 2023-12-05
Attending: INTERNAL MEDICINE
Payer: MEDICARE

## 2023-12-05 ENCOUNTER — TELEPHONE (OUTPATIENT)
Dept: OPHTHALMOLOGY | Facility: CLINIC | Age: 48
End: 2023-12-05
Payer: MEDICARE

## 2023-12-05 LAB
ANION GAP SERPL CALCULATED.3IONS-SCNC: 2 MMOL/L (ref 7–15)
BASOPHILS # BLD AUTO: NORMAL 10*3/UL
BASOPHILS # BLD MANUAL: 0 10E3/UL (ref 0–0.2)
BASOPHILS NFR BLD AUTO: NORMAL %
BASOPHILS NFR BLD MANUAL: 0 %
BUN SERPL-MCNC: 5.4 MG/DL (ref 6–20)
CALCIUM SERPL-MCNC: 8.9 MG/DL (ref 8.6–10)
CHLORIDE SERPL-SCNC: 111 MMOL/L (ref 98–107)
CREAT SERPL-MCNC: 0.68 MG/DL (ref 0.51–0.95)
CRP SERPL-MCNC: 6.12 MG/L
DEPRECATED HCO3 PLAS-SCNC: 31 MMOL/L (ref 22–29)
EGFRCR SERPLBLD CKD-EPI 2021: >90 ML/MIN/1.73M2
EOSINOPHIL # BLD AUTO: NORMAL 10*3/UL
EOSINOPHIL # BLD MANUAL: 0.3 10E3/UL (ref 0–0.7)
EOSINOPHIL NFR BLD AUTO: NORMAL %
EOSINOPHIL NFR BLD MANUAL: 3 %
ERYTHROCYTE [DISTWIDTH] IN BLOOD BY AUTOMATED COUNT: 11.7 % (ref 10–15)
GLUCOSE SERPL-MCNC: 86 MG/DL (ref 70–99)
HCT VFR BLD AUTO: 37.9 % (ref 35–47)
HGB BLD-MCNC: 12.6 G/DL (ref 11.7–15.7)
IMM GRANULOCYTES # BLD: NORMAL 10*3/UL
IMM GRANULOCYTES NFR BLD: NORMAL %
LYMPHOCYTES # BLD AUTO: NORMAL 10*3/UL
LYMPHOCYTES # BLD MANUAL: 3.9 10E3/UL (ref 0.8–5.3)
LYMPHOCYTES NFR BLD AUTO: NORMAL %
LYMPHOCYTES NFR BLD MANUAL: 46 %
MCH RBC QN AUTO: 31.7 PG (ref 26.5–33)
MCHC RBC AUTO-ENTMCNC: 33.2 G/DL (ref 31.5–36.5)
MCV RBC AUTO: 96 FL (ref 78–100)
MONOCYTES # BLD AUTO: NORMAL 10*3/UL
MONOCYTES # BLD MANUAL: 0.4 10E3/UL (ref 0–1.3)
MONOCYTES NFR BLD AUTO: NORMAL %
MONOCYTES NFR BLD MANUAL: 5 %
NEUTROPHILS # BLD AUTO: NORMAL 10*3/UL
NEUTROPHILS # BLD MANUAL: 3.9 10E3/UL (ref 1.6–8.3)
NEUTROPHILS NFR BLD AUTO: NORMAL %
NEUTROPHILS NFR BLD MANUAL: 46 %
NRBC # BLD AUTO: 0 10E3/UL
NRBC BLD AUTO-RTO: 0 /100
PLAT MORPH BLD: NORMAL
PLATELET # BLD AUTO: 305 10E3/UL (ref 150–450)
POTASSIUM SERPL-SCNC: 4.1 MMOL/L (ref 3.4–5.3)
RBC # BLD AUTO: 3.97 10E6/UL (ref 3.8–5.2)
RBC MORPH BLD: NORMAL
SODIUM SERPL-SCNC: 144 MMOL/L (ref 135–145)
WBC # BLD AUTO: 8.4 10E3/UL (ref 4–11)

## 2023-12-05 PROCEDURE — 250N000011 HC RX IP 250 OP 636: Performed by: INTERNAL MEDICINE

## 2023-12-05 PROCEDURE — 85027 COMPLETE CBC AUTOMATED: CPT | Performed by: INTERNAL MEDICINE

## 2023-12-05 PROCEDURE — 85007 BL SMEAR W/DIFF WBC COUNT: CPT | Performed by: INTERNAL MEDICINE

## 2023-12-05 PROCEDURE — 86140 C-REACTIVE PROTEIN: CPT | Performed by: INTERNAL MEDICINE

## 2023-12-05 PROCEDURE — 36415 COLL VENOUS BLD VENIPUNCTURE: CPT | Performed by: INTERNAL MEDICINE

## 2023-12-05 PROCEDURE — 258N000003 HC RX IP 258 OP 636

## 2023-12-05 PROCEDURE — 99232 SBSQ HOSP IP/OBS MODERATE 35: CPT | Performed by: INTERNAL MEDICINE

## 2023-12-05 PROCEDURE — G1010 CDSM STANSON: HCPCS | Mod: GC | Performed by: RADIOLOGY

## 2023-12-05 PROCEDURE — 250N000013 HC RX MED GY IP 250 OP 250 PS 637: Performed by: INTERNAL MEDICINE

## 2023-12-05 PROCEDURE — G1010 CDSM STANSON: HCPCS

## 2023-12-05 PROCEDURE — 70486 CT MAXILLOFACIAL W/O DYE: CPT | Mod: 26 | Performed by: RADIOLOGY

## 2023-12-05 PROCEDURE — 80048 BASIC METABOLIC PNL TOTAL CA: CPT | Performed by: INTERNAL MEDICINE

## 2023-12-05 PROCEDURE — 99232 SBSQ HOSP IP/OBS MODERATE 35: CPT | Mod: GC | Performed by: INTERNAL MEDICINE

## 2023-12-05 PROCEDURE — 120N000002 HC R&B MED SURG/OB UMMC

## 2023-12-05 RX ORDER — CARBOXYMETHYLCELLULOSE SODIUM 5 MG/ML
1 SOLUTION/ DROPS OPHTHALMIC 4 TIMES DAILY
Status: DISCONTINUED | OUTPATIENT
Start: 2023-12-05 | End: 2023-12-06 | Stop reason: HOSPADM

## 2023-12-05 RX ORDER — SODIUM CHLORIDE 9 MG/ML
INJECTION, SOLUTION INTRAVENOUS
Status: COMPLETED
Start: 2023-12-05 | End: 2023-12-05

## 2023-12-05 RX ORDER — SODIUM CHLORIDE 9 MG/ML
INJECTION, SOLUTION INTRAVENOUS
Status: DISPENSED
Start: 2023-12-05 | End: 2023-12-05

## 2023-12-05 RX ADMIN — HYDROMORPHONE HYDROCHLORIDE 0.5 MG: 1 INJECTION, SOLUTION INTRAMUSCULAR; INTRAVENOUS; SUBCUTANEOUS at 08:52

## 2023-12-05 RX ADMIN — ESCITALOPRAM OXALATE 20 MG: 10 TABLET ORAL at 08:52

## 2023-12-05 RX ADMIN — MEROPENEM 1 G: 1 INJECTION, POWDER, FOR SOLUTION INTRAVENOUS at 13:11

## 2023-12-05 RX ADMIN — CETIRIZINE HYDROCHLORIDE 10 MG: 10 TABLET, FILM COATED ORAL at 08:52

## 2023-12-05 RX ADMIN — CARBOXYMETHYLCELLULOSE SODIUM 1 DROP: 5 SOLUTION/ DROPS OPHTHALMIC at 15:47

## 2023-12-05 RX ADMIN — MEROPENEM 1 G: 1 INJECTION, POWDER, FOR SOLUTION INTRAVENOUS at 05:04

## 2023-12-05 RX ADMIN — SODIUM CHLORIDE: 9 INJECTION, SOLUTION INTRAVENOUS at 03:37

## 2023-12-05 RX ADMIN — OXYCODONE HYDROCHLORIDE 5 MG: 5 TABLET ORAL at 21:55

## 2023-12-05 RX ADMIN — SODIUM CHLORIDE 1000 ML: 9 INJECTION, SOLUTION INTRAVENOUS at 11:23

## 2023-12-05 RX ADMIN — OXYCODONE HYDROCHLORIDE 5 MG: 5 TABLET ORAL at 11:14

## 2023-12-05 RX ADMIN — CARBOXYMETHYLCELLULOSE SODIUM 1 DROP: 5 SOLUTION/ DROPS OPHTHALMIC at 21:56

## 2023-12-05 RX ADMIN — MEROPENEM 1 G: 1 INJECTION, POWDER, FOR SOLUTION INTRAVENOUS at 21:57

## 2023-12-05 RX ADMIN — SODIUM CHLORIDE, PRESERVATIVE FREE: 5 INJECTION INTRAVENOUS at 03:37

## 2023-12-05 RX ADMIN — OXYCODONE HYDROCHLORIDE 5 MG: 5 TABLET ORAL at 16:29

## 2023-12-05 ASSESSMENT — ACTIVITIES OF DAILY LIVING (ADL)
ADLS_ACUITY_SCORE: 35

## 2023-12-05 NOTE — PROGRESS NOTES
Shift [id number];Facial Cellulitis  History of Polysubstance Abuse,Chronic Septal Perforation  Summary:Pain managed with Oxycodone PRN and Ice Packs  VS:       Pt A/O X 4. Afebrile. VSS. Lungs- Clear bilaterally . Denies nausea, shortness of breath, and chest pain.     Output:       Bowels- + in all four quadrants. Voids spontaneously without   difficulty in the toilet.      Activity:       Pt up on her room and is independent with her cares .     Skin:   Redness,swelling and redness of her face     Pain:       Has pain in the face and refuses ice to the area  Pt states that she has had floaters in the Left Eye and Blurring in the Right Eye   CMS:       CMS and Neuro's are intact. Denies numbness and tingling in all extremities.      Dressing:     None      Diet:       Pt is on a Regular Diet .       LDA:       PIV is patent in the Right Wrist .      Equipment:       Refused PCD's on BLE's. Bilateral heels are elevated off the bed.  Pt is ambulatory   Plan:       Pt is able to make needs known and the call light is within the pt's reach. Continue to monitor.       Additional Info:        .

## 2023-12-05 NOTE — CONSULTS
Dental Hygiene Consult Service        Vianca Kumar MRN# [id number]   YOB: 1975 Age: 48 year old     Date of Admission: 12/3/2023  PCP is Whitney Perez  Date of Service: 12/5/2023           Reason for Consult:   Referring MD & Reason for Visit: I was asked by Ancelmo Osborne MD, to see Vianca Kumar for oral assessment of suspected dental caries, poor oral health, facial cellulitis            History of Present Illness:   This patient is a 48 year old female with a history of chronic septal perforation with recent hospital admissions 10/13-10/17 and 11/24-11/26 for facial cellulitis as well as MDD, obesity, paraesophageal diaphragmatic hernia repair, tobacco abuse, polysubstance abuse, who presented to [facility] for evaluation of facial swelling and pain. Transferred to [facility] 12/3 for further management of facial cellulitis with need for ophthalmology consultation due to blurred vision.  .  Dental and oropharyngeal history is pertinent for facial swelling and pain, cellulitis, no regular dental care.  The patient reports broken teeth, no pain.             General Observations   Level of support Patient is fully independent   Patient currently in pain No   Patient wears dentures No   Current oral care routine - brushing daily.    Patient has oral care products Toothbrush and Toothpaste   Extraoral assessment   General appearance Symmetrical and Normal TMJ function   Lymph nodes Symmetrical, no abnormalities, non-tender   Oral Health Assessment Tool (OHAT)   Lips 0=Healthy: smooth, pink, moist   Tongue 0=Healthy: normal, moist, roughness, pink   Gums and Tissues 1=Changes: (ie. dry, shiny, rough, red, swollen, one ulcer/sore spot (under dentures)) - mild marginal inflammation, no signs of ulcers, fistulas, or sores.    Saliva 0=Healthy: moist tissues, watery and free flowing saliva   Natural Teeth Yes/No 2=Unhealthy: (ie. 4+ decayed or broken teeth/roots, or very worn down  teeth, or less than 4 teeth) - suspicious for caries in mx anteriors, possible other areas of caries as well (not seen clinically), broken molars on maxillary arch.    Dentures Yes/No Patient does not wear dentures   Oral Cleanliness 2=Unhealthy: ( ie. food particles/tartar/plaque in most areas of the mouth or on most of dentures or severe halitosis (bad breath)) - moderate dental calculus build up on lower anterior teeth.    Dental Pain 0=Healthy: no behavioural, verbal, or physical signs of dental pain   OHAT Total Score (0-16) 5   Other Dental Concerns Patient does not have dental home, Infrequent professional dental care, and broken maxillary molars, suspicious for caries.    Care provided during assessment Oral Assessment, Connect with other provider, and Patient education   Follow up DH assessments required? No   Connect with [facility] or [place] care? Yes: DDS at [facility] request dental CT and dental consult, will follow up regarding findings.    For Dental Team Service Requesting Consult: Internal med  Clearance/Reason: Non-clearance patient: poor oral health, suspect caries  Dental CT status: ordered  Upcoming Sx date(s), if known: none known  Expected discharge date, if known: unknown  Ability to transfer to [facility]: pt does not have her own personal transportation ability.   Pain reported, by pt: none.   Swelling present: none.   Current dental Rx regimen: none.    Oral Care Plan - Encouraged pt to con't to brush frequently,2-3x/day.   - Dental team would like further evaluation with dental CT.   - Rec out-patient comprehensive dental care.   Information for [facility] [employer]: [phone]              Assessment and Plan:   Assessment:  This patient is a 48 year old female with a history of chronic septal perforation with recent hospital admissions 10/13-10/17 and 11/24-11/26 for facial cellulitis as well as MDD, obesity, paraesophageal diaphragmatic hernia repair, tobacco abuse, polysubstance abuse, who presented  to [facility] for evaluation of facial swelling and pain. Transferred to [facility] 12/3 for further management of facial cellulitis with need for ophthalmology consultation due to blurred vision.  , oral exam concerning for hx of facial swelling/facial cellulitis, broken molars, no recent dental care.      Plan:   - Dental CT per dental team  - Oral care plan as above  Dr. Cheikh Patel, DMD will review DH note and determine patient's dental needs.     Carla Savage, [facility]  Pager: [phone]      Past Medical History   I have reviewed this patient's medical history and updated it with pertinent information if needed.  Past Medical History:   Diagnosis Date    MDD (major depressive disorder)     Obesity (BMI 30-39.9)        Past Surgical History   I have reviewed this patient's surgical history and updated it with pertinent information if needed.  Past Surgical History:   Procedure Laterality Date    CAPSULE/PILL CAM ENDOSCOPY N/A 04/03/2018    Procedure: CAPSULE/PILL CAM ENDOSCOPY;;  Surgeon: Guru Hallie Song MD;  Location:  GI    COLONOSCOPY N/A 12/28/2017    Procedure: COLONOSCOPY;  Surgeon: Yosi Beck MD;  Location: [facility];  Service:     COLONOSCOPY N/A 08/11/2022    Procedure: COLONOSCOPY, WITH POLYPECTOMY AND BIOPSY;  Surgeon: Lorri Holley DO;  Location:  OR    COLONOSCOPY WITH CO2 INSUFFLATION N/A 08/11/2022    Procedure: COLONOSCOPY, WITH CO2 INSUFFLATION;  Surgeon: Lorri Holley DO;  Location:  OR    COMBINED ESOPHAGOSCOPY, GASTROSCOPY, DUODENOSCOPY (EGD) WITH CO2 INSUFFLATION N/A 08/11/2022    Procedure: ESOPHAGOGASTRODUODENOSCOPY, WITH CO2 INSUFFLATION;  Surgeon: Lorri Holley DO;  Location: [facility] OR    COMBINED ESOPHAGOSCOPY, GASTROSCOPY, DUODENOSCOPY (EGD) WITH CO2 INSUFFLATION N/A 11/25/2022    Procedure: ESOPHAGOGASTRODUODENOSCOPY, WITH CO2 INSUFFLATION;  Surgeon: Lorri Holley DO;  Location:  OR    ENDOSCOPIC ULTRASOUND UPPER  GASTROINTESTINAL TRACT (GI) N/A 04/03/2018    Procedure: ENDOSCOPIC ULTRASOUND, ESOPHAGOSCOPY / UPPER GASTROINTESTINAL TRACT (GI);  EUS/Capsule ;  Surgeon: Guru Hallie Song MD;  Location: [facility]    ESOPHAGOSCOPY, GASTROSCOPY, DUODENOSCOPY (EGD), COMBINED N/A 12/28/2017    Procedure: ESOPHAGOGASTRODUODENOSCOPY (EGD);  Surgeon: Yosi Beck MD;  Location: [facility];  Service:     ESOPHAGOSCOPY, GASTROSCOPY, DUODENOSCOPY (EGD), COMBINED N/A 08/11/2022    Procedure: ESOPHAGOGASTRODUODENOSCOPY, WITH BIOPSY;  Surgeon: Lorri Holley DO;  Location:  OR    ESOPHAGOSCOPY, GASTROSCOPY, DUODENOSCOPY (EGD), COMBINED N/A 11/25/2022    Procedure: ESOPHAGOGASTRODUODENOSCOPY, WITH BIOPSY;  Surgeon: Lorri Holley DO;  Location:  OR    KNEE SURGERY Left     osteotomy; screws and plate in place    LAPAROSCOPIC CHOLECYSTECTOMY N/A 04/25/2018    Procedure: LAPAROSCOPIC CHOLECYSTECTOMY;  Laparoscopic Cholecystectomy ;  Surgeon: Alberto Noble MD;  Location:  OR    LAPAROSCOPIC NISSEN FUNDOPLICATION N/A 5/23/2023    Procedure: ROBOTIC ASSISTED LAPAROSCOPIC HERNIORRHAPHY, HIATAL WITH MESH AND MAGNETIC SPHINCTER AUGMENTATION;  Surgeon: Abdiaziz Tolbert MD;  Location:  OR       Social History   I have reviewed this patient's social history and updated it with pertinent information if needed.  Social History     Tobacco Use    Smoking status: Every Day     Packs/day: 1.00     Years: 31.00     Additional pack years: 0.00     Total pack years: 31.00     Types: Cigarettes    Smokeless tobacco: Never    Tobacco comments:     31 years (as of 2023); ~1 cig/week (as of 2023)   Vaping Use    Vaping Use: Never used   Substance Use Topics    Alcohol use: Yes     Comment: 5 drinks tonight    Drug use: Yes     Types: Marijuana     Comment: rare use     Prior to Admission Medications   Prior to Admission Medications   Prescriptions Last Dose Informant Patient Reported? Taking?   cetirizine (ZYRTEC) 10  MG tablet  Self Yes No   Sig: Take 10 mg by mouth daily   escitalopram (LEXAPRO) 20 MG tablet  Self No No   Sig: Take 1 tablet (20 mg) by mouth daily   meropenem (MERREM) 500 MG vial   No No   Sig: Inject 500 mg into the vein every 8 hours   mupirocin (BACTROBAN) 2 % external ointment  Self No No   Sig: Apply topically 2 times daily      Facility-Administered Medications: None     Allergies   Allergies   Allergen Reactions    Zosyn [Piperacillin-Tazobactam In Dex] Hives and Itching    Keflex [Cephalexin] Hives    Avocado Hives    Chantix [Varenicline Tartrate] Other (See Comments)     depression     Physical Exam

## 2023-12-05 NOTE — PROGRESS NOTES
"  GENERAL ID SERVICE: NEW CONSULTATION  Patient:  Vianca Kumar, Date of birth 1975, Medical record number [id number]  Date of Admission: 12/3/2023  Date of Visit:  12/5/2023  Requesting Provider: Thomas Warner  Reason for consult: \"facial cellulitis, recurrent\"          Assessment and Recommendations:   Problem List:   1. Facial cellulitis, perforated septum 2/2 repeated intranasal cocaine use last year, once this year        -Nasal swab 10/13/23 grew 2+ MSSA   2. Bilateral nasal piercings, one removed 12/4/23, one in place   3. Dental carries, dental eval 12/5    Discussion:  Vianca Peters is a 47 yo female with relevant history of repeated intranasal cocaine use and chronic septal perforation diagnosed in October 2023 who presents with a recurrence of facial cellulitis despite oral antibiotics. She appears to be responding well to IV Meropenem with a decreasing CRP and improving pain and swelling. The Meropenem is covering gram-negatives and psudomonas, which the Clindamycin she takes home has not been, so those pathogens may be to blame. Brain MRI 12/5 ruled out intracranial involvement. She will benefit from continued IV meropenem while getting evaluation by Dental for maxillary pain and caries.     Recommendations:   1. Continue IV Meropenem.    2. Dental evaluation, inpatient if possible.     Recommendations discussed with primary team.    Thank you for this consult. ID will continue to follow this patient. Please feel free to call with any questions.     This patient was staffed and discussed with Dr. Mckenna, ID Attending.      Regi Gonsales MD  PGY-1 Med-Peds  Division of Infectious Diseases        Interval History:   ENT attempted removal of the remaining nose piercing without success. It was left in place for the MRI without issue. MRI read below, without concerning intracranial findings. ENT did not get a culture. Patient reports continuing to feel better since starting back " on the IV Meropenem.        Review of Systems:   Complete 12 point review of systems negative except as noted in HPI.       Allergies:      Allergies   Allergen Reactions    Zosyn [Piperacillin-Tazobactam In Dex] Hives and Itching    Keflex [Cephalexin] Hives    Avocado Hives    Chantix [Varenicline Tartrate] Other (See Comments)     depression            Physical Exam:   /81 (BP Location: Right arm)   Pulse 62   Temp 97.8  F (36.6  C) (Oral)   Resp 16   LMP 2022 (Exact Date)   SpO2 100%      Exam:  GENERAL:  Well-developed, well-nourished, sitting in bed in no acute distress.   ENT:  Head is normocephalic, atraumatic. Mild periorbital and preseptal swelling, improved from prior. No erythema. Tenderness to palpation over the nasal bridge.   EYES:  Eyes have anicteric sclerae. No entrapment.   NECK:  Supple. Full range of motion.   LUNGS:  Clear to auscultation.  CARDIOVASCULAR:  Regular rate and rhythm. Limbs well perfused.   ABDOMEN:  Nondistended.   EXT: Extremities warm and without edema.  SKIN:  Careful skin exam without any rashes on the upper torso, shoulders, arms. Significant, well healed tattoos over back and shoulders.  NEUROLOGIC:  Grossly nonfocal.         Laboratory Data:     CRP 6.12 (20 on the 3rd)   Procal <0.02  CBC- All WNL     Pertinent Recent Microbiology Data:   As above         Imagin/4 MRI Brain and Orbits:  IMPRESSION:  1. Facial soft tissue tissue edema and thickening in the premaxillary  and preseptal regions, right greater than left, consistent with  cellulitis. No evidence of postseptal orbital or intracranial  involvement.  2. No acute intracranial pathology.  3. Stable anterior nasal septal defect.

## 2023-12-05 NOTE — PLAN OF CARE
/64 (BP Location: Right arm)   Pulse 60   Temp 97.9  F (36.6  C) (Oral)   Resp 16   LMP 01/01/2022 (Exact Date)   SpO2 98%     A&O X 4, Denies SOB, CP. N/V, Dizziness  Facial swelling and pain, managed by  Prn oxycodone and x 1 dose of IV dilaudid  Right PIV infusing NS at 100 ml/hr  Regular diet. Independent in room  ABX regimen continued per orders  Continue with POC.

## 2023-12-05 NOTE — PROGRESS NOTES
"[facility]    Medicine Progress Note - Hospitalist Service, GOLD TEAM 21    Date of Admission:  12/3/2023    Assessment & Plan   Vianca Kumar is a 48 year old woman with history of chronic septal perforation with recent hospital admissions 10/13-10/17 and 11/24-11/26 for facial cellulitis as well as MDD, obesity, paraesophageal diaphragmatic hernia repair, tobacco abuse, polysubstance abuse, who presented to [facility] for evaluation of facial swelling and pain. Transferred to [facility] 12/3 for further management of facial cellulitis with need for ophthalmology consultation due to blurred vision.     Today:  -Discussed MRI findings with ophthalmology. No further concerns or recommendations.   -Discussed with ID, continue meropenem for now, may discharge on oral regimen   -CRP improving   -Dental consult   -Artificial tears ordered (scheduled) for dry eyes to see if this helps with blurry vision      Recurrent facial cellulitis w/ chronic septal perforation  Blurred vision likely 2/2 dry eyes   History of extensive septal perforation due to intranasal cocaine use. Recent admissions 10/13-10/17 and 11/24-11/26 for treatment for facial cellulitis. Most recently discharged home with clindamycin to complete a two week course. Despite antibiotics, facial pain and swelling increased in days preceding admission. Also developed blurred vision.   CT on presentation (12/3 ) showed: localized soft tissue stranding and edema now present in the right premaxillary soft tissues. This soft tissue edema extends along the soft tissues of the nose/upper lip as well as along the superficial aspect of the right periorbital and   glabellar regions. Milder soft tissue edema in the superficial left periorbital tissues and left premaxillary soft tissues. These findings are more prominent and extensive than on CT 11/24/2023.   MRI brain/orbits done 12/4 showed \"facial soft tissue " "tissue edema and thickening in the premaxillary and preseptal regions, right greater than left, consistent with  cellulitis. No evidence of postseptal orbital or intracranial involvement. No acute intracranial pathology. Stable anterior nasal septal defect.\"  Ophthalmology consulted 12/4, signed off. Suspect vision changes due to dry eyes.   -ID consulted, appreciate assistance    -Continue IV meropenem, possible switch to equivalent coverage oral regimen at discharge   -ENT consulted       -Irrigation with saline        -Mupirocin ointment to be placed into the nasal vestibule 3 times daily.         -Anticipate need for antibiotic therapy over the next 3 weeks and recommend follow-up with ENT in 2 to 3 weeks.   -Pain control with tylenol, oxycodone, and dilaudid  -Ophthalmology consulted 12/4. Signed off    -Continue artificial tears QID (scheduled)      Antimicrobials  -Ertapenem (12/3 only)  -Meropenem (12/3 - present)     Microbiology  -Blood culture x2 12/3: NGTD  -MRSA nares 11/24: negative     History of Polysubstance use   Etoh and cocaine. Last cocaine use 1 month ago. Also endorses marijuiana use, denies any other drug use or IV drug use.   -Monitor for withdrawal. No signs currently.     Tobacco use disorder   Current PPD smoker  -Recommend cessation  -Declined nicotine patch     MDD:   VALERIA lexapro. Follows with a therapist. Reports significant stress related to loss of brother in past year.   -Continue PTA lexapro   -Follow up with therapist as scheduled    Vitreous syneresis left eye   Per ophtho note 12/4: \"Patient does not have a Kwan ring on 20D exam today. We discussed that this is likely syneretic change in her affected eye and could represent an impending vitreous detachment. We discussed urgent follow up if she notices a new floater like a fly or a ring in her vision. No retinal breaks/tears/detachments on 360 \"  -RD precautions discussed by ophthalmology   -Follow up in clinic        Diet: " "Combination Diet Regular Diet Adult    DVT Prophylaxis: Pneumatic Compression Devices and Ambulate every shift  Sloan Catheter: Not present  Lines: None     Cardiac Monitoring: None  Code Status: Full Code      Clinically Significant Risk Factors                         # Obesity: Estimated body mass index is 30.11 kg/m  as calculated from the following:    Height as of an earlier encounter on 12/3/23: 1.727 m (5' 8\").    Weight as of an earlier encounter on 12/3/23: 89.8 kg (198 lb)., PRESENT ON ADMISSION            Disposition Plan     Expected Discharge Date: 12/06/2023                    Gemini Lugo DO  Hospitalist Service, GOLD TEAM 21  [facility]  Securely message with [employer] (more info)  Text page via [employer] Paging/Directory   See signed in provider for up to date coverage information  ______________________________________________________________________    Interval History   No overnight events reported.   Facial swelling improved today. Continues to have blurry vision, worse in R eye than left. Some floaters in left eye. Eating well, denies constipation or diarrhea. Denies any eye pain or redness.     Physical Exam   Vital Signs: Temp: 97.8  F (36.6  C) Temp src: Oral BP: 137/81 Pulse: 62   Resp: 16 SpO2: 100 % O2 Device: None (Room air)    Weight: 0 lbs 0 oz    Constitutional: no apparent distress, pleasant and cooperative   Cardiovascular: regular rate and rhythm, normal S1 and S2,  Respiratory: clear to auscultation bilaterally, no wheezing, rales, or rhonchi, normal work of breathing   GI: abdomen soft, non tender, non distended, bowel sounds present   Neuro: alert and oriented, no gross focal deficits noted    Skin: minimal erythema and some swelling of face noted   Eyes: Pupils equally round and reactive to light. Sclera non injected. EOM grossly intact.     Medical Decision Making           Data     I have personally reviewed the following data over the " past 24 hrs:    8.4  \   12.6   / 305     144 111 (H) 5.4 (L) /  86   4.1 31 (H) 0.68 \     Procal: N/A CRP: 6.12 (H) Lactic Acid: N/A         Imaging results reviewed over the past 24 hrs:   Recent Results (from the past 24 hour(s))   MR Brain and Orbits w/o & w Contrast    Narrative    EXAM: MR BRAIN AND ORBITS W/O & W CONTRAST  12/4/2023 6:30 PM     HISTORY:  Patient with recurrent facial cellulitis and nasal septal  defect. ID concerned about deeper tracking intracranial infection       COMPARISON: CT 12/3/2023  MRI 12/10/2012    TECHNIQUE:   1. MRI of the Brain: axial turboFLAIR and axial diffusion-weighted  with ADC map images of the brain were obtained without intravenous  contrast.  After intravenous administration of gadolinium, axial  T1-weighted images of the brain were obtained.    2. MRI of the Orbits focused on the orbits/visual pathways:  Axial  T2-weighted with inversion recovery and coronal T1-weighted images  were obtained without intravenous contrast. Axial and coronal  T1-weighted images with fat saturation were obtained after intravenous  gadolinium administration.    Contrast: 8.9 mL Gadavist injected intravenously.     FINDINGS:  Premaxillary and preseptal soft tissue edema and thickening, right  greater than left, similar to findings on recent CT. No focal abscess.  No postseptal edema of the orbits.    Normal optic nerves and optic chiasm.    No abnormal contrast enhancement in either the preseptal or postseptal  tissues.    There is no mass effect, midline shift, or intracranial hemorrhage.  The ventricles are proportionate to the cerebral sulci. Diffusion and  susceptibility weighted images are negative for acute/focal  abnormality. Major intracranial vascular structures are within normal  limits. Scattered T2 hyperintense foci in the periventricular and  subcortical white matter, likely chronic small vessel ischemic  disease.    No suspicious abnormality of the skull marrow signal. The  paranasal  sinuses and mastoid air cells are clear.. Large defect in the anterior  nasal septum, similar to prior CT.      Impression    IMPRESSION:  1. Facial soft tissue tissue edema and thickening in the premaxillary  and preseptal regions, right greater than left, consistent with  cellulitis. No evidence of postseptal orbital or intracranial  involvement.  2. No acute intracranial pathology.  3. Stable anterior nasal septal defect.    I have personally reviewed the examination and initial interpretation  and I agree with the findings.    LUPILLO STEVENS MD         SYSTEM ID:  [id number]

## 2023-12-05 NOTE — TELEPHONE ENCOUNTER
Pt to follow up non-urgently ideally within the next month per on call eye provider/hospital consult from yesterday (1-2 weeks per note)    -vitreous syneresis left eye     Pt currently inpatient     Scheduled 12- at 1315 with Dr. Maldonado at [facility] location.    Information will be on discharge instructions.    Note to  to mail out new patient packet.    Abdiaziz Lo RN 7:22 AM 12/05/23

## 2023-12-06 VITALS
DIASTOLIC BLOOD PRESSURE: 87 MMHG | SYSTOLIC BLOOD PRESSURE: 134 MMHG | HEART RATE: 58 BPM | TEMPERATURE: 97.8 F | OXYGEN SATURATION: 97 % | RESPIRATION RATE: 16 BRPM

## 2023-12-06 LAB
ANION GAP SERPL CALCULATED.3IONS-SCNC: 6 MMOL/L (ref 7–15)
BUN SERPL-MCNC: 5.8 MG/DL (ref 6–20)
CALCIUM SERPL-MCNC: 8.9 MG/DL (ref 8.6–10)
CHLORIDE SERPL-SCNC: 106 MMOL/L (ref 98–107)
CREAT SERPL-MCNC: 0.72 MG/DL (ref 0.51–0.95)
DEPRECATED HCO3 PLAS-SCNC: 31 MMOL/L (ref 22–29)
EGFRCR SERPLBLD CKD-EPI 2021: >90 ML/MIN/1.73M2
ERYTHROCYTE [DISTWIDTH] IN BLOOD BY AUTOMATED COUNT: 11.6 % (ref 10–15)
GLUCOSE SERPL-MCNC: 87 MG/DL (ref 70–99)
HCT VFR BLD AUTO: 39.3 % (ref 35–47)
HGB BLD-MCNC: 13.1 G/DL (ref 11.7–15.7)
MCH RBC QN AUTO: 31.7 PG (ref 26.5–33)
MCHC RBC AUTO-ENTMCNC: 33.3 G/DL (ref 31.5–36.5)
MCV RBC AUTO: 95 FL (ref 78–100)
PLATELET # BLD AUTO: 314 10E3/UL (ref 150–450)
POTASSIUM SERPL-SCNC: 3.8 MMOL/L (ref 3.4–5.3)
RBC # BLD AUTO: 4.13 10E6/UL (ref 3.8–5.2)
SODIUM SERPL-SCNC: 143 MMOL/L (ref 135–145)
WBC # BLD AUTO: 7.3 10E3/UL (ref 4–11)

## 2023-12-06 PROCEDURE — 36415 COLL VENOUS BLD VENIPUNCTURE: CPT | Performed by: INTERNAL MEDICINE

## 2023-12-06 PROCEDURE — 250N000011 HC RX IP 250 OP 636: Mod: JZ | Performed by: INTERNAL MEDICINE

## 2023-12-06 PROCEDURE — 250N000013 HC RX MED GY IP 250 OP 250 PS 637: Performed by: INTERNAL MEDICINE

## 2023-12-06 PROCEDURE — 99231 SBSQ HOSP IP/OBS SF/LOW 25: CPT | Performed by: INTERNAL MEDICINE

## 2023-12-06 PROCEDURE — 85027 COMPLETE CBC AUTOMATED: CPT | Performed by: INTERNAL MEDICINE

## 2023-12-06 PROCEDURE — 80048 BASIC METABOLIC PNL TOTAL CA: CPT | Performed by: INTERNAL MEDICINE

## 2023-12-06 PROCEDURE — 99239 HOSP IP/OBS DSCHRG MGMT >30: CPT | Performed by: INTERNAL MEDICINE

## 2023-12-06 RX ORDER — OXYCODONE HYDROCHLORIDE 5 MG/1
5 TABLET ORAL EVERY 6 HOURS PRN
Qty: 12 TABLET | Refills: 0 | Status: SHIPPED | OUTPATIENT
Start: 2023-12-06 | End: 2023-12-09

## 2023-12-06 RX ORDER — LEVOFLOXACIN 500 MG/1
500 TABLET, FILM COATED ORAL DAILY
Qty: 3 TABLET | Refills: 0 | Status: SHIPPED | OUTPATIENT
Start: 2023-12-06 | End: 2023-12-09

## 2023-12-06 RX ORDER — DOXYCYCLINE 100 MG/1
100 CAPSULE ORAL 2 TIMES DAILY
Qty: 6 CAPSULE | Refills: 0 | Status: SHIPPED | OUTPATIENT
Start: 2023-12-06 | End: 2023-12-09

## 2023-12-06 RX ORDER — CARBOXYMETHYLCELLULOSE SODIUM 5 MG/ML
1 SOLUTION/ DROPS OPHTHALMIC 4 TIMES DAILY
Qty: 120 EACH | Refills: 0 | Status: SHIPPED | OUTPATIENT
Start: 2023-12-06 | End: 2024-02-04

## 2023-12-06 RX ADMIN — OXYCODONE HYDROCHLORIDE 5 MG: 5 TABLET ORAL at 12:15

## 2023-12-06 RX ADMIN — MEROPENEM 1 G: 1 INJECTION, POWDER, FOR SOLUTION INTRAVENOUS at 13:06

## 2023-12-06 RX ADMIN — HYDROMORPHONE HYDROCHLORIDE 0.5 MG: 1 INJECTION, SOLUTION INTRAMUSCULAR; INTRAVENOUS; SUBCUTANEOUS at 10:25

## 2023-12-06 RX ADMIN — ESCITALOPRAM OXALATE 20 MG: 10 TABLET ORAL at 07:34

## 2023-12-06 RX ADMIN — HYDROMORPHONE HYDROCHLORIDE 0.5 MG: 1 INJECTION, SOLUTION INTRAMUSCULAR; INTRAVENOUS; SUBCUTANEOUS at 13:06

## 2023-12-06 RX ADMIN — OXYCODONE HYDROCHLORIDE 5 MG: 5 TABLET ORAL at 07:34

## 2023-12-06 RX ADMIN — CARBOXYMETHYLCELLULOSE SODIUM 1 DROP: 5 SOLUTION/ DROPS OPHTHALMIC at 12:15

## 2023-12-06 RX ADMIN — CETIRIZINE HYDROCHLORIDE 10 MG: 10 TABLET, FILM COATED ORAL at 07:34

## 2023-12-06 RX ADMIN — MEROPENEM 1 G: 1 INJECTION, POWDER, FOR SOLUTION INTRAVENOUS at 05:51

## 2023-12-06 RX ADMIN — CARBOXYMETHYLCELLULOSE SODIUM 1 DROP: 5 SOLUTION/ DROPS OPHTHALMIC at 07:34

## 2023-12-06 ASSESSMENT — ACTIVITIES OF DAILY LIVING (ADL)
ADLS_ACUITY_SCORE: 35

## 2023-12-06 NOTE — PLAN OF CARE
Goal Outcome Evaluation:         Overall Patient Progress: no change       VS:  /64 (BP Location: Right arm)   Pulse 60   Temp 97.9  F (36.6  C) (Oral)   Resp 16   LMP 01/01/2022 (Exact Date)   SpO2 98%     O2:  Stable on RA   Output:  Up to bathroom, voids w/o difficulty   Last BM:  12/3 per pt   Activity:  Up ad cindy, ind   Skin:  Minimal redness & swelling d/t facial cellulitis   Pain:  Managed w/ PRN oxy 5 mg   CMS:  A&O X4; denied numbness or tingling   Dressing:  N/A   Diet:  Reg w/ thin; denied N/V   LDA:  R PIV SL   Equipment:  IV pole & personal belongings   Plan:  Cont' POC   Additional Info:

## 2023-12-06 NOTE — DISCHARGE SUMMARY
"[facility]  Hospitalist Discharge Summary      Date of Admission:  12/3/2023  Date of Discharge:  12/6/2023  Discharging Provider: Gemini Lugo DO  Discharge Service: Hospitalist Service, GOLD TEAM 21    Discharge Diagnoses   Recurrent facial cellulitis   Chronic Septal Perforation   Blurry vision likely 2/2 Dry Eyes   History of polysubstance use   Tobacco use disorder   MDD  Vitreous syneresis left eye     Clinically Significant Risk Factors     # Obesity: Estimated body mass index is 30.11 kg/m  as calculated from the following:    Height as of an earlier encounter on 12/3/23: 1.727 m (5' 8\").    Weight as of an earlier encounter on 12/3/23: 89.8 kg (198 lb).       Follow-ups Needed After Discharge   Follow-up Appointments     Adult [facility] Follow-up and recommended labs and tests      Follow up with primary care provider, Whitney Perez, within 7 days   for hospital follow- up.  The following labs/tests are recommended: CBC,   CMP.      You should follow up with a dentist regarding your dental care.     Appointments on [place] and/or [facility] (with [facility] or [facility]   provider or service). Call [phone] if you haven't heard regarding   these appointments within 7 days of discharge.            Unresulted Labs Ordered in the Past 30 Days of this Admission       Date and Time Order Name Status Description    12/3/2023  7:39 AM Blood Culture Peripheral Blood Preliminary     12/3/2023  7:39 AM Blood Culture Peripheral Blood Preliminary         These results will be followed up by hospitalist pool.     Discharge Disposition   Discharged to home  Condition at discharge: Stable    Hospital Course   Vianca Kumar is a 48 year old woman with history of chronic septal perforation with recent hospital admissions 10/13-10/17 and 11/24-11/26 for facial cellulitis as well as MDD, obesity, paraesophageal diaphragmatic hernia repair, tobacco abuse, polysubstance " "abuse, who presented to [facility] for evaluation of facial swelling and pain. Transferred to [facility] 12/3 for further management of facial cellulitis with need for ophthalmology consultation due to blurred vision, determined to be due to dry eyes. She was treated with IV antibiotics with improvement and transitioned to oral antibiotics at discharge to complete a course.     Recurrent facial cellulitis w/ chronic septal perforation  Blurred vision likely 2/2 dry eyes   History of extensive septal perforation due to intranasal cocaine use. Recent admissions 10/13-10/17 and 11/24-11/26 for treatment for facial cellulitis. Most recently discharged home with clindamycin to complete a two week course. Despite antibiotics, facial pain and swelling increased in days preceding admission. Also developed blurred vision.   CT on presentation (12/3 ) showed: localized soft tissue stranding and edema now present in the right premaxillary soft tissues. This soft tissue edema extends along the soft tissues of the nose/upper lip as well as along the superficial aspect of the right periorbital and   glabellar regions. Milder soft tissue edema in the superficial left periorbital tissues and left premaxillary soft tissues. These findings are more prominent and extensive than on CT 11/24/2023.   MRI brain/orbits done 12/4 showed \"facial soft tissue tissue edema and thickening in the premaxillary and preseptal regions, right greater than left, consistent with cellulitis. No evidence of postseptal orbital or intracranial involvement. No acute intracranial pathology. Stable anterior nasal septal defect.\"  Ophthalmology consulted 12/4, signed off. Suspect vision changes due to dry eyes.   -ID consulted, appreciate assistance               -Treated with IV meropenem during admission    -Discharged with 3 additional days of oral antibiotics with levofloxacin and doxycycline   -ENT consulted       -Irrigation with saline        " "-Mupirocin ointment to be placed into the nasal vestibule 3 times daily.         -Follow-up with ENT in 2 to 3 weeks.   -Pain control with tylenol, oxycodone (provided with small supply at discharged)   -Ophthalmology consulted 12/4. Blurry vision due to dry eyes and swelling from cellulitis. Signed off              -Continue artificial tears QID (scheduled)   -Follow up with PCP in 1 week      Antimicrobials  -Ertapenem (12/3 only)  -Meropenem (12/3 - 12/6)  -Levofloxacin (12/7-12/9)   -Doxycycline (12/7 - 12/9)      Microbiology  -Blood culture x2 12/3: NGTD  -MRSA nares 11/24: negative     History of Polysubstance use   Etoh and cocaine. Last cocaine use 1 month ago. Also endorses marijuiana use, denies any other drug use or IV drug use. No signs of withdrawal during admission.      Tobacco use disorder   Current PPD smoker  -Recommend cessation     MDD:   VALERIA lexapro. Follows with a therapist. Reports significant stress related to loss of brother in past year.   -Continue PTA lexapro   -Follow up with therapist as scheduled     Vitreous syneresis left eye   Per ophtho note 12/4: \"Patient does not have a Kwan ring on 20D exam today. We discussed that this is likely syneretic change in her affected eye and could represent an impending vitreous detachment. We discussed urgent follow up if she notices a new floater like a fly or a ring in her vision. No retinal breaks/tears/detachments on 360 \"  -RD precautions discussed by ophthalmology   -Follow up in ophtho clinic    Dental Caries   Poor Dentition   Seen by dental hygiene team who discussed with dental team and requested dental CT for further evaluation of broken molars and cellulitis.   Dental CT showed \"1. Multiple mild periapical lucencies of the bilateral maxillary and mandibular teeth. No significant overlying soft tissue inflammation or abscess formation.  2. Mild mucosal thickening of the inferior maxillary sinuses, likely odontogenic in origin.  3. " "Stable large defect in the anterior nasal septum.  4. Decrease in soft tissue edema in the premaxillary and preseptal periorbital soft tissues, likely improving cellulitis.\"  -Dental hygiene team recommended review of scan and evaluation by dental team inpatient, however, patient elected to leave the hospital before this recommended evaluation.   -Outpatient dental follow up     Consultations This Hospital Stay   OPHTHALMOLOGY IP CONSULT  INFECTIOUS DISEASE [facility] ADULT IP CONSULT  ENT IP CONSULT  DENTAL HYGIENE IP ADULT CONSULT - UU  DENTISTRY ADULT IP CONSULT    Code Status   Full Code    Time Spent on this Encounter   IGemini DO, personally saw the patient today and spent greater than 30 minutes discharging this patient.       Gemini Lugo DO  [facility] [address]  [address] [facility] [address]  Phone: [phone]  Fax: [phone]  ______________________________________________________________________    Physical Exam   Vital Signs: Temp: 97.8  F (36.6  C) Temp src: Oral BP: 134/87 Pulse: 58   Resp: 16 SpO2: 97 % O2 Device: None (Room air)    Weight: 0 lbs 0 oz  Constitutional: no apparent distress, pleasant and cooperative   Cardiovascular: regular rate and rhythm, normal S1 and S2  Respiratory: clear to auscultation bilaterally, no wheezing, rales, or rhonchi, normal work of breathing   GI: abdomen soft, non tender, non distended, bowel sounds present   Neuro: alert and oriented, no gross focal deficits noted    Skin: minimal erythema and some swelling of face noted   Eyes: Pupils equally round and reactive to light. Sclera non injected. EOM grossly intact.        Primary Care Physician   Whitney Perez    Discharge Orders      Dental Referral      Reason for your hospital stay    Cellulitis (skin infection) of your face and blurry vision, most likely due to dry eyes     Activity    Your activity upon discharge: activity as tolerated     Adult [facility] Follow-up and recommended labs " and tests    Follow up with primary care provider, Whitney Perez, within 7 days for hospital follow- up.  The following labs/tests are recommended: CBC, CMP.      You should follow up with a dentist regarding your dental care.     Appointments on [place] and/or [facility] (with [facility] or [facility] provider or service). Call [phone] if you haven't heard regarding these appointments within 7 days of discharge.     Discharge Instructions    Continue taking levofloxacin once a day for 3 more days  Continue taking doxycycline twice a day for 3 more days   You have been prescribed as short course of oxycodone to use as needed. Please taper this in the coming days.   You have been prescribed artificial tears which you can continue to use for your dry eyes.     Please return to the hospital if you have any high fevers, worsening pain or swelling of your face, worsening or new vision changes or new eye pain.     Diet    Follow this diet upon discharge: Orders Placed This Encounter      Combination Diet Regular Diet Adult       Significant Results and Procedures   Results for orders placed or performed during the hospital encounter of 12/03/23   MR Brain and Orbits w/o & w Contrast    Narrative    EXAM: MR BRAIN AND ORBITS W/O & W CONTRAST  12/4/2023 6:30 PM     HISTORY:  Patient with recurrent facial cellulitis and nasal septal  defect. ID concerned about deeper tracking intracranial infection       COMPARISON: CT 12/3/2023  MRI 12/10/2012    TECHNIQUE:   1. MRI of the Brain: axial turboFLAIR and axial diffusion-weighted  with ADC map images of the brain were obtained without intravenous  contrast.  After intravenous administration of gadolinium, axial  T1-weighted images of the brain were obtained.    2. MRI of the Orbits focused on the orbits/visual pathways:  Axial  T2-weighted with inversion recovery and coronal T1-weighted images  were obtained without intravenous contrast. Axial and coronal  T1-weighted  images with fat saturation were obtained after intravenous  gadolinium administration.    Contrast: 8.9 mL Gadavist injected intravenously.     FINDINGS:  Premaxillary and preseptal soft tissue edema and thickening, right  greater than left, similar to findings on recent CT. No focal abscess.  No postseptal edema of the orbits.    Normal optic nerves and optic chiasm.    No abnormal contrast enhancement in either the preseptal or postseptal  tissues.    There is no mass effect, midline shift, or intracranial hemorrhage.  The ventricles are proportionate to the cerebral sulci. Diffusion and  susceptibility weighted images are negative for acute/focal  abnormality. Major intracranial vascular structures are within normal  limits. Scattered T2 hyperintense foci in the periventricular and  subcortical white matter, likely chronic small vessel ischemic  disease.    No suspicious abnormality of the skull marrow signal. The paranasal  sinuses and mastoid air cells are clear.. Large defect in the anterior  nasal septum, similar to prior CT.      Impression    IMPRESSION:  1. Facial soft tissue tissue edema and thickening in the premaxillary  and preseptal regions, right greater than left, consistent with  cellulitis. No evidence of postseptal orbital or intracranial  involvement.  2. No acute intracranial pathology.  3. Stable anterior nasal septal defect.    I have personally reviewed the examination and initial interpretation  and I agree with the findings.    LUPILLO STEVENS MD         SYSTEM ID:  [id number]   CT Dental wo Contrast    Narrative    EXAM: CT DENTAL WO CONTRAST 12/5/2023 9:57 PM    HISTORY:  broken molars, caries    COMPARISON: CT facial bones 12/3/2023, MRI 12/4/2023    TECHNIQUE: 3-D reconstruction by the technologists, with curved  multiplanar reformat of thin section imaging through the mandible and  maxilla obtained without intravenous contrast.    FINDINGS:  Premaxillary and preseptal orbital soft tissue  thickening and edema  appears improved from prior. No focal fluid collection to suggest  abscess.    Slight subcutaneous fat stranding in the premaxillary and  premandibular soft tissues, similar to prior. No focal fluid  collection.    Multiple bilateral dental fillings streak artifact partially limiting  evaluation of the oral cavity. There are mild bilateral mandibular and  maxillary periapical lucencies, most significantly about the right  first maxillary molar and a left first mandibular molar. Fracture of  the crown of the right first mandibular molar secondary to carious  disease. No acute fracture of the teeth or facial bones is visualized.  Normal facial bone alignment. No bony erosion.     Mild mucosal thickening of the inferior maxillary sinuses bilaterally.  Unchanged large defect in the anterior nasal septum. The mastoid air  cells are clear.      Impression    IMPRESSION:  1. Multiple mild periapical lucencies of the bilateral maxillary and  mandibular teeth. No significant overlying soft tissue inflammation or  abscess formation.  2. Mild mucosal thickening of the inferior maxillary sinuses, likely  odontogenic in origin.  3. Stable large defect in the anterior nasal septum.  4. Decrease in soft tissue edema in the premaxillary and preseptal  periorbital soft tissues, likely improving cellulitis.    I have personally reviewed the examination and initial interpretation  and I agree with the findings.    CANDIDA GOOD MD         SYSTEM ID:  [id number]       Discharge Medications   Current Discharge Medication List        START taking these medications    Details   carboxymethylcellulose PF (REFRESH PLUS) 0.5 % ophthalmic solution Place 1 drop into both eyes 4 times daily for 60 days  Qty: 120 each, Refills: 0    Associated Diagnoses: Dry eyes      doxycycline hyclate (VIBRAMYCIN) 100 MG capsule Take 1 capsule (100 mg) by mouth 2 times daily for 3 days  Qty: 6 capsule, Refills: 0    Associated  Diagnoses: Facial cellulitis      levofloxacin (LEVAQUIN) 500 MG tablet Take 1 tablet (500 mg) by mouth daily for 3 days  Qty: 3 tablet, Refills: 0    Associated Diagnoses: Facial cellulitis      oxyCODONE (ROXICODONE) 5 MG tablet Take 1 tablet (5 mg) by mouth every 6 hours as needed for pain  Qty: 12 tablet, Refills: 0    Associated Diagnoses: Facial cellulitis           CONTINUE these medications which have NOT CHANGED    Details   cetirizine (ZYRTEC) 10 MG tablet Take 10 mg by mouth daily      escitalopram (LEXAPRO) 20 MG tablet Take 1 tablet (20 mg) by mouth daily  Qty: 90 tablet, Refills: 3    Associated Diagnoses: Recurrent major depressive disorder, in full remission (H24)      mupirocin (BACTROBAN) 2 % external ointment Apply topically 2 times daily    Associated Diagnoses: Facial cellulitis           STOP taking these medications       meropenem (MERREM) 500 MG vial Comments:   Reason for Stopping:             Allergies   Allergies   Allergen Reactions    Zosyn [Piperacillin-Tazobactam In Dex] Hives and Itching    Keflex [Cephalexin] Hives    Avocado Hives    Chantix [Varenicline Tartrate] Other (See Comments)     depression

## 2023-12-06 NOTE — PROGRESS NOTES
ID NOTE     Key findings:   Facial cellulitis, perforated septum, with many months of intranasal cocaine use last year and once this year (per patient)           nasal swab on 10/13/23 grew 2+ MSSA   - improved after 4 days of Meropenem.   - no redness, swelling tenderness on exam  - right nasal piercing. (removed by patient  a few days ago)        2. Dental caries/ pain ( bilateral upper jaw )    3. Hx of Sinusitis ( but recent MRI did nots how any evidence of sinusitis but she did have gacial cellulitis with drainage from right eye    4. drainage from right eye - thicker than tears (per patient)     She was hospitalized twice, 10/13-10/17/23. she developed mild hives with Zosyn and Keflex, later discharged on 10 days of Clindamycin. She was rehospitalized on 11/24-11/26/23 and discharged on 14 days of Doxycycline. She says no improvement while on oral antibiotic/s. She is rehospitalized on 12/3/23 with facial cellulitis. she is currently on Meropenem and feels better. she has right nose piecing, s/p removal by pt  a few days ago. recently, increased drainage from right eye. no retroorbital pain, some dental pain ( upper jaw), some discomfort in head. all symptoms have improved     Recommendations:   - clinically improved. will continue with 3 more days of Levofloxacin 500 mg po daily and Doxycycline 100 mg po q12 hr   - can follow-up with PCP  - advised to stop cocaine use, smoking     ID will sign off  . Plan shared with Primary team        Jaylen Mckenna MD,M.Med.Sc.  Infectious Diseases  Pager: [phone]

## 2023-12-06 NOTE — PLAN OF CARE
Goal Outcome Evaluation:      Plan of Care Reviewed With: patient    Overall Patient Progress: improving     Pt slept through the night. IV ABX currently infusing. No issues reported overnight. Face swelling improved. Pt has dental hygiene consult.

## 2023-12-06 NOTE — CARE PLAN
NSG DISCHARGE NOTE    Patient discharged to home at 3:13 PM via ambulation. Accompanied by other:self and staff staff. Discharge instructions reviewed with patient, opportunity offered to ask questions. Prescriptions sent to patients preferred pharmacy. All belongings sent with patient.    Cat Nicole RN

## 2023-12-08 ENCOUNTER — PATIENT OUTREACH (OUTPATIENT)
Dept: CARE COORDINATION | Facility: CLINIC | Age: 48
End: 2023-12-08
Payer: MEDICARE

## 2023-12-08 LAB
BACTERIA BLD CULT: NO GROWTH
BACTERIA BLD CULT: NO GROWTH

## 2023-12-08 NOTE — PROGRESS NOTES
Connected Care Resource Center:   [facility] Resource Center Contact  [facility]/Voicemail     Clinical Data: Post-Discharge Outreach     Outreach attempted x 2.  Left message on patient's voicemail, providing [facility]'s central phone number of [phone] ([phone]) for questions/concerns and/or to schedule an appt with an [facility] provider, if they do not have a PCP.      Plan:  [facility] will do no further outreaches at this time.       Zenobia Ahuja RN  [facility] Care Resource [place], [facility]    *Connected Care Resource Team does NOT follow patient ongoing. Referrals are identified based on internal discharge reports and the outreach is to ensure patient has an understanding of their discharge instructions.

## 2023-12-11 NOTE — CONSULTS
Dental Consult,  [facility]     Patient:   Vianca Kumar    Date of birth 1975   MRN:  [id number]   Date of Visit:   12/10/2023   Date of Admission 12/3/2023   Consult Requested by No att. providers found     I did not see the patient in person, I reviewed the [facility] consult notes on 12/05 and dental CT on 12/05 .                                      Assessment and Recommendations:   ASSESSMENT:  1. Vianca Kumar is a 48 year old female with a past medical history pertinent for chronic septal perforation with recent hospital admissions 10/13-10/17 and 11/24-11/26 for facial cellulitis as well as MDD, obesity, paraesophageal diaphragmatic hernia repair, tobacco abuse, polysubstance abuse, who presented to [facility] for evaluation of facial swelling and pain. Transferred to [facility] 12/3 for further management of facial cellulitis with need for ophthalmology consultation due to blurred vision. Diagnosis upon admission Facial cellulitis [L03.211].   2. Dental exam pertinent for: hx of facial swelling/facial cellulitis, broken molars, no recent dental care.  suspicious for caries in mx anteriors, possible other areas of caries as well (not seen clinically), broken molars on maxillary arch. .    3. CT reveals multiple scattered caries throughout dentition. Periapical radiolucency on #3 with fractured crown.      RECOMMENDATION:  -Recommend to follow up with [facility] urgent care for second examination, dental radiograph and definite treatment ( extraction of #3). [phone] to schedule an appointment.   -Also encourage to follow up out patient with school of dent[facility] for comprehensive care.  [facility]: [phone] ---- [address] [place] [address] or [facility] (will help find Provider)     [phone]   _____________________________________________________________________  Thank you for allowing us to  participate in the care of this patient,  Direct any further questions to:     Cheikh Patel MD DMD, Fellow  Pager: [phone]    Patient discussed with:   Abdiaziz Medina DDS  , [facility]     Clinic information:   [facility]  [facility]  [address] [place] [address]  [address]th [place] [address]  Phone:[phone]  [place], Executive [employer] Administrative Support phone: [phone]                                             Reason for Consult:   Referring MD & Reason for Visit: I was asked by Ancelmo Osborne MD, to see Vianca Kumar for a dental consultation due to infection with potential odontogenic source.                                               History of Present Illness:   This patient is a 48 year old female with a history of chronic septal perforation with recent hospital admissions 10/13-10/17 and 11/24-11/26 for facial cellulitis as well as MDD, obesity, paraesophageal diaphragmatic hernia repair, tobacco abuse, polysubstance abuse, who presented to [facility] for evaluation of facial swelling and pain. Transferred to [facility] 12/3 for further management of facial cellulitis with need for ophthalmology consultation due to blurred vision.  .  Dental and oropharyngeal history is pertinent for facial swelling and pain, cellulitis, no regular dental care.  The patient reports broken teeth, no pain.                                                    Clinical Examination   Please see complete exam from [facility]'s consult notes on 12/05.  Vitals were reviewed                       See H&P for complete ROS.                                                          Imaging     Dental CT taken on 12/05  Potential periradicular and/or periapical findings on: #3-upper right 1st molar  Retained root tips/fractured teeth #3-upper right 1st molar  Condyles seated in fossa bilaterally, maxillary  sinuses clear bilaterally.  No osseous pathology seen.   No results found for this or any previous visit (from the past 48 hour(s)).                                                                       IMPRESSION:  1. Multiple mild periapical lucencies of the bilateral maxillary and  mandibular teeth. No significant overlying soft tissue inflammation or  abscess formation.  2. Mild mucosal thickening of the inferior maxillary sinuses, likely  odontogenic in origin.  3. Stable large defect in the anterior nasal septum.  4. Decrease in soft tissue edema in the premaxillary and preseptal  periorbital soft tissues, likely improving cellulitis.                                    Past Medical History      Past Medical History:   Diagnosis Date     MDD (major depressive disorder)      Obesity (BMI 30-39.9)        Immunization History   Administered Date(s) Administered     COVID-19 Vaccine (Allan) 11/22/2021     Flu, Unspecified 01/14/2011, 10/17/2011     Influenza (IIV3) PF 01/14/2011, 10/17/2011     Influenza Vaccine, 6+MO IM (QUADRIVALENT W/PRESERVATIVES) 09/30/2015     TDAP Vaccine (Adacel) 01/14/2011, 01/19/2012     Td (Adult), Adsorbed 12/31/2001     Tdap (Adult) Unspecified Formulation 12/31/2001       Past Surgical History   Past Surgical History:   Procedure Laterality Date     CAPSULE/PILL CAM ENDOSCOPY N/A 04/03/2018    Procedure: CAPSULE/PILL CAM ENDOSCOPY;;  Surgeon: Guru Hallie Song MD;  Location:  GI     COLONOSCOPY N/A 12/28/2017    Procedure: COLONOSCOPY;  Surgeon: Yosi Beck MD;  Location: [facility];  Service:      COLONOSCOPY N/A 08/11/2022    Procedure: COLONOSCOPY, WITH POLYPECTOMY AND BIOPSY;  Surgeon: Lorri Holley DO;  Location:  OR     COLONOSCOPY WITH CO2 INSUFFLATION N/A 08/11/2022    Procedure: COLONOSCOPY, WITH CO2 INSUFFLATION;  Surgeon: Lorri Holley DO;  Location:  OR     COMBINED ESOPHAGOSCOPY, GASTROSCOPY, DUODENOSCOPY (EGD) WITH CO2  INSUFFLATION N/A 08/11/2022    Procedure: ESOPHAGOGASTRODUODENOSCOPY, WITH CO2 INSUFFLATION;  Surgeon: Lorri Holley DO;  Location:  OR     COMBINED ESOPHAGOSCOPY, GASTROSCOPY, DUODENOSCOPY (EGD) WITH CO2 INSUFFLATION N/A 11/25/2022    Procedure: ESOPHAGOGASTRODUODENOSCOPY, WITH CO2 INSUFFLATION;  Surgeon: Lorri Holley DO;  Location:  OR     ENDOSCOPIC ULTRASOUND UPPER GASTROINTESTINAL TRACT (GI) N/A 04/03/2018    Procedure: ENDOSCOPIC ULTRASOUND, ESOPHAGOSCOPY / UPPER GASTROINTESTINAL TRACT (GI);  EUS/Capsule ;  Surgeon: Guru Hallie Song MD;  Location:  GI     ESOPHAGOSCOPY, GASTROSCOPY, DUODENOSCOPY (EGD), COMBINED N/A 12/28/2017    Procedure: ESOPHAGOGASTRODUODENOSCOPY (EGD);  Surgeon: Yosi Beck MD;  Location: [facility];  Service:      ESOPHAGOSCOPY, GASTROSCOPY, DUODENOSCOPY (EGD), COMBINED N/A 08/11/2022    Procedure: ESOPHAGOGASTRODUODENOSCOPY, WITH BIOPSY;  Surgeon: Lorri Holley DO;  Location:  OR     ESOPHAGOSCOPY, GASTROSCOPY, DUODENOSCOPY (EGD), COMBINED N/A 11/25/2022    Procedure: ESOPHAGOGASTRODUODENOSCOPY, WITH BIOPSY;  Surgeon: Lorri Holley DO;  Location:  OR     KNEE SURGERY Left     osteotomy; screws and plate in place     LAPAROSCOPIC CHOLECYSTECTOMY N/A 04/25/2018    Procedure: LAPAROSCOPIC CHOLECYSTECTOMY;  Laparoscopic Cholecystectomy ;  Surgeon: Alberto Noble MD;  Location:  OR     LAPAROSCOPIC NISSEN FUNDOPLICATION N/A 5/23/2023    Procedure: ROBOTIC ASSISTED LAPAROSCOPIC HERNIORRHAPHY, HIATAL WITH MESH AND MAGNETIC SPHINCTER AUGMENTATION;  Surgeon: Abdiaziz Tolbert MD;  Location:  OR                                           Social History      Family History   Problem Relation Age of Onset     Hypertension Mother      Hyperlipidemia Mother      Hypertension Brother      Hyperlipidemia Brother      Myocardial Infarction Maternal Grandfather 47     Myocardial Infarction Maternal Uncle         multiple later in life      Diabetes No family hx of      Breast Cancer No family hx of      Ovarian Cancer No family hx of      Colon Cancer No family hx of      Cerebrovascular Disease No family hx of                                           Allergies      Allergies   Allergen Reactions     Zosyn [Piperacillin-Tazobactam In Dex] Hives and Itching     Keflex [Cephalexin] Hives     Avocado Hives     Chantix [Varenicline Tartrate] Other (See Comments)     depression                                        Medications        No medications prior to admission.         No current Epic-ordered facility-administered medications on file.     Current Outpatient Medications Ordered in Epic   Medication     carboxymethylcellulose PF (REFRESH PLUS) 0.5 % ophthalmic solution     cetirizine (ZYRTEC) 10 MG tablet     escitalopram (LEXAPRO) 20 MG tablet     mupirocin (BACTROBAN) 2 % external ointment

## 2023-12-14 ENCOUNTER — OFFICE VISIT (OUTPATIENT)
Dept: OPHTHALMOLOGY | Facility: CLINIC | Age: 48
End: 2023-12-14
Attending: OPHTHALMOLOGY
Payer: MEDICARE

## 2023-12-14 DIAGNOSIS — H35.412 LATTICE DEGENERATION OF LEFT RETINA: Primary | ICD-10-CM

## 2023-12-14 DIAGNOSIS — H43.392 FLOATER, VITREOUS, LEFT: ICD-10-CM

## 2023-12-14 PROCEDURE — 92250 FUNDUS PHOTOGRAPHY W/I&R: CPT | Performed by: OPHTHALMOLOGY

## 2023-12-14 PROCEDURE — G0463 HOSPITAL OUTPT CLINIC VISIT: HCPCS | Performed by: OPHTHALMOLOGY

## 2023-12-14 PROCEDURE — 99207 PR BUNDLED PROCEDURE IN GLOBAL PKG: CPT | Mod: 26 | Performed by: OPHTHALMOLOGY

## 2023-12-14 PROCEDURE — 92134 CPTRZ OPH DX IMG PST SGM RTA: CPT | Performed by: OPHTHALMOLOGY

## 2023-12-14 PROCEDURE — 92134 CPTRZ OPH DX IMG PST SGM RTA: CPT | Mod: 26 | Performed by: OPHTHALMOLOGY

## 2023-12-14 PROCEDURE — 99203 OFFICE O/P NEW LOW 30 MIN: CPT | Mod: GC | Performed by: OPHTHALMOLOGY

## 2023-12-14 ASSESSMENT — TONOMETRY
IOP_METHOD: TONOPEN
OS_IOP_MMHG: 11
OD_IOP_MMHG: 12

## 2023-12-14 ASSESSMENT — VISUAL ACUITY
OD_SC+: -2
OS_SC+: +1
OS_SC: 20/25
OD_SC: 20/20
METHOD: SNELLEN - LINEAR

## 2023-12-14 ASSESSMENT — CONF VISUAL FIELD
METHOD: COUNTING FINGERS
OD_SUPERIOR_TEMPORAL_RESTRICTION: 0
OS_INFERIOR_TEMPORAL_RESTRICTION: 0
OD_NORMAL: 1
OS_NORMAL: 1
OD_INFERIOR_NASAL_RESTRICTION: 0
OD_SUPERIOR_NASAL_RESTRICTION: 0
OS_INFERIOR_NASAL_RESTRICTION: 0
OS_SUPERIOR_TEMPORAL_RESTRICTION: 0
OD_INFERIOR_TEMPORAL_RESTRICTION: 0
OS_SUPERIOR_NASAL_RESTRICTION: 0

## 2023-12-14 ASSESSMENT — SLIT LAMP EXAM - LIDS
COMMENTS: INSPISSATED MEIBOMIAN GLANDS
COMMENTS: INSPISSATED MEIBOMIAN GLANDS

## 2023-12-14 ASSESSMENT — CUP TO DISC RATIO
OS_RATIO: 0.5
OD_RATIO: 0.5

## 2023-12-14 ASSESSMENT — EXTERNAL EXAM - RIGHT EYE: OD_EXAM: NORMAL

## 2023-12-14 ASSESSMENT — EXTERNAL EXAM - LEFT EYE: OS_EXAM: NORMAL

## 2023-12-14 NOTE — PROGRESS NOTES
I have confirmed the patient's and reviewed Past Medical History, Past Surgical History, Social History, Family History, Problem List, Medication List and agree with Tech note.      CC -   Vitreous syneresis, left eye    INTERVAL HISTORY - Initial visit    PMH -   Vianca SILVA Stacy is a  48 year old year-old patient with history of major depressive disorder, obesity,  paraesophageal   ]  diaphragmatic hernia repair, chronic septal perforation with two recent hospitalizations for facial cellulitis who presents for E    D follow up of vitreous syneresis OS. Patient was seen by on call resident Dr. Wallis on 12/4/23 for blurry vision and floaters and was found to have vitreous syneresis without mayberry ring in the left eye.     Today she states blurriness has calmed down. The floaters are stable without increase. They are intermittent. No flashes or curtain.      PAST OCULAR SURGERY  None    RETINAL IMAGING:  OCT 12/14/23  OD - Normal foveal contour, no IRF/SRF, PHF attached  OS - Normal foveal contour, no IRF/SRF, PHF attached      ASSESSMENT & PLAN    Floaters, left eye  2.  Vitreous syneresis, both eyes  No mayberry ring on exam.  PHF appears attached both eyes on OCT   Discussed RD return precautions  Continue to monitor    3. Lattice degeneration, left eye   No holes or tears   Monitor    return to clinic: one year [facility] optos both eyes     Frieda Reis MD  Resident Physician, PGY-3  Department of Ophthalmology    Attestation:  I have seen and examined the patient with Dr. Alejandra Reis MD and agree with the findings in this note, as well as the interpretations of the diagnostic tests.        Lior Cheng MD PhD.  Professor & Chair

## 2023-12-14 NOTE — NURSING NOTE
Chief Complaints and History of Present Illnesses   Patient presents with    Retinal Evaluation     vitreous syneresis left eye      Chief Complaint(s) and History of Present Illness(es)       Retinal Evaluation              Comments: vitreous syneresis left eye               Comments    Pt states she has a staff infection and has had some eye problems  States she has some facial  swelling and floaters in the left eye   No flashes or eye pain     Ella Cowart COT 12:46 PM December 14, 2023

## 2023-12-29 ENCOUNTER — OFFICE VISIT (OUTPATIENT)
Dept: INTERNAL MEDICINE | Facility: CLINIC | Age: 48
End: 2023-12-29
Payer: MEDICARE

## 2023-12-29 VITALS
HEART RATE: 71 BPM | OXYGEN SATURATION: 99 % | SYSTOLIC BLOOD PRESSURE: 122 MMHG | DIASTOLIC BLOOD PRESSURE: 72 MMHG | BODY MASS INDEX: 30.27 KG/M2 | WEIGHT: 199.1 LBS | TEMPERATURE: 99.5 F

## 2023-12-29 DIAGNOSIS — Z09 HOSPITAL DISCHARGE FOLLOW-UP: Primary | ICD-10-CM

## 2023-12-29 DIAGNOSIS — L03.211 FACIAL CELLULITIS: ICD-10-CM

## 2023-12-29 DIAGNOSIS — J34.89 NASAL SEPTAL PERFORATION: ICD-10-CM

## 2023-12-29 LAB
ALBUMIN SERPL BCG-MCNC: 4.3 G/DL (ref 3.5–5.2)
ALP SERPL-CCNC: 100 U/L (ref 40–150)
ALT SERPL W P-5'-P-CCNC: 23 U/L (ref 0–50)
ANION GAP SERPL CALCULATED.3IONS-SCNC: 9 MMOL/L (ref 7–15)
AST SERPL W P-5'-P-CCNC: 30 U/L (ref 0–45)
BILIRUB SERPL-MCNC: 0.7 MG/DL
BUN SERPL-MCNC: 6.2 MG/DL (ref 6–20)
CALCIUM SERPL-MCNC: 9.5 MG/DL (ref 8.6–10)
CHLORIDE SERPL-SCNC: 102 MMOL/L (ref 98–107)
CREAT SERPL-MCNC: 0.77 MG/DL (ref 0.51–0.95)
DEPRECATED HCO3 PLAS-SCNC: 28 MMOL/L (ref 22–29)
EGFRCR SERPLBLD CKD-EPI 2021: >90 ML/MIN/1.73M2
ERYTHROCYTE [DISTWIDTH] IN BLOOD BY AUTOMATED COUNT: 11.8 % (ref 10–15)
GLUCOSE SERPL-MCNC: 82 MG/DL (ref 70–99)
HCT VFR BLD AUTO: 45.3 % (ref 35–47)
HGB BLD-MCNC: 15.1 G/DL (ref 11.7–15.7)
MCH RBC QN AUTO: 31.1 PG (ref 26.5–33)
MCHC RBC AUTO-ENTMCNC: 33.3 G/DL (ref 31.5–36.5)
MCV RBC AUTO: 93 FL (ref 78–100)
PLATELET # BLD AUTO: 386 10E3/UL (ref 150–450)
POTASSIUM SERPL-SCNC: 4.2 MMOL/L (ref 3.4–5.3)
PROT SERPL-MCNC: 7.1 G/DL (ref 6.4–8.3)
RBC # BLD AUTO: 4.85 10E6/UL (ref 3.8–5.2)
SODIUM SERPL-SCNC: 139 MMOL/L (ref 135–145)
WBC # BLD AUTO: 8.7 10E3/UL (ref 4–11)

## 2023-12-29 PROCEDURE — 85027 COMPLETE CBC AUTOMATED: CPT | Performed by: INTERNAL MEDICINE

## 2023-12-29 PROCEDURE — 99213 OFFICE O/P EST LOW 20 MIN: CPT | Performed by: INTERNAL MEDICINE

## 2023-12-29 PROCEDURE — 36415 COLL VENOUS BLD VENIPUNCTURE: CPT | Performed by: INTERNAL MEDICINE

## 2023-12-29 PROCEDURE — 80053 COMPREHEN METABOLIC PANEL: CPT | Performed by: INTERNAL MEDICINE

## 2023-12-29 NOTE — PROGRESS NOTES
ASSESSMENT/PLAN                                                      (Z09) Hospital discharge follow-up  (primary encounter diagnosis)  (L03.211) Facial cellulitis  (J34.89) Nasal septal perforation  Comment: doing reasonably well clinically.  Plan: CBC and CMP today; patient encouraged to schedule follow-up with ENT and ophthalmology; if recurrent facial redness, swelling, or pain, contact MD.    Whitney Perez MD   [address] [address]  T: [phone], F: [phone]    SUBJECTIVE                                                      Vianca Kumar is a very pleasant 48 year old female who presents for hospital follow-up:    Patient was hospitalized at [facility] 12/3/2023-12/6/2023 with recurrent facial cellulitis and chronic septal perforation.  Patient had been previously hospitalized 10/13/2023-10/17/2023 and 11/24/2023-11/26/2023 for facial cellulitis. Presented to [facility] 12/3/2023 with recurrent facial cellulitis. ENT, ID, and ophthalmology consulted.  Patient was treated with meropenem during her admission and discharged on Levaquin and doxycycline.    No recurrent facial redness or swelling. No fevers or chills. Feels worn out and reports continued congestion. Has seen ophthalmology since hospital discharge.  Needs to schedule follow-up. Also needs to schedule follow-up with ENT.    OBJECTIVE                                                      /72   Pulse 71   Temp 99.5  F (37.5  C) (Temporal)   Wt 90.3 kg (199 lb 1.6 oz)   LMP 01/01/2022 (Exact Date)   SpO2 99%   BMI 30.27 kg/m    Constitutional: well-appearing  Head, Ears, Nose: normocephalic, atraumatic; normal external auditory canal and pinna; tympanic membranes visualized and normal; nasal septum without erythema  Neck: supple, symmetric, no thyromegaly or lymphadenopathy  Respiratory: normal respiratory effort; clear to auscultation bilaterally  Cardiovascular: regular rate and rhythm; no  edema  Psych: normal judgment and insight; normal mood and affect; recent and remote memory intact    ---    (Note documentation was completed, in part, with [employer] voice-recognition software. Documentation was reviewed, but some grammatical, spelling, and word errors may remain.)

## 2024-01-04 NOTE — COMMUNITY RESOURCES LIST (ENGLISH)
01/04/2024   [facility]  N/A  For questions about this resource list or additional care needs, please contact your primary care clinic or care manager.  Phone: [phone]   Email: N/A   Address: [address] [place] [address]   Hours: N/A        Transportation       Free or low-cost transportation  1  [facility] Distance: 5.76 miles      In-Person   [address] [place] [address]  Language: English  Hours: Mon - Fri 9:00 AM - 12:00 PM , Mon - Fri 1:00 PM - 3:00 PM  Fees: Self Pay   Phone: (230) 214-9589 Email: info@[employer] Website: https://www.Xand.org/[place]     2  [facility] Distance: 5.82 miles      In-Person   [address] [place] [address]  Language: English  Hours: Mon - Fri 8:00 AM - 3:00 PM  Fees: Free   Phone: (215) 242-2235 Ext.14 Email: neighborhood@[email or web address] Website: http://[email or web address]     Transportation to medical appointments  3  [facility] Distance: 3.16 miles      In-Person   [address] [facility] [address] [place] [address]  Language: English  Hours: Mon - Sun Open 24 Hours  Fees: Insurance, Self Pay   Phone: (353) 150-5986 Website: [email or web address] [email or web address]/?L=true     4  [place] [employer] Distance: 5.45 miles      In-Person   [address] [facility] [address] [place] [address]  Language: Spanish, English, Oromo, British  Hours: Mon - Sat 5:00 AM - 9:00 PM  Fees: Insurance, Self Pay   Phone: (563) 284-1392 Email: info@[employer]ad[employer] Website: http://www.damntheradio/          Important Numbers & Websites       Emergency Services   911  City Services   311  Poison Control   (157) 443-9352  Suicide Prevention Lifeline   (284) 298-2093 (TALK)  Child Abuse Hotline   (154) 514-2120 (4-A-Child)  Sexual Assault Hotline   (672) 624-5784 (HOPE)  National Runaway Safeline   (195) 791-2545 (RUNAWAY)  All-Options Talkline   (906)  016-1285  Substance Abuse Referral   (769) 717-6419 (HELP)

## 2024-02-08 ENCOUNTER — PATIENT OUTREACH (OUTPATIENT)
Dept: CARE COORDINATION | Facility: CLINIC | Age: 49
End: 2024-02-08
Payer: MEDICARE

## 2024-02-20 ENCOUNTER — PATIENT OUTREACH (OUTPATIENT)
Dept: INTERNAL MEDICINE | Facility: CLINIC | Age: 49
End: 2024-02-20
Payer: MEDICARE

## 2024-02-20 PROBLEM — R87.810 CERVICAL HIGH RISK HPV (HUMAN PAPILLOMAVIRUS) TEST POSITIVE: Status: RESOLVED | Noted: 2023-03-15 | Resolved: 2023-05-07

## 2024-02-22 ENCOUNTER — PATIENT OUTREACH (OUTPATIENT)
Dept: CARE COORDINATION | Facility: CLINIC | Age: 49
End: 2024-02-22
Payer: MEDICARE

## 2024-04-19 NOTE — TELEPHONE ENCOUNTER
FYI to provider - Patient is lost to pap tracking follow-up. Attempts to contact pt have been made per reminder process and there has been no reply and/or no appt scheduled. Contact hx listed below.     2004, 2005, 2006, 2008, 2011, 2012  NILM paps  2013 NILM pap, Neg HPV  3/9/23 NILM pap, + HR HPV (not 16 or 18). Plan cotest in 1 year due by 3/9/24  3/15/23 LM on VM . [employer]it[employer]ar[employer]enk[employer] message sent. Pt read message  2/20/24 Reminder mychart  3/20/24 Reminder call-LM  4/19/24 Lost to follow up

## 2024-05-30 ENCOUNTER — OFFICE VISIT (OUTPATIENT)
Dept: URGENT CARE | Facility: URGENT CARE | Age: 49
End: 2024-05-30
Payer: MEDICARE

## 2024-05-30 VITALS
OXYGEN SATURATION: 97 % | DIASTOLIC BLOOD PRESSURE: 78 MMHG | TEMPERATURE: 97.9 F | HEART RATE: 92 BPM | SYSTOLIC BLOOD PRESSURE: 114 MMHG

## 2024-05-30 DIAGNOSIS — S61.210A LACERATION OF RIGHT INDEX FINGER WITHOUT FOREIGN BODY WITHOUT DAMAGE TO NAIL, INITIAL ENCOUNTER: Primary | ICD-10-CM

## 2024-05-30 PROCEDURE — 12001 RPR S/N/AX/GEN/TRNK 2.5CM/<: CPT | Mod: F6 | Performed by: NURSE PRACTITIONER

## 2024-05-30 PROCEDURE — 99207 PR NO CHARGE LOS: CPT | Performed by: NURSE PRACTITIONER

## 2024-05-30 NOTE — PATIENT INSTRUCTIONS
Leave dressing in place for the next 24 hours.  After 24 hours you may remove the dressing gently and shower normally.  After the shower apply a small amount of antibiotic ointment and cover with bandage.  Do this for next 3 days.    After 3 days you may shower normally and cover the wound with dry bandage.  After those 3 days you may leave it open to the air unless you are going to do something that is in a very dirty environment.    Do not submerge the wound in any type of soaking water until completely healed.    Return for suture removal in 10 days    Watch for signs of infection, redness, draining, increasing swelling and return sooner if these occur.

## 2024-05-30 NOTE — PROGRESS NOTES
Assessment:       ICD-10-CM    1. Laceration of right index finger without foreign body without damage to nail, initial encounter  S61.210A            Plan:    Imaging of the injured area for foreign body or fracture was not  Indicated    Wound closed per procedure note below.     Discussed home wound care. Return to  with increased swelling, pain, redness, pus or fevers.  Follow up for Suture removal in 7 days.     Patient was discharged in stable condition.  Patient verbalized understanding and agreed with this plan.    HPI: Vianca Kumar is an 48 year old female that presents to clinic after cutting self on the right index finger with a kitchen knife.  denies numbness, dysfunction or loss of strength of the finger.      Patient is up to date on tetanus.       Vitals reviewed by YOLANDA JEFFERS CNP  /78 (BP Location: Left arm, Patient Position: Sitting, Cuff Size: Adult Regular)   Pulse 92   Temp 97.9  F (36.6  C) (Tympanic)   LMP 01/01/2022 (Exact Date)   SpO2 97%     Physical Exam:    Physical Exam      Procedure Note - Wound Repair:  Procedure performed by Yolanda Minor     Anesthesia was obtained with 1% plain lidocaine via Local.  The wound, located on the right index finger, measured 1 cm and was no foreign bodies, ragged edges.  The level of complexity was: simple .  The neurovascular exam was normal.  It was cleaned with surgiscrub, irrigated with normal saline and explored.  The wound was closed using 4-0 Ethylon interrupted. A small amount of antibiotic ointment was used and dressing was then applied    YOLANDA JEFFERS CNP 12:27 PM

## 2024-06-02 ENCOUNTER — HEALTH MAINTENANCE LETTER (OUTPATIENT)
Age: 49
End: 2024-06-02

## 2024-06-03 DIAGNOSIS — F33.42 RECURRENT MAJOR DEPRESSIVE DISORDER, IN FULL REMISSION (H): ICD-10-CM

## 2024-06-03 RX ORDER — ESCITALOPRAM OXALATE 20 MG/1
20 TABLET ORAL DAILY
Qty: 90 TABLET | Refills: 0 | Status: SHIPPED | OUTPATIENT
Start: 2024-06-03 | End: 2024-09-24

## 2024-07-11 ENCOUNTER — VIRTUAL VISIT (OUTPATIENT)
Dept: FAMILY MEDICINE | Facility: CLINIC | Age: 49
End: 2024-07-11
Payer: MEDICARE

## 2024-07-11 DIAGNOSIS — R19.5 CHANGE IN STOOL: Primary | ICD-10-CM

## 2024-07-11 PROCEDURE — 99214 OFFICE O/P EST MOD 30 MIN: CPT | Mod: 95 | Performed by: NURSE PRACTITIONER

## 2024-07-11 ASSESSMENT — PATIENT HEALTH QUESTIONNAIRE - PHQ9
SUM OF ALL RESPONSES TO PHQ QUESTIONS 1-9: 15
SUM OF ALL RESPONSES TO PHQ QUESTIONS 1-9: 15
10. IF YOU CHECKED OFF ANY PROBLEMS, HOW DIFFICULT HAVE THESE PROBLEMS MADE IT FOR YOU TO DO YOUR WORK, TAKE CARE OF THINGS AT HOME, OR GET ALONG WITH OTHER PEOPLE: VERY DIFFICULT

## 2024-07-11 ASSESSMENT — ENCOUNTER SYMPTOMS: FEVER: 1

## 2024-07-11 NOTE — PROGRESS NOTES
"Vianca is a 48 year old who is being evaluated via a billable video visit.    How would you like to obtain your AVS? MyChart  If the video visit is dropped, the invitation should be resent by: Text to cell phone: [phone]  Will anyone else be joining your video visit? No      Assessment & Plan     Change in stool  Discussed close monitoring.  Symptoms should not recur.  Stool testing ordered.  If symptoms recur she will schedule in clinic.  Otherwise, can consider repeat testing if desired in 3-4 weeks.  - Ova and Parasite Exam Routine; Future  - Ova and Parasite Exam Routine; Future          BMI  Estimated body mass index is 30.27 kg/m  as calculated from the following:    Height as of 12/3/23: 1.727 m (5' 8\").    Weight as of 12/29/23: 90.3 kg (199 lb 1.6 oz).             Subjective   Vianca is a 48 year old, presenting for the following health issues:  Fever      7/11/2024    11:10 AM   Additional Questions   Roomed by blake rojas   Accompanied by self         7/11/2024    11:10 AM   Patient Reported Additional Medications   Patient reports taking the following new medications na     Video Start Time: 1139am    Fever  Associated symptoms include a fever.   History of Present Illness       Reason for visit:  Concern of intestinal infection (worms)  Symptom onset:  1-3 days ago  Symptoms include:  Low grade fever for 2-3 days, exhaustion, aching, passed something concerning that may be a large worm  Symptom intensity:  Moderate  Symptom progression:  Improving  Had these symptoms before:  No    She eats 2-3 servings of fruits and vegetables daily.She consumes 0 sweetened beverage(s) daily.She exercises with enough effort to increase her heart rate 20 to 29 minutes per day.  She exercises with enough effort to increase her heart rate 4 days per week. She is missing 1 dose(s) of medications per week.  She is not taking prescribed medications regularly due to remembering to take.         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  Her young nephew was ill the week prior.  Fever, body aches.  She developed fever and body aches as well.  This lasted for 2.5 days.  Yesterday she had a bowel movement and saw what looked like a long worm in the stool.  Not moving.  She had another bm this morning and this was normal.  Her fever has resolved.  She is still tired.  No nausea, vomiting, diarrhea.  She had been to an indoor waterpark in June and she has seen that the state department is investigating possible parasitic infections spreading in individuals who visited during the month of June.      Review of Systems  Constitutional, HEENT, cardiovascular, pulmonary, gi and gu systems are negative, except as otherwise noted.      Objective           Vitals:  No vitals were obtained today due to virtual visit.    Physical Exam   GENERAL: alert and no distress  EYES: Eyes grossly normal to inspection.  No discharge or erythema, or obvious scleral/conjunctival abnormalities.  RESP: No audible wheeze, cough, or visible cyanosis.    SKIN: Visible skin clear. No significant rash, abnormal pigmentation or lesions.  NEURO: Cranial nerves grossly intact.  Mentation and speech appropriate for age.  PSYCH: Appropriate affect, tone, and pace of words          Video-Visit Details    Type of service:  Video Visit   Video End Time:11:51 AM  Originating Location (pt. Location): Home    Distant Location (provider location):  On-site  Platform used for Video Visit: Italia  Signed Electronically by: SALLY Pack Ra, CNP

## 2024-07-12 ENCOUNTER — APPOINTMENT (OUTPATIENT)
Dept: LAB | Facility: CLINIC | Age: 49
End: 2024-07-12
Payer: MEDICARE

## 2024-07-12 PROCEDURE — 87209 SMEAR COMPLEX STAIN: CPT | Mod: 95 | Performed by: NURSE PRACTITIONER

## 2024-07-12 PROCEDURE — 87177 OVA AND PARASITES SMEARS: CPT | Mod: 95 | Performed by: NURSE PRACTITIONER

## 2024-07-15 LAB — O+P STL MICRO: NEGATIVE

## 2024-07-16 ENCOUNTER — MYC MEDICAL ADVICE (OUTPATIENT)
Dept: FAMILY MEDICINE | Facility: CLINIC | Age: 49
End: 2024-07-16
Payer: MEDICARE

## 2024-07-16 NOTE — TELEPHONE ENCOUNTER
She should do an in clinic visit.  I'm not sure if she wants to come to us or closer to home, but in clinic should be done.  GARRISON

## 2024-07-23 ENCOUNTER — OFFICE VISIT (OUTPATIENT)
Dept: FAMILY MEDICINE | Facility: CLINIC | Age: 49
End: 2024-07-23
Payer: MEDICARE

## 2024-07-23 VITALS
DIASTOLIC BLOOD PRESSURE: 76 MMHG | SYSTOLIC BLOOD PRESSURE: 108 MMHG | BODY MASS INDEX: 30.31 KG/M2 | HEART RATE: 60 BPM | WEIGHT: 200 LBS | TEMPERATURE: 97.4 F | RESPIRATION RATE: 14 BRPM | OXYGEN SATURATION: 96 % | HEIGHT: 68 IN

## 2024-07-23 DIAGNOSIS — Z90.49 S/P LAPAROSCOPIC CHOLECYSTECTOMY: ICD-10-CM

## 2024-07-23 DIAGNOSIS — F33.1 MODERATE EPISODE OF RECURRENT MAJOR DEPRESSIVE DISORDER (H): ICD-10-CM

## 2024-07-23 DIAGNOSIS — R10.11 ABDOMINAL PAIN, RIGHT UPPER QUADRANT: Primary | ICD-10-CM

## 2024-07-23 DIAGNOSIS — R10.13 DYSPEPSIA: ICD-10-CM

## 2024-07-23 DIAGNOSIS — Z87.19 HISTORY OF REPAIR OF HIATAL HERNIA: ICD-10-CM

## 2024-07-23 DIAGNOSIS — R19.5 CHANGE IN STOOL: ICD-10-CM

## 2024-07-23 DIAGNOSIS — Z98.890 HISTORY OF REPAIR OF HIATAL HERNIA: ICD-10-CM

## 2024-07-23 LAB
ALBUMIN SERPL BCG-MCNC: 4.1 G/DL (ref 3.5–5.2)
ALP SERPL-CCNC: 103 U/L (ref 40–150)
ALT SERPL W P-5'-P-CCNC: 24 U/L (ref 0–50)
ANION GAP SERPL CALCULATED.3IONS-SCNC: 7 MMOL/L (ref 7–15)
AST SERPL W P-5'-P-CCNC: 31 U/L (ref 0–45)
BASOPHILS # BLD AUTO: 0 10E3/UL (ref 0–0.2)
BASOPHILS NFR BLD AUTO: 0 %
BILIRUB SERPL-MCNC: 0.6 MG/DL
BUN SERPL-MCNC: 6 MG/DL (ref 6–20)
CALCIUM SERPL-MCNC: 8.9 MG/DL (ref 8.8–10.4)
CHLORIDE SERPL-SCNC: 102 MMOL/L (ref 98–107)
CREAT SERPL-MCNC: 0.86 MG/DL (ref 0.51–0.95)
EGFRCR SERPLBLD CKD-EPI 2021: 83 ML/MIN/1.73M2
EOSINOPHIL # BLD AUTO: 0.2 10E3/UL (ref 0–0.7)
EOSINOPHIL NFR BLD AUTO: 3 %
ERYTHROCYTE [DISTWIDTH] IN BLOOD BY AUTOMATED COUNT: 12 % (ref 10–15)
GLUCOSE SERPL-MCNC: 87 MG/DL (ref 70–99)
HCO3 SERPL-SCNC: 28 MMOL/L (ref 22–29)
HCT VFR BLD AUTO: 44.5 % (ref 35–47)
HGB BLD-MCNC: 15.1 G/DL (ref 11.7–15.7)
IMM GRANULOCYTES # BLD: 0.1 10E3/UL
IMM GRANULOCYTES NFR BLD: 1 %
LIPASE SERPL-CCNC: 22 U/L (ref 13–60)
LYMPHOCYTES # BLD AUTO: 2.6 10E3/UL (ref 0.8–5.3)
LYMPHOCYTES NFR BLD AUTO: 29 %
MCH RBC QN AUTO: 31.7 PG (ref 26.5–33)
MCHC RBC AUTO-ENTMCNC: 33.9 G/DL (ref 31.5–36.5)
MCV RBC AUTO: 94 FL (ref 78–100)
MONOCYTES # BLD AUTO: 0.6 10E3/UL (ref 0–1.3)
MONOCYTES NFR BLD AUTO: 6 %
NEUTROPHILS # BLD AUTO: 5.7 10E3/UL (ref 1.6–8.3)
NEUTROPHILS NFR BLD AUTO: 61 %
PLATELET # BLD AUTO: 375 10E3/UL (ref 150–450)
POTASSIUM SERPL-SCNC: 4.4 MMOL/L (ref 3.4–5.3)
PROT SERPL-MCNC: 6.5 G/DL (ref 6.4–8.3)
RBC # BLD AUTO: 4.76 10E6/UL (ref 3.8–5.2)
SODIUM SERPL-SCNC: 137 MMOL/L (ref 135–145)
WBC # BLD AUTO: 9.2 10E3/UL (ref 4–11)

## 2024-07-23 PROCEDURE — 85025 COMPLETE CBC W/AUTO DIFF WBC: CPT | Performed by: INTERNAL MEDICINE

## 2024-07-23 PROCEDURE — 36415 COLL VENOUS BLD VENIPUNCTURE: CPT | Performed by: INTERNAL MEDICINE

## 2024-07-23 PROCEDURE — 83690 ASSAY OF LIPASE: CPT | Performed by: INTERNAL MEDICINE

## 2024-07-23 PROCEDURE — 99214 OFFICE O/P EST MOD 30 MIN: CPT | Performed by: INTERNAL MEDICINE

## 2024-07-23 PROCEDURE — 80053 COMPREHEN METABOLIC PANEL: CPT | Performed by: INTERNAL MEDICINE

## 2024-07-23 ASSESSMENT — PAIN SCALES - GENERAL: PAINLEVEL: MILD PAIN (2)

## 2024-07-23 NOTE — PROGRESS NOTES
"  Assessment & Plan     1.  Abdominal pain mostly right upper quadrant but on clinical exam the tenderness is more significant in the epigastric area.  Chronic abdominal symptoms but worse the past 3 weeks.  Will further investigate with ultrasound of the abdomen, stool for Helicobacter, CBC with differential, CMP and a lipase.  Will get back to the patient with recommendation.  2.  Dyspepsia.  Also chronic issue but more so last 3 weeks involving the right flank and right upper quadrant with some back discomfort.  3.  Change in bowel habits.  Stool more narrow and tender the last 3 weeks.  Stool for ova parasite performed recently negative.  4.  S/p laparoscopic cholecystectomy.  His  5.  History of repair of hiatal hernia with LINX magnetic splinter augmentation.  6.  Last upper and lower endoscopic evaluation in 2022.  7.  Moderate episode of recurrent major depression being treated with escitalopram.    BMI  Estimated body mass index is 30.41 kg/m  as calculated from the following:    Height as of this encounter: 1.727 m (5' 8\").    Weight as of this encounter: 90.7 kg (200 lb).       Aliza Coley is a 48 year old, presenting for the following health issues:  GI Problem (Infection/possible worm)      7/23/2024    12:27 PM   Additional Questions   Roomed by Anthony   Accompanied by self         7/23/2024    12:27 PM   Patient Reported Additional Medications   Patient reports taking the following new medications no     GI Problem       Concern - GI infection issues  Onset: 3 weeks  Description: possible 1 worn passed, bowel movement changed, bloated, pain sensation on right side. Different stool shapes/sizes, lower back pain   Intensity: mild  Progression of Symptoms:  same  Accompanying Signs & Symptoms: possible 1 worn passed, bowel movement changed, bloated, pain sensation on right side. Different stool shapes/sizes  Previous history of similar problem: choledochal cyst   Precipitating factors:        " "Worsened by: end of day, morning for back pain   Alleviating factors:        Improved by: N/A  Therapies tried and outcome: miralax, stool softeners     48-year-old lady with longstanding history of GI symptoms, status post cholecystectomy, status post hiatal hernia repair with Lynx magnetic splinter augmentation presents with history of significant increase in her symptoms of dyspepsia in the past 3 weeks along with pain and discomfort right flank and right upper quadrant as well as back.  She has noticed that her stool is narrow and longer but no blood or pus.  Few weeks ago she thought she may have seen a worm.  On 7/11/2024 she had a virtual visit and stool for ova parasite was performed.  The test came back negative.  No change in weight.  No nausea or vomiting.  Her symptoms do not seem to get worse to better with eating.    On review of medical records it appears she has had extensive evaluation with multiple endoscopies, ERCP etc.    Review of Systems  Constitutional, HEENT, cardiovascular, pulmonary, GI, , musculoskeletal, neuro, skin, endocrine and psych systems are negative, except as otherwise noted.      Objective    /76 (BP Location: Right arm, Patient Position: Sitting, Cuff Size: Adult Regular)   Pulse 60   Temp 97.4  F (36.3  C) (Temporal)   Resp 14   Ht 1.727 m (5' 8\")   Wt 90.7 kg (200 lb)   LMP 01/01/2022 (Exact Date)   SpO2 96%   BMI 30.41 kg/m    Body mass index is 30.41 kg/m .  Physical Exam   GENERAL: alert and no distress  NECK: no adenopathy, no asymmetry, masses, or scars  RESP: lungs clear to auscultation - no rales, rhonchi or wheezes  CV: regular rate and rhythm, normal S1 S2, no S3 or S4, no murmur, click or rub, no peripheral edema  ABDOMEN: tenderness noted in the epigastric area and minimal in the right upper quadrant., bowel sounds normal, no palpable or pulsatile masses.  MS: no gross musculoskeletal defects noted, no edema            Signed Electronically by: " Michael Matute MD

## 2024-07-24 PROCEDURE — 87338 HPYLORI STOOL AG IA: CPT | Performed by: INTERNAL MEDICINE

## 2024-07-25 LAB — H PYLORI AG STL QL IA: NEGATIVE

## 2024-08-01 ENCOUNTER — ANCILLARY PROCEDURE (OUTPATIENT)
Dept: ULTRASOUND IMAGING | Facility: CLINIC | Age: 49
End: 2024-08-01
Attending: INTERNAL MEDICINE
Payer: MEDICARE

## 2024-08-01 DIAGNOSIS — R10.13 DYSPEPSIA: ICD-10-CM

## 2024-08-01 DIAGNOSIS — R10.11 ABDOMINAL PAIN, RIGHT UPPER QUADRANT: ICD-10-CM

## 2024-08-01 DIAGNOSIS — R19.5 CHANGE IN STOOL: ICD-10-CM

## 2024-08-01 PROCEDURE — 76700 US EXAM ABDOM COMPLETE: CPT

## 2024-08-02 ENCOUNTER — MYC MEDICAL ADVICE (OUTPATIENT)
Dept: FAMILY MEDICINE | Facility: CLINIC | Age: 49
End: 2024-08-02
Payer: MEDICARE

## 2024-08-02 DIAGNOSIS — K83.8 DILATION OF COMMON BILE DUCT: Primary | ICD-10-CM

## 2024-08-02 DIAGNOSIS — K76.9 LIVER LESION, RIGHT LOBE: ICD-10-CM

## 2024-08-22 ENCOUNTER — HOSPITAL ENCOUNTER (OUTPATIENT)
Dept: MRI IMAGING | Facility: CLINIC | Age: 49
Discharge: HOME | End: 2024-08-22
Attending: INTERNAL MEDICINE
Payer: MEDICARE

## 2024-08-22 DIAGNOSIS — K83.8 DILATION OF COMMON BILE DUCT: ICD-10-CM

## 2024-08-22 PROCEDURE — 255N000002 HC RX 255 OP 636: Performed by: INTERNAL MEDICINE

## 2024-08-22 PROCEDURE — 74183 MRI ABD W/O CNTR FLWD CNTR: CPT | Mod: MG

## 2024-08-22 PROCEDURE — A9585 GADOBUTROL INJECTION: HCPCS | Performed by: INTERNAL MEDICINE

## 2024-08-22 RX ORDER — GADOBUTROL 604.72 MG/ML
9 INJECTION INTRAVENOUS ONCE
Status: COMPLETED | OUTPATIENT
Start: 2024-08-22 | End: 2024-08-22

## 2024-08-22 RX ADMIN — GADOBUTROL 9 ML: 604.72 INJECTION INTRAVENOUS at 19:51

## 2024-08-23 ENCOUNTER — MYC MEDICAL ADVICE (OUTPATIENT)
Dept: FAMILY MEDICINE | Facility: CLINIC | Age: 49
End: 2024-08-23
Payer: MEDICARE

## 2024-09-05 ENCOUNTER — PATIENT OUTREACH (OUTPATIENT)
Dept: CARE COORDINATION | Facility: CLINIC | Age: 49
End: 2024-09-05
Payer: MEDICARE

## 2024-09-23 DIAGNOSIS — F33.42 RECURRENT MAJOR DEPRESSIVE DISORDER, IN FULL REMISSION (H): ICD-10-CM

## 2024-09-24 RX ORDER — ESCITALOPRAM OXALATE 20 MG/1
20 TABLET ORAL DAILY
Qty: 90 TABLET | Refills: 0 | Status: SHIPPED | OUTPATIENT
Start: 2024-09-24

## 2024-09-25 ENCOUNTER — PATIENT OUTREACH (OUTPATIENT)
Dept: CARE COORDINATION | Facility: CLINIC | Age: 49
End: 2024-09-25
Payer: MEDICARE

## 2024-12-08 NOTE — PROGRESS NOTES
Assessment & Plan     Strep throat    You have had a positive test for strep throat. Strep throat is a contagious illness. It's spread by coughing, kissing, sharing glasses or eating utensils, or by touching others after touching your mouth or nose. Symptoms include throat pain that is worse with swallowing, aching all over, headache, swollen lymph nodes at the front of the neck, and red swollen tonsils sometimes with white patches and fever. It's treated with antibiotic medicine. This should help you start to feel better in 1 to 2 days.     augmentin for strep throat  Magic mouthwash for throat pain  OTC Motrin    - Streptococcus A Rapid Screen w/Reflex to PCR  - Influenza A/B antigen  - amoxicillin-clavulanate (AUGMENTIN) 875-125 MG tablet; Take 1 tablet by mouth 2 times daily for 10 days  - magic mouthwash suspension, diphenhydrAMINE, lidocaine, aluminum-magnesium & simethicone, (FIRST-MOUTHWASH BLM) compounding kit; Swish and swallow 5-10 mLs in mouth every 6 hours as needed for mouth sores    Influenza A    Patient given information about influenza.  Patient understands they are contagious until their fever has resolved without the use of motrin or tylenol.  At that time they can return to school/work.  Patient is to monitor for any worsening symptoms and return to the clinic if this occurs.  The most common complication of influenza is Pneumonia or other respiratory problems especially in those with underlying lung problems including asthma and COPD.  Patient will follow up if this occurs.    - oseltamivir (TAMIFLU) 75 MG capsule; Take 1 capsule (75 mg) by mouth 2 times daily for 5 days    Acute bacterial bronchitis    Bronchitis is an infection of the air passages (bronchial tubes) in your lungs. It often occurs when you have a cold. This illness is contagious during the first few days and is spread through the air by coughing and sneezing, or by direct contact (touching the sick person and then touching  Right adductor canal peripheral nerve block    Start time: 2/24/2023 8:43 AM  End time: 2/24/2023 8:45 AM  Performed by: Jennifer Sousa MD  Authorized by: Jennifer Sousa MD       Pre-procedure: Indications: at surgeon's request and post-op pain management    Preanesthetic Checklist: patient identified, risks and benefits discussed, site marked, timeout performed, anesthesia consent given, patient being monitored and fire risk safety assessment completed and verbalized    Timeout Time: 08:43 EST      Block Type:   Block Type:   Adductor canal block  Laterality:  Right  Monitoring:  Standard ASA monitoring, continuous pulse ox, responsive to questions and oxygen  Injection Technique:  Single shot  Procedures: ultrasound guided    Patient Position: supine  Prep: chlorhexidine    Location:  Mid thigh  Needle Type:  Stimuplex  Needle Gauge:  22 G  Needle Localization:  Anatomical landmarks and ultrasound guidance  Med Admin Time: 2/24/2023 8:43 AM    Assessment:  Number of attempts:  1  Injection Assessment:  Incremental injection every 5 mL, no paresthesia, ultrasound image on chart, no intravascular symptoms, local visualized surrounding nerve on ultrasound and negative aspiration for blood  Patient tolerance:  Patient tolerated the procedure well with no immediate complications  Excellent visualization on US your own eyes, nose, or mouth).  Symptoms of bronchitis include cough with mucus (phlegm) and low-grade fever. Bronchitis usually lasts 7 to 14 days. Mild cases can be treated with simple home remedies. More severe infection is treated with an antibiotic.    - amoxicillin-clavulanate (AUGMENTIN) 875-125 MG tablet; Take 1 tablet by mouth 2 times daily for 10 days    Review of external notes as documented elsewhere in note       Nicotine/Tobacco Cessation:  She reports that she has been smoking cigarettes. She has a 29.00 pack-year smoking history. She has quit using smokeless tobacco.  Nicotine/Tobacco Cessation Plan:         At today's visit with Vianca Kumar , we discussed results, diagnosis, medications and formulated a plan.  We also discussed red flags for immediate return to clinic/ER, as well as indications for follow up with PCP if not improved in 3 days. Patient understood and agreed to plan. Vianca Kumar was discharged with stable vitals and has no further questions.       No follow-ups on file.    Alberto Thomas, [facility], PA-C  [facility]    Aliza Coley is a 47 year old, presenting for the following health issues:  Pharyngitis (Sore throat X 3 days-niece has pos strep)      HPI   Review of Systems   Constitutional, HEENT, cardiovascular, pulmonary, gi and gu systems are negative, except as otherwise noted.      Objective    /74   Pulse 98   Temp (!) 101.6  F (38.7  C) (Tympanic)   Resp 16   SpO2 96%   There is no height or weight on file to calculate BMI.  Physical Exam   GENERAL: healthy, alert and no distress  EYES: Eyes grossly normal to inspection, PERRL and conjunctivae and sclerae normal  HENT: normal cephalic/atraumatic, ear canals and TM's normal, nose and mouth without ulcers or lesions, oropharynx clear, oral mucous membranes moist, tonsillar hypertrophy and tonsillar erythema  NECK: cervical adenopathy   RESP: lungs clear to auscultation - no  rales, rhonchi or wheezes  CV: regular rate and rhythm, normal S1 S2, no S3 or S4, no murmur, click or rub, no peripheral edema and peripheral pulses strong  MS: no gross musculoskeletal defects noted, no edema  SKIN: no suspicious lesions or rashes  NEURO: Normal strength and tone, mentation intact and speech normal  PSYCH: mentation appears normal, affect normal/bright        Results for orders placed or performed in visit on 12/02/22   Streptococcus A Rapid Screen w/Reflex to PCR     Status: Abnormal    Specimen: Throat; Swab   Result Value Ref Range    Group A Strep antigen Positive (A) Negative   Influenza A/B antigen     Status: Abnormal    Specimen: Nose; Swab   Result Value Ref Range    Influenza A antigen Positive (A) Negative    Influenza B antigen Negative Negative    Narrative    Test results must be correlated with clinical data. If necessary, results should be confirmed by a molecular assay or viral culture.                  (E4) spontaneous

## 2025-01-09 PROBLEM — L03.211 FACIAL CELLULITIS: Status: RESOLVED | Noted: 2023-11-24 | Resolved: 2025-01-09

## 2025-01-09 PROBLEM — F33.1 MODERATE EPISODE OF RECURRENT MAJOR DEPRESSIVE DISORDER (H): Status: RESOLVED | Noted: 2024-07-23 | Resolved: 2025-01-09

## 2025-01-27 DIAGNOSIS — F33.42 RECURRENT MAJOR DEPRESSIVE DISORDER, IN FULL REMISSION: ICD-10-CM

## 2025-01-27 RX ORDER — ESCITALOPRAM OXALATE 20 MG/1
20 TABLET ORAL DAILY
Qty: 90 TABLET | Refills: 0 | Status: SHIPPED | OUTPATIENT
Start: 2025-01-27

## 2025-03-05 ENCOUNTER — PATIENT OUTREACH (OUTPATIENT)
Dept: CARE COORDINATION | Facility: CLINIC | Age: 50
End: 2025-03-05
Payer: MEDICARE

## 2025-04-01 ENCOUNTER — OFFICE VISIT (OUTPATIENT)
Dept: URGENT CARE | Facility: URGENT CARE | Age: 50
End: 2025-04-01
Payer: MEDICARE

## 2025-04-01 VITALS
DIASTOLIC BLOOD PRESSURE: 82 MMHG | OXYGEN SATURATION: 98 % | WEIGHT: 201.8 LBS | BODY MASS INDEX: 30.68 KG/M2 | RESPIRATION RATE: 16 BRPM | SYSTOLIC BLOOD PRESSURE: 137 MMHG | HEART RATE: 75 BPM | TEMPERATURE: 97.2 F

## 2025-04-01 DIAGNOSIS — L72.3 INFECTED SEBACEOUS CYST: Primary | ICD-10-CM

## 2025-04-01 DIAGNOSIS — L08.9 INFECTED SEBACEOUS CYST: Primary | ICD-10-CM

## 2025-04-01 PROCEDURE — 3079F DIAST BP 80-89 MM HG: CPT | Performed by: PHYSICIAN ASSISTANT

## 2025-04-01 PROCEDURE — 3075F SYST BP GE 130 - 139MM HG: CPT | Performed by: PHYSICIAN ASSISTANT

## 2025-04-01 PROCEDURE — 99213 OFFICE O/P EST LOW 20 MIN: CPT | Performed by: PHYSICIAN ASSISTANT

## 2025-04-01 RX ORDER — CLINDAMYCIN HYDROCHLORIDE 300 MG/1
300 CAPSULE ORAL 4 TIMES DAILY
Qty: 40 CAPSULE | Refills: 0 | Status: SHIPPED | OUTPATIENT
Start: 2025-04-01 | End: 2025-04-11

## 2025-04-01 NOTE — PROGRESS NOTES
Patient presents with:  Urgent Care  Abscess: Possible abscess locate on her back and worries about having a staph infection. Now experiencing body aches and chills in the last 24 hrs    (L72.3,  L08.9) Infected sebaceous cyst  (primary encounter diagnosis)  Comment:   Plan: clindamycin (CLEOCIN) 300 MG capsule        Cyst is currently draining.    Clean twice a day and apply bacitracin and dressing to prevent it from scabbing over until everything has drained out of it.      Hot water bottle as warm compress to area for 20 minutes 4 times a day until resolved.      Treat as though it may be contagious.    To Emergency room should symptoms worsen such as fever or severe pain.     Take probiotic (like yogurt) 2 hours after taking antibiotic.  At least twice a day.     At the end of the encounter, I discussed results, diagnosis, medications. Discussed red flags for immediate return to clinic/ER, as well as indications for follow up if no improvement. Patient understood and agreed to plan. Patient was stable for discharge     If not improving or if condition worsens, follow up with your Primary Care Provider          SUBJECTIVE:   Vianca Kumar is a 49 year old female who presents today with a painful lump on her left upper back.  Denies any fevers.  Has had abscesses/boils in the past.      Patient Active Problem List   Diagnosis    MDD (major depressive disorder)    Obesity (BMI 30-39.9)         Past Medical History:   Diagnosis Date    MDD (major depressive disorder)     Obesity (BMI 30-39.9)          Current Outpatient Medications   Medication Sig Dispense Refill    Multiple Vitamins-Iron (DAILY-ALIS/IRON/BETA-CAROTENE) TABS TAKE 1 TABLET BY MOUTH DAILY. (Patient not taking: Reported on 10/19/2020) 30 tablet 7     Social History     Tobacco Use    Smoking status: Never Smoker    Smokeless tobacco: Never Used   Substance Use Topics    Alcohol use: Not on file     Family History   Problem Relation Age of Onset     Diabetes Mother     Diabetes Father          ROS:    10 point ROS of systems including Constitutional, Eyes, Respiratory, Cardiovascular, Gastroenterology, Genitourinary, Integumentary, Muscularskeletal, Psychiatric ,neurological were all negative except for pertinent positives noted in my HPI       OBJECTIVE:  /82   Pulse 75   Temp 97.2  F (36.2  C) (Tympanic)   Resp 16   Wt 91.5 kg (201 lb 12.8 oz)   LMP 01/01/2022 (Exact Date)   SpO2 98%   BMI 30.68 kg/m    Physical Exam:  GENERAL APPEARANCE: healthy, alert and no distress  SKIN: Erythematous raised lump on left upper back with central pustule, which drains a small amount of purulent material with slight pressure.    Procedure: Wound is cleaned and dressed with bacitracin and dressing.

## 2025-04-01 NOTE — PATIENT INSTRUCTIONS
(L72.3,  L08.9) Infected sebaceous cyst  (primary encounter diagnosis)  Comment:   Plan: clindamycin (CLEOCIN) 300 MG capsule        Cyst is currently draining.    Clean twice a day and apply bacitracin and dressing to prevent it from scabbing over until everything has drained out of it.      Hot water bottle as warm compress to area for 20 minutes 4 times a day until resolved.      Treat as though it may be contagious.    To Emergency room should symptoms worsen such as fever or severe pain.     Take probiotic (like yogurt) 2 hours after taking antibiotic.  At least twice a day.

## 2025-04-02 ENCOUNTER — PATIENT OUTREACH (OUTPATIENT)
Dept: CARE COORDINATION | Facility: CLINIC | Age: 50
End: 2025-04-02
Payer: MEDICARE

## 2025-04-07 ENCOUNTER — OFFICE VISIT (OUTPATIENT)
Dept: INTERNAL MEDICINE | Facility: CLINIC | Age: 50
End: 2025-04-07
Payer: MEDICARE

## 2025-04-07 VITALS
RESPIRATION RATE: 16 BRPM | WEIGHT: 202.5 LBS | HEART RATE: 81 BPM | SYSTOLIC BLOOD PRESSURE: 115 MMHG | HEIGHT: 68 IN | DIASTOLIC BLOOD PRESSURE: 82 MMHG | OXYGEN SATURATION: 97 % | BODY MASS INDEX: 30.69 KG/M2 | TEMPERATURE: 97.3 F

## 2025-04-07 DIAGNOSIS — H91.93 BILATERAL HEARING LOSS, UNSPECIFIED HEARING LOSS TYPE: ICD-10-CM

## 2025-04-07 DIAGNOSIS — L72.3 INFECTED SEBACEOUS CYST: ICD-10-CM

## 2025-04-07 DIAGNOSIS — L08.9 INFECTED SEBACEOUS CYST: ICD-10-CM

## 2025-04-07 DIAGNOSIS — Z13.220 SCREENING FOR CHOLESTEROL LEVEL: ICD-10-CM

## 2025-04-07 DIAGNOSIS — Z12.31 ENCOUNTER FOR SCREENING MAMMOGRAM FOR BREAST CANCER: ICD-10-CM

## 2025-04-07 DIAGNOSIS — Z00.00 MEDICARE ANNUAL WELLNESS VISIT, SUBSEQUENT: Primary | ICD-10-CM

## 2025-04-07 DIAGNOSIS — R73.01 IMPAIRED FASTING GLUCOSE: ICD-10-CM

## 2025-04-07 DIAGNOSIS — F10.90 ALCOHOL USE DISORDER: ICD-10-CM

## 2025-04-07 DIAGNOSIS — F33.42 RECURRENT MAJOR DEPRESSIVE DISORDER, IN FULL REMISSION: ICD-10-CM

## 2025-04-07 DIAGNOSIS — Z13.1 SCREENING FOR DIABETES MELLITUS: ICD-10-CM

## 2025-04-07 DIAGNOSIS — Z11.51 ENCOUNTER FOR SCREENING FOR HUMAN PAPILLOMAVIRUS (HPV): ICD-10-CM

## 2025-04-07 LAB
ALBUMIN SERPL BCG-MCNC: 4.4 G/DL (ref 3.5–5.2)
ALP SERPL-CCNC: 109 U/L (ref 40–150)
ALT SERPL W P-5'-P-CCNC: 37 U/L (ref 0–50)
ANION GAP SERPL CALCULATED.3IONS-SCNC: 12 MMOL/L (ref 7–15)
AST SERPL W P-5'-P-CCNC: 43 U/L (ref 0–45)
BILIRUB SERPL-MCNC: 0.8 MG/DL
BUN SERPL-MCNC: 9.4 MG/DL (ref 6–20)
CALCIUM SERPL-MCNC: 9.4 MG/DL (ref 8.8–10.4)
CHLORIDE SERPL-SCNC: 104 MMOL/L (ref 98–107)
CHOLEST SERPL-MCNC: 210 MG/DL
CREAT SERPL-MCNC: 0.76 MG/DL (ref 0.51–0.95)
EGFRCR SERPLBLD CKD-EPI 2021: >90 ML/MIN/1.73M2
EST. AVERAGE GLUCOSE BLD GHB EST-MCNC: 97 MG/DL
FASTING STATUS PATIENT QL REPORTED: NO
FASTING STATUS PATIENT QL REPORTED: NO
GLUCOSE SERPL-MCNC: 100 MG/DL (ref 70–99)
HBA1C MFR BLD: 5 % (ref 0–5.6)
HCO3 SERPL-SCNC: 25 MMOL/L (ref 22–29)
HDLC SERPL-MCNC: 86 MG/DL
LDLC SERPL CALC-MCNC: 111 MG/DL
NONHDLC SERPL-MCNC: 124 MG/DL
POTASSIUM SERPL-SCNC: 4.4 MMOL/L (ref 3.4–5.3)
PROT SERPL-MCNC: 7.1 G/DL (ref 6.4–8.3)
SODIUM SERPL-SCNC: 141 MMOL/L (ref 135–145)
TRIGL SERPL-MCNC: 67 MG/DL

## 2025-04-07 PROCEDURE — 80061 LIPID PANEL: CPT | Performed by: INTERNAL MEDICINE

## 2025-04-07 PROCEDURE — 3079F DIAST BP 80-89 MM HG: CPT | Performed by: INTERNAL MEDICINE

## 2025-04-07 PROCEDURE — 80053 COMPREHEN METABOLIC PANEL: CPT | Performed by: INTERNAL MEDICINE

## 2025-04-07 PROCEDURE — G0145 SCR C/V CYTO,THINLAYER,RESCR: HCPCS | Performed by: INTERNAL MEDICINE

## 2025-04-07 PROCEDURE — 99459 PELVIC EXAMINATION: CPT | Performed by: INTERNAL MEDICINE

## 2025-04-07 PROCEDURE — 83036 HEMOGLOBIN GLYCOSYLATED A1C: CPT | Performed by: INTERNAL MEDICINE

## 2025-04-07 PROCEDURE — 3074F SYST BP LT 130 MM HG: CPT | Performed by: INTERNAL MEDICINE

## 2025-04-07 PROCEDURE — G0439 PPPS, SUBSEQ VISIT: HCPCS | Performed by: INTERNAL MEDICINE

## 2025-04-07 PROCEDURE — 36415 COLL VENOUS BLD VENIPUNCTURE: CPT | Performed by: INTERNAL MEDICINE

## 2025-04-07 PROCEDURE — 87624 HPV HI-RISK TYP POOLED RSLT: CPT | Performed by: INTERNAL MEDICINE

## 2025-04-07 PROCEDURE — 99213 OFFICE O/P EST LOW 20 MIN: CPT | Mod: 25 | Performed by: INTERNAL MEDICINE

## 2025-04-07 RX ORDER — CLINDAMYCIN HYDROCHLORIDE 300 MG/1
300 CAPSULE ORAL 4 TIMES DAILY
Qty: 40 CAPSULE | Refills: 0 | Status: SHIPPED | OUTPATIENT
Start: 2025-04-07 | End: 2025-04-17

## 2025-04-07 RX ORDER — ESCITALOPRAM OXALATE 20 MG/1
20 TABLET ORAL DAILY
Qty: 90 TABLET | Refills: 3 | Status: SHIPPED | OUTPATIENT
Start: 2025-04-07

## 2025-04-07 SDOH — HEALTH STABILITY: PHYSICAL HEALTH: ON AVERAGE, HOW MANY DAYS PER WEEK DO YOU ENGAGE IN MODERATE TO STRENUOUS EXERCISE (LIKE A BRISK WALK)?: 4 DAYS

## 2025-04-07 ASSESSMENT — PATIENT HEALTH QUESTIONNAIRE - PHQ9
SUM OF ALL RESPONSES TO PHQ QUESTIONS 1-9: 6
10. IF YOU CHECKED OFF ANY PROBLEMS, HOW DIFFICULT HAVE THESE PROBLEMS MADE IT FOR YOU TO DO YOUR WORK, TAKE CARE OF THINGS AT HOME, OR GET ALONG WITH OTHER PEOPLE: SOMEWHAT DIFFICULT
SUM OF ALL RESPONSES TO PHQ QUESTIONS 1-9: 6

## 2025-04-07 ASSESSMENT — SOCIAL DETERMINANTS OF HEALTH (SDOH): HOW OFTEN DO YOU GET TOGETHER WITH FRIENDS OR RELATIVES?: TWICE A WEEK

## 2025-04-07 NOTE — LETTER
04/07/25      Vianca RICARDO Kumar  1975  [address]  [facility] [address]        To whom it may concern,    Ms. Kumar can safely operate a vehicle and may proceed with applying for a 's license.     Please contact me with any question or concerns.        Whitney Perez MD   [address] [address]  T: [phone], F: [phone]

## 2025-04-07 NOTE — PROGRESS NOTES
ASSESSMENT/PLAN                                                       (Z00.00) Medicare annual wellness visit, subsequent  (primary encounter diagnosis)  Comment: PMH, PSH, FH, SH, medications, allergies, immunizations, and preventative health measures reviewed and updated as appropriate.  Plan: see below for plans.      (Z12.31) Encounter for screening mammogram for breast cancer  Plan: screening mammogram ordered - patient to schedule.     (F33.42) Recurrent major depressive disorder, in full remission  Comment: well-controlled on Lexapro.   Plan: continue present management; refills provided.     (L72.3,  L08.9) Infected sebaceous cyst  Plan: refills of clindamycin prescribed in case of ongoing symptoms (traveling soon).    (Z13.220) Screening for cholesterol level  (Z13.1) Screening for diabetes mellitus  (R73.01) Impaired fasting glucose  Plan: non-fasting labs today; recommendations to follow.     (F10.90) Alcohol use disorder  Plan: Adult Mental Health  Referral placed for outpatient substance abuse program - patient will be contacted to schedule.       (H91.93) Bilateral hearing loss, unspecified hearing loss type  Plan: Adult Audiology  Referral placed - patient will be contacted to schedule.      (Z11.51) Encounter for screening for human papillomavirus (HPV)  Comment: pap obtained today.    Appropriate preventive services were discussed with this patient, including applicable screening as appropriate for cardiovascular disease, diabetes, osteopenia/osteoporosis, and glaucoma.  As appropriate for age/gender, discussed screening for colorectal cancer, prostate cancer, breast cancer, and cervical cancer. Checklist reviewing preventive services available has been given to the patient.    Reviewed patients plan of care. The Basic Care Plan (routine screening as documented in Health Maintenance) for Vianca Peters meets the Care Plan requirement. This Care Plan has been established and  reviewed with the Patient.    Whitney Perez MD   [address] [address]  T: [phone], F: [phone]    ZULEMA Kumar is a very pleasant 49 year old female who presents for her subsequent AWV:    Current providers (other than myself): n/a     Past Medical History:   Diagnosis Date    MDD (major depressive disorder)     Obesity (BMI 30-39.9)      Past Surgical History:   Procedure Laterality Date    CAPSULE/PILL CAM ENDOSCOPY N/A 04/03/2018    Procedure: CAPSULE/PILL CAM ENDOSCOPY;;  Surgeon: Guru Hallie Song MD;  Location: [facility]    COLONOSCOPY N/A 12/28/2017    Procedure: COLONOSCOPY;  Surgeon: Yosi Beck MD;  Location: [facility];  Service:     COLONOSCOPY N/A 08/11/2022    Procedure: COLONOSCOPY, WITH POLYPECTOMY AND BIOPSY;  Surgeon: Lorri Holley DO;  Location: [facility]    COLONOSCOPY WITH CO2 INSUFFLATION N/A 08/11/2022    Procedure: COLONOSCOPY, WITH CO2 INSUFFLATION;  Surgeon: Lorri Holley DO;  Location: [facility]    COMBINED ESOPHAGOSCOPY, GASTROSCOPY, DUODENOSCOPY (EGD) WITH CO2 INSUFFLATION N/A 08/11/2022    Procedure: ESOPHAGOGASTRODUODENOSCOPY, WITH CO2 INSUFFLATION;  Surgeon: Lorri Holley DO;  Location: [facility]    COMBINED ESOPHAGOSCOPY, GASTROSCOPY, DUODENOSCOPY (EGD) WITH CO2 INSUFFLATION N/A 11/25/2022    Procedure: ESOPHAGOGASTRODUODENOSCOPY, WITH CO2 INSUFFLATION;  Surgeon: Lorri Holley DO;  Location: [facility]    ENDOSCOPIC ULTRASOUND UPPER GASTROINTESTINAL TRACT (GI) N/A 04/03/2018    Procedure: ENDOSCOPIC ULTRASOUND, ESOPHAGOSCOPY / UPPER GASTROINTESTINAL TRACT (GI);  EUS/Capsule ;  Surgeon: Guru Hallie Song MD;  Location: [facility]    ESOPHAGOSCOPY, GASTROSCOPY, DUODENOSCOPY (EGD), COMBINED N/A 12/28/2017    Procedure: ESOPHAGOGASTRODUODENOSCOPY (EGD);  Surgeon: Yosi Beck MD;  Location: [facility];  Service:      ESOPHAGOSCOPY, GASTROSCOPY, DUODENOSCOPY (EGD), COMBINED N/A 08/11/2022    Procedure: ESOPHAGOGASTRODUODENOSCOPY, WITH BIOPSY;  Surgeon: Lorri Holley DO;  Location: [facility]    ESOPHAGOSCOPY, GASTROSCOPY, DUODENOSCOPY (EGD), COMBINED N/A 11/25/2022    Procedure: ESOPHAGOGASTRODUODENOSCOPY, WITH BIOPSY;  Surgeon: Lorri Holley DO;  Location: [facility]    KNEE SURGERY Left     osteotomy; screws and plate in place    LAPAROSCOPIC CHOLECYSTECTOMY N/A 04/25/2018    Procedure: LAPAROSCOPIC CHOLECYSTECTOMY;  Laparoscopic Cholecystectomy ;  Surgeon: Alberto Noble MD;  Location: [facility]    LAPAROSCOPIC NISSEN FUNDOPLICATION N/A 5/23/2023    Procedure: ROBOTIC ASSISTED LAPAROSCOPIC HERNIORRHAPHY, HIATAL WITH MESH AND MAGNETIC SPHINCTER AUGMENTATION;  Surgeon: Abdiaziz Tolbert MD;  Location: [facility]     Family History   Problem Relation Age of Onset    Hypertension Mother     Hyperlipidemia Mother     Hypertension Brother     Hyperlipidemia Brother     Myocardial Infarction Maternal Grandfather 47    Myocardial Infarction Maternal Uncle         multiple later in life    Diabetes No family hx of     Breast Cancer No family hx of     Ovarian Cancer No family hx of     Colon Cancer No family hx of     Cerebrovascular Disease No family hx of      Social History     Occupational History    Occupation: Not working currently   Tobacco Use    Smoking status: Every Day     Current packs/day: 1.00     Average packs/day: 1 pack/day for 31.0 years (31.0 ttl pk-yrs)     Types: Cigarettes     Passive exposure: Current    Smokeless tobacco: Never    Tobacco comments:     34 years (as of 2025); 0.5ppd (as of 2025); 1 ppd at her most.    Vaping Use    Vaping status: Never Used   Substance and Sexual Activity    Alcohol use: Yes     Comment: 4-5 drinks/day    Drug use: Yes     Types: Marijuana     Comment: 1x/week    Sexual activity: Not Currently   Social History Narrative    Single.    No children.    Walks 3-4x/week; takes care of  niece and nephew regularly.     Allergies   Allergen Reactions    Chantix [Varenicline Tartrate] Other (See Comments)     depression    Keflex [Cephalexin] Hives    Zosyn [Piperacillin-Tazobactam In Dex] Hives     Current Outpatient Medications   Medication Sig    cetirizine (ZYRTEC) 10 MG tablet Take 10 mg by mouth daily    clindamycin (CLEOCIN) 300 MG capsule Take 1 capsule (300 mg) by mouth 4 times daily for 10 days.    escitalopram (LEXAPRO) 20 MG tablet Take 1 tablet (20 mg) by mouth daily.     Immunization History   Administered Date(s) Administered    COVID-19 Vaccine (Allan) 11/22/2021    Flu, Unspecified 01/14/2011, 10/17/2011    Influenza (IIV3) PF 01/14/2011, 10/17/2011    Influenza Vaccine, 6+MO IM (QUADRIVALENT W/PRESERVATIVES) 09/30/2015    TDAP (Adacel,Boostrix) 06/15/2023    TDAP Vaccine (Adacel) 01/14/2011, 01/19/2012    Td (Adult), Adsorbed 12/31/2001    Tdap (Adult) Unspecified Formulation 12/31/2001     PREVENTATIVE HEALTH                                                      BMI: obese  Blood pressure: within normal limits   Breast CA screening: DUE  Cervical CA screening: DUE  Colon CA screening: up to date   Lung CA screening: patient does not meet screening criteria  Dexa: not medically indicated at this time   Screening cholesterol: DUE  Screening diabetes: DUE  STD testing: not sexually active  Alcohol misuse screening: alcohol use reviewed - no intervention indicated at this time  Immunizations: reviewed;  flu shot and COVID-19 booster DUE - previously declined (not discussed at today's visit)    HEALTH RISK ASSESSMENT                                                      In general, how would you rate your overall physical health? fair  Outside of work, how many days during the week do you exercise? 4 days/week  Outside of work, approximately how many minutes a day do you exercise? 15-30 minutes    If you drink alcohol do you typically have >3 drinks per day or >7 drinks per week? Yes    "  Assistance with daily activities: No    Safety concerns: No    Fall risk assessment: completed today (see ambulatory assessments)    Hearing concerns: YES - feels that people are mumbling are not speaking clearly, needs to ask people to speak up or repeat themselves, it is hard to follow conversation in a noisy restaurant or crowded room, trouble understanding softer whispered speech, and trouble understanding speech on the telephone    In the past 6 months, have you been bothered by leaking of urine: No    Do you have a current opioid prescription? No  Do you use any other controlled substances or medications that are not prescribed by a provider? None    PHQ-2/PHQ-9 assessment: completed today (see ambulatory assessments)    Additional concerns today: No    Have you ever done Advance Care Planning? (For example, a Health Directive, POLST, or a discussion with a medical provider or your loved ones about your wishes): Yes    OBJECTIVE                                                      /82   Pulse 81   Temp 97.3  F (36.3  C) (Temporal)   Resp 16   Ht 1.727 m (5' 8\")   Wt 91.9 kg (202 lb 8 oz)   LMP 01/01/2022 (Exact Date)   SpO2 97%   BMI 30.79 kg/m    Constitutional: well-appearing  Head, Ears, and Eyes: normocephalic; normal external auditory canal and pinna; tympanic membranes visualized and normal; normal lids and conjunctivae  Neck: supple, symmetric, no thyromegaly or lymphadenopathy  Respiratory: normal respiratory effort; clear to auscultation bilaterally  Cardiovascular: regular rate and rhythm; no edema  Gastrointestinal: soft, non-tender, and non-distended; no organomegaly or masses  Genitourinary: external genitalia, urethral meatus, and vagina normal; cervix visualized and normal in appearance  Musculoskeletal: normal gait and station  Psych: normal judgment and insight; normal mood and affect; recent and remote memory intact    Cognitive impairment noted: No  ---  (Note was completed, " in part, with Dragon voice-recognition software. Documentation was reviewed, but some grammatical, spelling, and word errors may remain.)

## 2025-04-08 ENCOUNTER — PATIENT OUTREACH (OUTPATIENT)
Dept: CARE COORDINATION | Facility: CLINIC | Age: 50
End: 2025-04-08
Payer: MEDICARE

## 2025-04-08 LAB
HPV HR 12 DNA CVX QL NAA+PROBE: NEGATIVE
HPV16 DNA CVX QL NAA+PROBE: NEGATIVE
HPV18 DNA CVX QL NAA+PROBE: NEGATIVE
HUMAN PAPILLOMA VIRUS FINAL DIAGNOSIS: NORMAL

## 2025-04-10 LAB
BKR AP ASSOCIATED HPV REPORT: NORMAL
BKR LAB AP GYN ADEQUACY: NORMAL
BKR LAB AP GYN INTERPRETATION: NORMAL
BKR LAB AP PREVIOUS ABNL DX: NORMAL
BKR LAB AP PREVIOUS ABNORMAL: NORMAL
PATH REPORT.COMMENTS IMP SPEC: NORMAL
PATH REPORT.COMMENTS IMP SPEC: NORMAL
PATH REPORT.RELEVANT HX SPEC: NORMAL

## 2025-06-15 ENCOUNTER — HEALTH MAINTENANCE LETTER (OUTPATIENT)
Age: 50
End: 2025-06-15

## (undated) DEVICE — SU VICRYL 0 UR-6 27" J603H

## (undated) DEVICE — PREP CHLORAPREP 26ML TINTED HI-LITE ORANGE 930815

## (undated) DEVICE — PREP CHLORAPREP 26ML TINTED ORANGE  260815

## (undated) DEVICE — ESU GROUND PAD UNIVERSAL W/O CORD

## (undated) DEVICE — LINEN TOWEL PACK X30 5481

## (undated) DEVICE — GLOVE BIOGEL INDICATOR 7.5 LF 41675

## (undated) DEVICE — ESU PENCIL W/COATED BLADE E2450H

## (undated) DEVICE — ENDO TROCAR 05MM VERSAONE BLADELESS W/STD FIX CAN NONB5STF

## (undated) DEVICE — CATH TRAY FOLEY SURESTEP 16FR DRAIN BAG STATOCK A899916

## (undated) DEVICE — ESU PENCIL SMOKE EVAC W/ROCKER SWITCH 0703-047-000

## (undated) DEVICE — PAD CHUX UNDERPAD 23X24" 7136

## (undated) DEVICE — ENDO CANNULA 05MM VERSAONE UNIVERSAL UNVCA5STF

## (undated) DEVICE — ADH SKIN CLOSURE PREMIERPRO EXOFIN 1.0ML 3470

## (undated) DEVICE — ENDO DISSECTOR BLUNT 05MM 3/PK 173019

## (undated) DEVICE — DAVINCI XI OBTURATOR BLADELESS 8MM 470359

## (undated) DEVICE — GLOVE PROTEXIS BLUE W/NEU-THERA 7.0  2D73EB70

## (undated) DEVICE — DEVICE ACTIVE LAP PLUME FILTERATION PUREVIEW 620030100

## (undated) DEVICE — SURGICEL HEMOSTAT 4X8" 1952

## (undated) DEVICE — SOL WATER IRRIG 1000ML BOTTLE 2F7114

## (undated) DEVICE — GLOVE PROTEXIS W/NEU-THERA 7.0  2D73TE70

## (undated) DEVICE — SOL NACL 0.9% INJ 1000ML BAG 07983-09

## (undated) DEVICE — DAVINCI XI HANDPIECE ESU VESSEL SEALER 8MM EXT 480422

## (undated) DEVICE — SU STRATAFIX PDS PLUS 0 CT-1 30CM SXPP1B450

## (undated) DEVICE — DRAPE U SPLIT 74X120" 29440

## (undated) DEVICE — ENDO TROCAR BLUNT TIP KII BALLOON 12X100MM C0R47

## (undated) DEVICE — LINEN TOWEL PACK X6 WHITE 5487

## (undated) DEVICE — KIT PATIENT POSITIONING PIGAZZI LATEX FREE 40580

## (undated) DEVICE — ESU GROUND PAD ADULT W/CORD E7507

## (undated) DEVICE — ENDO TROCAR FIRST ENTRY KII FIOS Z-THRD 05X100MM CTF03

## (undated) DEVICE — ANTIFOG SOLUTION W/FOAM PAD 31142527

## (undated) DEVICE — SU MONOCRYL 4-0 PS-2 27" UND Y426H

## (undated) DEVICE — Device

## (undated) DEVICE — SU DERMABOND ADVANCED .7ML DNX12

## (undated) DEVICE — DAVINCI XI DRAPE COLUMN 470341

## (undated) DEVICE — SYR 10ML LL W/O NDL 302995

## (undated) DEVICE — CLIP APPLIER ENDO 05MM MED/LG 176630

## (undated) DEVICE — KIT ENDO FIRST STEP DISINFECTANT 200ML W/POUCH EP-4

## (undated) DEVICE — PACK GENERAL LAPAOSCOPY

## (undated) DEVICE — ENDO SHEARS 5MM 176643

## (undated) DEVICE — SU VICRYL 3-0 SH 27" J316H

## (undated) DEVICE — DAVINCI XI DRAPE ARM 470015

## (undated) DEVICE — SU MONOCRYL 4-0 PS-2 18" UND Y496G

## (undated) DEVICE — SU VICRYL 0 TIE 54" J608H

## (undated) DEVICE — DAVINCI XI SEAL UNIVERSAL 5-8MM 470361

## (undated) DEVICE — GLOVE BIOGEL PI ULTRATOUCH G SZ 7.5 42175

## (undated) DEVICE — SUCTION MANIFOLD NEPTUNE 2 SYS 4 PORT 0702-020-000

## (undated) DEVICE — DRAIN PENROSE 0.25"X18" LATEX FREE GR201

## (undated) RX ORDER — PROPOFOL 10 MG/ML
INJECTION, EMULSION INTRAVENOUS
Status: DISPENSED
Start: 2018-04-25

## (undated) RX ORDER — LIDOCAINE HYDROCHLORIDE 20 MG/ML
SOLUTION OROPHARYNGEAL
Status: DISPENSED
Start: 2023-02-09

## (undated) RX ORDER — SIMETHICONE 20 MG/.3ML
EMULSION ORAL
Status: DISPENSED
Start: 2018-04-03

## (undated) RX ORDER — LIDOCAINE HYDROCHLORIDE 20 MG/ML
INJECTION, SOLUTION EPIDURAL; INFILTRATION; INTRACAUDAL; PERINEURAL
Status: DISPENSED
Start: 2018-04-03

## (undated) RX ORDER — DEXAMETHASONE SODIUM PHOSPHATE 10 MG/ML
INJECTION, SOLUTION INTRAMUSCULAR; INTRAVENOUS
Status: DISPENSED
Start: 2023-05-23

## (undated) RX ORDER — LABETALOL HYDROCHLORIDE 5 MG/ML
INJECTION, SOLUTION INTRAVENOUS
Status: DISPENSED
Start: 2023-05-23

## (undated) RX ORDER — PROPOFOL 10 MG/ML
INJECTION, EMULSION INTRAVENOUS
Status: DISPENSED
Start: 2018-04-03

## (undated) RX ORDER — ONDANSETRON 2 MG/ML
INJECTION INTRAMUSCULAR; INTRAVENOUS
Status: DISPENSED
Start: 2023-05-23

## (undated) RX ORDER — OXYCODONE HYDROCHLORIDE 5 MG/1
TABLET ORAL
Status: DISPENSED
Start: 2018-04-25

## (undated) RX ORDER — HYDROMORPHONE HYDROCHLORIDE 1 MG/ML
INJECTION, SOLUTION INTRAMUSCULAR; INTRAVENOUS; SUBCUTANEOUS
Status: DISPENSED
Start: 2018-04-25

## (undated) RX ORDER — ONDANSETRON 2 MG/ML
INJECTION INTRAMUSCULAR; INTRAVENOUS
Status: DISPENSED
Start: 2018-04-25

## (undated) RX ORDER — PROPOFOL 10 MG/ML
INJECTION, EMULSION INTRAVENOUS
Status: DISPENSED
Start: 2023-05-23

## (undated) RX ORDER — BUPIVACAINE HYDROCHLORIDE AND EPINEPHRINE 5; 5 MG/ML; UG/ML
INJECTION, SOLUTION EPIDURAL; INTRACAUDAL; PERINEURAL
Status: DISPENSED
Start: 2018-04-25

## (undated) RX ORDER — FENTANYL CITRATE 50 UG/ML
INJECTION, SOLUTION INTRAMUSCULAR; INTRAVENOUS
Status: DISPENSED
Start: 2018-04-03

## (undated) RX ORDER — FENTANYL CITRATE 50 UG/ML
INJECTION, SOLUTION INTRAMUSCULAR; INTRAVENOUS
Status: DISPENSED
Start: 2022-08-11

## (undated) RX ORDER — FENTANYL CITRATE 50 UG/ML
INJECTION, SOLUTION INTRAMUSCULAR; INTRAVENOUS
Status: DISPENSED
Start: 2018-04-25

## (undated) RX ORDER — FENTANYL CITRATE 50 UG/ML
INJECTION, SOLUTION INTRAMUSCULAR; INTRAVENOUS
Status: DISPENSED
Start: 2023-05-23

## (undated) RX ORDER — DIPHENHYDRAMINE HYDROCHLORIDE 50 MG/ML
INJECTION INTRAMUSCULAR; INTRAVENOUS
Status: DISPENSED
Start: 2018-04-25

## (undated) RX ORDER — HYDROMORPHONE HYDROCHLORIDE 1 MG/ML
INJECTION, SOLUTION INTRAMUSCULAR; INTRAVENOUS; SUBCUTANEOUS
Status: DISPENSED
Start: 2023-05-23

## (undated) RX ORDER — LIDOCAINE HYDROCHLORIDE 10 MG/ML
INJECTION, SOLUTION INFILTRATION; PERINEURAL
Status: DISPENSED
Start: 2018-04-25

## (undated) RX ORDER — LIDOCAINE HYDROCHLORIDE 10 MG/ML
INJECTION, SOLUTION EPIDURAL; INFILTRATION; INTRACAUDAL; PERINEURAL
Status: DISPENSED
Start: 2023-05-23

## (undated) RX ORDER — LIDOCAINE HYDROCHLORIDE 20 MG/ML
INJECTION, SOLUTION EPIDURAL; INFILTRATION; INTRACAUDAL; PERINEURAL
Status: DISPENSED
Start: 2018-04-25

## (undated) RX ORDER — CEFAZOLIN SODIUM 2 G/100ML
INJECTION, SOLUTION INTRAVENOUS
Status: DISPENSED
Start: 2018-04-25

## (undated) RX ORDER — BUPIVACAINE HYDROCHLORIDE AND EPINEPHRINE 5; 5 MG/ML; UG/ML
INJECTION, SOLUTION EPIDURAL; INTRACAUDAL; PERINEURAL
Status: DISPENSED
Start: 2023-05-23